# Patient Record
Sex: MALE | Race: WHITE | NOT HISPANIC OR LATINO | Employment: FULL TIME | URBAN - METROPOLITAN AREA
[De-identification: names, ages, dates, MRNs, and addresses within clinical notes are randomized per-mention and may not be internally consistent; named-entity substitution may affect disease eponyms.]

---

## 2017-01-06 ENCOUNTER — ALLSCRIPTS OFFICE VISIT (OUTPATIENT)
Dept: OTHER | Facility: OTHER | Age: 57
End: 2017-01-06

## 2017-01-06 DIAGNOSIS — J45.909 UNCOMPLICATED ASTHMA: ICD-10-CM

## 2017-02-13 ENCOUNTER — TRANSCRIBE ORDERS (OUTPATIENT)
Dept: ADMINISTRATIVE | Facility: HOSPITAL | Age: 57
End: 2017-02-13

## 2017-02-13 ENCOUNTER — ALLSCRIPTS OFFICE VISIT (OUTPATIENT)
Dept: OTHER | Facility: OTHER | Age: 57
End: 2017-02-13

## 2017-02-13 DIAGNOSIS — E04.1 NONTOXIC UNINODULAR GOITER: Primary | ICD-10-CM

## 2017-02-13 DIAGNOSIS — E29.1 TESTICULAR HYPOFUNCTION: ICD-10-CM

## 2017-02-17 ENCOUNTER — HOSPITAL ENCOUNTER (OUTPATIENT)
Dept: ULTRASOUND IMAGING | Facility: HOSPITAL | Age: 57
Discharge: HOME | End: 2017-02-17
Attending: INTERNAL MEDICINE
Payer: COMMERCIAL

## 2017-02-17 DIAGNOSIS — E04.1 NONTOXIC UNINODULAR GOITER: ICD-10-CM

## 2017-02-17 PROCEDURE — 76536 US EXAM OF HEAD AND NECK: CPT

## 2017-02-27 ENCOUNTER — GENERIC CONVERSION - ENCOUNTER (OUTPATIENT)
Dept: OTHER | Facility: OTHER | Age: 57
End: 2017-02-27

## 2017-04-24 ENCOUNTER — ALLSCRIPTS OFFICE VISIT (OUTPATIENT)
Dept: OTHER | Facility: OTHER | Age: 57
End: 2017-04-24

## 2017-06-13 ENCOUNTER — GENERIC CONVERSION - ENCOUNTER (OUTPATIENT)
Dept: OTHER | Facility: OTHER | Age: 57
End: 2017-06-13

## 2017-07-14 ENCOUNTER — GENERIC CONVERSION - ENCOUNTER (OUTPATIENT)
Dept: OTHER | Facility: OTHER | Age: 57
End: 2017-07-14

## 2017-07-22 ENCOUNTER — GENERIC CONVERSION - ENCOUNTER (OUTPATIENT)
Dept: OTHER | Facility: OTHER | Age: 57
End: 2017-07-22

## 2017-08-17 ENCOUNTER — ALLSCRIPTS OFFICE VISIT (OUTPATIENT)
Dept: OTHER | Facility: OTHER | Age: 57
End: 2017-08-17

## 2017-09-13 ENCOUNTER — LAB CONVERSION - ENCOUNTER (OUTPATIENT)
Dept: OTHER | Facility: OTHER | Age: 57
End: 2017-09-13

## 2017-09-13 LAB
HCT VFR BLD AUTO: 47.6 % (ref 38.5–50)
HGB BLD-MCNC: 16 G/DL (ref 13.2–17.1)
PROSTATE SPECIFIC ANTIGEN TOTAL (HISTORICAL): 0.6 NG/ML
T4 FREE SERPL-MCNC: 1.1 NG/DL (ref 0.8–1.8)
TESTOSTERONE FREE (HISTORICAL): 165.9 PG/ML (ref 35–155)
TESTOSTERONE TOTAL (HISTORICAL): 768 NG/DL (ref 250–1100)
TSH SERPL DL<=0.05 MIU/L-ACNC: 2.57 MIU/L (ref 0.4–4.5)

## 2017-09-14 ENCOUNTER — GENERIC CONVERSION - ENCOUNTER (OUTPATIENT)
Dept: OTHER | Facility: OTHER | Age: 57
End: 2017-09-14

## 2017-10-24 DIAGNOSIS — E29.1 TESTICULAR HYPOFUNCTION: ICD-10-CM

## 2017-10-24 DIAGNOSIS — I10 ESSENTIAL (PRIMARY) HYPERTENSION: ICD-10-CM

## 2017-11-16 ENCOUNTER — LAB CONVERSION - ENCOUNTER (OUTPATIENT)
Dept: OTHER | Facility: OTHER | Age: 57
End: 2017-11-16

## 2017-11-16 LAB
A/G RATIO (HISTORICAL): 1.6 (CALC) (ref 1–2.5)
ALBUMIN SERPL BCP-MCNC: 4.2 G/DL (ref 3.6–5.1)
ALP SERPL-CCNC: 50 U/L (ref 40–115)
ALT SERPL W P-5'-P-CCNC: 14 U/L (ref 9–46)
AST SERPL W P-5'-P-CCNC: 17 U/L (ref 10–35)
BILIRUB SERPL-MCNC: 0.5 MG/DL (ref 0.2–1.2)
BUN SERPL-MCNC: 15 MG/DL (ref 7–25)
BUN/CREA RATIO (HISTORICAL): NORMAL (CALC) (ref 6–22)
CALCIUM SERPL-MCNC: 9.8 MG/DL (ref 8.6–10.3)
CHLORIDE SERPL-SCNC: 106 MMOL/L (ref 98–110)
CHOLEST SERPL-MCNC: 165 MG/DL
CHOLEST/HDLC SERPL: 5 (CALC)
CO2 SERPL-SCNC: 25 MMOL/L (ref 20–31)
CREAT SERPL-MCNC: 0.95 MG/DL (ref 0.7–1.33)
EGFR AFRICAN AMERICAN (HISTORICAL): 104 ML/MIN/1.73M2
EGFR-AMERICAN CALC (HISTORICAL): 90 ML/MIN/1.73M2
GAMMA GLOBULIN (HISTORICAL): 2.6 G/DL (CALC) (ref 1.9–3.7)
GLUCOSE (HISTORICAL): 94 MG/DL (ref 65–99)
HDLC SERPL-MCNC: 33 MG/DL
LDL CHOLESTEROL (HISTORICAL): 98 MG/DL (CALC)
NON-HDL-CHOL (CHOL-HDL) (HISTORICAL): 132 MG/DL (CALC)
POTASSIUM SERPL-SCNC: 4.3 MMOL/L (ref 3.5–5.3)
SODIUM SERPL-SCNC: 141 MMOL/L (ref 135–146)
TOTAL PROTEIN (HISTORICAL): 6.8 G/DL (ref 6.1–8.1)
TRIGL SERPL-MCNC: 227 MG/DL

## 2017-11-28 ENCOUNTER — ALLSCRIPTS OFFICE VISIT (OUTPATIENT)
Dept: OTHER | Facility: OTHER | Age: 57
End: 2017-11-28

## 2018-01-11 NOTE — RESULT NOTES
Message   stable thyroid nodule     Verified Results  US THYROID [id number] 12:12PM Dipak Peterson     Test Name Result Flag Reference   US THYROID (Report)     THYROID ULTRASOUND     INDICATION: Follow-up thyroid nodule  COMPARISON: 5/7/2015     TECHNIQUE:  Ultrasound of the thyroid was performed with a high frequency linear transducer in transverse and sagittal planes including volumetric imaging sweeps as well as traditional still imaging technique  FINDINGS:   Normal homogeneous smooth background echotexture  Right gland: 4 7 x 1 3 x 1 8 cm  Isoechoic nodule in the mid lobe anteriorly measures 9 x 6 mm unchanged  Left gland: 4 3 x 1 4 x 1 4 cm  No dominant nodules  Isthmus: The isthmus is 0 2 cm in AP dimension  IMPRESSION:      Stable exam with subcentimeter right thyroid nodule  Workstation performed: [id number]     Signed by:    Tasneem Blue MD   2/20/17

## 2018-01-11 NOTE — RESULT NOTES
Message   Normal, continue current dose of testosterone replacement  Verified Results  (Q) TESTOSTERONE, FREE AND TOTAL, LC/MS/MS [id number] 06:35AM Quiana Nipper     Test Name Result Flag Reference   TESTOSTERONE, TOTAL,$LC/MS/ ng/dL  250-1100   For more information on this test, go to  [email or web address]/faq/  TotalTestosteroneLCMSMS   FREE TESTOSTERONE 92 4 pg/mL  35 0-155 0       Signatures   Electronically signed by : Licha Marks, ; May 24 2016  6:07PM EST                       (Author)

## 2018-01-12 VITALS
HEIGHT: 71 IN | BODY MASS INDEX: 28.84 KG/M2 | RESPIRATION RATE: 16 BRPM | DIASTOLIC BLOOD PRESSURE: 84 MMHG | HEART RATE: 74 BPM | WEIGHT: 206.02 LBS | SYSTOLIC BLOOD PRESSURE: 138 MMHG

## 2018-01-12 NOTE — PROGRESS NOTES
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)   2  Thyroid nodule (241 0) (E04 1)    Plan  Testicular hypogonadism    · BD Eclipse Syringe 25G X 1" 3 ML Miscellaneous; use twice a week   Rx By: Ekta Prajapati; Dispense: 90 Days ; #:24 Miscellaneous; Refill: 3; For: Testicular hypogonadism; CAL = N; Verified Transmission to [id number]; Last Updated By: System, Comfy; 8/17/2017 7:34:51 AM   · Testosterone Enanthate 200 MG/ML Intramuscular Solution; Inject 80 mg IM  weekly   Rx By: Ekta Prajapati; Dispense: 90 Days ; #:3 X 5 ML Vial; Refill: 1; For: Testicular hypogonadism; CAL = N; Print Rx   · (1) HGB AND HCT, BLOOD; Status:Active; Requested for:58Bjr2223;    Perform:[facility]; Due:17Aug2018; Ordered; For:Testicular hypogonadism; Ordered By:Kuldip Morales;   · (1) TESTOSTERONE, FREE (DIRECT) AND TOTAL; Status:Active; Requested  for:32Tkw7674;    Perform:[facility]; Due:15Ncw7192; Ordered; For:Testicular hypogonadism; Ordered By:Kuldip Morales; Testicular hypogonadism, Urinary frequency    · (1) PSA, DIAGNOSTIC (FOLLOW-UP); Status:Active; Requested for:56Hnq4911;    Perform:[facility]; Due:17Aug2018; Ordered; For:Testicular hypogonadism, Urinary frequency; Ordered By:Kuldip Morales; Thyroid nodule    · (1) T4, FREE; Status:Active; Requested for:17Aug2017;    Perform:[facility]; Due:96Riu2544; Ordered; For:Thyroid nodule; Ordered By:Kuldip Morales;   · (1) TSH; Status:Active; Requested for:84Sqy5401;    Perform:[facility]; Due:47Wgp7212; Ordered; For:Thyroid nodule; Ordered By:Kuldip Morales;   · Follow-up PRN Evaluation and Treatment  Follow-up  Status: Complete  Done:  33DWB0909   Ordered; For: Thyroid nodule; Ordered By: Ekta Prajapati Performed:  Due: 33NYC8580    Discussion/Summary  Discussion Summary:   1  Hypogonadism-he has let his testosterone dosing to twice a week instead of once a week  He finds that he is having better energy throughout the week doing this compared to once a week   He is due for laboratory testing which I have ordered  Being on testosterone, he is at higher risk or developing erythrocytosis deck in the 2 DVT and pulmonary embolism, prosthetic hypertrophy, hyperlipidemia and worsening of sleep apnea  2  Thyroid nodule-the ultrasound showed stable small nodules  Check TSH and free T4  Counseling Documentation With Imm: The patient was counseled regarding diagnostic results, instructions for management, impressions  Medication SE Review and Pt Understands Tx: The treatment plan was reviewed with the patient/guardian  The patient/guardian understands and agrees with the treatment plan      Chief Complaint  Chief Complaint Free Text Note Form: Follow up  History of Present Illness  Thyroid Nodule (Brief): The patient is being seen for a routine clinic follow-up of a thyroid nodule  The patient is currently asymptomatic  No associated symptoms are reported  The patient is not currently being treated for this problem  Pertinent medical history:  no thyroid malignancy  No risk factors have been identified  Hypogonadism, Primary: The patient is being seen for follow-up of primary hypogonadism  The etiology is idiopathic  Current treatment includes intramuscular testosterone  By report, there is good compliance with treatment, good tolerance of treatment and good symptom control  Symptoms:  no fatigue, no lack of motivation, no trouble concentrating, no feeling less masculine, no low libido and no erectile dysfunction  Pertinent medical history:  no alcoholism and no cirrhosis  Family history:  no delayed puberty  Review of Systems  Endo Adult ROS Male Established v2 - [facility]:   Constitutional/General: no recent weight gain, no recent weight loss, no poor energy/fatigue, no increased energy level, no insomnia/sleep problems, no fever and no feeling weak  Heart: high blood pressure, but no chest pain/tightness, no rapid/racing heart rate and no palpitations     Genitourinary - Urinary no frequent urination, no excess urination and no urinating during the night  Eyes: no blurred vision, no double vision, no bulging eyes, no gritty/scratchy eyes and no excessive tearing  Mouth / Throat: no hoarseness and no difficulty swallowing  Neck: no lumps, no swollen glands, no neck pain, no neck stiffness and no enlarged thyroid  Respiratory: no wheezing, no asthma and no persistent cough  Musculoskeletal: no muscle aches/pain, no joint aches/pain and no muscle weakness  Skin & Hair: no dry skin, no acne, the hair texture was not oily, no hair loss and no excessive hair growth  Gastrointestinal: no constipation, no diarrhea, no waking at night to drink and no stomach ache  Neurological: no blackouts, no weakness and no tremors  Genital: no testicular pain, no testicular lumps/bumps/mass and performs monthly testicular exam    Endocrine: no feeling hot frequently, no feeling cold frequently, no shifts between feeling hot and cold, no cold hands or feet, no excessive sweating, no thyroid problems, no blood sugar problems, no excessive thirst, no excessive hunger, no change in shoe size, no nausea or vomiting and no shaky hands  Active Problems    1  Abnormal blood chemistry (790 6) (R79 9)   2  Acute maxillary sinusitis (461 0) (J01 00)   3  Anxiety disorder (300 00) (F41 9)   4  Benign essential hypertension (401 1) (I10)   5  Benign non-nodular prostatic hyperplasia without lower urinary tract symptoms (600 90)   (N40 0)   6  Bronchitis, asthmatic (493 90) (J45 909)   7  Colon cancer screening (V76 51) (Z12 11)   8  Contusion of scrotum or testis (922 4) (S30 22XA)   9  Depression with anxiety (300 4) (F41 8)   10  Dyspepsia (536 8) (K30)   11  ED (erectile dysfunction) of organic origin (607 84) (N52 9)   12  GERD (gastroesophageal reflux disease) (530 81) (K21 9)   13  Heart burn (787 1) (R12)   14  Heartburn (787 1) (R12)   15  History of allergy (V15 09) (Z88 9)   16  Homocysteinemia (270 4) (E72 11)   17  Hypercalcemia (275 42) (E83 52)   18  Hyperlipidemia (272 4) (E78 5)   19  Need for vaccination for DTP (V06 1) (Z23)   20  Nephrolithiasis (592 0) (N20 0)   21  Other benign neoplasm of skin (216 9) (D23 9)   22  Overweight (278 02) (E66 3)   23  Palpitations (785 1) (R00 2)   24  Seasonal allergies (477 9) (J30 2)   25  Sinus bradycardia (427 89) (R00 1)   26  Skin neoplasm (239 2) (D49 2)   27  Testicular hypogonadism (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)   28  Thyroid nodule (241 0) (E04 1)   29  Urinary frequency (788 41) (R35 0)    Past Medical History    1  History of Low testosterone (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)   2  Need for vaccination for DTP (V06 1) (Z23)    Surgical History    1  History of Renal Lithotripsy    Family History  Father    1  FH: CABG (coronary artery bypass surgery) (V17 3) (Z84 89)  Sister    2  Family history of atrial fibrillation (V17 49) (Z82 49)  Paternal Uncle    3  Family history of myocardial infarction (V17 3) (Z82 49)  Family History    4  Family history of Coronary Artery Disease (V17 49)   5  Family history of Diabetes Mellitus (V18 0)   6  Family history of Hypertension (V17 49)   7  Family history of Uterine Cancer (V16 49)    Social History    · Being A Social Drinker   · Daily caffeine consumption   · Denied: History of Drug Use   · Former smoker (V15 82) (Z89 266)    Current Meds   1  BD Eclipse Syringe 25G X 1" 3 ML Miscellaneous; USE AS DIRECTED; Therapy: 11LIS7673 to (67 [phone])  Requested for: 94RFF7007; Last   Rx:93Ufw2666 Ordered   2  Co Q-10 100 MG Oral Capsule; take 1 capsule daily; Therapy: (Recorded:73Xri3304) to Recorded   3  Fenofibrate 160 MG Oral Tablet; TAKE 1 TABLET DAILY; Therapy: 14ZCW0068 to (SSDTNYAL:86XXH2527)  Requested for: 68ODF5299; Last   Rx:48Hpl6112 Ordered   4  Folic Acid 1 MG Oral Tablet; TAKE 1 TABLET DAILY AS DIRECTED  Requested for:   99RHJ2584; Last Rx:09Oct2012 Ordered   5  Lisinopril 10 MG Oral Tablet;  Take 1 tablet daily; Therapy: 43ZOJ2914 to (Last Rx:76Ycf9594)  Requested for: 47Bqd2959 Ordered   6  LORazepam 0 5 MG Oral Tablet; TAKE 1 TABLET DAILY AS NEEDED; Therapy: 18KTP0491 to (Evaluate:80Iry5808); Last Rx:25Jyq7093 Ordered   7  Nasonex 50 MCG/ACT Nasal Suspension; Therapy: (Recorded:26Dqo7126) to Recorded   8  Omega 3 1200 MG Oral Capsule; TAKE 2 CAPSULE Twice daily; Therapy: (Recorded:10Ifa2597) to Recorded   9  Omeprazole 40 MG Oral Capsule Delayed Release; TAKE 1 CAPSULE Daily; Therapy: (Recorded:05Ksx1630) to Recorded   10  One-Daily Multi Vitamins TABS; TAKE 1 TABLET DAILY; Therapy: (Recorded:50Flk8800) to Recorded   11  Testosterone Enanthate 200 MG/ML Intramuscular Solution; Inject 80 mg IM weekly; Therapy: 22XHI4274 to (Evaluate:87Bur6020); Last Rx:19One4320 Ordered   12  Viagra 50 MG Oral Tablet; take 1 tablet daily 1 hour before needed; Therapy: 86JMX8193 to (Evaluate:27Oct2016)  Requested for: 02DTT9463; Last    Rx:79Ezc2956 Ordered   13  Vitamin B Complex CAPS; TAKE 1 CAPSULE DAILY; Therapy: (Recorded:32Xmv2587) to Recorded   14  Vitamin B12 100 MCG Oral Tablet; TAKE 1 TABLET DAILY AS DIRECTED; Therapy: (Recorded:68Gzg8218) to Recorded   15  Vitamin D 1000 UNIT CAPS; TAKE 1 CAPSULE Daily; Therapy: (Recorded:95Uls3455) to Recorded  Medication List Reviewed: The medication list was reviewed and updated today  Allergies    1  No Known Drug Allergies    2  No Known Environmental Allergies   3  No Known Food Allergies    Vitals  Vital Signs    Recorded: 17Aug2017 07:16AM   Heart Rate 76   Systolic 395   Diastolic 84   Height 5 ft 10 5 in   Weight 206 lb 7 04 oz   BMI Calculated 29 2   BSA Calculated 2 13     Physical Exam    Constitutional   General appearance: No acute distress, well appearing and well nourished  Eyes   Conjunctiva and lids: No swelling, erythema, or discharge  Pupils: Equal, round and reactive to light  The sclera are anicteric   Extraocular movements are intact  Ears, Nose, Mouth, and Throat   External inspection of ears, nose and lips: Normal     Oropharynx: Normal with no erythema, edema, exudate or lesions  Exam of Head: The head is atraumatic and normocephalic  Neck: The neck is supple  The thyroid is normal in size with no palpable nodules  Pulmonary   Auscultation of lungs: Clear to auscultation bilaterally with normal chest expansion  Cardiovascular   Auscultation of heart: Normal rate and rhythm with no murmurs, gallops or rubs  Abdomen   Abdomen: Abdomen is soft, non-tender with normal bowel sounds  Lymphatic   Palpation of lymph nodes: No supraclavicular or suboccipital lymphadenopathy  Musculoskeletal   Inspection/palpation of joints, bones, and muscles: Muscle bulk and tone is normal     Skin   Skin and subcutaneous tissue: Normal skin temperature and color  Neurologic   Reflexes: 2+ and symmetric  Motor Strength: Strength is 5/5 bilaterally  Psychiatric   Orientation to person, place and time: Normal     Mood and affect: Affect and attention span are normal        Results/Data  US THYROID [id number] 12:12PM Vini Hansonian     Test Name Result Flag Reference   US THYROID (Report)     THYROID ULTRASOUND     INDICATION: Follow-up thyroid nodule  COMPARISON: 5/7/2015     TECHNIQUE:  Ultrasound of the thyroid was performed with a high frequency linear transducer in transverse and sagittal planes including volumetric imaging sweeps as well as traditional still imaging technique  FINDINGS:   Normal homogeneous smooth background echotexture  Right gland: 4 7 x 1 3 x 1 8 cm  Isoechoic nodule in the mid lobe anteriorly measures 9 x 6 mm unchanged  Left gland: 4 3 x 1 4 x 1 4 cm  No dominant nodules  Isthmus: The isthmus is 0 2 cm in AP dimension  IMPRESSION:      Stable exam with subcentimeter right thyroid nodule               Workstation performed: [id number]     Signed by: Marie Booth MD   2/20/17     Future Appointments    Date/Time Provider Specialty Site   10/23/2017 08:00 AM Katelynn Brown DO [facility]     Signatures   Electronically signed by : MERRY Granado ; Aug 17 2017  7:44AM EST                       (Author)

## 2018-01-13 VITALS
BODY MASS INDEX: 28.98 KG/M2 | DIASTOLIC BLOOD PRESSURE: 80 MMHG | HEART RATE: 84 BPM | WEIGHT: 207.02 LBS | SYSTOLIC BLOOD PRESSURE: 140 MMHG | HEIGHT: 71 IN

## 2018-01-13 VITALS
SYSTOLIC BLOOD PRESSURE: 110 MMHG | HEART RATE: 76 BPM | DIASTOLIC BLOOD PRESSURE: 84 MMHG | BODY MASS INDEX: 28.9 KG/M2 | HEIGHT: 71 IN | WEIGHT: 206.44 LBS

## 2018-01-14 VITALS
DIASTOLIC BLOOD PRESSURE: 88 MMHG | HEART RATE: 81 BPM | RESPIRATION RATE: 16 BRPM | TEMPERATURE: 98.6 F | BODY MASS INDEX: 27.73 KG/M2 | WEIGHT: 198.04 LBS | OXYGEN SATURATION: 98 % | HEIGHT: 71 IN | SYSTOLIC BLOOD PRESSURE: 142 MMHG

## 2018-01-15 NOTE — RESULT NOTES
Discussion/Summary   Most of the testing is normal except for free testosterone level  Decrease testosterone to 70 mg per week  Repeat testosterone level in 6 weeks  This should be done in the middle of the cycle  Verified Results  (1) T4, FREE [id number] 07:08AM Andrew, Bankim     Test Name Result Flag Reference   T4, FREE 1 1 ng/dL  0 8-1 8     (Q) HEMOGLOBIN + HEMATOCRIT [id number] 07:08AM Andrew, Bankim     Test Name Result Flag Reference   HEMOGLOBIN 16 0 g/dL  13 2-17 1   HEMATOCRIT 47 6 %  38 5-50 0     (Q) TSH, 3RD GENERATION [id number] 07:08AM Andrew, Bankim     Test Name Result Flag Reference   TSH 2 57 mIU/L  0 40-4 50     (1) PSA (SCREEN) (Dx V76 44 Screen for Prostate Cancer) [id number] 07:08AM Nettie Bernheim     Test Name Result Flag Reference   PSA, TOTAL 0 6 ng/mL  < OR = 4 0   The total PSA value from this assay system is   standardized against the WHO standard  The test   result will be approximately 20% lower when compared   to the equimolar-standardized total PSA (Markell   Clarice)  Comparison of serial PSA results should be   interpreted with this fact in mind  This test was performed using the Siemens   chemiluminescent method  Values obtained from   different assay methods cannot be used  interchangeably  PSA levels, regardless of  value, should not be interpreted as absolute  evidence of the presence or absence of disease  (Q) TESTOSTERONE, FREE AND TOTAL, LC/MS/MS 22Kea4557 07:08AM Kuldip Morales   REPORT COMMENT:  FASTING:NO     Test Name Result Flag Reference   TESTOSTERONE, TOTAL,$LC/MS/ ng/dL  250-1100   For more information on this test, go to  [email or web address]/faq/  TotalTestosteroneLCMSMS        This test was developed and its analytical performance  characteristics have been determined by [address] [place] [address]  It has  not been cleared or approved by the [place]  Food and Drug  Administration   This assay has been validated pursuant  to the CLIA regulations and is used for clinical  purposes  FREE TESTOSTERONE 165 9 pg/mL H 35 0-155 0   This test was developed and its analytical performance  characteristics have been determined by [address] [place] [address]  It has  not been cleared or approved by the [place]  Food and Drug  Administration  This assay has been validated pursuant  to the CLIA regulations and is used for clinical  purposes

## 2018-01-16 NOTE — PROGRESS NOTES
Assessment    1  Former smoker (V15 82) (O36 865)   2  Benign essential hypertension (401 1) (I10)   3  Hyperlipidemia (272 4) (E78 5)   4  Testicular hypogonadism (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)   5  Encounter for prostate cancer screening (V76 44) (Z12 5)    Assessment and plan 1  Annual wellness examination completed for the patient overall the patient is clinically stable doing well we encouraged the patient follow healthy imbalance diet, exercise routinely, weight loss is recommended I will be ordering is routine laboratories  He is up-to-date on his flu shot, colonoscopy  I will be ordering the patient's PSA  2   Hypertension - controlled, I have counseled patient following healthy imbalance diet, I would like the patient reduce sodium, exercise routinely, I would like the patient continued the med current medical regiment and we will continue to monitor the progress  3   Hypogonadism the patient reports me that he would like me to start prescribing his testosterone he did have a conversation with Endocrinology who feels that it would be appropriate therefore I have given the patient a prescription for his testosterone and a prescription for the syringes  The patient did confirm the dose of testosterone  He does understand the risks benefits and side effects of testosterone  The patient has a narcotics contract on file and today we did check the PDMP  Return to office 6  months  call if any problems     Plan  Encounter for prostate cancer screening    · (1) TESTOSTERONE, FREE (DIRECT) AND TOTAL; Status:Active; Requested  for:28May2018;   Encounter for prostate cancer screening, Testicular hypogonadism    · (1) PSA (SCREEN) (Dx V76 44 Screen for Prostate Cancer); Status:Active; Requested  for:28May2018;   Hyperlipidemia, Testicular hypogonadism    · (1) LIPID PANEL, FASTING; Status:Active; Requested for:28May2018;    Testicular hypogonadism    · From  Testosterone Enanthate 200 MG/ML Intramuscular Solution Inject 70 mg  IM weekly To Testosterone Enanthate 200 MG/ML Intramuscular Solution Inject 80 mg IM  weekly   · BD Eclipse Syringe 25G X 1" 3 ML Miscellaneous; use twice a week   · (1) CBC/ PLT (NO DIFF); Status:Active; Requested for:20Mni6052;    · (1) COMPREHENSIVE METABOLIC PANEL; Status:Active; Requested for:44Ehd3374;     Chief Complaint  Patient is here for a yearly wellness exam       History of Present Illness  HM, Adult Male: The patient is being seen for a health maintenance evaluation  Social History: Household members include spouse  He is   Work status: working full time and occupation: Manager  The patient is a former cigarette smoker, quit smoking 1987, has never used smokeless tobacco and 12pack yrs  He reports occasional alcohol use and drinking 4 drinks per week  The patient has no concerns about alcohol abuse  He has never used illicit drugs  General Health: The patient's health since the last visit is described as good  He has regular dental visits  The patient brushes 3 time(s) a day, flosses 1 time(s) a day and reports his last dental visit was April 2017  He denies vision problems  Vision care includes an eye examination more than a year ago  He denies hearing loss  Immunizations status: up to date  Lifestyle:  He consumes a diverse and healthy diet  He has weight concerns  He exercises regularly  He exercises 3 times per week for 30 minutes per session  He does not use tobacco  He consumes alcohol  He reports occasional alcohol use  He denies drug use  Reproductive health:  the patient is sexually active  birth control is not being practiced  He complains of erectile dysfunction  Screening: Prostate cancer screening includes prostate-specific antigen testing last year  Colorectal cancer screening includes a colonoscopy within the past ten years       Safety elements used: seat belt, safe driving habits, smoke detector, carbon monoxide detector and CPR training for the patient, but no CPR training for household members  Risk findings: anxiety symptoms and mild anxiety, but no passive smoke exposure and no depression symptoms  Active Problems    1  Abnormal blood chemistry (790 6) (R79 9)   2  Acute maxillary sinusitis (461 0) (J01 00)   3  Anxiety disorder (300 00) (F41 9)   4  Benign essential hypertension (401 1) (I10)   5  Benign non-nodular prostatic hyperplasia without lower urinary tract symptoms (600 90)   (N40 0)   6  Bronchitis, asthmatic (493 90) (J45 909)   7  Colon cancer screening (V76 51) (Z12 11)   8  Contusion of scrotum or testis (922 4) (S30 22XA)   9  Depression with anxiety (300 4) (F41 8)   10  Dyspepsia (536 8) (K30)   11  ED (erectile dysfunction) of organic origin (607 84) (N52 9)   12  GERD (gastroesophageal reflux disease) (530 81) (K21 9)   13  Heart burn (787 1) (R12)   14  Heartburn (787 1) (R12)   15  History of allergy (V15 09) (Z88 9)   16  Homocysteinemia (270 4) (E72 11)   17  Hypercalcemia (275 42) (E83 52)   18  Hyperlipidemia (272 4) (E78 5)   19  Need for vaccination for DTP (V06 1) (Z23)   20  Nephrolithiasis (592 0) (N20 0)   21  Other benign neoplasm of skin (216 9) (D23 9)   22  Overweight (278 02) (E66 3)   23  Palpitations (785 1) (R00 2)   24  Seasonal allergies (477 9) (J30 2)   25  Sinus bradycardia (427 89) (R00 1)   26  Skin neoplasm (239 2) (D49 2)   27  Testicular hypogonadism (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)   28  Thyroid nodule (241 0) (E04 1)   29   Urinary frequency (788 41) (R35 0)    Past Medical History    · History of Low testosterone (259 9) (E34 9)   · Need for vaccination for DTP (V06 1) (Z23)    Surgical History    · History of Renal Lithotripsy    Family History  Father    · FH: CABG (coronary artery bypass surgery) (V17 3) (Z84 89)  Sister    · Family history of atrial fibrillation (V17 49) (Z82 49)  Paternal Uncle    · Family history of myocardial infarction (V17 3) (Z82 49)  Family History    · Family history of Coronary Artery Disease (V17 49)   · Family history of Diabetes Mellitus (V18 0)   · Family history of Hypertension (V17 49)   · Family history of Uterine Cancer (V16 49)    Social History    · Being A Social Drinker   · Daily caffeine consumption   · Denied: History of Drug Use   · Former smoker (V15 82) (S01 278)    Current Meds   1  BD Eclipse Syringe 25G X 1" 3 ML Miscellaneous; use twice a week; Therapy: 39JEU8818 to (Evaluate:17Ank1239)  Requested for: 17Aug2017; Last   Rx:17Aug2017 Ordered   2  Co Q-10 100 MG Oral Capsule; take 1 capsule daily; Therapy: (Recorded:49Gfc9411) to Recorded   3  Fenofibrate 160 MG Oral Tablet; TAKE 1 TABLET DAILY; Therapy: 30OPN1223 to (ZXSRSI:97FUL8373)  Requested for: 75HQO3552; Last   Rx:10Jwo0402 Ordered   4  Folic Acid 1 MG Oral Tablet; TAKE 1 TABLET DAILY AS DIRECTED  Requested for:   50LEK8447; Last Rx:09Oct2012 Ordered   5  Lisinopril 10 MG Oral Tablet; Take 1 tablet daily; Therapy: 96VTY8795 to (Last Rx:01Gsu8201)  Requested for: 89Tpc6457 Ordered   6  LORazepam 0 5 MG Oral Tablet; TAKE 1 TABLET DAILY AS NEEDED; Therapy: 11RUJ9356 to (Evaluate:10Nov2017)  Requested for: 74BXE5289; Last   Rx:11Oct2017; Status: ACTIVE - Renewal Denied Ordered   7  Nasonex 50 MCG/ACT Nasal Suspension; Therapy: (Recorded:96Ggt9037) to Recorded   8  Omega 3 1200 MG Oral Capsule; TAKE 2 CAPSULE Twice daily; Therapy: (Recorded:60Ucd1888) to Recorded   9  Omeprazole 40 MG Oral Capsule Delayed Release; TAKE 1 CAPSULE Daily; Therapy: (Recorded:31Giw5343) to Recorded   10  One-Daily Multi Vitamins TABS; TAKE 1 TABLET DAILY; Therapy: (Recorded:81Jmy2069) to Recorded   11  Testosterone Enanthate 200 MG/ML Intramuscular Solution; Inject 70 mg IM weekly; Therapy: 91SRV4734 to (Evaluate:14Mar2018) Recorded   12  Viagra 50 MG Oral Tablet; take 1 tablet daily 1 hour before needed; Therapy: 45KND2906 to (Evaluate:27Oct2016)  Requested for: 51ZEV8201; Last    Rx:58Gve4433 Ordered   13   Vitamin B Complex CAPS; TAKE 1 CAPSULE DAILY; Therapy: (Recorded:73Fhx6715) to Recorded   14  Vitamin B12 100 MCG Oral Tablet; TAKE 1 TABLET DAILY AS DIRECTED; Therapy: (Recorded:48Cxy0746) to Recorded   15  Vitamin D 1000 UNIT CAPS; TAKE 1 CAPSULE Daily; Therapy: (Recorded:97Vea5732) to Recorded    Allergies    1  No Known Drug Allergies    2  No Known Environmental Allergies   3  No Known Food Allergies    Vitals   Recorded: 90FDX6383 09:31AM   Heart Rate 76   Respiration 16   Systolic 199, RUE, Sitting   Diastolic 89, RUE, Sitting   Height 5 ft 10 5 in   Weight 207 lb 0 4 oz   BMI Calculated 29 29   BSA Calculated 2 13   O2 Saturation 99     Physical Exam    Constitutional   General appearance: No acute distress, well appearing and well nourished  Head and Face   Head and face: Normal     Eyes   Conjunctiva and lids: No erythema, swelling or discharge  Pupils and irises: Equal, round, reactive to light  Ears, Nose, Mouth, and Throat   External inspection of ears and nose: Normal     Otoscopic examination: Tympanic membranes translucent with normal light reflex  Canals patent without erythema  Hearing: Normal     Nasal mucosa, septum, and turbinates: Normal without edema or erythema  Lips, teeth, and gums: Normal, good dentition  Oropharynx: Normal with no erythema, edema, exudate or lesions  Neck   Neck: Supple, symmetric, trachea midline, no masses  Pulmonary   Respiratory effort: No increased work of breathing or signs of respiratory distress  Auscultation of lungs: Clear to auscultation  Cardiovascular   Auscultation of heart: Normal rate and rhythm, normal S1 and S2, no murmurs  Pedal pulses: 2+ bilaterally  Examination of extremities for edema and/or varicosities: Normal     Abdomen   Abdomen: Non-tender, no masses  Liver and spleen: No hepatomegaly or splenomegaly  Lymphatic   Palpation of lymph nodes in neck: No lymphadenopathy      Psychiatric   Mood and affect: Normal  Results/Data  PHQ-9 Adult Depression Screening 28Nov2017 09:35AM User, Haley     Test Name Result Flag Reference   PHQ-9 Adult Depression Score 0     Over the last two weeks, how often have you been bothered by any of the following problems? Little interest or pleasure in doing things: Not at all - 0  Feeling down, depressed, or hopeless: Not at all - 0  Trouble falling or staying asleep, or sleeping too much: Not at all - 0  Feeling tired or having little energy: Not at all - 0  Poor appetite or over eating: Not at all - 0  Feeling bad about yourself - or that you are a failure or have let yourself or your family down: Not at all - 0  Trouble concentrating on things, such as reading the newspaper or watching television: Not at all - 0  Moving or speaking so slowly that other people could have noticed  Or the opposite -  being so fidgety or restless that you have been moving around a lot more than usual: Not at all - 0  Thoughts that you would be better off dead, or of hurting yourself in some way: Not at all - 0   PHQ-9 Adult Depression Screening Negative     PHQ-9 Difficulty Level Not difficult at all     PHQ-9 Severity No Depression       (1) LIPID PANEL, FASTING 79DPM6737 06:47AM Prema Flow     Test Name Result Flag Reference   CHOLESTEROL, TOTAL 165 mg/dL  <200   HDL CHOLESTEROL 33 mg/dL L >24   TRIGLICERIDES 147 mg/dL H <150   LDL-CHOLESTEROL 98 mg/dL (calc)     Reference range: <100     Desirable range <100 mg/dL for patients with CHD or  diabetes and <70 mg/dL for diabetic patients with  known heart disease  LDL-C is now calculated using the Antonio-Jiménez   calculation, which is a validated novel method providing   better accuracy than the Friedewald equation in the   estimation of LDL-C  Amanda Rodriguezdevin DoughertyDorys  2742;688(84): [phone]   (http://[employer]/faq/YGG898)   CHOL/HDLC RATIO 5 0 (calc) H <5 0   NON HDL CHOLESTEROL 132 mg/dL (calc) H <130   For patients with diabetes plus 1 major ASCVD risk   factor, treating to a non-HDL-C goal of <100 mg/dL   (LDL-C of <70 mg/dL) is considered a therapeutic   option  (1) COMPREHENSIVE METABOLIC PANEL 36ZVP6183 48:48EC [place] [address]   REPORT COMMENT:  FASTING:YES     Test Name Result Flag Reference   GLUCOSE 94 mg/dL  65-99   Fasting reference interval   UREA NITROGEN (BUN) 15 mg/dL  7-25   CREATININE 0 95 mg/dL  0 70-1 33   For patients >52years of age, the reference limit  for Creatinine is approximately 13% higher for people  identified as -American  eGFR NON-AFR  AMERICAN 90 mL/min/1 73m2  > OR = 60   eGFR AFRICAN AMERICAN 104 mL/min/1 73m2  > OR = 60   BUN/CREATININE RATIO   0-30   NOT APPLICABLE (calc)   SODIUM 141 mmol/L  135-146   POTASSIUM 4 3 mmol/L  3 5-5 3   CHLORIDE 106 mmol/L     CARBON DIOXIDE 25 mmol/L  20-31   CALCIUM 9 8 mg/dL  8 6-10 3   PROTEIN, TOTAL 6 8 g/dL  6 1-8 1   ALBUMIN 4 2 g/dL  3 6-5 1   GLOBULIN 2 6 g/dL (calc)  1 9-3 7   ALBUMIN/GLOBULIN RATIO 1 6 (calc)  1 0-2 5   BILIRUBIN, TOTAL 0 5 mg/dL  0 2-1 2   ALKALINE PHOSPHATASE 50 U/L     AST 17 U/L  10-35   ALT 14 U/L  9-46     (1) T4, FREE 43Zxc9111 07:08AM Andrew, Bankim     Test Name Result Flag Reference   T4, FREE 1 1 ng/dL  0 8-1 8     (Q) HEMOGLOBIN + HEMATOCRIT 87Zqa2591 07:08AM Andrew, Bankim     Test Name Result Flag Reference   HEMOGLOBIN 16 0 g/dL  13 2-17 1   HEMATOCRIT 47 6 %  38 5-50 0     (Q) TSH, 3RD GENERATION 00Vtc3981 07:08AM Andrew, Bankim     Test Name Result Flag Reference   TSH 2 57 mIU/L  0 40-4 50     (1) PSA (SCREEN) (Dx V76 44 Screen for Prostate Cancer) 93Cyb9551 07:08AM Andrew, Bankim     Test Name Result Flag Reference   PSA, TOTAL 0 6 ng/mL  < OR = 4 0   The total PSA value from this assay system is   standardized against the WHO standard  The test   result will be approximately 20% lower when compared   to the equimolar-standardized total PSA (Markell   Clarice)   Comparison of serial PSA results should be   interpreted with this fact in mind  This test was performed using the Siemens   chemiluminescent method  Values obtained from   different assay methods cannot be used  interchangeably  PSA levels, regardless of  value, should not be interpreted as absolute  evidence of the presence or absence of disease         Future Appointments    Date/Time Provider Specialty Site   06/12/2018 08:00 AM Nadine Mullen DO [facility]     Signatures   Electronically signed by : Sumeet Vidal DO; Nov 28 2017  4:12PM EST                       (Author)

## 2018-01-16 NOTE — RESULT NOTES
Verified Results  (Q) TESTOSTERONE, FREE AND TOTAL, LC/MS/MS 20Jan2016 06:52AM Kuldip Morales     Test Name Result Flag Reference   TESTOSTERONE, TOTAL,$LC/MS/ ng/dL  250-1100   For more information on this test, go to  http://[employer]/faq/  TotalTestosteroneLCMSMS   FREE TESTOSTERONE 40 4 pg/mL  35 0-155 0

## 2018-01-22 VITALS
RESPIRATION RATE: 16 BRPM | BODY MASS INDEX: 28.98 KG/M2 | OXYGEN SATURATION: 99 % | HEIGHT: 71 IN | HEART RATE: 76 BPM | DIASTOLIC BLOOD PRESSURE: 89 MMHG | WEIGHT: 207.03 LBS | SYSTOLIC BLOOD PRESSURE: 140 MMHG

## 2018-02-05 DIAGNOSIS — E78.1 HYPERTRIGLYCERIDEMIA: Primary | ICD-10-CM

## 2018-02-05 RX ORDER — FENOFIBRATE 145 MG/1
145 TABLET, COATED ORAL DAILY
Qty: 90 TABLET | Refills: 3 | Status: SHIPPED | OUTPATIENT
Start: 2018-02-05 | End: 2018-02-05 | Stop reason: SDUPTHER

## 2018-02-05 RX ORDER — FENOFIBRATE 145 MG/1
145 TABLET, COATED ORAL DAILY
Qty: 90 TABLET | Refills: 0 | Status: SHIPPED | OUTPATIENT
Start: 2018-02-05 | End: 2018-04-21 | Stop reason: SDUPTHER

## 2018-02-23 DIAGNOSIS — F41.9 ANXIETY: Primary | ICD-10-CM

## 2018-02-23 RX ORDER — DIMENHYDRINATE 50 MG
1 TABLET ORAL DAILY
COMMUNITY

## 2018-02-23 RX ORDER — LORAZEPAM 0.5 MG/1
1 TABLET ORAL DAILY PRN
COMMUNITY
Start: 2012-11-09 | End: 2018-02-23 | Stop reason: SDUPTHER

## 2018-02-23 RX ORDER — FOLIC ACID 1 MG/1
1 TABLET ORAL DAILY
COMMUNITY
End: 2019-04-12

## 2018-02-23 RX ORDER — BIOTIN 1 MG
1 TABLET ORAL DAILY
COMMUNITY

## 2018-02-23 RX ORDER — LORAZEPAM 0.5 MG/1
0.5 TABLET ORAL DAILY PRN
Qty: 30 TABLET | Refills: 0 | OUTPATIENT
Start: 2018-02-23 | End: 2018-05-01 | Stop reason: SDUPTHER

## 2018-02-23 RX ORDER — SILDENAFIL 50 MG/1
1 TABLET, FILM COATED ORAL AS NEEDED
COMMUNITY
Start: 2012-03-05 | End: 2019-08-19 | Stop reason: SDUPTHER

## 2018-03-28 DIAGNOSIS — I10 ESSENTIAL HYPERTENSION: Primary | ICD-10-CM

## 2018-03-28 RX ORDER — LISINOPRIL 10 MG/1
TABLET ORAL
Qty: 90 TABLET | Refills: 2 | Status: SHIPPED | OUTPATIENT
Start: 2018-03-28 | End: 2018-06-12 | Stop reason: SDUPTHER

## 2018-04-21 DIAGNOSIS — E78.1 HYPERTRIGLYCERIDEMIA: ICD-10-CM

## 2018-04-21 RX ORDER — FENOFIBRATE 145 MG/1
TABLET, COATED ORAL
Qty: 90 TABLET | Refills: 0 | Status: SHIPPED | OUTPATIENT
Start: 2018-04-21 | End: 2018-07-25 | Stop reason: SDUPTHER

## 2018-05-01 DIAGNOSIS — F41.9 ANXIETY: ICD-10-CM

## 2018-05-01 RX ORDER — LORAZEPAM 0.5 MG/1
0.5 TABLET ORAL DAILY PRN
Qty: 30 TABLET | Refills: 0 | OUTPATIENT
Start: 2018-05-01 | End: 2018-06-12 | Stop reason: SDUPTHER

## 2018-05-21 LAB
ALBUMIN SERPL-MCNC: 4.6 G/DL (ref 3.6–5.1)
ALBUMIN/GLOB SERPL: 1.6 (CALC) (ref 1–2.5)
ALP SERPL-CCNC: 58 U/L (ref 40–115)
ALT SERPL-CCNC: 18 U/L (ref 9–46)
AST SERPL-CCNC: 22 U/L (ref 10–35)
BILIRUB SERPL-MCNC: 0.6 MG/DL (ref 0.2–1.2)
BUN SERPL-MCNC: 9 MG/DL (ref 7–25)
BUN/CREAT SERPL: NORMAL (CALC) (ref 6–22)
CALCIUM SERPL-MCNC: 10.1 MG/DL (ref 8.6–10.3)
CHLORIDE SERPL-SCNC: 103 MMOL/L (ref 98–110)
CHOLEST SERPL-MCNC: 195 MG/DL
CHOLEST/HDLC SERPL: 5.3 (CALC)
CO2 SERPL-SCNC: 27 MMOL/L (ref 20–31)
CREAT SERPL-MCNC: 0.98 MG/DL (ref 0.7–1.33)
ERYTHROCYTE [DISTWIDTH] IN BLOOD BY AUTOMATED COUNT: 12.1 % (ref 11–15)
GLOBULIN SER CALC-MCNC: 2.8 G/DL (CALC) (ref 1.9–3.7)
GLUCOSE SERPL-MCNC: 87 MG/DL (ref 65–99)
HCT VFR BLD AUTO: 46.7 % (ref 38.5–50)
HDLC SERPL-MCNC: 37 MG/DL
HGB BLD-MCNC: 16.1 G/DL (ref 13.2–17.1)
LDLC SERPL CALC-MCNC: 121 MG/DL (CALC)
MCH RBC QN AUTO: 32.6 PG (ref 27–33)
MCHC RBC AUTO-ENTMCNC: 34.5 G/DL (ref 32–36)
MCV RBC AUTO: 94.5 FL (ref 80–100)
NONHDLC SERPL-MCNC: 158 MG/DL (CALC)
PLATELET # BLD AUTO: 269 THOUSAND/UL (ref 140–400)
PMV BLD REES-ECKER: 10.9 FL (ref 7.5–12.5)
POTASSIUM SERPL-SCNC: 4.7 MMOL/L (ref 3.5–5.3)
PROT SERPL-MCNC: 7.4 G/DL (ref 6.1–8.1)
PSA SERPL-MCNC: 0.5 NG/ML
RBC # BLD AUTO: 4.94 MILLION/UL (ref 4.2–5.8)
SL AMB EGFR AFRICAN AMERICAN: 100 ML/MIN/1.73M2
SL AMB EGFR NON AFRICAN AMERICAN: 86 ML/MIN/1.73M2
SODIUM SERPL-SCNC: 139 MMOL/L (ref 135–146)
TESTOST FREE SERPL-MCNC: 99 PG/ML (ref 35–155)
TESTOST SERPL-MCNC: 739 NG/DL (ref 250–1100)
TRIGL SERPL-MCNC: 262 MG/DL
WBC # BLD AUTO: 5.4 THOUSAND/UL (ref 3.8–10.8)

## 2018-06-04 ENCOUNTER — OFFICE VISIT (OUTPATIENT)
Dept: INTERNAL MEDICINE CLINIC | Facility: CLINIC | Age: 58
End: 2018-06-04
Payer: COMMERCIAL

## 2018-06-04 VITALS
SYSTOLIC BLOOD PRESSURE: 120 MMHG | HEART RATE: 88 BPM | OXYGEN SATURATION: 95 % | WEIGHT: 208.6 LBS | DIASTOLIC BLOOD PRESSURE: 88 MMHG | TEMPERATURE: 98.6 F | HEIGHT: 71 IN | BODY MASS INDEX: 29.2 KG/M2

## 2018-06-04 DIAGNOSIS — J20.9 ACUTE BRONCHITIS, UNSPECIFIED ORGANISM: Primary | ICD-10-CM

## 2018-06-04 PROCEDURE — 99213 OFFICE O/P EST LOW 20 MIN: CPT | Performed by: NURSE PRACTITIONER

## 2018-06-04 PROCEDURE — 3008F BODY MASS INDEX DOCD: CPT | Performed by: NURSE PRACTITIONER

## 2018-06-04 RX ORDER — AZITHROMYCIN 250 MG/1
TABLET, FILM COATED ORAL
Qty: 6 TABLET | Refills: 0 | Status: SHIPPED | OUTPATIENT
Start: 2018-06-04 | End: 2018-06-12 | Stop reason: ALTCHOICE

## 2018-06-04 RX ORDER — ALBUTEROL SULFATE 90 UG/1
2 AEROSOL, METERED RESPIRATORY (INHALATION) EVERY 6 HOURS PRN
Qty: 1 INHALER | Refills: 0 | Status: SHIPPED | OUTPATIENT
Start: 2018-06-04 | End: 2019-04-12

## 2018-06-04 RX ORDER — GUAIFENESIN AND CODEINE PHOSPHATE 100; 10 MG/5ML; MG/5ML
5 SOLUTION ORAL
Qty: 50 ML | Refills: 0 | Status: SHIPPED | OUTPATIENT
Start: 2018-06-04 | End: 2018-06-12 | Stop reason: ALTCHOICE

## 2018-06-04 NOTE — PROGRESS NOTES
Assessment/Plan:     Diagnoses and all orders for this visit:    Acute bronchitis, unspecified organism  -     azithromycin (ZITHROMAX) 250 mg tablet; Take 2 tablets daily on day 1 then 1 tablet daily on days 2-5   -     albuterol (PROAIR HFA) 90 mcg/act inhaler; Inhale 2 puffs every 6 (six) hours as needed for wheezing or shortness of breath  -     guaifenesin-codeine (GUAIFENESIN AC) 100-10 MG/5ML liquid; Take 5 mL by mouth daily at bedtime as needed for cough          Subjective:      Patient ID: Laura Gonzalez is a 56 y o  male  Here for a cough/cold symptoms  Started 8 days ago, thought it was a cold  Now getting worse +wheezing, chest tightness  Taking mucinex and flonase    Productive cough with yellowish sputum  Denies any fever               The following portions of the patient's history were reviewed and updated as appropriate: allergies, current medications, past family history, past medical history, past social history, past surgical history and problem list     Review of Systems   Constitutional: Negative  HENT: Positive for postnasal drip and sore throat  Respiratory: Positive for cough, chest tightness and wheezing  Negative for shortness of breath  Cardiovascular: Negative  Gastrointestinal: Negative  Musculoskeletal: Negative  Neurological: Negative  Objective:      /88   Pulse 88   Temp 98 6 °F (37 °C)   Ht 5' 11" (1 803 m)   Wt 94 6 kg (208 lb 9 6 oz)   SpO2 95%   BMI 29 09 kg/m²          Physical Exam   Constitutional: He is oriented to person, place, and time  He appears well-developed and well-nourished  HENT:   Right Ear: External ear normal    Left Ear: External ear normal    Mouth/Throat: Posterior oropharyngeal erythema present  No oropharyngeal exudate or posterior oropharyngeal edema  Eyes: Pupils are equal, round, and reactive to light  Cardiovascular: Normal rate, regular rhythm and normal heart sounds      Pulmonary/Chest: He has wheezes in the right upper field and the left upper field  Abdominal: Soft  Bowel sounds are normal    Musculoskeletal: He exhibits no edema  Lymphadenopathy:     He has no cervical adenopathy  Neurological: He is alert and oriented to person, place, and time  Psychiatric: He has a normal mood and affect   His behavior is normal

## 2018-06-05 RX ORDER — TESTOSTERONE ENANTHATE 200 MG/ML
80 INJECTION, SOLUTION INTRAMUSCULAR WEEKLY
Refills: 1 | COMMUNITY
Start: 2018-05-25 | End: 2018-06-12 | Stop reason: SDUPTHER

## 2018-06-12 ENCOUNTER — OFFICE VISIT (OUTPATIENT)
Dept: INTERNAL MEDICINE CLINIC | Facility: CLINIC | Age: 58
End: 2018-06-12
Payer: COMMERCIAL

## 2018-06-12 VITALS
DIASTOLIC BLOOD PRESSURE: 80 MMHG | RESPIRATION RATE: 16 BRPM | WEIGHT: 209.6 LBS | OXYGEN SATURATION: 98 % | HEIGHT: 71 IN | HEART RATE: 72 BPM | BODY MASS INDEX: 29.34 KG/M2 | SYSTOLIC BLOOD PRESSURE: 138 MMHG

## 2018-06-12 DIAGNOSIS — E29.1 TESTICULAR HYPOGONADISM: ICD-10-CM

## 2018-06-12 DIAGNOSIS — I10 ESSENTIAL HYPERTENSION: ICD-10-CM

## 2018-06-12 DIAGNOSIS — E29.1 HYPOGONADISM IN MALE: Primary | ICD-10-CM

## 2018-06-12 DIAGNOSIS — E04.1 THYROID NODULE: ICD-10-CM

## 2018-06-12 DIAGNOSIS — I10 BENIGN ESSENTIAL HYPERTENSION: ICD-10-CM

## 2018-06-12 DIAGNOSIS — Z11.59 ENCOUNTER FOR HEPATITIS C SCREENING TEST FOR LOW RISK PATIENT: Primary | ICD-10-CM

## 2018-06-12 DIAGNOSIS — F41.9 ANXIETY: ICD-10-CM

## 2018-06-12 DIAGNOSIS — E66.3 OVERWEIGHT: ICD-10-CM

## 2018-06-12 DIAGNOSIS — F41.8 DEPRESSION WITH ANXIETY: ICD-10-CM

## 2018-06-12 DIAGNOSIS — K21.9 GASTROESOPHAGEAL REFLUX DISEASE WITHOUT ESOPHAGITIS: ICD-10-CM

## 2018-06-12 PROCEDURE — 3079F DIAST BP 80-89 MM HG: CPT | Performed by: INTERNAL MEDICINE

## 2018-06-12 PROCEDURE — 1036F TOBACCO NON-USER: CPT | Performed by: INTERNAL MEDICINE

## 2018-06-12 PROCEDURE — 3075F SYST BP GE 130 - 139MM HG: CPT | Performed by: INTERNAL MEDICINE

## 2018-06-12 PROCEDURE — 99214 OFFICE O/P EST MOD 30 MIN: CPT | Performed by: INTERNAL MEDICINE

## 2018-06-12 RX ORDER — LISINOPRIL 10 MG/1
TABLET ORAL
Qty: 135 TABLET | Refills: 3 | Status: SHIPPED | OUTPATIENT
Start: 2018-06-12 | End: 2019-07-07 | Stop reason: SDUPTHER

## 2018-06-12 NOTE — ASSESSMENT & PLAN NOTE
Suboptimal control blood pressure 138/90 when I recheck it will increase lisinopril to 15 mg once daily

## 2018-06-12 NOTE — ASSESSMENT & PLAN NOTE
Currently stable thyroid nodule recheck ultrasound of the thyroid in 1 year I did review the ultrasound with the patient

## 2018-06-12 NOTE — ASSESSMENT & PLAN NOTE
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

## 2018-06-12 NOTE — PROGRESS NOTES
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 will continue with PPI currently is stable and up-to-date with upper endoscopy I have explained to the patient the risks benefits and side effects of proton pump inhibitor  Overweight     Recommend a healthy balance diet, reduce carbohydrates and sweets increase exercise  Thyroid nodule     Currently stable thyroid nodule recheck ultrasound of the thyroid in 1 year I did review the ultrasound with the patient  Relevant Orders    US thyroid    TSH, 3rd generation    Testicular hypogonadism     Currently stable on testosterone 80 mg IM weekly  Other Visit Diagnoses     Encounter for hepatitis C screening test for low risk patient    -  Primary    Relevant Orders    Hepatitis C antibody    Essential hypertension        Relevant Medications    lisinopril (ZESTRIL) 10 mg tablet          Return to office 6  months  call if any problems  Subjective:      Patient ID: Venus Silvestre is a 56 y o  male  HPI 49-year old male coming in for a follow up visit regarding GERD/Webb's, thyroid nodule, testicular hypogonadism, benign essential hypertension, overweight, anxiety and depression; The patient reports me compliant taking medications without untoward side effects the    The patient is here to review his medical condition, update me on the medical condition and the patient reports me no hospitalizations and no ER visits  Patient does report me overall he is feeling fine less exercise since last visit he is here to review his laboratories  He does report me travel and that he is not exercising the weight he had been in the past he plans to make changes  He reports me no anxiety, no depression  The following portions of the patient's history were reviewed and updated as appropriate: allergies, current medications, past family history, past medical history, past surgical history and problem list     Review of Systems   Constitutional: Negative for activity change, appetite change and unexpected weight change  HENT: Negative for dental problem and postnasal drip  Eyes: Negative for visual disturbance  Respiratory: Negative for cough and shortness of breath  Cardiovascular: Negative for chest pain  Gastrointestinal: Negative for abdominal pain, diarrhea, nausea and vomiting  Neurological: Negative for dizziness, light-headedness and headaches  Hematological: Negative for adenopathy  Objective:                    No Follow-up on file  No Known Allergies    Past Medical History:   Diagnosis Date    Chronic kidney disease     kidney stones    Hypertension      Past Surgical History:   Procedure Laterality Date    EXTRACORPOREAL SHOCK WAVE LITHOTRIPSY Bilateral     Renal    NH EXC SKIN BENIG >4 CM FACE,FACIAL Right 4/28/2016    Procedure: EXCISION  LESION UPPER EYELID, COMPLEX CLOSURE;  Surgeon: Joaquin Tripp MD;  Location: [facility] OR;  Service: Plastics     Current Outpatient Prescriptions on File Prior to Visit   Medication Sig Dispense Refill    albuterol (PROAIR HFA) 90 mcg/act inhaler Inhale 2 puffs every 6 (six) hours as needed for wheezing or shortness of breath 1 Inhaler 0    b complex vitamins capsule Take 1 capsule by mouth daily        Cholecalciferol (VITAMIN D3) 1000 units CAPS Take 1 capsule by mouth daily      coenzyme Q-10 100 MG capsule Take 1 capsule by mouth daily  cyanocobalamin (VITAMIN B-12) 100 mcg tablet Take by mouth daily   fenofibrate (TRICOR) 145 mg tablet TAKE 1 TABLET DAILY 90 tablet 0    fexofenadine (ALLEGRA) 180 MG tablet Take 180 mg by mouth daily   folic acid (FOLVITE) 1 mg tablet Take 1 tablet by mouth daily      LORazepam (ATIVAN) 0 5 mg tablet Take 1 tablet (0 5 mg total) by mouth daily as needed for anxiety 30 tablet 0    mometasone (NASONEX) 50 mcg/act nasal spray 2 sprays into each nostril daily as needed Indications: Hayfever   multivitamin (THERAGRAN) TABS Take 1 tablet by mouth daily   omeprazole (PriLOSEC) 40 MG capsule Take 40 mg by mouth daily   sildenafil (VIAGRA) 50 MG tablet Take 1 tablet by mouth      SYRINGE-NEEDLE, DISP, 3 ML (BD ECLIPSE SYRINGE) 25G X 1" 3 ML MISC by Does not apply route      Testosterone Enanthate 200 MG/ML SOLN Inject 80 mg into the shoulder, thigh, or buttocks once a week  1    [DISCONTINUED] guaifenesin-codeine (GUAIFENESIN AC) 100-10 MG/5ML liquid Take 5 mL by mouth daily at bedtime as needed for cough 50 mL 0    [DISCONTINUED] lisinopril (ZESTRIL) 10 mg tablet TAKE 1 TABLET BY MOUTH     DAILY 90 tablet 2    [DISCONTINUED] Testosterone 12 5 MG/ACT (1%) GEL Inject 80 mg into the shoulder, thigh, or buttocks once a week   [DISCONTINUED] azithromycin (ZITHROMAX) 250 mg tablet Take 2 tablets daily on day 1 then 1 tablet daily on days 2-5  6 tablet 0     No current facility-administered medications on file prior to visit        Family History   Problem Relation Age of Onset    Other Father      CABG (coronary artery bypass surgery)    Atrial fibrillation Sister     Other Paternal Uncle      Myocardial infarction    Coronary artery disease Family     Diabetes Family     Hypertension Family     Uterine cancer Family      Social History     Social History    Marital status: /Civil Union     Spouse name: N/A    Number of children: N/A    Years of education: N/A Occupational History    Not on file       Social History Main Topics    Smoking status: Former Smoker     Quit date: 4/28/1981    Smokeless tobacco: Never Used    Alcohol use 1 2 oz/week     2 Cans of beer per week      Comment: week, social drinker    Drug use: No    Sexual activity: Not on file     Other Topics Concern    Not on file     Social History Narrative    Daily caffeine consumption     Vitals:    06/12/18 0754   BP: 138/80   BP Location: Right arm   Patient Position: Sitting   Cuff Size: Standard   Pulse: 72   Resp: 16   SpO2: 98%   Weight: 95 1 kg (209 lb 9 6 oz)   Height: 5' 11" (1 803 m)     Results for orders placed or performed in visit on 05/17/18   Lipid panel   Result Value Ref Range    Total Cholesterol 195 <200 mg/dL    SL AMB HDL CHOLESTEROL 37 (L) >40 mg/dL    Triglycerides 262 (H) <150 mg/dL    SL AMB LDL-CHOLESTEROL 121 (H) mg/dL (calc)    SL AMB CHOL/HDLC RATIO 5 3 (H) <5 0 (calc)    SL AMB NON HDL CHOLESTEROL 158 (H) <130 mg/dL (calc)   Comprehensive metabolic panel   Result Value Ref Range    SL AMB GLUCOSE 87 65 - 99 mg/dL    BUN 9 7 - 25 mg/dL    Creatinine, Serum 0 98 0 70 - 1 33 mg/dL    eGFR Non  86 > OR = 60 mL/min/1 73m2    SL AMB EGFR  100 > OR = 60 mL/min/1 73m2    SL AMB BUN/CREATININE RATIO NOT APPLICABLE 6 - 22 (calc)    SL AMB SODIUM 139 135 - 146 mmol/L    SL AMB POTASSIUM 4 7 3 5 - 5 3 mmol/L    SL AMB CHLORIDE 103 98 - 110 mmol/L    SL AMB CARBON DIOXIDE 27 20 - 31 mmol/L    SL AMB CALCIUM 10 1 8 6 - 10 3 mg/dL    SL AMB PROTEIN, TOTAL 7 4 6 1 - 8 1 g/dL    Serum Albumin 4 6 3 6 - 5 1 g/dL    SL AMB GLOBULIN 2 8 1 9 - 3 7 g/dL (calc)    SL AMB ALBUMIN/GLOBULIN RATIO 1 6 1 0 - 2 5 (calc)    SL AMB BILIRUBIN, TOTAL 0 6 0 2 - 1 2 mg/dL    SL AMB ALKALINE PHOSPHATASE 58 40 - 115 U/L    SL AMB AST 22 10 - 35 U/L    SL AMB ALT 18 9 - 46 U/L   CBC   Result Value Ref Range    SL AMB LAB WHITE BLOOD CELL COUNT 5 4 3 8 - 10 8 Thousand/uL SL AMB LAB RED BLOOD CELLS 4 94 4 20 - 5 80 Million/uL    Hemoglobin 16 1 13 2 - 17 1 g/dL    Hematocrit 46 7 38 5 - 50 0 %    MCV 94 5 80 0 - 100 0 fL    MCH 32 6 27 0 - 33 0 pg    MCHC 34 5 32 0 - 36 0 g/dL    RDW 12 1 11 0 - 15 0 %    Platelet Count 554 794 - 400 Thousand/uL    SL AMB MPV 10 9 7 5 - 12 5 fL   PSA, Total Screen   Result Value Ref Range    Prostate Specific Antigen Total 0 5 < OR = 4 0 ng/mL   Testosterone, free, total   Result Value Ref Range    Testosterone, Total, LC/ 250 - 1,100 ng/dL    Testosterone, Free 99 0 35 0 - 155 0 pg/mL     Weight (last 2 days)     Date/Time   Weight    06/12/18 0754  95 1 (209 6)            Body mass index is 29 23 kg/m²  BP      Temp      Pulse     Resp      SpO2        Vitals:    06/12/18 0754   Weight: 95 1 kg (209 lb 9 6 oz)     Vitals:    06/12/18 0754   Weight: 95 1 kg (209 lb 9 6 oz)       /80 (BP Location: Right arm, Patient Position: Sitting, Cuff Size: Standard)   Pulse 72   Resp 16   Ht 5' 11" (1 803 m)   Wt 95 1 kg (209 lb 9 6 oz)   SpO2 98%   BMI 29 23 kg/m²          Physical Exam   Constitutional: He appears well-developed and well-nourished  No distress  HENT:   Head: Normocephalic and atraumatic  Right Ear: External ear normal    Left Ear: External ear normal    Mouth/Throat: Oropharynx is clear and moist    Eyes: Conjunctivae are normal  Pupils are equal, round, and reactive to light  Right eye exhibits no discharge  Left eye exhibits no discharge  No scleral icterus  Neck: Neck supple  Cardiovascular: Normal rate, regular rhythm and normal heart sounds  Exam reveals no gallop and no friction rub  No murmur heard  Pulmonary/Chest: No respiratory distress  He has no wheezes  He has no rales  Abdominal: Soft  Bowel sounds are normal  He exhibits no distension and no mass  There is no tenderness  There is no rebound and no guarding  Musculoskeletal: He exhibits no edema or deformity     Lymphadenopathy:     He has no cervical adenopathy  Neurological: He is alert  Skin: He is not diaphoretic  Psychiatric: He has a normal mood and affect

## 2018-06-13 RX ORDER — TESTOSTERONE ENANTHATE 200 MG/ML
80 INJECTION, SOLUTION INTRAMUSCULAR WEEKLY
Qty: 3 VIAL | Refills: 0 | Status: SHIPPED | OUTPATIENT
Start: 2018-06-13 | End: 2019-04-12 | Stop reason: SDUPTHER

## 2018-06-13 RX ORDER — LORAZEPAM 0.5 MG/1
0.5 TABLET ORAL DAILY PRN
Qty: 30 TABLET | Refills: 0 | Status: SHIPPED | OUTPATIENT
Start: 2018-06-13 | End: 2018-08-14 | Stop reason: SDUPTHER

## 2018-06-13 NOTE — TELEPHONE ENCOUNTER
No, I sent for a 3 month supply per last prescription  I corrected and took out the refills  Please authorize

## 2018-07-25 DIAGNOSIS — E78.1 HYPERTRIGLYCERIDEMIA: ICD-10-CM

## 2018-07-25 RX ORDER — FENOFIBRATE 145 MG/1
TABLET, COATED ORAL
Qty: 90 TABLET | Refills: 0 | Status: SHIPPED | OUTPATIENT
Start: 2018-07-25 | End: 2018-09-24 | Stop reason: SDUPTHER

## 2018-08-14 DIAGNOSIS — F41.9 ANXIETY: ICD-10-CM

## 2018-08-14 RX ORDER — LORAZEPAM 0.5 MG/1
0.5 TABLET ORAL DAILY PRN
Qty: 30 TABLET | Refills: 0 | Status: SHIPPED | OUTPATIENT
Start: 2018-08-14 | End: 2018-10-11 | Stop reason: SDUPTHER

## 2018-09-06 DIAGNOSIS — E29.1 HYPOGONADISM IN MALE: Primary | ICD-10-CM

## 2018-09-06 RX ORDER — TESTOSTERONE CYPIONATE 200 MG/ML
INJECTION INTRAMUSCULAR
Qty: 4 VIAL | Refills: 0 | Status: SHIPPED | OUTPATIENT
Start: 2018-09-06 | End: 2018-11-23 | Stop reason: SDUPTHER

## 2018-09-24 DIAGNOSIS — E78.1 HYPERTRIGLYCERIDEMIA: ICD-10-CM

## 2018-09-25 RX ORDER — FENOFIBRATE 145 MG/1
TABLET, COATED ORAL
Qty: 90 TABLET | Refills: 0 | Status: SHIPPED | OUTPATIENT
Start: 2018-09-25 | End: 2019-01-21 | Stop reason: SDUPTHER

## 2018-10-11 DIAGNOSIS — F41.9 ANXIETY: ICD-10-CM

## 2018-10-11 RX ORDER — LORAZEPAM 0.5 MG/1
0.5 TABLET ORAL DAILY PRN
Qty: 30 TABLET | Refills: 0 | Status: SHIPPED | OUTPATIENT
Start: 2018-10-11 | End: 2018-12-03 | Stop reason: SDUPTHER

## 2018-10-11 NOTE — TELEPHONE ENCOUNTER
PDMP completed Last appt 6/12/18 09/07/2018  1  09/06/2018  TESTOSTERONE  MG/ML  4 0  28  [id number]  0  [place]  [place]    08/14/2018  1  08/14/2018  LORAZEPAM 0 5 MG TABLET  30 0  30  [id number]  0  [place] Ins  [place]    06/13/2018  1  06/13/2018  LORAZEPAM 0 5 MG TABLET  30 0  30

## 2018-11-23 DIAGNOSIS — E29.1 HYPOGONADISM IN MALE: ICD-10-CM

## 2018-11-26 RX ORDER — TESTOSTERONE CYPIONATE 200 MG/ML
INJECTION INTRAMUSCULAR
Qty: 4 VIAL | Refills: 1 | Status: SHIPPED | OUTPATIENT
Start: 2018-11-26 | End: 2019-03-19 | Stop reason: SDUPTHER

## 2018-11-30 ENCOUNTER — TELEPHONE (OUTPATIENT)
Dept: UROLOGY | Facility: AMBULATORY SURGERY CENTER | Age: 58
End: 2018-11-30

## 2018-11-30 DIAGNOSIS — N20.0 KIDNEY STONES: Primary | ICD-10-CM

## 2018-11-30 NOTE — TELEPHONE ENCOUNTER
Patient called to schedule 2 yr follow up with Dr Gerda Anguiano wanted to know if he wanted any test done prior

## 2018-11-30 NOTE — TELEPHONE ENCOUNTER
Call and schedule patient for 2 yr follow up  Looks like he was a kidney stone patient so I will put in an order for a KUB to be done prior to visit

## 2018-12-03 DIAGNOSIS — F41.9 ANXIETY: ICD-10-CM

## 2018-12-03 RX ORDER — LORAZEPAM 0.5 MG/1
0.5 TABLET ORAL DAILY PRN
Qty: 30 TABLET | Refills: 0 | Status: SHIPPED | OUTPATIENT
Start: 2018-12-03 | End: 2019-01-23 | Stop reason: SDUPTHER

## 2018-12-13 LAB
ALBUMIN SERPL-MCNC: 4.3 G/DL (ref 3.6–5.1)
ALBUMIN/GLOB SERPL: 1.7 (CALC) (ref 1–2.5)
ALP SERPL-CCNC: 48 U/L (ref 40–115)
ALT SERPL-CCNC: 19 U/L (ref 9–46)
AST SERPL-CCNC: 22 U/L (ref 10–35)
BILIRUB SERPL-MCNC: 0.5 MG/DL (ref 0.2–1.2)
BUN SERPL-MCNC: 15 MG/DL (ref 7–25)
BUN/CREAT SERPL: NORMAL (CALC) (ref 6–22)
CALCIUM SERPL-MCNC: 9.6 MG/DL (ref 8.6–10.3)
CHLORIDE SERPL-SCNC: 104 MMOL/L (ref 98–110)
CHOLEST SERPL-MCNC: 182 MG/DL
CHOLEST/HDLC SERPL: 5.7 (CALC)
CO2 SERPL-SCNC: 29 MMOL/L (ref 20–32)
CREAT SERPL-MCNC: 1.03 MG/DL (ref 0.7–1.33)
GLOBULIN SER CALC-MCNC: 2.5 G/DL (CALC) (ref 1.9–3.7)
GLUCOSE SERPL-MCNC: 89 MG/DL (ref 65–99)
HCV AB S/CO SERPL IA: 0.01
HCV AB SERPL QL IA: NORMAL
HDLC SERPL-MCNC: 32 MG/DL
LDLC SERPL CALC-MCNC: 115 MG/DL (CALC)
NONHDLC SERPL-MCNC: 150 MG/DL (CALC)
POTASSIUM SERPL-SCNC: 4.4 MMOL/L (ref 3.5–5.3)
PROT SERPL-MCNC: 6.8 G/DL (ref 6.1–8.1)
SL AMB EGFR AFRICAN AMERICAN: 94 ML/MIN/1.73M2
SL AMB EGFR NON AFRICAN AMERICAN: 81 ML/MIN/1.73M2
SODIUM SERPL-SCNC: 140 MMOL/L (ref 135–146)
TRIGL SERPL-MCNC: 231 MG/DL

## 2019-01-21 DIAGNOSIS — E78.1 HYPERTRIGLYCERIDEMIA: ICD-10-CM

## 2019-01-21 RX ORDER — FENOFIBRATE 145 MG/1
TABLET, COATED ORAL
Qty: 90 TABLET | Refills: 0 | Status: SHIPPED | OUTPATIENT
Start: 2019-01-21 | End: 2019-04-21 | Stop reason: SDUPTHER

## 2019-01-23 DIAGNOSIS — F41.9 ANXIETY: ICD-10-CM

## 2019-01-23 RX ORDER — LORAZEPAM 0.5 MG/1
0.5 TABLET ORAL DAILY PRN
Qty: 30 TABLET | Refills: 0 | Status: SHIPPED | OUTPATIENT
Start: 2019-01-23 | End: 2019-03-08 | Stop reason: SDUPTHER

## 2019-02-04 ENCOUNTER — TELEPHONE (OUTPATIENT)
Dept: UROLOGY | Facility: MEDICAL CENTER | Age: 59
End: 2019-02-04

## 2019-02-04 NOTE — TELEPHONE ENCOUNTER
Please triage for appointment  Patient was in [place] [address] emergency department yesterday for ureterolithiasis

## 2019-02-04 NOTE — TELEPHONE ENCOUNTER
Offer 02/12 at 10 am with Dr Marlys Patel at [facility]   I scheduled appointment please call

## 2019-02-04 NOTE — TELEPHONE ENCOUNTER
Per ER report, patient has mild R hydro secondary to 5mm ureteral stone  Should be seen within a week

## 2019-02-04 NOTE — TELEPHONE ENCOUNTER
I am unable to find an opening for patient  Please help with an appointment  Patient is willing to go to [facility] or [place]

## 2019-02-08 NOTE — TELEPHONE ENCOUNTER
Patient has not been seen since 1/2016  Patient requested methylene blue  Hilaria Cox PA-C was consulted and stated that medication does not have any indication for use for kidney stones  Patient was notified that he will have to wait until his appointment for medication

## 2019-02-08 NOTE — TELEPHONE ENCOUNTER
Pt calling- is experiencing discomfort and pressure thinking due to kidney stone, would like to know if Dr Angie Garza will send over Rx to [phone] in the past to help before apt 2/12  Please advise    [phone]

## 2019-02-08 NOTE — PROGRESS NOTES
2/12/2019    [facility] Body  1960  [id number]    Discussion and Plan    Patient brings with him a potential stone fragment today  KUB will be obtained to reassess overall stone burden with consideration of elective ESWL with a sizable calculi are present in the upper tracts  We also briefly reviewed the potential need for ureteroscopy if the distal stone is still present  Scripts for Flomax and oxycodone provided  Given the recurrent nature of his stone disease, I reiterated the need for complete metabolic workup  Referral to Nephrology arranged today  All questions answered at this time  1  Calculus of kidney  - PSA Total, Diagnostic; Future  - Ambulatory referral to Nephrology; Future  - XR abdomen 1 view kub; Future  - HYDROcodone-acetaminophen (NORCO) 5-325 mg per tablet; Take 1 tablet by mouth every 6 (six) hours as needed for pain for up to 10 daysMax Daily Amount: 4 tablets  Dispense: 10 tablet; Refill: 0  - tamsulosin (FLOMAX) 0 4 mg; Take 1 capsule (0 4 mg total) by mouth daily with dinner  Dispense: 14 capsule; Refill: 3    Assessment      Patient Active Problem List   Diagnosis    Benign essential hypertension    Depression with anxiety    GERD (gastroesophageal reflux disease)    Overweight    Thyroid nodule    Testicular hypogonadism    Calculus of kidney       History of Present Illness    Shahana Paredes is a 66 y o  male seen today in regards to a history of nephrolithiasis and BPH  Not seen for sometime but last imaging had demonstrated bilateral small calculi  He more recently presented to [facility] with acute right flank pain  He is found have a 5 mm right UVJ stone along with several intrarenal calculi diameters for which were not provided in the report  He denies any hematuria urinary tract infection  Urinary flow consists of fair stream with complete bladder emptying sensation, currently sporadic urgency and frequency  Pain is well managed      Urinary Symptom Assessment        Past Medical History  Past Medical History:   Diagnosis Date    Chronic kidney disease     kidney stones    Hypertension        Past Social History  Past Surgical History:   Procedure Laterality Date    EXTRACORPOREAL SHOCK WAVE LITHOTRIPSY Bilateral     Renal    OR EXC SKIN BENIG >4 CM FACE,FACIAL Right 2016    Procedure: EXCISION  LESION UPPER EYELID, COMPLEX CLOSURE;  Surgeon: Clint Rosales MD;  Location:  MAIN OR;  Service: Plastics       Past Family History  Family History   Problem Relation Age of Onset    Other Father         CABG (coronary artery bypass surgery)    Atrial fibrillation Sister     Other Paternal Uncle         Myocardial infarction    Coronary artery disease Family     Diabetes Family     Hypertension Family     Uterine cancer Family        Past Social history  Social History     Socioeconomic History    Marital status: /Civil Union     Spouse name: Not on file    Number of children: Not on file    Years of education: Not on file    Highest education level: Not on file   Occupational History    Not on file   Social Needs    Financial resource strain: Not on file    Food insecurity:     Worry: Not on file     Inability: Not on file    Transportation needs:     Medical: Not on file     Non-medical: Not on file   Tobacco Use    Smoking status: Former Smoker     Last attempt to quit: 1981     Years since quittin 8    Smokeless tobacco: Never Used   Substance and Sexual Activity    Alcohol use:  Yes     Alcohol/week: 1 2 oz     Types: 2 Cans of beer per week     Comment: week, social drinker    Drug use: No    Sexual activity: Not on file   Lifestyle    Physical activity:     Days per week: Not on file     Minutes per session: Not on file    Stress: Not on file   Relationships    Social connections:     Talks on phone: Not on file     Gets together: Not on file     Attends Mosque service: Not on file     Active member of club or organization: Not on file     Attends meetings of clubs or organizations: Not on file     Relationship status: Not on file    Intimate partner violence:     Fear of current or ex partner: Not on file     Emotionally abused: Not on file     Physically abused: Not on file     Forced sexual activity: Not on file   Other Topics Concern    Not on file   Social History Narrative    Daily caffeine consumption       Current Medications  Current Outpatient Medications   Medication Sig Dispense Refill    b complex vitamins capsule Take 1 capsule by mouth daily   Cholecalciferol (VITAMIN D3) 1000 units CAPS Take 1 capsule by mouth daily      coenzyme Q-10 100 MG capsule Take 1 capsule by mouth daily      cyanocobalamin (VITAMIN B-12) 100 mcg tablet Take by mouth daily   fenofibrate (TRICOR) 145 mg tablet TAKE 1 TABLET DAILY 90 tablet 0    fexofenadine (ALLEGRA) 180 MG tablet Take 180 mg by mouth daily   FLUCELVAX QUADRIVALENT TO BE ADMINISTERED BY PHARMACIST FOR IMMUNIZATION  0    ibuprofen (MOTRIN) 600 mg tablet Take 600 mg by mouth every 6 (six) hours as needed      ibuprofen (MOTRIN) 600 mg tablet TAKE 1 TABLET BY MOUTH EVERY 6 HOURS AS NEED FOR MILD PAIN (1-3 ON PAIN SCALE)  TAKE WITH MEALS   0    lisinopril (ZESTRIL) 10 mg tablet 1 5 tablets once a day (Patient taking differently: 15 mg 1 5 tablets once a day) 135 tablet 3    LORazepam (ATIVAN) 0 5 mg tablet Take 1 tablet (0 5 mg total) by mouth daily as needed for anxiety 30 tablet 0    mometasone (NASONEX) 50 mcg/act nasal spray 2 sprays into each nostril daily as needed Indications: Hayfever   multivitamin (THERAGRAN) TABS Take 1 tablet by mouth daily   omeprazole (PriLOSEC) 40 MG capsule Take 40 mg by mouth daily        oxyCODONE-acetaminophen (PERCOCET) 5-325 mg per tablet One (1) Tab Every 6 Hours PRN moderate to severe pain for 1st day,  One (1) Tab Every 12 Hours PRN moderate to severe pain for 2nd day,  One (1) Tab Once PRN moderate to severe pain for 3rd day   oxyCODONE-acetaminophen (PERCOCET) 5-325 mg per tablet 1 TABLET EVERY 6 HRS 1ST DAY, 1 TABLET EVERY 12 HRS 2ND DAY, 1 TABLET ONCE 3RD DAY AS NEEDED ONLY  0    sildenafil (VIAGRA) 50 MG tablet Take 1 tablet by mouth      SYRINGE-NEEDLE, DISP, 3 ML (BD ECLIPSE SYRINGE) 25G X 1" 3 ML MISC by Does not apply route once a week 50 each 3    testosterone cypionate (DEPO-TESTOSTERONE) 200 mg/mL SOLN Inject 0 4ml into shoulder, thigh or buttocks once weekly  4 vial 1    Testosterone Enanthate 200 MG/ML SOLN Inject 0 4 mL (80 mg total) into the shoulder, thigh, or buttocks once a week 3 vial 0    albuterol (PROAIR HFA) 90 mcg/act inhaler Inhale 2 puffs every 6 (six) hours as needed for wheezing or shortness of breath (Patient not taking: Reported on 2/12/2019) 1 Inhaler 0    folic acid (FOLVITE) 1 mg tablet Take 1 tablet by mouth daily      HYDROcodone-acetaminophen (NORCO) 5-325 mg per tablet Take 1 tablet by mouth every 6 (six) hours as needed for pain for up to 10 daysMax Daily Amount: 4 tablets 10 tablet 0    lisinopril (ZESTRIL) 10 mg tablet Take 15 mg by mouth      ondansetron (ZOFRAN-ODT) 4 mg disintegrating tablet Take 4 mg by mouth every 8 (eight) hours as needed      ondansetron (ZOFRAN-ODT) 4 mg disintegrating tablet TAKE 1 TABLET BY MOUTH EVERY 8 HOURS AS NEEDED FOR NAUSEA/VOMITING  0    tamsulosin (FLOMAX) 0 4 mg Take 1 capsule (0 4 mg total) by mouth daily with dinner 14 capsule 3     No current facility-administered medications for this visit  Allergies  No Known Allergies    Past Medical History, Social History, Family History, medications and allergies were reviewed  Review of Systems  Review of Systems   Constitutional: Negative  HENT: Negative  Eyes: Negative  Respiratory: Negative  Cardiovascular: Negative  Gastrointestinal: Negative  Endocrine: Negative  Genitourinary: Positive for frequency and urgency   Negative for decreased urine volume, difficulty urinating and hematuria  Musculoskeletal: Negative  Skin: Negative  Neurological: Negative  Hematological: Negative  Psychiatric/Behavioral: Negative  Vitals  There were no vitals filed for this visit  Physical Exam    Physical Exam   Constitutional: He is oriented to person, place, and time  He appears well-developed and well-nourished  HENT:   Head: Normocephalic and atraumatic  Eyes: Pupils are equal, round, and reactive to light  Neck: Normal range of motion  Cardiovascular: Normal rate, regular rhythm and normal heart sounds  Pulmonary/Chest: Effort normal and breath sounds normal  No accessory muscle usage  No respiratory distress  Abdominal: Soft  Normal appearance and bowel sounds are normal  There is no tenderness  Musculoskeletal: Normal range of motion  Neurological: He is alert and oriented to person, place, and time  Skin: Skin is warm, dry and intact  Psychiatric: He has a normal mood and affect  His speech is normal  Cognition and memory are normal    Nursing note and vitals reviewed  Results    Below listed labs, pathology results, and radiology images were personally reviewed:    Lab Results   Component Value Date/Time    PSA 0 5 05/17/2018 07:25 AM     Lab Results   Component Value Date    GLUCOSE 97 01/08/2016    CALCIUM 9 6 12/12/2018     11/16/2017    K 4 4 12/12/2018    CO2 29 12/12/2018     12/12/2018    BUN 15 12/12/2018    CREATININE 0 95 11/16/2017     Lab Results   Component Value Date    WBC 5 4 05/17/2018    HGB 16 1 05/17/2018    HCT 46 7 05/17/2018    MCV 94 5 05/17/2018     05/17/2018       No results found for this or any previous visit (from the past 1 hour(s))  ]  Impression: Mild right hydronephrosis secondary to 5 mm calculus in the distal  right ureter  No bowel obstruction or free air              [id number]   Result Narrative   CT abdomen and pelvis without contrast 2/03/2019  Reason for exam: Right flank pain and hematuria  Axial tomographic sections were obtained from the domes of the diaphragm to the  pubic symphysis utilizing a renal calculus protocol  The study demonstrates minor atelectatic changes and groundglass opacity at the  lung bases  No pleural effusion is seen  CT of the abdomen: Evaluation of the abdominal viscera is limited by lack of  intravenous contrast  The liver and spleen are free of evidence of mass lesions  The spleen is not enlarged  No cholelithiasis or biliary dilatation is evident  The patient's pancreas appears normal  No peripancreatic collections are  evident  No adrenal enlargement can be detected  Mild right hydronephrosis is  noted secondary to a 5 mm calculus in the distal right ureter  Multiple  bilateral intrarenal calculi are noted  No left renal hydronephrosis is evident  Cortical scarring is noted in the lateral aspect of the left kidney   There is  no evidence of periaortic or pelvic lymphadenopathy  No abdominal aortic  aneurysm is evident  There is no evidence of free fluid within the abdomen or  pelvis  No free intraperitoneal air is evident  No bowel obstruction is evident  The patient's appendix is visualized and appears normal     No destructive bony lesions are evident  CT of the pelvis: No pelvic adenopathy, mass or free fluid can be detected  Other Result Information   Interface, Rad Results In - 02/03/2019  4:10 AM EST  CT abdomen and pelvis without contrast 2/03/2019  Reason for exam: Right flank pain and hematuria  Axial tomographic sections were obtained from the domes of the diaphragm to the  pubic symphysis utilizing a renal calculus protocol  The study demonstrates minor atelectatic changes and groundglass opacity at the  lung bases  No pleural effusion is seen      CT of the abdomen: Evaluation of the abdominal viscera is limited by lack of  intravenous contrast  The liver and spleen are free of evidence of mass lesions  The spleen is not enlarged  No cholelithiasis or biliary dilatation is evident  The patient's pancreas appears normal  No peripancreatic collections are  evident  No adrenal enlargement can be detected  Mild right hydronephrosis is  noted secondary to a 5 mm calculus in the distal right ureter  Multiple  bilateral intrarenal calculi are noted  No left renal hydronephrosis is evident  Cortical scarring is noted in the lateral aspect of the left kidney   There is  no evidence of periaortic or pelvic lymphadenopathy  No abdominal aortic  aneurysm is evident  There is no evidence of free fluid within the abdomen or  pelvis  No free intraperitoneal air is evident  No bowel obstruction is evident  The patient's appendix is visualized and appears normal     No destructive bony lesions are evident  CT of the pelvis: No pelvic adenopathy, mass or free fluid can be detected  IMPRESSION:  Impression: Mild right hydronephrosis secondary to 5 mm calculus in the distal  right ureter  No bowel obstruction or free air              [id number]   Status Results Details

## 2019-02-12 ENCOUNTER — APPOINTMENT (OUTPATIENT)
Dept: LAB | Facility: AMBULARY SURGERY CENTER | Age: 59
End: 2019-02-12
Attending: UROLOGY
Payer: COMMERCIAL

## 2019-02-12 ENCOUNTER — HOSPITAL ENCOUNTER (OUTPATIENT)
Dept: RADIOLOGY | Facility: HOSPITAL | Age: 59
Discharge: HOME | End: 2019-02-12
Attending: UROLOGY
Payer: COMMERCIAL

## 2019-02-12 ENCOUNTER — OFFICE VISIT (OUTPATIENT)
Dept: UROLOGY | Facility: CLINIC | Age: 59
End: 2019-02-12
Payer: COMMERCIAL

## 2019-02-12 ENCOUNTER — TELEPHONE (OUTPATIENT)
Dept: UROLOGY | Facility: MEDICAL CENTER | Age: 59
End: 2019-02-12

## 2019-02-12 DIAGNOSIS — N20.0 CALCULUS OF KIDNEY: ICD-10-CM

## 2019-02-12 DIAGNOSIS — N20.0 CALCULUS OF KIDNEY: Primary | ICD-10-CM

## 2019-02-12 LAB — PSA SERPL-MCNC: 0.7 NG/ML (ref 0–4)

## 2019-02-12 PROCEDURE — 74018 RADEX ABDOMEN 1 VIEW: CPT

## 2019-02-12 PROCEDURE — 82360 CALCULUS ASSAY QUANT: CPT | Performed by: UROLOGY

## 2019-02-12 PROCEDURE — 99244 OFF/OP CNSLTJ NEW/EST MOD 40: CPT | Performed by: UROLOGY

## 2019-02-12 PROCEDURE — 84153 ASSAY OF PSA TOTAL: CPT

## 2019-02-12 RX ORDER — LISINOPRIL 10 MG/1
15 TABLET ORAL
COMMUNITY
End: 2019-04-12

## 2019-02-12 RX ORDER — ONDANSETRON 4 MG/1
TABLET, ORALLY DISINTEGRATING ORAL
Refills: 0 | COMMUNITY
Start: 2019-02-03 | End: 2019-04-12

## 2019-02-12 RX ORDER — OXYCODONE HYDROCHLORIDE AND ACETAMINOPHEN 5; 325 MG/1; MG/1
TABLET ORAL
Refills: 0 | COMMUNITY
Start: 2019-02-03 | End: 2019-04-12

## 2019-02-12 RX ORDER — ONDANSETRON 4 MG/1
4 TABLET, ORALLY DISINTEGRATING ORAL EVERY 8 HOURS PRN
COMMUNITY
Start: 2019-02-03 | End: 2019-04-12

## 2019-02-12 RX ORDER — IBUPROFEN 600 MG/1
TABLET ORAL
Refills: 0 | COMMUNITY
Start: 2019-02-03 | End: 2019-04-12

## 2019-02-12 RX ORDER — TAMSULOSIN HYDROCHLORIDE 0.4 MG/1
0.4 CAPSULE ORAL
Qty: 14 CAPSULE | Refills: 3 | Status: SHIPPED | OUTPATIENT
Start: 2019-02-12 | End: 2019-04-12

## 2019-02-12 RX ORDER — OXYCODONE HYDROCHLORIDE AND ACETAMINOPHEN 5; 325 MG/1; MG/1
TABLET ORAL
COMMUNITY
Start: 2019-02-03 | End: 2019-02-13

## 2019-02-12 RX ORDER — IBUPROFEN 600 MG/1
600 TABLET ORAL EVERY 6 HOURS PRN
COMMUNITY
Start: 2019-02-03 | End: 2022-04-06 | Stop reason: ALTCHOICE

## 2019-02-12 RX ORDER — HYDROCODONE BITARTRATE AND ACETAMINOPHEN 5; 325 MG/1; MG/1
1 TABLET ORAL EVERY 6 HOURS PRN
Qty: 10 TABLET | Refills: 0 | Status: SHIPPED | OUTPATIENT
Start: 2019-02-12 | End: 2019-02-13 | Stop reason: SDUPTHER

## 2019-02-12 NOTE — TELEPHONE ENCOUNTER
Pt was in the office today prescription of Hydrocodone was sent to the pharmacy and they need clarification on the OLIVE#  [facility]  [phone]

## 2019-02-12 NOTE — TELEPHONE ENCOUNTER
I spoke to a pharmacy representative that stated she could not push the script through due to it being a narcotic  Please advise  There is no one else in the office able to send the script electronically at this time

## 2019-02-13 RX ORDER — HYDROCODONE BITARTRATE AND ACETAMINOPHEN 5; 325 MG/1; MG/1
1 TABLET ORAL EVERY 6 HOURS PRN
Qty: 10 TABLET | Refills: 0 | Status: SHIPPED | OUTPATIENT
Start: 2019-02-13 | End: 2019-02-23

## 2019-02-13 NOTE — TELEPHONE ENCOUNTER
Pt would like a call back before end of day today regarding prescription for medication [phone]

## 2019-02-15 LAB
CA PHOS MFR STONE: 10 %
CALCIUM OXALATE DIHYDRATE MFR STONE IR: 40 %
COLOR STONE: NORMAL
COM MFR STONE: 50 %
COMMENT-STONE3: NORMAL
COMPOSITION: NORMAL
LABORATORY COMMENT REPORT: NORMAL
NIDUS STONE QL: NORMAL
PHOTO: NORMAL
SIZE STONE: NORMAL MM
STONE ANALYSIS-IMP: NORMAL
STONE ANALYSIS-IMP: NORMAL
WT STONE: 14 MG

## 2019-02-18 ENCOUNTER — OFFICE VISIT (OUTPATIENT)
Dept: UROLOGY | Facility: CLINIC | Age: 59
End: 2019-02-18
Payer: COMMERCIAL

## 2019-02-18 VITALS
HEART RATE: 88 BPM | HEIGHT: 71 IN | DIASTOLIC BLOOD PRESSURE: 82 MMHG | BODY MASS INDEX: 30.38 KG/M2 | WEIGHT: 217 LBS | SYSTOLIC BLOOD PRESSURE: 130 MMHG

## 2019-02-18 DIAGNOSIS — N20.0 CALCULUS OF KIDNEY: Primary | ICD-10-CM

## 2019-02-18 LAB
SL AMB  POCT GLUCOSE, UA: ABNORMAL
SL AMB LEUKOCYTE ESTERASE,UA: 1
SL AMB POCT BILIRUBIN,UA: ABNORMAL
SL AMB POCT BLOOD,UA: 1
SL AMB POCT CLARITY,UA: CLEAR
SL AMB POCT COLOR,UA: YELLOW
SL AMB POCT KETONES,UA: ABNORMAL
SL AMB POCT NITRITE,UA: ABNORMAL
SL AMB POCT PH,UA: 8
SL AMB POCT SPECIFIC GRAVITY,UA: 1
SL AMB POCT URINE PROTEIN: ABNORMAL
SL AMB POCT UROBILINOGEN: ABNORMAL

## 2019-02-18 PROCEDURE — 81002 URINALYSIS NONAUTO W/O SCOPE: CPT | Performed by: PHYSICIAN ASSISTANT

## 2019-02-18 PROCEDURE — 99214 OFFICE O/P EST MOD 30 MIN: CPT | Performed by: PHYSICIAN ASSISTANT

## 2019-02-18 NOTE — PROGRESS NOTES
1  Calculus of kidney  POCT urine dip    Case request operating room: [address] [facility] ([facility])    Case request operating room: [address] [facility] ([facility])         Assessment and plan:       1  Nephrolithiasis - managed by Dr Galina Llanos  - I was happy to hear that the patient passed his right ureteral calculus  - we discussed his remaining stone burden, specifically with his right-sided kidney stones  We reviewed options including observation, ESWL, and ureteroscopy  Patient states that he has responded well to ESWL in the past and is interested in pursuing this again  Stones are very visible on KUB  We reviewed the risks of the procedure including but not limited to cardiopulmonary complications, bleeding, infection, hematoma, inability to break up stones, need for additional procedures  This will be reviewed preoperatively by the surgeon  Patient verbalized understanding  Patient will be scheduled for right ESWL in the near future  - patient will follow up with Nephrology as previously instructed for metabolic evaluation given his recurrent stone formation  - all questions answered  Keisha Ribera PA-C      Chief Complaint      Follow-up kidney stones    History of Present Illness     Marcie Rader is a 66 y o  male patient of Dr Galina Llanos with a history of nephrolithiasis and BPH presenting for follow-up  Patient has had recurrent nephrolithiasis  He has been referred to Nephrology for metabolic evaluation  Patient recently had a CT of the abdomen and pelvis without contrast (02/03/2019) at [facility] which revealed a 5 mm distal right ureteral calculus with mild right hydronephrosis  Multiple bilateral intrarenal calculi were also noted  Patient had a recent KUB (02/12/2019) which revealed multiple bilateral renal calculi  Four stones in the right kidney with the largest measuring approximately 7 mm    Two stones in the left kidney with the largest measuring approximately 4 mm  A 2 mm calcification was noted over the UVJ which is felt to likely correlates to previous distal ureteral calculus from previous CT  Patient states that he was able to passed his ureteral calculus approximately 3 days after he went for his KUB  He denies any residual suprapubic or flank pain  Denies any dysuria, gross hematuria, nausea, vomiting, fevers, or chills  Patient had brought in a stone fragment for analysis  This revealed his stones are calcium oxalate in origin  Laboratory     Lab Results   Component Value Date    CREATININE 0 95 11/16/2017       Lab Results   Component Value Date    PSA 0 7 02/12/2019    PSA 0 5 05/17/2018       Review of Systems     Review of Systems   Constitutional: Negative for activity change, appetite change, chills, diaphoresis, fatigue, fever and unexpected weight change  Respiratory: Negative for chest tightness and shortness of breath  Cardiovascular: Negative for chest pain, palpitations and leg swelling  Gastrointestinal: Negative for abdominal distention, abdominal pain, constipation, diarrhea, nausea and vomiting  Genitourinary: Negative for decreased urine volume, difficulty urinating, dysuria, enuresis, flank pain, frequency, genital sores, hematuria and urgency  Musculoskeletal: Negative for back pain, gait problem and myalgias  Skin: Negative for color change, pallor, rash and wound  Psychiatric/Behavioral: Negative for behavioral problems  The patient is not nervous/anxious  Allergies     No Known Allergies    Physical Exam     Physical Exam   Constitutional: He is oriented to person, place, and time  He appears well-developed and well-nourished  No distress  HENT:   Head: Normocephalic and atraumatic  Eyes: Conjunctivae are normal    Cardiovascular: Normal rate, regular rhythm and normal heart sounds  Pulmonary/Chest: Effort normal    Musculoskeletal: Normal range of motion   He exhibits no edema or deformity  Neurological: He is alert and oriented to person, place, and time  Skin: Skin is warm and dry  No rash noted  He is not diaphoretic  No erythema  Psychiatric: He has a normal mood and affect  His behavior is normal          Vital Signs     Vitals:    02/18/19 0846   BP: 130/82   BP Location: Left arm   Patient Position: Sitting   Cuff Size: Adult   Pulse: 88   Weight: 98 4 kg (217 lb)   Height: 5' 11" (1 803 m)         Current Medications       Current Outpatient Medications:     b complex vitamins capsule, Take 1 capsule by mouth daily  , Disp: , Rfl:     Cholecalciferol (VITAMIN D3) 1000 units CAPS, Take 1 capsule by mouth daily, Disp: , Rfl:     coenzyme Q-10 100 MG capsule, Take 1 capsule by mouth daily, Disp: , Rfl:     cyanocobalamin (VITAMIN B-12) 100 mcg tablet, Take by mouth daily  , Disp: , Rfl:     fenofibrate (TRICOR) 145 mg tablet, TAKE 1 TABLET DAILY, Disp: 90 tablet, Rfl: 0    fexofenadine (ALLEGRA) 180 MG tablet, Take 180 mg by mouth daily  , Disp: , Rfl:     FLUCELVAX QUADRIVALENT, TO BE ADMINISTERED BY PHARMACIST FOR IMMUNIZATION, Disp: , Rfl: 0    HYDROcodone-acetaminophen (NORCO) 5-325 mg per tablet, Take 1 tablet by mouth every 6 (six) hours as needed for pain for up to 10 daysMax Daily Amount: 4 tablets, Disp: 10 tablet, Rfl: 0    ibuprofen (MOTRIN) 600 mg tablet, Take 600 mg by mouth every 6 (six) hours as needed, Disp: , Rfl:     ibuprofen (MOTRIN) 600 mg tablet, TAKE 1 TABLET BY MOUTH EVERY 6 HOURS AS NEED FOR MILD PAIN (1-3 ON PAIN SCALE)   TAKE WITH MEALS , Disp: , Rfl: 0    lisinopril (ZESTRIL) 10 mg tablet, 1 5 tablets once a day (Patient taking differently: 15 mg 1 5 tablets once a day), Disp: 135 tablet, Rfl: 3    lisinopril (ZESTRIL) 10 mg tablet, Take 15 mg by mouth, Disp: , Rfl:     LORazepam (ATIVAN) 0 5 mg tablet, Take 1 tablet (0 5 mg total) by mouth daily as needed for anxiety, Disp: 30 tablet, Rfl: 0    mometasone (NASONEX) 50 mcg/act nasal spray, 2 sprays into each nostril daily as needed Indications: Hayfever  , Disp: , Rfl:     multivitamin (THERAGRAN) TABS, Take 1 tablet by mouth daily  , Disp: , Rfl:     omeprazole (PriLOSEC) 40 MG capsule, Take 40 mg by mouth daily  , Disp: , Rfl:     sildenafil (VIAGRA) 50 MG tablet, Take 1 tablet by mouth, Disp: , Rfl:     SYRINGE-NEEDLE, DISP, 3 ML (BD ECLIPSE SYRINGE) 25G X 1" 3 ML MISC, by Does not apply route once a week, Disp: 50 each, Rfl: 3    testosterone cypionate (DEPO-TESTOSTERONE) 200 mg/mL SOLN, Inject 0 4ml into shoulder, thigh or buttocks once weekly  , Disp: 4 vial, Rfl: 1    Testosterone Enanthate 200 MG/ML SOLN, Inject 0 4 mL (80 mg total) into the shoulder, thigh, or buttocks once a week, Disp: 3 vial, Rfl: 0    albuterol (PROAIR HFA) 90 mcg/act inhaler, Inhale 2 puffs every 6 (six) hours as needed for wheezing or shortness of breath (Patient not taking: Reported on 2/18/2019), Disp: 1 Inhaler, Rfl: 0    folic acid (FOLVITE) 1 mg tablet, Take 1 tablet by mouth daily, Disp: , Rfl:     ondansetron (ZOFRAN-ODT) 4 mg disintegrating tablet, Take 4 mg by mouth every 8 (eight) hours as needed, Disp: , Rfl:     ondansetron (ZOFRAN-ODT) 4 mg disintegrating tablet, TAKE 1 TABLET BY MOUTH EVERY 8 HOURS AS NEEDED FOR NAUSEA/VOMITING, Disp: , Rfl: 0    oxyCODONE-acetaminophen (PERCOCET) 5-325 mg per tablet, 1 TABLET EVERY 6 HRS 1ST DAY, 1 TABLET EVERY 12 HRS 2ND DAY, 1 TABLET ONCE 3RD DAY AS NEEDED ONLY, Disp: , Rfl: 0    tamsulosin (FLOMAX) 0 4 mg, Take 1 capsule (0 4 mg total) by mouth daily with dinner (Patient not taking: Reported on 2/18/2019), Disp: 14 capsule, Rfl: 3      Active Problems     Patient Active Problem List   Diagnosis    Benign essential hypertension    Depression with anxiety    GERD (gastroesophageal reflux disease)    Overweight    Thyroid nodule    Testicular hypogonadism    Calculus of kidney         Past Medical History     Past Medical History: Diagnosis Date    Chronic kidney disease     kidney stones    Hypertension          Surgical History     Past Surgical History:   Procedure Laterality Date    EXTRACORPOREAL SHOCK WAVE LITHOTRIPSY Bilateral     Renal    WA EXC SKIN BENIG >4 CM FACE,FACIAL Right 4/28/2016    Procedure: EXCISION  LESION UPPER EYELID, COMPLEX CLOSURE;  Surgeon: Jenae Stephens MD;  Location: [facility];  Service: Plastics         Family History     Family History   Problem Relation Age of Onset    Other Father         CABG (coronary artery bypass surgery)    Atrial fibrillation Sister     Other Paternal Uncle         Myocardial infarction    Coronary artery disease Family     Diabetes Family     Hypertension Family     Uterine cancer Family          Social History     Social History       Radiology   ABDOMEN     INDICATION:   N20 0: Calculus of kidney  History of bilateral renal stones     COMPARISON:  January 8, 2016  CT abdomen and pelvis February 3, 2019 report from [id number]:  AP supine        FINDINGS:     Multiple bilateral renal calculi are present  4 stones on the right with the largest measuring 7 mm  2 stones on the left with the largest measuring 4 mm    The report from [facility] describes a 5 mm distal right ureteral calculus        A 2 mm calcification projects in the region of the ureterovesical junction which may correspond to the stone on the recent CT      Nonobstructive bowel gas pattern      No acute osseous abnormality is seen      IMPRESSION:     Bilateral renal calculi with a possible distal right ureter calculus

## 2019-02-21 ENCOUNTER — TELEPHONE (OUTPATIENT)
Dept: UROLOGY | Facility: AMBULATORY SURGERY CENTER | Age: 59
End: 2019-02-21

## 2019-02-21 NOTE — TELEPHONE ENCOUNTER
02/21/19 Left a message for patient to call me to schedule his procedure for 04/18/19 with Dr Aparna Arambula Wilson

## 2019-03-08 DIAGNOSIS — F41.9 ANXIETY: ICD-10-CM

## 2019-03-09 RX ORDER — LORAZEPAM 0.5 MG/1
0.5 TABLET ORAL DAILY PRN
Qty: 30 TABLET | Refills: 0 | Status: SHIPPED | OUTPATIENT
Start: 2019-03-09 | End: 2019-04-16 | Stop reason: SDUPTHER

## 2019-03-19 ENCOUNTER — OFFICE VISIT (OUTPATIENT)
Dept: INTERNAL MEDICINE CLINIC | Facility: CLINIC | Age: 59
End: 2019-03-19
Payer: COMMERCIAL

## 2019-03-19 VITALS
HEART RATE: 77 BPM | BODY MASS INDEX: 30.52 KG/M2 | RESPIRATION RATE: 16 BRPM | OXYGEN SATURATION: 97 % | SYSTOLIC BLOOD PRESSURE: 132 MMHG | DIASTOLIC BLOOD PRESSURE: 80 MMHG | HEIGHT: 71 IN | WEIGHT: 218 LBS

## 2019-03-19 DIAGNOSIS — K21.9 GASTROESOPHAGEAL REFLUX DISEASE WITHOUT ESOPHAGITIS: ICD-10-CM

## 2019-03-19 DIAGNOSIS — N20.0 KIDNEY STONE: ICD-10-CM

## 2019-03-19 DIAGNOSIS — I10 BENIGN ESSENTIAL HYPERTENSION: Primary | ICD-10-CM

## 2019-03-19 DIAGNOSIS — F41.8 DEPRESSION WITH ANXIETY: ICD-10-CM

## 2019-03-19 DIAGNOSIS — R73.9 ELEVATED BLOOD SUGAR: ICD-10-CM

## 2019-03-19 DIAGNOSIS — E78.1 HYPERTRIGLYCERIDEMIA: ICD-10-CM

## 2019-03-19 DIAGNOSIS — E29.1 HYPOGONADISM IN MALE: ICD-10-CM

## 2019-03-19 DIAGNOSIS — E66.09 CLASS 1 OBESITY DUE TO EXCESS CALORIES WITHOUT SERIOUS COMORBIDITY WITH BODY MASS INDEX (BMI) OF 30.0 TO 30.9 IN ADULT: ICD-10-CM

## 2019-03-19 DIAGNOSIS — E29.1 TESTICULAR HYPOGONADISM: ICD-10-CM

## 2019-03-19 PROBLEM — E66.811 CLASS 1 OBESITY DUE TO EXCESS CALORIES WITHOUT SERIOUS COMORBIDITY WITH BODY MASS INDEX (BMI) OF 30.0 TO 30.9 IN ADULT: Status: ACTIVE | Noted: 2019-03-19

## 2019-03-19 PROCEDURE — 3075F SYST BP GE 130 - 139MM HG: CPT | Performed by: INTERNAL MEDICINE

## 2019-03-19 PROCEDURE — 3079F DIAST BP 80-89 MM HG: CPT | Performed by: INTERNAL MEDICINE

## 2019-03-19 PROCEDURE — 99214 OFFICE O/P EST MOD 30 MIN: CPT | Performed by: INTERNAL MEDICINE

## 2019-03-19 NOTE — PROGRESS NOTES
Assessment/Plan:    GERD (gastroesophageal reflux disease)  Stable continue with current medical regiment weight loss encouraged    Testicular hypogonadism  Check testosterone level    Benign essential hypertension  Hypertension - controlled, I have counseled patient following healthy balance diet, I would like the patient reduce sodium, exercise routinely, I would like the patient continued the med current medical regiment and we will continue to monitor  Elevated blood sugar  Pre diabetes -I have counseled the patient to follow a healthy balanced diet, I have counseled patient reduce carbohydrates and sweets in the diet, I would like the patient exercise routinely  I will be checking hemoglobin A1c and comprehensive metabolic panel  Have counseled patient about the prevention of diabetes, and the risk of progression to type 2 diabetes  Class 1 obesity due to excess calories without serious comorbidity with body mass index (BMI) of 30 0 to 30 9 in adult  Obesity -I have counseled patient following healthy and balanced diet, I would like the patient to lose weight, I would like the patient exercise routinely; we will continue monitor the patient's progress  Depression with anxiety  Stable doing well will continue monitor         Problem List Items Addressed This Visit        Digestive    GERD (gastroesophageal reflux disease)     Stable continue with current medical regiment weight loss encouraged            Endocrine    Testicular hypogonadism     Check testosterone level            Cardiovascular and Mediastinum    Benign essential hypertension - Primary     Hypertension - controlled, I have counseled patient following healthy balance diet, I would like the patient reduce sodium, exercise routinely, I would like the patient continued the [facility] current medical regiment and we will continue to monitor           Relevant Orders    Comprehensive metabolic panel       Genitourinary    Kidney stone    Relevant Orders    PTH, intact    Calcium, 24 Hour Urine (w/ Creatinine)       Other    Depression with anxiety     Stable doing well will continue monitor         Elevated blood sugar     Pre diabetes -I have counseled the patient to follow a healthy balanced diet, I have counseled patient reduce carbohydrates and sweets in the diet, I would like the patient exercise routinely  I will be checking hemoglobin A1c and comprehensive metabolic panel  Have counseled patient about the prevention of diabetes, and the risk of progression to type 2 diabetes  Relevant Orders    Hemoglobin A1C    Class 1 obesity due to excess calories without serious comorbidity with body mass index (BMI) of 30 0 to 30 9 in adult     Obesity -I have counseled patient following healthy and balanced diet, I would like the patient to lose weight, I would like the patient exercise routinely; we will continue monitor the patient's progress  Other Visit Diagnoses     Hypogonadism in male        Relevant Orders    Testosterone, free, total    Hypertriglyceridemia        Relevant Orders    High sensitivity CRP    Lipid Panel with Direct LDL reflex          Return to office 6  months  call if any problems  Subjective:      Patient ID: Sanjuanita Ny is a 66 y o  male  HPI 65-year old male coming in for a follow up visit regarding benign essential hypertension, GERD, testicular hypogonadism, kidney stone, anxiety, prediabetes and obesity; The patient reports me compliant taking medications without untoward side effects the  The patient is here to review his medical condition, update me on the medical condition and the patient reports me no hospitalizations and no ER visits  following the meditarranin diet on the vegan diet was gaining weight and low energy with the vegan diet  Fam history of cad father 61 and ; 2-3 days per week exercize; walking more with wife  Best results when going to the gym and work place just put in a gym      The following portions of the patient's history were reviewed and updated as appropriate: allergies, current medications, past family history, past medical history, past social history, past surgical history and problem list     Review of Systems   Constitutional: Negative for activity change, appetite change and unexpected weight change  HENT: Negative for congestion and postnasal drip  Eyes: Negative for visual disturbance  Respiratory: Negative for cough and shortness of breath  Cardiovascular: Negative for chest pain  Gastrointestinal: Negative for abdominal pain, diarrhea, nausea and vomiting  Neurological: Negative for dizziness, light-headedness and headaches  Hematological: Negative for adenopathy  Objective:    No follow-ups on file  No results found  No Known Allergies    Past Medical History:   Diagnosis Date    Chronic kidney disease     kidney stones    Hypertension      Past Surgical History:   Procedure Laterality Date    EXTRACORPOREAL SHOCK WAVE LITHOTRIPSY Bilateral     Renal    VA EXC SKIN BENIG >4 CM FACE,FACIAL Right 4/28/2016    Procedure: EXCISION  LESION UPPER EYELID, COMPLEX CLOSURE;  Surgeon: Robbin Wynn MD;  Location: [facility];  Service: Plastics     Current Outpatient Medications on File Prior to Visit   Medication Sig Dispense Refill    albuterol (PROAIR HFA) 90 mcg/act inhaler Inhale 2 puffs every 6 (six) hours as needed for wheezing or shortness of breath 1 Inhaler 0    b complex vitamins capsule Take 1 capsule by mouth daily   Cholecalciferol (VITAMIN D3) 1000 units CAPS Take 1 capsule by mouth daily      coenzyme Q-10 100 MG capsule Take 1 capsule by mouth daily      cyanocobalamin (VITAMIN B-12) 100 mcg tablet Take by mouth daily   fenofibrate (TRICOR) 145 mg tablet TAKE 1 TABLET DAILY 90 tablet 0    fexofenadine (ALLEGRA) 180 MG tablet Take 180 mg by mouth daily        FLUCELVAX QUADRIVALENT TO BE ADMINISTERED BY PHARMACIST FOR IMMUNIZATION  0    folic acid (FOLVITE) 1 mg tablet Take 1 tablet by mouth daily      ibuprofen (MOTRIN) 600 mg tablet Take 600 mg by mouth every 6 (six) hours as needed      ibuprofen (MOTRIN) 600 mg tablet TAKE 1 TABLET BY MOUTH EVERY 6 HOURS AS NEED FOR MILD PAIN (1-3 ON PAIN SCALE)  TAKE WITH MEALS   0    lisinopril (ZESTRIL) 10 mg tablet 1 5 tablets once a day (Patient taking differently: 15 mg 1 5 tablets once a day) 135 tablet 3    lisinopril (ZESTRIL) 10 mg tablet Take 15 mg by mouth      LORazepam (ATIVAN) 0 5 mg tablet Take 1 tablet (0 5 mg total) by mouth daily as needed for anxiety 30 tablet 0    mometasone (NASONEX) 50 mcg/act nasal spray 2 sprays into each nostril daily as needed Indications: Hayfever   multivitamin (THERAGRAN) TABS Take 1 tablet by mouth daily   omeprazole (PriLOSEC) 40 MG capsule Take 40 mg by mouth daily   ondansetron (ZOFRAN-ODT) 4 mg disintegrating tablet Take 4 mg by mouth every 8 (eight) hours as needed      ondansetron (ZOFRAN-ODT) 4 mg disintegrating tablet TAKE 1 TABLET BY MOUTH EVERY 8 HOURS AS NEEDED FOR NAUSEA/VOMITING  0    oxyCODONE-acetaminophen (PERCOCET) 5-325 mg per tablet 1 TABLET EVERY 6 HRS 1ST DAY, 1 TABLET EVERY 12 HRS 2ND DAY, 1 TABLET ONCE 3RD DAY AS NEEDED ONLY  0    sildenafil (VIAGRA) 50 MG tablet Take 1 tablet by mouth      SYRINGE-NEEDLE, DISP, 3 ML (BD ECLIPSE SYRINGE) 25G X 1" 3 ML MISC by Does not apply route once a week 50 each 3    tamsulosin (FLOMAX) 0 4 mg Take 1 capsule (0 4 mg total) by mouth daily with dinner 14 capsule 3    testosterone cypionate (DEPO-TESTOSTERONE) 200 mg/mL SOLN Inject 0 4ml into shoulder, thigh or buttocks once weekly  4 vial 1    Testosterone Enanthate 200 MG/ML SOLN Inject 0 4 mL (80 mg total) into the shoulder, thigh, or buttocks once a week 3 vial 0     No current facility-administered medications on file prior to visit        Family History   Problem Relation Age of Onset    Other Father CABG (coronary artery bypass surgery)    Atrial fibrillation Sister     Other Paternal Uncle         Myocardial infarction    Coronary artery disease Family     Diabetes Family     Hypertension Family     Uterine cancer Family      Social History     Socioeconomic History    Marital status: /Civil Union     Spouse name: Not on file    Number of children: Not on file    Years of education: Not on file    Highest education level: Not on file   Occupational History    Not on file   Social Needs    Financial resource strain: Not on file    Food insecurity:     Worry: Not on file     Inability: Not on file    Transportation needs:     Medical: Not on file     Non-medical: Not on file   Tobacco Use    Smoking status: Former Smoker     Last attempt to quit: 1981     Years since quittin 9    Smokeless tobacco: Never Used   Substance and Sexual Activity    Alcohol use:  Yes     Alcohol/week: 1 2 oz     Types: 2 Cans of beer per week     Comment: week, social drinker    Drug use: No    Sexual activity: Not on file   Lifestyle    Physical activity:     Days per week: Not on file     Minutes per session: Not on file    Stress: Not on file   Relationships    Social connections:     Talks on phone: Not on file     Gets together: Not on file     Attends Cheondoism service: Not on file     Active member of club or organization: Not on file     Attends meetings of clubs or organizations: Not on file     Relationship status: Not on file    Intimate partner violence:     Fear of current or ex partner: Not on file     Emotionally abused: Not on file     Physically abused: Not on file     Forced sexual activity: Not on file   Other Topics Concern    Not on file   Social History Narrative    Daily caffeine consumption     Vitals:    19 0819   BP: 132/80   Pulse: 77   Resp: 16   SpO2: 97%   Weight: 98 9 kg (218 lb)   Height: 5' 11" (1 803 m)     Results for orders placed or performed in visit on 02/18/19   POCT urine dip   Result Value Ref Range    LEUKOCYTE ESTERASE,UA 1     NITRITE,UA -     SL AMB POCT UROBILINOGEN -     POCT URINE PROTEIN TRACE      PH,UA 8 0     BLOOD,UA 1     SPECIFIC GRAVITY,UA 1 005     KETONES,UA -     BILIRUBIN,UA -     GLUCOSE, UA -      COLOR,UA YELLOW     CLARITY,UA CLEAR      Weight (last 2 days)     Date/Time   Weight    03/19/19 0819   98 9 (218)            Body mass index is 30 4 kg/m²  BP      Temp      Pulse     Resp      SpO2        Vitals:    03/19/19 0819   Weight: 98 9 kg (218 lb)     Vitals:    03/19/19 0819   Weight: 98 9 kg (218 lb)       /80   Pulse 77   Resp 16   Ht 5' 11" (1 803 m)   Wt 98 9 kg (218 lb)   SpO2 97%   BMI 30 40 kg/m²          Physical Exam   Constitutional: He appears well-developed and well-nourished  No distress  HENT:   Head: Normocephalic and atraumatic  Right Ear: External ear normal    Left Ear: External ear normal    Mouth/Throat: Oropharynx is clear and moist    Eyes: Pupils are equal, round, and reactive to light  Conjunctivae are normal  Right eye exhibits no discharge  Left eye exhibits no discharge  No scleral icterus  Neck: Neck supple  Cardiovascular: Normal rate, regular rhythm and normal heart sounds  Exam reveals no gallop and no friction rub  No murmur heard  Pulmonary/Chest: No respiratory distress  He has no wheezes  He has no rales  Abdominal: Soft  Bowel sounds are normal  He exhibits no distension and no mass  There is no tenderness  There is no rebound and no guarding  Musculoskeletal: He exhibits no edema or deformity  Lymphadenopathy:     He has no cervical adenopathy  Neurological: He is alert  Skin: He is not diaphoretic  Psychiatric: He has a normal mood and affect  His mood appears not anxious  He does not exhibit a depressed mood

## 2019-03-21 RX ORDER — TESTOSTERONE CYPIONATE 200 MG/ML
INJECTION INTRAMUSCULAR
Qty: 4 ML | Refills: 3 | Status: SHIPPED | OUTPATIENT
Start: 2019-03-21 | End: 2019-10-07 | Stop reason: SDUPTHER

## 2019-04-05 ENCOUNTER — ANESTHESIA EVENT (OUTPATIENT)
Dept: PERIOP | Facility: AMBULARY SURGERY CENTER | Age: 59
End: 2019-04-05
Payer: COMMERCIAL

## 2019-04-12 ENCOUNTER — OFFICE VISIT (OUTPATIENT)
Dept: UROLOGY | Facility: AMBULATORY SURGERY CENTER | Age: 59
End: 2019-04-12
Payer: COMMERCIAL

## 2019-04-12 VITALS
BODY MASS INDEX: 31.05 KG/M2 | DIASTOLIC BLOOD PRESSURE: 90 MMHG | HEIGHT: 71 IN | HEART RATE: 84 BPM | WEIGHT: 221.8 LBS | SYSTOLIC BLOOD PRESSURE: 126 MMHG

## 2019-04-12 DIAGNOSIS — N20.0 NEPHROLITHIASIS: Primary | ICD-10-CM

## 2019-04-12 PROCEDURE — 99214 OFFICE O/P EST MOD 30 MIN: CPT | Performed by: NURSE PRACTITIONER

## 2019-04-16 DIAGNOSIS — F41.9 ANXIETY: ICD-10-CM

## 2019-04-17 RX ORDER — LORAZEPAM 0.5 MG/1
0.5 TABLET ORAL DAILY PRN
Qty: 30 TABLET | Refills: 0 | Status: SHIPPED | OUTPATIENT
Start: 2019-04-17 | End: 2019-10-07 | Stop reason: SDUPTHER

## 2019-04-18 ENCOUNTER — HOSPITAL ENCOUNTER (OUTPATIENT)
Facility: AMBULARY SURGERY CENTER | Age: 59
Setting detail: OUTPATIENT SURGERY
Discharge: HOME | End: 2019-04-18
Attending: UROLOGY | Admitting: UROLOGY
Payer: COMMERCIAL

## 2019-04-18 ENCOUNTER — ANESTHESIA (OUTPATIENT)
Dept: PERIOP | Facility: AMBULARY SURGERY CENTER | Age: 59
End: 2019-04-18
Payer: COMMERCIAL

## 2019-04-18 VITALS
TEMPERATURE: 97.1 F | DIASTOLIC BLOOD PRESSURE: 84 MMHG | HEART RATE: 80 BPM | WEIGHT: 220 LBS | BODY MASS INDEX: 30.8 KG/M2 | HEIGHT: 71 IN | SYSTOLIC BLOOD PRESSURE: 153 MMHG | OXYGEN SATURATION: 97 % | RESPIRATION RATE: 18 BRPM

## 2019-04-18 DIAGNOSIS — N20.0 KIDNEY STONE: Primary | ICD-10-CM

## 2019-04-18 PROCEDURE — 50590 FRAGMENTING OF KIDNEY STONE: CPT | Performed by: UROLOGY

## 2019-04-18 RX ORDER — SODIUM CHLORIDE 9 MG/ML
INJECTION, SOLUTION INTRAVENOUS CONTINUOUS PRN
Status: DISCONTINUED | OUTPATIENT
Start: 2019-04-18 | End: 2019-04-18 | Stop reason: SURG

## 2019-04-18 RX ORDER — CEFAZOLIN SODIUM 2 G/50ML
2000 SOLUTION INTRAVENOUS ONCE
Status: COMPLETED | OUTPATIENT
Start: 2019-04-18 | End: 2019-04-18

## 2019-04-18 RX ORDER — LIDOCAINE HYDROCHLORIDE 10 MG/ML
0.5 INJECTION, SOLUTION EPIDURAL; INFILTRATION; INTRACAUDAL; PERINEURAL ONCE AS NEEDED
Status: DISCONTINUED | OUTPATIENT
Start: 2019-04-18 | End: 2019-04-18 | Stop reason: HOSPADM

## 2019-04-18 RX ORDER — FENTANYL CITRATE 50 UG/ML
INJECTION, SOLUTION INTRAMUSCULAR; INTRAVENOUS AS NEEDED
Status: DISCONTINUED | OUTPATIENT
Start: 2019-04-18 | End: 2019-04-18 | Stop reason: SURG

## 2019-04-18 RX ORDER — HYDROMORPHONE HCL/PF 1 MG/ML
0.2 SYRINGE (ML) INJECTION
Status: DISCONTINUED | OUTPATIENT
Start: 2019-04-18 | End: 2019-04-18 | Stop reason: HOSPADM

## 2019-04-18 RX ORDER — LIDOCAINE HYDROCHLORIDE 10 MG/ML
INJECTION, SOLUTION INFILTRATION; PERINEURAL AS NEEDED
Status: DISCONTINUED | OUTPATIENT
Start: 2019-04-18 | End: 2019-04-18 | Stop reason: SURG

## 2019-04-18 RX ORDER — ONDANSETRON 2 MG/ML
INJECTION INTRAMUSCULAR; INTRAVENOUS AS NEEDED
Status: DISCONTINUED | OUTPATIENT
Start: 2019-04-18 | End: 2019-04-18 | Stop reason: SURG

## 2019-04-18 RX ORDER — ONDANSETRON 2 MG/ML
4 INJECTION INTRAMUSCULAR; INTRAVENOUS EVERY 4 HOURS PRN
Status: DISCONTINUED | OUTPATIENT
Start: 2019-04-18 | End: 2019-04-18 | Stop reason: HOSPADM

## 2019-04-18 RX ORDER — FENTANYL CITRATE/PF 50 MCG/ML
25 SYRINGE (ML) INJECTION
Status: COMPLETED | OUTPATIENT
Start: 2019-04-18 | End: 2019-04-18

## 2019-04-18 RX ORDER — SODIUM CHLORIDE, SODIUM LACTATE, POTASSIUM CHLORIDE, CALCIUM CHLORIDE 600; 310; 30; 20 MG/100ML; MG/100ML; MG/100ML; MG/100ML
125 INJECTION, SOLUTION INTRAVENOUS CONTINUOUS
Status: DISCONTINUED | OUTPATIENT
Start: 2019-04-18 | End: 2019-04-18 | Stop reason: HOSPADM

## 2019-04-18 RX ORDER — PROPOFOL 10 MG/ML
INJECTION, EMULSION INTRAVENOUS AS NEEDED
Status: DISCONTINUED | OUTPATIENT
Start: 2019-04-18 | End: 2019-04-18 | Stop reason: SURG

## 2019-04-18 RX ORDER — HYDROCODONE BITARTRATE AND ACETAMINOPHEN 5; 325 MG/1; MG/1
1 TABLET ORAL EVERY 4 HOURS PRN
Status: DISCONTINUED | OUTPATIENT
Start: 2019-04-18 | End: 2019-04-18 | Stop reason: HOSPADM

## 2019-04-18 RX ORDER — CIPROFLOXACIN 500 MG/1
500 TABLET, FILM COATED ORAL 2 TIMES DAILY
Qty: 6 TABLET | Refills: 0 | Status: SHIPPED | OUTPATIENT
Start: 2019-04-18 | End: 2019-04-21

## 2019-04-18 RX ORDER — KETOROLAC TROMETHAMINE 30 MG/ML
INJECTION, SOLUTION INTRAMUSCULAR; INTRAVENOUS AS NEEDED
Status: DISCONTINUED | OUTPATIENT
Start: 2019-04-18 | End: 2019-04-18 | Stop reason: SURG

## 2019-04-18 RX ORDER — DEXAMETHASONE SODIUM PHOSPHATE 10 MG/ML
INJECTION, SOLUTION INTRAMUSCULAR; INTRAVENOUS AS NEEDED
Status: DISCONTINUED | OUTPATIENT
Start: 2019-04-18 | End: 2019-04-18 | Stop reason: SURG

## 2019-04-18 RX ORDER — HYDROCODONE BITARTRATE AND ACETAMINOPHEN 5; 325 MG/1; MG/1
1 TABLET ORAL EVERY 6 HOURS PRN
Qty: 10 TABLET | Refills: 0 | Status: SHIPPED | OUTPATIENT
Start: 2019-04-18 | End: 2019-04-28

## 2019-04-18 RX ORDER — ACETAMINOPHEN 325 MG/1
650 TABLET ORAL EVERY 4 HOURS PRN
Status: DISCONTINUED | OUTPATIENT
Start: 2019-04-18 | End: 2019-04-18 | Stop reason: HOSPADM

## 2019-04-18 RX ADMIN — HYDROCODONE BITARTRATE AND ACETAMINOPHEN 1 TABLET: 5; 325 TABLET ORAL at 09:57

## 2019-04-18 RX ADMIN — FENTANYL CITRATE 25 MCG: 50 INJECTION, SOLUTION INTRAMUSCULAR; INTRAVENOUS at 09:35

## 2019-04-18 RX ADMIN — ONDANSETRON 4 MG: 2 INJECTION INTRAMUSCULAR; INTRAVENOUS at 08:30

## 2019-04-18 RX ADMIN — CEFAZOLIN SODIUM 2000 MG: 2 SOLUTION INTRAVENOUS at 08:22

## 2019-04-18 RX ADMIN — SODIUM CHLORIDE: 0.9 INJECTION, SOLUTION INTRAVENOUS at 07:54

## 2019-04-18 RX ADMIN — FENTANYL CITRATE 25 MCG: 50 INJECTION, SOLUTION INTRAMUSCULAR; INTRAVENOUS at 09:24

## 2019-04-18 RX ADMIN — LIDOCAINE HYDROCHLORIDE ANHYDROUS 50 MG: 10 INJECTION, SOLUTION INFILTRATION at 08:30

## 2019-04-18 RX ADMIN — PROPOFOL 200 MG: 10 INJECTION, EMULSION INTRAVENOUS at 08:30

## 2019-04-18 RX ADMIN — FENTANYL CITRATE 25 MCG: 50 INJECTION, SOLUTION INTRAMUSCULAR; INTRAVENOUS at 08:30

## 2019-04-18 RX ADMIN — FENTANYL CITRATE 25 MCG: 50 INJECTION, SOLUTION INTRAMUSCULAR; INTRAVENOUS at 09:29

## 2019-04-18 RX ADMIN — DEXAMETHASONE SODIUM PHOSPHATE 4 MG: 10 INJECTION, SOLUTION INTRAMUSCULAR; INTRAVENOUS at 08:30

## 2019-04-18 RX ADMIN — FENTANYL CITRATE 25 MCG: 50 INJECTION, SOLUTION INTRAMUSCULAR; INTRAVENOUS at 08:53

## 2019-04-18 RX ADMIN — FENTANYL CITRATE 25 MCG: 50 INJECTION, SOLUTION INTRAMUSCULAR; INTRAVENOUS at 09:19

## 2019-04-18 RX ADMIN — KETOROLAC TROMETHAMINE 30 MG: 30 INJECTION, SOLUTION INTRAMUSCULAR at 09:02

## 2019-04-18 RX ADMIN — FENTANYL CITRATE 25 MCG: 50 INJECTION, SOLUTION INTRAMUSCULAR; INTRAVENOUS at 08:41

## 2019-04-19 ENCOUNTER — TELEPHONE (OUTPATIENT)
Dept: UROLOGY | Facility: AMBULATORY SURGERY CENTER | Age: 59
End: 2019-04-19

## 2019-04-19 DIAGNOSIS — N20.0 KIDNEY STONES: Primary | ICD-10-CM

## 2019-04-19 RX ORDER — NITROFURANTOIN 25; 75 MG/1; MG/1
100 CAPSULE ORAL 2 TIMES DAILY
Qty: 6 CAPSULE | Refills: 0 | Status: SHIPPED | OUTPATIENT
Start: 2019-04-19 | End: 2019-04-22

## 2019-04-21 DIAGNOSIS — E78.1 HYPERTRIGLYCERIDEMIA: ICD-10-CM

## 2019-04-21 RX ORDER — FENOFIBRATE 145 MG/1
TABLET, COATED ORAL
Qty: 90 TABLET | Refills: 0 | Status: SHIPPED | OUTPATIENT
Start: 2019-04-21 | End: 2019-07-07 | Stop reason: SDUPTHER

## 2019-05-06 ENCOUNTER — TELEPHONE (OUTPATIENT)
Dept: NEPHROLOGY | Facility: CLINIC | Age: 59
End: 2019-05-06

## 2019-05-10 ENCOUNTER — APPOINTMENT (OUTPATIENT)
Dept: RADIOLOGY | Age: 59
End: 2019-05-10
Payer: COMMERCIAL

## 2019-05-10 DIAGNOSIS — N20.0 KIDNEY STONES: ICD-10-CM

## 2019-05-10 PROCEDURE — 74018 RADEX ABDOMEN 1 VIEW: CPT

## 2019-05-23 ENCOUNTER — OFFICE VISIT (OUTPATIENT)
Dept: UROLOGY | Facility: AMBULATORY SURGERY CENTER | Age: 59
End: 2019-05-23
Payer: COMMERCIAL

## 2019-05-23 VITALS
HEIGHT: 71 IN | BODY MASS INDEX: 30.27 KG/M2 | DIASTOLIC BLOOD PRESSURE: 88 MMHG | WEIGHT: 216.2 LBS | HEART RATE: 80 BPM | SYSTOLIC BLOOD PRESSURE: 128 MMHG

## 2019-05-23 DIAGNOSIS — N20.0 CALCULUS OF KIDNEY: Primary | ICD-10-CM

## 2019-05-23 DIAGNOSIS — E29.1 TESTICULAR HYPOGONADISM: ICD-10-CM

## 2019-05-23 PROCEDURE — 99024 POSTOP FOLLOW-UP VISIT: CPT | Performed by: NURSE PRACTITIONER

## 2019-06-06 ENCOUNTER — CONSULT (OUTPATIENT)
Dept: NEPHROLOGY | Facility: CLINIC | Age: 59
End: 2019-06-06
Payer: COMMERCIAL

## 2019-06-06 VITALS — WEIGHT: 220 LBS | BODY MASS INDEX: 30.8 KG/M2 | HEIGHT: 71 IN

## 2019-06-06 DIAGNOSIS — N20.0 CALCULUS OF KIDNEY: ICD-10-CM

## 2019-06-06 PROCEDURE — 99244 OFF/OP CNSLTJ NEW/EST MOD 40: CPT | Performed by: INTERNAL MEDICINE

## 2019-07-07 DIAGNOSIS — I10 ESSENTIAL HYPERTENSION: ICD-10-CM

## 2019-07-07 DIAGNOSIS — E78.1 HYPERTRIGLYCERIDEMIA: ICD-10-CM

## 2019-07-07 RX ORDER — LISINOPRIL 10 MG/1
TABLET ORAL
Qty: 135 TABLET | Refills: 3 | Status: SHIPPED | OUTPATIENT
Start: 2019-07-07 | End: 2020-06-22

## 2019-07-07 RX ORDER — FENOFIBRATE 145 MG/1
TABLET, COATED ORAL
Qty: 90 TABLET | Refills: 0 | Status: SHIPPED | OUTPATIENT
Start: 2019-07-07 | End: 2019-10-08 | Stop reason: SDUPTHER

## 2019-08-19 DIAGNOSIS — E29.1 TESTICULAR HYPOGONADISM: Primary | ICD-10-CM

## 2019-08-19 RX ORDER — SILDENAFIL 50 MG/1
50 TABLET, FILM COATED ORAL AS NEEDED
Qty: 30 TABLET | Refills: 1 | Status: SHIPPED | OUTPATIENT
Start: 2019-08-19 | End: 2020-08-17 | Stop reason: SDUPTHER

## 2019-09-25 ENCOUNTER — TELEPHONE (OUTPATIENT)
Dept: UROLOGY | Facility: MEDICAL CENTER | Age: 59
End: 2019-09-25

## 2019-09-25 DIAGNOSIS — N20.0 CALCULUS OF KIDNEY: Primary | ICD-10-CM

## 2019-09-25 DIAGNOSIS — N20.0 NEPHROLITHIASIS: Primary | ICD-10-CM

## 2019-09-25 RX ORDER — TAMSULOSIN HYDROCHLORIDE 0.4 MG/1
0.4 CAPSULE ORAL
Qty: 30 CAPSULE | Refills: 0 | Status: SHIPPED | OUTPATIENT
Start: 2019-09-25 | End: 2020-03-23 | Stop reason: SDUPTHER

## 2019-09-25 NOTE — TELEPHONE ENCOUNTER
Called Tyrell Reis back and notified him that script was sent  Also put order in for him to get Ct scan when he gets back

## 2019-09-25 NOTE — TELEPHONE ENCOUNTER
Prescription of Flomax has been sent to the patient's preferred pharmacy in [place]  I would recommend following up in the next 1-2 weeks with CT scan prior  If his symptoms become unmanageable, patient should go to the emergency department

## 2019-09-25 NOTE — TELEPHONE ENCOUNTER
Patient of Dr Hugo Ly seen at [facility]  Currently on a business trip in [place] and he states he is passing a stone  Asking for a script of tamsulosin to be sent to the below pharmacy  [id number]  [address] [address]  [phone]    Requesting a call back to confirm this request has been processed [phone]  Message can be left

## 2019-10-07 ENCOUNTER — TRANSCRIBE ORDERS (OUTPATIENT)
Dept: ADMINISTRATIVE | Facility: HOSPITAL | Age: 59
End: 2019-10-07

## 2019-10-07 DIAGNOSIS — E29.1 HYPOGONADISM IN MALE: ICD-10-CM

## 2019-10-07 DIAGNOSIS — F41.9 ANXIETY: ICD-10-CM

## 2019-10-07 DIAGNOSIS — K21.9 GASTROESOPHAGEAL REFLUX DISEASE, ESOPHAGITIS PRESENCE NOT SPECIFIED: Primary | ICD-10-CM

## 2019-10-07 RX ORDER — TESTOSTERONE CYPIONATE 200 MG/ML
INJECTION INTRAMUSCULAR
Qty: 4 ML | Refills: 3 | Status: SHIPPED | OUTPATIENT
Start: 2019-10-07 | End: 2019-12-05 | Stop reason: SDUPTHER

## 2019-10-07 RX ORDER — LORAZEPAM 0.5 MG/1
0.5 TABLET ORAL DAILY PRN
Qty: 30 TABLET | Refills: 0 | Status: SHIPPED | OUTPATIENT
Start: 2019-10-07 | End: 2019-12-26 | Stop reason: SDUPTHER

## 2019-10-08 DIAGNOSIS — E78.1 HYPERTRIGLYCERIDEMIA: ICD-10-CM

## 2019-10-08 RX ORDER — FENOFIBRATE 145 MG/1
TABLET, COATED ORAL
Qty: 90 TABLET | Refills: 0 | Status: SHIPPED | OUTPATIENT
Start: 2019-10-08 | End: 2020-01-05

## 2019-10-11 ENCOUNTER — HOSPITAL ENCOUNTER (OUTPATIENT)
Dept: RADIOLOGY | Age: 59
Discharge: HOME | End: 2019-10-11
Payer: COMMERCIAL

## 2019-10-11 ENCOUNTER — APPOINTMENT (OUTPATIENT)
Dept: LAB | Age: 59
End: 2019-10-11
Payer: COMMERCIAL

## 2019-10-11 ENCOUNTER — TRANSCRIBE ORDERS (OUTPATIENT)
Dept: ADMINISTRATIVE | Age: 59
End: 2019-10-11

## 2019-10-11 DIAGNOSIS — R10.11 ABDOMINAL PAIN, RIGHT UPPER QUADRANT: Primary | ICD-10-CM

## 2019-10-11 DIAGNOSIS — R73.9 ELEVATED BLOOD SUGAR: ICD-10-CM

## 2019-10-11 DIAGNOSIS — R10.11 ABDOMINAL PAIN, RIGHT UPPER QUADRANT: ICD-10-CM

## 2019-10-11 DIAGNOSIS — E78.1 HYPERTRIGLYCERIDEMIA: ICD-10-CM

## 2019-10-11 DIAGNOSIS — K21.9 GASTROESOPHAGEAL REFLUX DISEASE, ESOPHAGITIS PRESENCE NOT SPECIFIED: ICD-10-CM

## 2019-10-11 DIAGNOSIS — N20.0 KIDNEY STONE: ICD-10-CM

## 2019-10-11 DIAGNOSIS — I10 BENIGN ESSENTIAL HYPERTENSION: ICD-10-CM

## 2019-10-11 DIAGNOSIS — N20.0 NEPHROLITHIASIS: ICD-10-CM

## 2019-10-11 DIAGNOSIS — E29.1 HYPOGONADISM IN MALE: ICD-10-CM

## 2019-10-11 LAB
ALBUMIN SERPL BCP-MCNC: 3.8 G/DL (ref 3.5–5)
ALP SERPL-CCNC: 44 U/L (ref 46–116)
ALT SERPL W P-5'-P-CCNC: 31 U/L (ref 12–78)
AMYLASE SERPL-CCNC: 101 IU/L (ref 25–115)
ANION GAP SERPL CALCULATED.3IONS-SCNC: 4 MMOL/L (ref 4–13)
AST SERPL W P-5'-P-CCNC: 23 U/L (ref 5–45)
BASOPHILS # BLD AUTO: 0.07 THOUSANDS/ΜL (ref 0–0.1)
BASOPHILS NFR BLD AUTO: 1 % (ref 0–1)
BILIRUB SERPL-MCNC: 0.48 MG/DL (ref 0.2–1)
BUN SERPL-MCNC: 15 MG/DL (ref 5–25)
CALCIUM SERPL-MCNC: 9.7 MG/DL (ref 8.3–10.1)
CHLORIDE SERPL-SCNC: 106 MMOL/L (ref 100–108)
CHOLEST SERPL-MCNC: 199 MG/DL (ref 50–200)
CO2 SERPL-SCNC: 27 MMOL/L (ref 21–32)
CREAT SERPL-MCNC: 1.14 MG/DL (ref 0.6–1.3)
CRP SERPL HS-MCNC: 9.38 MG/L
EOSINOPHIL # BLD AUTO: 0.23 THOUSAND/ΜL (ref 0–0.61)
EOSINOPHIL NFR BLD AUTO: 4 % (ref 0–6)
ERYTHROCYTE [DISTWIDTH] IN BLOOD BY AUTOMATED COUNT: 13.5 % (ref 11.6–15.1)
EST. AVERAGE GLUCOSE BLD GHB EST-MCNC: 114 MG/DL
GFR SERPL CREATININE-BSD FRML MDRD: 71 ML/MIN/1.73SQ M
GLUCOSE P FAST SERPL-MCNC: 86 MG/DL (ref 65–99)
HBA1C MFR BLD: 5.6 % (ref 4.2–6.3)
HCT VFR BLD AUTO: 51.1 % (ref 36.5–49.3)
HDLC SERPL-MCNC: 31 MG/DL (ref 40–60)
HGB BLD-MCNC: 16.3 G/DL (ref 12–17)
IMM GRANULOCYTES # BLD AUTO: 0.06 THOUSAND/UL (ref 0–0.2)
IMM GRANULOCYTES NFR BLD AUTO: 1 % (ref 0–2)
LDLC SERPL CALC-MCNC: 129 MG/DL (ref 0–100)
LIPASE SERPL-CCNC: 387 U/L (ref 73–393)
LYMPHOCYTES # BLD AUTO: 1.92 THOUSANDS/ΜL (ref 0.6–4.47)
LYMPHOCYTES NFR BLD AUTO: 32 % (ref 14–44)
MCH RBC QN AUTO: 28.7 PG (ref 26.8–34.3)
MCHC RBC AUTO-ENTMCNC: 31.9 G/DL (ref 31.4–37.4)
MCV RBC AUTO: 90 FL (ref 82–98)
MONOCYTES # BLD AUTO: 0.64 THOUSAND/ΜL (ref 0.17–1.22)
MONOCYTES NFR BLD AUTO: 11 % (ref 4–12)
NEUTROPHILS # BLD AUTO: 3.07 THOUSANDS/ΜL (ref 1.85–7.62)
NEUTS SEG NFR BLD AUTO: 51 % (ref 43–75)
NRBC BLD AUTO-RTO: 0 /100 WBCS
PLATELET # BLD AUTO: 320 THOUSANDS/UL (ref 149–390)
PMV BLD AUTO: 10.4 FL (ref 8.9–12.7)
POTASSIUM SERPL-SCNC: 4.1 MMOL/L (ref 3.5–5.3)
PROT SERPL-MCNC: 7.5 G/DL (ref 6.4–8.2)
PTH-INTACT SERPL-MCNC: 11.4 PG/ML (ref 18.4–80.1)
RBC # BLD AUTO: 5.67 MILLION/UL (ref 3.88–5.62)
SODIUM SERPL-SCNC: 137 MMOL/L (ref 136–145)
TRIGL SERPL-MCNC: 195 MG/DL
WBC # BLD AUTO: 5.99 THOUSAND/UL (ref 4.31–10.16)

## 2019-10-11 PROCEDURE — 74176 CT ABD & PELVIS W/O CONTRAST: CPT

## 2019-10-11 PROCEDURE — 80053 COMPREHEN METABOLIC PANEL: CPT

## 2019-10-11 PROCEDURE — 84402 ASSAY OF FREE TESTOSTERONE: CPT

## 2019-10-11 PROCEDURE — 86141 C-REACTIVE PROTEIN HS: CPT

## 2019-10-11 PROCEDURE — 83036 HEMOGLOBIN GLYCOSYLATED A1C: CPT

## 2019-10-11 PROCEDURE — 85025 COMPLETE CBC W/AUTO DIFF WBC: CPT

## 2019-10-11 PROCEDURE — 83690 ASSAY OF LIPASE: CPT

## 2019-10-11 PROCEDURE — 83970 ASSAY OF PARATHORMONE: CPT

## 2019-10-11 PROCEDURE — 36415 COLL VENOUS BLD VENIPUNCTURE: CPT

## 2019-10-11 PROCEDURE — 76705 ECHO EXAM OF ABDOMEN: CPT

## 2019-10-11 PROCEDURE — 84403 ASSAY OF TOTAL TESTOSTERONE: CPT

## 2019-10-11 PROCEDURE — 80061 LIPID PANEL: CPT

## 2019-10-11 PROCEDURE — 82150 ASSAY OF AMYLASE: CPT

## 2019-10-12 LAB
TESTOST FREE SERPL-MCNC: 20.3 PG/ML (ref 7.2–24)
TESTOST SERPL-MCNC: 652 NG/DL (ref 264–916)

## 2019-10-16 ENCOUNTER — TELEPHONE (OUTPATIENT)
Dept: UROLOGY | Facility: CLINIC | Age: 59
End: 2019-10-16

## 2019-10-16 NOTE — TELEPHONE ENCOUNTER
Called and left detailed message for patient  Explained to patient that he has  no obstructing stones or hydronephrosis at this time  Patient is to call the office with any questions or concerns  Office number left in message  ----- Message from Lashonda Segundo PA-C sent at 10/16/2019  8:10 AM EDT -----  Patient has no obstructing stones or hydronephrosis at this time

## 2019-10-25 ENCOUNTER — OFFICE VISIT (OUTPATIENT)
Dept: INTERNAL MEDICINE CLINIC | Facility: CLINIC | Age: 59
End: 2019-10-25
Payer: COMMERCIAL

## 2019-10-25 VITALS
SYSTOLIC BLOOD PRESSURE: 130 MMHG | HEIGHT: 71 IN | DIASTOLIC BLOOD PRESSURE: 86 MMHG | HEART RATE: 88 BPM | BODY MASS INDEX: 30.74 KG/M2 | TEMPERATURE: 97.8 F | WEIGHT: 219.6 LBS

## 2019-10-25 DIAGNOSIS — F41.8 DEPRESSION WITH ANXIETY: ICD-10-CM

## 2019-10-25 DIAGNOSIS — E66.09 CLASS 1 OBESITY DUE TO EXCESS CALORIES WITHOUT SERIOUS COMORBIDITY WITH BODY MASS INDEX (BMI) OF 30.0 TO 30.9 IN ADULT: Primary | ICD-10-CM

## 2019-10-25 DIAGNOSIS — Z11.59 ENCOUNTER FOR HEPATITIS C SCREENING TEST FOR LOW RISK PATIENT: ICD-10-CM

## 2019-10-25 DIAGNOSIS — R79.89 LOW SERUM PARATHYROID HORMONE (PTH): ICD-10-CM

## 2019-10-25 DIAGNOSIS — L98.9 SKIN LESION: ICD-10-CM

## 2019-10-25 DIAGNOSIS — R73.9 ELEVATED BLOOD SUGAR: ICD-10-CM

## 2019-10-25 DIAGNOSIS — R71.8 ELEVATED HEMATOCRIT: ICD-10-CM

## 2019-10-25 DIAGNOSIS — Z23 NEED FOR INFLUENZA VACCINATION: ICD-10-CM

## 2019-10-25 PROBLEM — K76.0 FATTY LIVER: Status: ACTIVE | Noted: 2019-10-25

## 2019-10-25 PROCEDURE — 3008F BODY MASS INDEX DOCD: CPT | Performed by: INTERNAL MEDICINE

## 2019-10-25 PROCEDURE — 99214 OFFICE O/P EST MOD 30 MIN: CPT | Performed by: INTERNAL MEDICINE

## 2019-10-25 PROCEDURE — 90682 RIV4 VACC RECOMBINANT DNA IM: CPT

## 2019-10-25 PROCEDURE — 90471 IMMUNIZATION ADMIN: CPT

## 2019-10-25 NOTE — PROGRESS NOTES
BMI Counseling: Body mass index is 30 63 kg/m²  The BMI is above normal  Nutrition recommendations include decreasing portion sizes  Exercise recommendations include moderate physical activity 150 minutes/week  No pharmacotherapy was ordered  Assessment/Plan:    Skin lesion  Will have the patient see Dermatology doctor Leyla Vargas    Class 1 obesity due to excess calories without serious comorbidity with body mass index (BMI) of 30 0 to 30 9 in adult  Obesity -I have counseled patient following healthy and balanced diet, I would like the patient to lose weight, I would like the patient exercise routinely; we will continue monitor the patient's progress  Depression with anxiety  Clinically stable and doing well continue the current medical regiment will continue monitor  Elevated blood sugar  I have counselled the pt to follow a healthy and balanced diet ,and recommend routine exercise  Elevated hematocrit  Likely secondary to testosterone I have asked the patient to go for a blood donation and recheck a CBC 1 month    Encounter for hepatitis C screening test for low risk patient  Counseled, will check hepatitis C antibody    Low serum parathyroid hormone (PTH)  Will check a vitamin-D level, I have asked patient to discuss this further with Nephrology to see whether he may need endocrinology consultation    Need for influenza vaccination  Counseled, patient received the flu shot today         Problem List Items Addressed This Visit        Musculoskeletal and Integument    Skin lesion     Will have the patient see Dermatology doctor Leyla Vargas         Relevant Orders    Ambulatory referral to Dermatology       Other    Depression with anxiety     Clinically stable and doing well continue the current medical regiment will continue monitor  Overweight    Elevated blood sugar     I have counselled the pt to follow a healthy and balanced diet ,and recommend routine exercise           Relevant Orders Comprehensive metabolic panel    Hemoglobin A1C    Lipid Panel with Direct LDL reflex    Class 1 obesity due to excess calories without serious comorbidity with body mass index (BMI) of 30 0 to 30 9 in adult - Primary     Obesity -I have counseled patient following healthy and balanced diet, I would like the patient to lose weight, I would like the patient exercise routinely; we will continue monitor the patient's progress  Elevated hematocrit     Likely secondary to testosterone I have asked the patient to go for a blood donation and recheck a CBC 1 month         Relevant Orders    CBC (Includes Diff/Plt) (Refl)    Vitamin D 25 hydroxy    Low serum parathyroid hormone (PTH)     Will check a vitamin-D level, I have asked patient to discuss this further with Nephrology to see whether he may need endocrinology consultation         Encounter for hepatitis C screening test for low risk patient     Counseled, will check hepatitis C antibody         Relevant Orders    PSA, Total Screen    Need for influenza vaccination     Counseled, patient received the flu shot today         Relevant Orders    influenza vaccine, [id number], quadrivalent, recombinant, PF, 0 5 mL, for patients 18 yr+ (FLUBLOK) (Completed)            Subjective:      Patient ID: Nancy Kay is a 66 y o  male  HPI 65-year old male coming in for a follow up visit regarding obesity, anxiety and depression, elevated blood sugar, elevated hematocrit/polycythemia, low parathyroid hormone, skin lesions; sep  Got passed kidney stone pass , flowmax, when got back following week ? Eating different while on travel he was getting pain below the rib cage called gi; etoh  Weekly etoh 6-7 per week   The pt has noticed mode nodule of the right nostril for 6 months sometime flakey  Occasionally with scrubbing he gets some bleeding and the pt has a mole left shin which is elarging    The following portions of the patient's history were reviewed and updated as appropriate: allergies, current medications, past family history, past medical history, past social history, past surgical history and problem list     Review of Systems   Constitutional: Negative for activity change, appetite change and unexpected weight change  HENT: Negative for congestion and postnasal drip  Eyes: Negative for visual disturbance  Respiratory: Negative for cough and shortness of breath  Cardiovascular: Negative for chest pain  Gastrointestinal: Negative for abdominal pain, diarrhea, nausea and vomiting  Neurological: Negative for dizziness, light-headedness and headaches  Hematological: Negative for adenopathy  skin lesion nose    Objective:    No follow-ups on file  Procedure: Us Abdomen Limited    Result Date: 10/15/2019  Narrative: RIGHT UPPER QUADRANT ULTRASOUND INDICATION:    K21 9: Gastro-esophageal reflux disease without esophagitis  COMPARISON:  CT abdomen/pelvis from the same day TECHNIQUE:   Real-time ultrasound of the right upper quadrant was performed with a curvilinear transducer with both volumetric sweeps and still imaging techniques  FINDINGS: PANCREAS:  Visualized portions of the pancreas are within normal limits  AORTA AND IVC:  Visualized portions are normal for patient age  LIVER: Size:  Within normal range  The liver measures cm in the midclavicular line  Contour:  Surface contour is smooth  Parenchyma: There is mild diffuse increased echogenicity with smooth echotexture, without significant beam attenuation or loss of periportal echogenicity  Most consistent with mild hepatic steatosis  No evidence of suspicious mass  Limited imaging of the main portal vein shows it to be patent and hepatopetal   BILIARY: The gallbladder is normal in caliber  No wall thickening or pericholecystic fluid  No stones or sludge identified  No sonographic Beck's sign  No intrahepatic biliary dilatation  CBD measures 3 mm  No choledocholithiasis   KIDNEY: Right kidney measures 12 3 x 6 3 cm  0 5 cm nonobstructing calculus within the interpole  A 1 3 cm lower pole cyst is noted  No hydronephrosis  ASCITES:   None  Impression: 1  Mild hepatic steatosis  2   Right renal 0 5 cm nonobstructing calculus  Of note, CT from same day demonstrated multiple bilateral nonobstructing renal calculi  Workstation performed: [id number]     Procedure: Ct Renal Stone Study Abdomen Pelvis Wo Contrast    Result Date: 10/15/2019  Narrative: CT ABDOMEN AND PELVIS WITHOUT IV CONTRAST - LOW DOSE RENAL STONE INDICATION:   N20 0: Calculus of kidney  COMPARISON:  None  TECHNIQUE:  Low dose thin section CT examination of the abdomen and pelvis was performed without intravenous or oral contrast according to a protocol specifically designed to evaluate for urinary tract calculus  Axial, sagittal, and coronal 2D reformatted images were created from the source data and submitted for interpretation  Evaluation for pathology in the abdomen and pelvis that is unrelated to urinary tract calculi is limited  Radiation dose length product (DLP) for this visit:  558 mGy-cm   This examination, like all CT scans performed in the [place], was performed utilizing techniques to minimize radiation dose exposure, including the use of iterative reconstruction and automated exposure control  FINDINGS: RIGHT KIDNEY AND URETER: Scattered nonobstructive 1 mm to 4 mm calculi  No hydronephrosis or hydroureter  No ureteral calculi  No perinephric collection  LEFT KIDNEY AND URETER: Scattered nonobstructive 1 mm to 5 mm calculi  No hydronephrosis or hydroureter  No ureteral calculi  No perinephric collection  Focal left upper pole renal scar  URINARY BLADDER: Unremarkable  No significant abnormality in the visualized lung bases  Limited low radiation dose noncontrast CT evaluation demonstrates no clinically significant abnormality of the spleen, pancreas, or adrenal glands   Mild fatty infiltration of the liver  The liver and spleen are partially imaged  No calcified gallstones or gallbladder wall thickening noted  No ascites or bulky lymphadenopathy on this limited noncontrast study  Bowel loops appear unremarkable  Limited evaluation demonstrates no evidence to suggest acute appendicitis  Small uncomplicated fat-containing left inguinal hernia  No acute fracture or destructive osseous lesion is identified  Impression: Scattered nonobstructive 1 to 5 mm bilateral renal calculi without hydronephrosis, hydroureter or ureteral /bladder calculi  Workstation performed: [id number]          Allergies   Allergen Reactions    Ciprofloxacin Hives    Macrobid [Nitrofurantoin] Nausea Only and Palpitations       Past Medical History:   Diagnosis Date    Chronic kidney disease     kidney stones    GERD (gastroesophageal reflux disease)     Hypertension     Kidney stone      Past Surgical History:   Procedure Laterality Date    COLONOSCOPY      ESOPHAGOGASTRODUODENOSCOPY      EXTRACORPOREAL SHOCK WAVE LITHOTRIPSY Bilateral     Renal Multiple times    ND EXC SKIN BENIG >4 CM FACE,FACIAL Right 4/28/2016    Procedure: EXCISION  LESION UPPER EYELID, COMPLEX CLOSURE;  Surgeon: Katie Palma MD;  Location:  MAIN OR;  Service: Plastics    ND FRAGMENT KIDNEY STONE/ ESWL Right 4/18/2019    Procedure: Tim Rhodes EXTRACORPORAL SHOCKWAVE (ESWL); Surgeon: Maylin Jasso MD;  Location[facility];  Service: Urology     Current Outpatient Medications on File Prior to Visit   Medication Sig Dispense Refill    b complex vitamins capsule Take 1 capsule by mouth daily   Cholecalciferol (VITAMIN D3) 1000 units CAPS Take 1 capsule by mouth daily      coenzyme Q-10 100 MG capsule Take 1 capsule by mouth daily      cyanocobalamin (VITAMIN B-12) 100 mcg tablet Take by mouth daily        fenofibrate (TRICOR) 145 mg tablet TAKE 1 TABLET DAILY 90 tablet 0    fexofenadine (ALLEGRA) 180 MG tablet Take 180 mg by mouth daily in the early morning       ibuprofen (MOTRIN) 600 mg tablet Take 600 mg by mouth every 6 (six) hours as needed      lisinopril (ZESTRIL) 10 mg tablet TAKE 1 AND 1/2 TABLETS ONCEDAILY 135 tablet 3    LORazepam (ATIVAN) 0 5 mg tablet Take 1 tablet (0 5 mg total) by mouth daily as needed for anxiety 30 tablet 0    multivitamin (THERAGRAN) TABS Take 1 tablet by mouth daily   omeprazole (PriLOSEC) 40 MG capsule Take 40 mg by mouth daily in the early morning       sildenafil (VIAGRA) 50 MG tablet Take 1 tablet (50 mg total) by mouth as needed for erectile dysfunction 30 tablet 1    SYRINGE-NEEDLE, DISP, 3 ML (BD ECLIPSE SYRINGE) 25G X 1" 3 ML MISC by Does not apply route once a week 50 each 3    testosterone cypionate (DEPO-TESTOSTERONE) 200 mg/mL SOLN INJECT 0 4ML INTO SHOULDER, THIGH, OR BUTTOCKS ONCE WEEKLY 4 mL 3    FLUCELVAX QUADRIVALENT TO BE ADMINISTERED BY PHARMACIST FOR IMMUNIZATION  0    tamsulosin (FLOMAX) 0 4 mg Take 1 capsule (0 4 mg total) by mouth daily with dinner (Patient not taking: Reported on 10/25/2019) 30 capsule 0     No current facility-administered medications on file prior to visit        Family History   Problem Relation Age of Onset    Other Father         CABG (coronary artery bypass surgery)    Atrial fibrillation Sister     Other Paternal Uncle         Myocardial infarction    Coronary artery disease Family     Diabetes Family     Hypertension Family     Uterine cancer Family      Social History     Socioeconomic History    Marital status: /Civil Union     Spouse name: Not on file    Number of children: Not on file    Years of education: Not on file    Highest education level: Not on file   Occupational History    Not on file   Social Needs    Financial resource strain: Not on file    Food insecurity:     Worry: Not on file     Inability: Not on file    Transportation needs:     Medical: Not on file     Non-medical: Not on file   Tobacco Use    Smoking status: Former Smoker     Last attempt to quit: 1981     Years since quittin 5    Smokeless tobacco: Never Used   Substance and Sexual Activity    Alcohol use:  Yes     Alcohol/week: 2 0 standard drinks     Types: 2 Cans of beer per week     Frequency: 2-4 times a month     Drinks per session: 1 or 2     Binge frequency: Never     Comment: weekend    Drug use: No    Sexual activity: Not on file   Lifestyle    Physical activity:     Days per week: Not on file     Minutes per session: Not on file    Stress: Not on file   Relationships    Social connections:     Talks on phone: Not on file     Gets together: Not on file     Attends [employer] service: Not on file     Active member of club or organization: Not on file     Attends meetings of clubs or organizations: Not on file     Relationship status: Not on file    Intimate partner violence:     Fear of current or ex partner: Not on file     Emotionally abused: Not on file     Physically abused: Not on file     Forced sexual activity: Not on file   Other Topics Concern    Not on file   Social History Narrative    Daily caffeine consumption     Vitals:    10/25/19 0821   BP: 130/86   BP Location: Right arm   Patient Position: Sitting   Cuff Size: Large   Pulse: 88   Temp: 97 8 °F (36 6 °C)   Weight: 99 6 kg (219 lb 9 6 oz)   Height: 5' 11" (1 803 m)     Results for orders placed or performed in visit on 10/11/19   PTH, intact   Result Value Ref Range    PTH 11 4 (L) 18 4 - 80 1 pg/mL   High sensitivity CRP   Result Value Ref Range    CRP, High Sensitivity 9 38 mg/L   Comprehensive metabolic panel   Result Value Ref Range    Sodium 137 136 - 145 mmol/L    Potassium 4 1 3 5 - 5 3 mmol/L    Chloride 106 100 - 108 mmol/L    CO2 27 21 - 32 mmol/L    ANION GAP 4 4 - 13 mmol/L    BUN 15 5 - 25 mg/dL    Creatinine 1 14 0 60 - 1 30 mg/dL    Glucose, Fasting 86 65 - 99 mg/dL    Calcium 9 7 8 3 - 10 1 mg/dL    AST 23 5 - 45 U/L    ALT 31 12 - 78 U/L Alkaline Phosphatase 44 (L) 46 - 116 U/L    Total Protein 7 5 6 4 - 8 2 g/dL    Albumin 3 8 3 5 - 5 0 g/dL    Total Bilirubin 0 48 0 20 - 1 00 mg/dL    eGFR 71 ml/min/1 73sq m   Lipid Panel with Direct LDL reflex   Result Value Ref Range    Cholesterol 199 50 - 200 mg/dL    Triglycerides 195 (H) <=150 mg/dL    HDL, Direct 31 (L) 40 - 60 mg/dL    LDL Calculated 129 (H) 0 - 100 mg/dL   Hemoglobin A1C   Result Value Ref Range    Hemoglobin A1C 5 6 4 2 - 6 3 %     mg/dl   Testosterone, free, total   Result Value Ref Range    Testosterone, Free 20 3 7 2 - 24 0 pg/mL    TESTOSTERONE TOTAL 652 264 - 916 ng/dL   Amylase   Result Value Ref Range    Amylase 101 25 - 115 IU/L   Lipase   Result Value Ref Range    Lipase 387 73 - 393 u/L   CBC and differential   Result Value Ref Range    WBC 5 99 4 31 - 10 16 Thousand/uL    RBC 5 67 (H) 3 88 - 5 62 Million/uL    Hemoglobin 16 3 12 0 - 17 0 g/dL    Hematocrit 51 1 (H) 36 5 - 49 3 %    MCV 90 82 - 98 fL    MCH 28 7 26 8 - 34 3 pg    MCHC 31 9 31 4 - 37 4 g/dL    RDW 13 5 11 6 - 15 1 %    MPV 10 4 8 9 - 12 7 fL    Platelets 327 521 - 441 Thousands/uL    nRBC 0 /100 WBCs    Neutrophils Relative 51 43 - 75 %    Immat GRANS % 1 0 - 2 %    Lymphocytes Relative 32 14 - 44 %    Monocytes Relative 11 4 - 12 %    Eosinophils Relative 4 0 - 6 %    Basophils Relative 1 0 - 1 %    Neutrophils Absolute 3 07 1 85 - 7 62 Thousands/µL    Immature Grans Absolute 0 06 0 00 - 0 20 Thousand/uL    Lymphocytes Absolute 1 92 0 60 - 4 47 Thousands/µL    Monocytes Absolute 0 64 0 17 - 1 22 Thousand/µL    Eosinophils Absolute 0 23 0 00 - 0 61 Thousand/µL    Basophils Absolute 0 07 0 00 - 0 10 Thousands/µL     Weight (last 2 days)     Date/Time   Weight    10/25/19 0821   99 6 (219 6)            Body mass index is 30 63 kg/m²    BP      Temp      Pulse     Resp      SpO2        Vitals:    10/25/19 0821   Weight: 99 6 kg (219 lb 9 6 oz)     Vitals:    10/25/19 0821   Weight: 99 6 kg (219 lb 9 6 oz) /86 (BP Location: Right arm, Patient Position: Sitting, Cuff Size: Large)   Pulse 88   Temp 97 8 °F (36 6 °C)   Ht 5' 11" (1 803 m)   Wt 99 6 kg (219 lb 9 6 oz)   BMI 30 63 kg/m²          Physical Exam   Constitutional: He appears well-developed and well-nourished  No distress  HENT:   Head: Normocephalic and atraumatic  Right Ear: External ear normal    Left Ear: External ear normal    Mouth/Throat: Oropharynx is clear and moist    Eyes: Pupils are equal, round, and reactive to light  Conjunctivae are normal  Right eye exhibits no discharge  Left eye exhibits no discharge  No scleral icterus  Neck: Neck supple  Cardiovascular: Normal rate, regular rhythm and normal heart sounds  Exam reveals no gallop and no friction rub  No murmur heard  Pulmonary/Chest: No respiratory distress  He has no wheezes  He has no rales  Abdominal: Soft  Bowel sounds are normal  He exhibits no distension and no mass  There is no tenderness  There is no rebound and no guarding  Musculoskeletal: He exhibits no edema or deformity  Lymphadenopathy:     He has no cervical adenopathy  Neurological: He is alert  Skin: He is not diaphoretic  Psychiatric: He has a normal mood and affect       brown skin lesion slightly raised nostril

## 2019-10-27 PROBLEM — L98.9 SKIN LESION: Status: ACTIVE | Noted: 2019-10-27

## 2019-10-27 PROBLEM — Z23 NEED FOR INFLUENZA VACCINATION: Status: ACTIVE | Noted: 2019-10-27

## 2019-10-27 PROBLEM — Z11.59 ENCOUNTER FOR HEPATITIS C SCREENING TEST FOR LOW RISK PATIENT: Status: ACTIVE | Noted: 2019-10-27

## 2019-10-27 PROBLEM — R79.89 LOW SERUM PARATHYROID HORMONE (PTH): Status: ACTIVE | Noted: 2019-10-27

## 2019-10-27 NOTE — ASSESSMENT & PLAN NOTE
Will check a vitamin-D level, I have asked patient to discuss this further with Nephrology to see whether he may need endocrinology consultation

## 2019-10-27 NOTE — ASSESSMENT & PLAN NOTE
Likely secondary to testosterone I have asked the patient to go for a blood donation and recheck a CBC 1 month

## 2019-12-05 DIAGNOSIS — E29.1 HYPOGONADISM IN MALE: ICD-10-CM

## 2019-12-05 RX ORDER — TESTOSTERONE CYPIONATE 200 MG/ML
INJECTION INTRAMUSCULAR
Qty: 4 ML | Refills: 3 | Status: SHIPPED | OUTPATIENT
Start: 2019-12-05 | End: 2020-03-24 | Stop reason: SDUPTHER

## 2019-12-05 NOTE — TELEPHONE ENCOUNTER
[facility] called in to explain since the [facility] [address] closed they could not transfer pt's remaining script because it is a controlled substance      NEXT OV: 4/28  LAST OV: 10/25    PDMP CHECKED  LAST FILL: 10/9  QUANT: 4 0    Prescriptions   Filled  ID  Written  Drug  QTY  Days  Prescriber  Rx #  Pharmacy *  Refills  Daily Dose  Pymt Type      10/09/2019  1  10/07/2019  TESTOSTERONE  MG/ML  4 0  28   MAT  [id number]  P[facility]NS ([id number])  0   [place]

## 2019-12-26 DIAGNOSIS — F41.9 ANXIETY: ICD-10-CM

## 2019-12-26 RX ORDER — LORAZEPAM 0.5 MG/1
0.5 TABLET ORAL DAILY PRN
Qty: 30 TABLET | Refills: 0 | Status: SHIPPED | OUTPATIENT
Start: 2019-12-26 | End: 2020-02-18 | Stop reason: SDUPTHER

## 2019-12-26 NOTE — TELEPHONE ENCOUNTER
Prescriptions    Filled  ID  Written  Drug  QTY  Days  Prescriber  Rx #  Pharmacy *  Refills  Daily Dose  Pymt Type      12/05/2019  1  12/05/2019  TESTOSTERONE  MG/ML  2 0  30  [facility]  [id number]  P[facility]NS ([id number])  0   [place]    10/09/2019  1  10/07/2019  TESTOSTERONE  MG/ML  4 0  28  [facility]  [id number]  [facility]NS ([id number])  0   [place]    10/07/2019  1  10/07/2019  LORAZEPAM 0 5 MG TABLET  30 0  30  [facility]  [id number]  [facility] ([id number])  0   [place]

## 2020-01-05 DIAGNOSIS — E78.1 HYPERTRIGLYCERIDEMIA: ICD-10-CM

## 2020-01-05 RX ORDER — FENOFIBRATE 145 MG/1
TABLET, COATED ORAL
Qty: 90 TABLET | Refills: 0 | Status: SHIPPED | OUTPATIENT
Start: 2020-01-05 | End: 2020-04-05

## 2020-02-18 DIAGNOSIS — F41.9 ANXIETY: ICD-10-CM

## 2020-02-19 DIAGNOSIS — F41.9 ANXIETY: ICD-10-CM

## 2020-02-19 RX ORDER — LORAZEPAM 0.5 MG/1
TABLET ORAL
Qty: 30 TABLET | Refills: 0 | OUTPATIENT
Start: 2020-02-19

## 2020-02-19 RX ORDER — LORAZEPAM 0.5 MG/1
0.5 TABLET ORAL DAILY PRN
Qty: 30 TABLET | Refills: 0 | Status: SHIPPED | OUTPATIENT
Start: 2020-02-19 | End: 2020-03-24 | Stop reason: SDUPTHER

## 2020-03-23 ENCOUNTER — TELEPHONE (OUTPATIENT)
Dept: UROLOGY | Facility: MEDICAL CENTER | Age: 60
End: 2020-03-23

## 2020-03-23 DIAGNOSIS — N20.0 CALCULUS OF KIDNEY: ICD-10-CM

## 2020-03-23 RX ORDER — TAMSULOSIN HYDROCHLORIDE 0.4 MG/1
0.4 CAPSULE ORAL
Qty: 30 CAPSULE | Refills: 0 | Status: SHIPPED | OUTPATIENT
Start: 2020-03-23 | End: 2020-08-06 | Stop reason: HOSPADM

## 2020-03-23 NOTE — TELEPHONE ENCOUNTER
Spoke to patient and gave him the recommendations per Barbara Modi, regarding stone treatment and slight discomfort  ER precautions were reviewed with him as well and he gave verbal understanding to follow instructions on hydration and NSAIDS and to take his FLOMAX  He was advised on what measures hospital ER rooms are taking during this COVID-19 outbreak and he understands now that if he has to go to the ER, he will be safe from the virus

## 2020-03-23 NOTE — TELEPHONE ENCOUNTER
Patient can take otc NSAIDs for discomfort, and prescription refill for Flomax sent to his pharmacy  He should increase his water intake  Please review ER precautions

## 2020-03-23 NOTE — TELEPHONE ENCOUNTER
Patient of [facility] ([facility] of [facility])  History Nephrolithiasis s/p right ESWL (4/18/19) and hypogonadism  Last seen in office May 2019  Patient was ordered for one year follow up with KUB prior to visit  Patient is currently scheduled for May 2020  Patient called today to advised that he passed stone fragments about 2 weeks ago  He would like to know if there is anything he should be taking so that he can avoid going to ER  Will route to provider to advise and will follow up with patient

## 2020-03-24 DIAGNOSIS — F41.9 ANXIETY: ICD-10-CM

## 2020-03-24 DIAGNOSIS — E29.1 HYPOGONADISM IN MALE: ICD-10-CM

## 2020-03-25 RX ORDER — TESTOSTERONE CYPIONATE 200 MG/ML
INJECTION INTRAMUSCULAR
Qty: 4 ML | Refills: 3 | Status: SHIPPED | OUTPATIENT
Start: 2020-03-25 | End: 2020-06-17

## 2020-03-25 RX ORDER — LORAZEPAM 0.5 MG/1
0.5 TABLET ORAL DAILY PRN
Qty: 90 TABLET | Refills: 0 | Status: SHIPPED | OUTPATIENT
Start: 2020-03-25 | End: 2020-07-13 | Stop reason: SDUPTHER

## 2020-04-04 DIAGNOSIS — E78.1 HYPERTRIGLYCERIDEMIA: ICD-10-CM

## 2020-04-05 RX ORDER — FENOFIBRATE 145 MG/1
TABLET, COATED ORAL
Qty: 90 TABLET | Refills: 0 | Status: SHIPPED | OUTPATIENT
Start: 2020-04-05 | End: 2020-06-22

## 2020-04-21 ENCOUNTER — TELEPHONE (OUTPATIENT)
Dept: DERMATOLOGY | Facility: CLINIC | Age: 60
End: 2020-04-21

## 2020-05-06 ENCOUNTER — AMB VIDEO VISIT (OUTPATIENT)
Dept: OTHER | Facility: HOSPITAL | Age: 60
End: 2020-05-06

## 2020-05-06 PROCEDURE — EVISIT: Performed by: FAMILY MEDICINE

## 2020-05-27 ENCOUNTER — TELEPHONE (OUTPATIENT)
Dept: UROLOGY | Facility: AMBULATORY SURGERY CENTER | Age: 60
End: 2020-05-27

## 2020-06-14 DIAGNOSIS — E29.1 HYPOGONADISM IN MALE: ICD-10-CM

## 2020-06-17 RX ORDER — TESTOSTERONE CYPIONATE 200 MG/ML
INJECTION INTRAMUSCULAR
Qty: 2 ML | Refills: 0 | Status: SHIPPED | OUTPATIENT
Start: 2020-06-17 | End: 2020-12-05

## 2020-06-22 DIAGNOSIS — I10 ESSENTIAL HYPERTENSION: ICD-10-CM

## 2020-06-22 DIAGNOSIS — E78.1 HYPERTRIGLYCERIDEMIA: ICD-10-CM

## 2020-06-22 RX ORDER — LISINOPRIL 10 MG/1
TABLET ORAL
Qty: 135 TABLET | Refills: 3 | Status: ON HOLD | OUTPATIENT
Start: 2020-06-22 | End: 2020-08-06 | Stop reason: SDUPTHER

## 2020-06-22 RX ORDER — FENOFIBRATE 145 MG/1
TABLET, COATED ORAL
Qty: 90 TABLET | Refills: 0 | Status: SHIPPED | OUTPATIENT
Start: 2020-06-22 | End: 2020-09-23

## 2020-07-10 ENCOUNTER — TELEPHONE (OUTPATIENT)
Dept: UROLOGY | Facility: AMBULATORY SURGERY CENTER | Age: 60
End: 2020-07-10

## 2020-07-10 ENCOUNTER — APPOINTMENT (OUTPATIENT)
Dept: LAB | Age: 60
End: 2020-07-10
Payer: COMMERCIAL

## 2020-07-10 ENCOUNTER — APPOINTMENT (OUTPATIENT)
Dept: RADIOLOGY | Age: 60
End: 2020-07-10
Payer: COMMERCIAL

## 2020-07-10 DIAGNOSIS — N20.0 NEPHROLITHIASIS: ICD-10-CM

## 2020-07-10 DIAGNOSIS — R97.20 ELEVATED PSA: ICD-10-CM

## 2020-07-10 DIAGNOSIS — N20.0 NEPHROLITHIASIS: Primary | ICD-10-CM

## 2020-07-10 DIAGNOSIS — R71.8 ELEVATED HEMATOCRIT: ICD-10-CM

## 2020-07-10 DIAGNOSIS — Z11.59 ENCOUNTER FOR HEPATITIS C SCREENING TEST FOR LOW RISK PATIENT: ICD-10-CM

## 2020-07-10 DIAGNOSIS — R73.9 ELEVATED BLOOD SUGAR: ICD-10-CM

## 2020-07-10 LAB — PSA SERPL-MCNC: 1 NG/ML (ref 0–4)

## 2020-07-10 PROCEDURE — 74018 RADEX ABDOMEN 1 VIEW: CPT

## 2020-07-10 PROCEDURE — 84153 ASSAY OF PSA TOTAL: CPT

## 2020-07-13 DIAGNOSIS — F41.9 ANXIETY: ICD-10-CM

## 2020-07-13 RX ORDER — LORAZEPAM 0.5 MG/1
0.5 TABLET ORAL DAILY PRN
Qty: 90 TABLET | Refills: 0 | Status: SHIPPED | OUTPATIENT
Start: 2020-07-13 | End: 2020-11-10 | Stop reason: SDUPTHER

## 2020-07-21 ENCOUNTER — OFFICE VISIT (OUTPATIENT)
Dept: UROLOGY | Facility: AMBULATORY SURGERY CENTER | Age: 60
End: 2020-07-21
Payer: COMMERCIAL

## 2020-07-21 VITALS
HEIGHT: 71 IN | SYSTOLIC BLOOD PRESSURE: 128 MMHG | TEMPERATURE: 97.1 F | WEIGHT: 228 LBS | BODY MASS INDEX: 31.92 KG/M2 | DIASTOLIC BLOOD PRESSURE: 88 MMHG

## 2020-07-21 DIAGNOSIS — N20.0 KIDNEY STONE: Primary | ICD-10-CM

## 2020-07-21 PROCEDURE — 3079F DIAST BP 80-89 MM HG: CPT | Performed by: UROLOGY

## 2020-07-21 PROCEDURE — 99214 OFFICE O/P EST MOD 30 MIN: CPT | Performed by: UROLOGY

## 2020-07-21 PROCEDURE — 3008F BODY MASS INDEX DOCD: CPT | Performed by: UROLOGY

## 2020-07-21 PROCEDURE — 3074F SYST BP LT 130 MM HG: CPT | Performed by: UROLOGY

## 2020-07-21 PROCEDURE — 1036F TOBACCO NON-USER: CPT | Performed by: UROLOGY

## 2020-07-21 NOTE — ASSESSMENT & PLAN NOTE
Metabolic workup ordered  Will reorder consult to Nephrology  We reviewed his KUB  He has small bilateral stones which we will continue to observe  KUB for next year ordered

## 2020-07-28 ENCOUNTER — TELEPHONE (OUTPATIENT)
Dept: DERMATOLOGY | Facility: CLINIC | Age: 60
End: 2020-07-28

## 2020-08-04 ENCOUNTER — TELEPHONE (OUTPATIENT)
Dept: UROLOGY | Facility: MEDICAL CENTER | Age: 60
End: 2020-08-04

## 2020-08-04 DIAGNOSIS — R10.9 FLANK PAIN: Primary | ICD-10-CM

## 2020-08-04 NOTE — TELEPHONE ENCOUNTER
Recommend obtaining a CT stone study, as KUB performed on 7/10 identified nonobstructing bilateral calculi  Order in Ronni  Encourage patient to increase water intake and continue Flomax  Please review ER precautions

## 2020-08-04 NOTE — TELEPHONE ENCOUNTER
Calling Lorna Ford back stating that he is passing a stone on left as he has Left flank pain  -  he has passed many stones  He took ibuprofen, flomax and is hydrating with water  Pain last night was worse as he was nauseated and the pain kept coming in waves  He had KUB completed on 7/10/20  He thinks stone is moving  He doesn't want to go to ER and is wondering if there is anything else he can try  Also suggested a moist heating pad   Please advise

## 2020-08-04 NOTE — TELEPHONE ENCOUNTER
Called Sim back and notified that he should have a CT scan completed and it is in the system and went to review ER precautions and he  states that he has been having kidney stones for 25 years and knows the ER precautions  and that  Dr Clarence Pitts would  Prescribe him stronger medication rather that ibuprofen to help with his pain  Per Nurse Practioner he has a 6 mm stone  The stone could be obstructing  Where we may need to discuss surgery or it could have grown and he won't pass it   He understood once explained whole reasoning to Ct scan

## 2020-08-04 NOTE — TELEPHONE ENCOUNTER
Patient managed by Dr Sarai Beth seen at [place]  Patient called in stating he is passing a kidney stone with pain  Patient stated the pain is coming in waves  Patient is taking ibuprofen   Patient can be reached at [phone]

## 2020-08-05 ENCOUNTER — ANESTHESIA EVENT (OUTPATIENT)
Dept: PERIOP | Facility: HOSPITAL | Age: 60
End: 2020-08-05
Payer: COMMERCIAL

## 2020-08-05 ENCOUNTER — APPOINTMENT (EMERGENCY)
Dept: RADIOLOGY | Facility: HOSPITAL | Age: 60
End: 2020-08-05
Payer: COMMERCIAL

## 2020-08-05 ENCOUNTER — HOSPITAL ENCOUNTER (OUTPATIENT)
Facility: HOSPITAL | Age: 60
Setting detail: OBSERVATION
Discharge: HOME | End: 2020-08-06
Attending: EMERGENCY MEDICINE | Admitting: FAMILY MEDICINE
Payer: COMMERCIAL

## 2020-08-05 DIAGNOSIS — N13.30 HYDRONEPHROSIS OF LEFT KIDNEY: ICD-10-CM

## 2020-08-05 DIAGNOSIS — N20.1 LEFT URETERAL STONE: Primary | ICD-10-CM

## 2020-08-05 DIAGNOSIS — N17.9 ACUTE KIDNEY INJURY (HCC): ICD-10-CM

## 2020-08-05 DIAGNOSIS — I10 ESSENTIAL HYPERTENSION: ICD-10-CM

## 2020-08-05 DIAGNOSIS — R31.9 HEMATURIA: ICD-10-CM

## 2020-08-05 PROBLEM — Z87.442 HISTORY OF NEPHROLITHIASIS: Status: ACTIVE | Noted: 2020-08-05

## 2020-08-05 PROBLEM — E78.5 DYSLIPIDEMIA: Status: ACTIVE | Noted: 2020-08-05

## 2020-08-05 PROBLEM — D72.829 LEUKOCYTOSIS: Status: ACTIVE | Noted: 2020-08-05

## 2020-08-05 PROBLEM — N13.2 HYDRONEPHROSIS WITH OBSTRUCTING CALCULUS: Status: ACTIVE | Noted: 2020-08-05

## 2020-08-05 LAB
ALBUMIN SERPL BCP-MCNC: 3.8 G/DL (ref 3.5–5)
ALP SERPL-CCNC: 38 U/L (ref 46–116)
ALT SERPL W P-5'-P-CCNC: 32 U/L (ref 12–78)
ANION GAP SERPL CALCULATED.3IONS-SCNC: 9 MMOL/L (ref 4–13)
AST SERPL W P-5'-P-CCNC: 37 U/L (ref 5–45)
BACTERIA UR QL AUTO: ABNORMAL /HPF
BASOPHILS # BLD AUTO: 0.08 THOUSANDS/ΜL (ref 0–0.1)
BASOPHILS NFR BLD AUTO: 1 % (ref 0–1)
BILIRUB SERPL-MCNC: 0.64 MG/DL (ref 0.2–1)
BILIRUB UR QL STRIP: NEGATIVE
BUN SERPL-MCNC: 19 MG/DL (ref 5–25)
CALCIUM SERPL-MCNC: 9.6 MG/DL (ref 8.3–10.1)
CHLORIDE SERPL-SCNC: 106 MMOL/L (ref 100–108)
CLARITY UR: CLEAR
CO2 SERPL-SCNC: 23 MMOL/L (ref 21–32)
COLOR UR: YELLOW
COLOR, POC: NORMAL
CREAT SERPL-MCNC: 1.44 MG/DL (ref 0.6–1.3)
EOSINOPHIL # BLD AUTO: 0.13 THOUSAND/ΜL (ref 0–0.61)
EOSINOPHIL NFR BLD AUTO: 1 % (ref 0–6)
ERYTHROCYTE [DISTWIDTH] IN BLOOD BY AUTOMATED COUNT: 13.6 % (ref 11.6–15.1)
GFR SERPL CREATININE-BSD FRML MDRD: 54 ML/MIN/1.73SQ M
GLUCOSE SERPL-MCNC: 89 MG/DL (ref 65–140)
GLUCOSE UR STRIP-MCNC: NEGATIVE MG/DL
HCT VFR BLD AUTO: 47.1 % (ref 36.5–49.3)
HGB BLD-MCNC: 16 G/DL (ref 12–17)
HGB UR QL STRIP.AUTO: ABNORMAL
HYALINE CASTS #/AREA URNS LPF: ABNORMAL /LPF
IMM GRANULOCYTES # BLD AUTO: 0.07 THOUSAND/UL (ref 0–0.2)
IMM GRANULOCYTES NFR BLD AUTO: 1 % (ref 0–2)
KETONES UR STRIP-MCNC: NEGATIVE MG/DL
LEUKOCYTE ESTERASE UR QL STRIP: NEGATIVE
LIPASE SERPL-CCNC: 340 U/L (ref 73–393)
LYMPHOCYTES # BLD AUTO: 1.65 THOUSANDS/ΜL (ref 0.6–4.47)
LYMPHOCYTES NFR BLD AUTO: 12 % (ref 14–44)
MCH RBC QN AUTO: 29.3 PG (ref 26.8–34.3)
MCHC RBC AUTO-ENTMCNC: 34 G/DL (ref 31.4–37.4)
MCV RBC AUTO: 86 FL (ref 82–98)
MONOCYTES # BLD AUTO: 1.47 THOUSAND/ΜL (ref 0.17–1.22)
MONOCYTES NFR BLD AUTO: 11 % (ref 4–12)
NEUTROPHILS # BLD AUTO: 10.1 THOUSANDS/ΜL (ref 1.85–7.62)
NEUTS SEG NFR BLD AUTO: 74 % (ref 43–75)
NITRITE UR QL STRIP: NEGATIVE
NON-SQ EPI CELLS URNS QL MICRO: ABNORMAL /HPF
NRBC BLD AUTO-RTO: 0 /100 WBCS
PH UR STRIP.AUTO: 7 [PH] (ref 4.5–8)
PLATELET # BLD AUTO: 286 THOUSANDS/UL (ref 149–390)
PMV BLD AUTO: 10.2 FL (ref 8.9–12.7)
POTASSIUM SERPL-SCNC: 3.8 MMOL/L (ref 3.5–5.3)
PROT SERPL-MCNC: 7.6 G/DL (ref 6.4–8.2)
PROT UR STRIP-MCNC: NEGATIVE MG/DL
RBC # BLD AUTO: 5.46 MILLION/UL (ref 3.88–5.62)
RBC #/AREA URNS AUTO: ABNORMAL /HPF
SODIUM SERPL-SCNC: 138 MMOL/L (ref 136–145)
SP GR UR STRIP.AUTO: 1.02 (ref 1–1.03)
UROBILINOGEN UR QL STRIP.AUTO: 0.2 E.U./DL
WBC # BLD AUTO: 13.5 THOUSAND/UL (ref 4.31–10.16)
WBC #/AREA URNS AUTO: ABNORMAL /HPF

## 2020-08-05 PROCEDURE — 99285 EMERGENCY DEPT VISIT HI MDM: CPT | Performed by: EMERGENCY MEDICINE

## 2020-08-05 PROCEDURE — 96376 TX/PRO/DX INJ SAME DRUG ADON: CPT

## 2020-08-05 PROCEDURE — 81001 URINALYSIS AUTO W/SCOPE: CPT

## 2020-08-05 PROCEDURE — G1004 CDSM NDSC: HCPCS

## 2020-08-05 PROCEDURE — 96374 THER/PROPH/DIAG INJ IV PUSH: CPT

## 2020-08-05 PROCEDURE — 99220 PR INITIAL OBSERVATION CARE/DAY 70 MINUTES: CPT | Performed by: INTERNAL MEDICINE

## 2020-08-05 PROCEDURE — 99285 EMERGENCY DEPT VISIT HI MDM: CPT

## 2020-08-05 PROCEDURE — 96361 HYDRATE IV INFUSION ADD-ON: CPT

## 2020-08-05 PROCEDURE — 80053 COMPREHEN METABOLIC PANEL: CPT | Performed by: EMERGENCY MEDICINE

## 2020-08-05 PROCEDURE — 99215 OFFICE O/P EST HI 40 MIN: CPT | Performed by: PHYSICIAN ASSISTANT

## 2020-08-05 PROCEDURE — 74176 CT ABD & PELVIS W/O CONTRAST: CPT

## 2020-08-05 PROCEDURE — 85025 COMPLETE CBC W/AUTO DIFF WBC: CPT | Performed by: EMERGENCY MEDICINE

## 2020-08-05 PROCEDURE — 96375 TX/PRO/DX INJ NEW DRUG ADDON: CPT

## 2020-08-05 PROCEDURE — 83690 ASSAY OF LIPASE: CPT | Performed by: EMERGENCY MEDICINE

## 2020-08-05 PROCEDURE — 36415 COLL VENOUS BLD VENIPUNCTURE: CPT | Performed by: EMERGENCY MEDICINE

## 2020-08-05 RX ORDER — PANTOPRAZOLE SODIUM 40 MG/1
40 TABLET, DELAYED RELEASE ORAL
Status: DISCONTINUED | OUTPATIENT
Start: 2020-08-06 | End: 2020-08-06 | Stop reason: HOSPADM

## 2020-08-05 RX ORDER — HYDROMORPHONE HCL/PF 1 MG/ML
1 SYRINGE (ML) INJECTION ONCE
Status: COMPLETED | OUTPATIENT
Start: 2020-08-05 | End: 2020-08-05

## 2020-08-05 RX ORDER — LORAZEPAM 0.5 MG/1
0.5 TABLET ORAL DAILY PRN
Status: DISCONTINUED | OUTPATIENT
Start: 2020-08-05 | End: 2020-08-06 | Stop reason: HOSPADM

## 2020-08-05 RX ORDER — HYDROMORPHONE HCL/PF 1 MG/ML
1 SYRINGE (ML) INJECTION
Status: DISCONTINUED | OUTPATIENT
Start: 2020-08-05 | End: 2020-08-06 | Stop reason: HOSPADM

## 2020-08-05 RX ORDER — CHOLECALCIFEROL (VITAMIN D3) 10(400)/ML
1000 DROPS ORAL DAILY
Status: DISCONTINUED | OUTPATIENT
Start: 2020-08-06 | End: 2020-08-06 | Stop reason: HOSPADM

## 2020-08-05 RX ORDER — LORATADINE 10 MG/1
10 TABLET ORAL DAILY
Status: DISCONTINUED | OUTPATIENT
Start: 2020-08-06 | End: 2020-08-06 | Stop reason: HOSPADM

## 2020-08-05 RX ORDER — TAMSULOSIN HYDROCHLORIDE 0.4 MG/1
0.4 CAPSULE ORAL
Status: DISCONTINUED | OUTPATIENT
Start: 2020-08-06 | End: 2020-08-06 | Stop reason: HOSPADM

## 2020-08-05 RX ORDER — ONDANSETRON 2 MG/ML
4 INJECTION INTRAMUSCULAR; INTRAVENOUS EVERY 4 HOURS PRN
Status: DISCONTINUED | OUTPATIENT
Start: 2020-08-05 | End: 2020-08-06 | Stop reason: HOSPADM

## 2020-08-05 RX ORDER — OXYCODONE HYDROCHLORIDE 5 MG/1
2.5 TABLET ORAL EVERY 4 HOURS PRN
Status: DISCONTINUED | OUTPATIENT
Start: 2020-08-05 | End: 2020-08-06 | Stop reason: HOSPADM

## 2020-08-05 RX ORDER — CEFAZOLIN SODIUM 2 G/50ML
2000 SOLUTION INTRAVENOUS
Status: COMPLETED | OUTPATIENT
Start: 2020-08-06 | End: 2020-08-06

## 2020-08-05 RX ORDER — SODIUM CHLORIDE, SODIUM LACTATE, POTASSIUM CHLORIDE, CALCIUM CHLORIDE 600; 310; 30; 20 MG/100ML; MG/100ML; MG/100ML; MG/100ML
125 INJECTION, SOLUTION INTRAVENOUS CONTINUOUS
Status: DISCONTINUED | OUTPATIENT
Start: 2020-08-05 | End: 2020-08-05

## 2020-08-05 RX ORDER — HEPARIN SODIUM 5000 [USP'U]/ML
5000 INJECTION, SOLUTION INTRAVENOUS; SUBCUTANEOUS EVERY 8 HOURS SCHEDULED
Status: DISCONTINUED | OUTPATIENT
Start: 2020-08-05 | End: 2020-08-06 | Stop reason: HOSPADM

## 2020-08-05 RX ORDER — KETOROLAC TROMETHAMINE 30 MG/ML
15 INJECTION, SOLUTION INTRAMUSCULAR; INTRAVENOUS ONCE
Status: COMPLETED | OUTPATIENT
Start: 2020-08-05 | End: 2020-08-05

## 2020-08-05 RX ORDER — HYDRALAZINE HYDROCHLORIDE 20 MG/ML
5 INJECTION INTRAMUSCULAR; INTRAVENOUS EVERY 6 HOURS PRN
Status: DISCONTINUED | OUTPATIENT
Start: 2020-08-05 | End: 2020-08-06 | Stop reason: HOSPADM

## 2020-08-05 RX ORDER — OXYCODONE HYDROCHLORIDE 5 MG/1
5 TABLET ORAL EVERY 4 HOURS PRN
Status: DISCONTINUED | OUTPATIENT
Start: 2020-08-05 | End: 2020-08-06 | Stop reason: HOSPADM

## 2020-08-05 RX ORDER — FENOFIBRATE 145 MG/1
145 TABLET, COATED ORAL DAILY
Status: DISCONTINUED | OUTPATIENT
Start: 2020-08-06 | End: 2020-08-06 | Stop reason: HOSPADM

## 2020-08-05 RX ORDER — SODIUM CHLORIDE 9 MG/ML
125 INJECTION, SOLUTION INTRAVENOUS CONTINUOUS
Status: DISCONTINUED | OUTPATIENT
Start: 2020-08-05 | End: 2020-08-06 | Stop reason: HOSPADM

## 2020-08-05 RX ORDER — CHOLECALCIFEROL (VITAMIN D3) 125 MCG
100 CAPSULE ORAL DAILY
Status: DISCONTINUED | OUTPATIENT
Start: 2020-08-06 | End: 2020-08-06 | Stop reason: HOSPADM

## 2020-08-05 RX ORDER — ACETAMINOPHEN 325 MG/1
650 TABLET ORAL EVERY 6 HOURS PRN
Status: DISCONTINUED | OUTPATIENT
Start: 2020-08-05 | End: 2020-08-06 | Stop reason: HOSPADM

## 2020-08-05 RX ORDER — ONDANSETRON 2 MG/ML
4 INJECTION INTRAMUSCULAR; INTRAVENOUS ONCE
Status: COMPLETED | OUTPATIENT
Start: 2020-08-05 | End: 2020-08-05

## 2020-08-05 RX ORDER — CALCIUM CARBONATE 200(500)MG
1000 TABLET,CHEWABLE ORAL DAILY PRN
Status: DISCONTINUED | OUTPATIENT
Start: 2020-08-05 | End: 2020-08-06 | Stop reason: HOSPADM

## 2020-08-05 RX ADMIN — SODIUM CHLORIDE 125 ML/HR: 0.9 INJECTION, SOLUTION INTRAVENOUS at 22:47

## 2020-08-05 RX ADMIN — KETOROLAC TROMETHAMINE 15 MG: 30 INJECTION, SOLUTION INTRAMUSCULAR at 07:33

## 2020-08-05 RX ADMIN — HYDROMORPHONE HYDROCHLORIDE 1 MG: 1 INJECTION, SOLUTION INTRAMUSCULAR; INTRAVENOUS; SUBCUTANEOUS at 22:25

## 2020-08-05 RX ADMIN — ONDANSETRON 4 MG: 2 INJECTION INTRAMUSCULAR; INTRAVENOUS at 15:27

## 2020-08-05 RX ADMIN — HYDROMORPHONE HYDROCHLORIDE 1 MG: 1 INJECTION, SOLUTION INTRAMUSCULAR; INTRAVENOUS; SUBCUTANEOUS at 16:20

## 2020-08-05 RX ADMIN — HYDROMORPHONE HYDROCHLORIDE 1 MG: 1 INJECTION, SOLUTION INTRAMUSCULAR; INTRAVENOUS; SUBCUTANEOUS at 10:14

## 2020-08-05 RX ADMIN — SODIUM CHLORIDE 125 ML/HR: 0.9 INJECTION, SOLUTION INTRAVENOUS at 15:25

## 2020-08-05 RX ADMIN — CALCIUM CARBONATE (ANTACID) CHEW TAB 500 MG 1000 MG: 500 CHEW TAB at 22:44

## 2020-08-05 RX ADMIN — SODIUM CHLORIDE 1000 ML: 0.9 INJECTION, SOLUTION INTRAVENOUS at 07:33

## 2020-08-05 RX ADMIN — HYDROMORPHONE HYDROCHLORIDE 1 MG: 1 INJECTION, SOLUTION INTRAMUSCULAR; INTRAVENOUS; SUBCUTANEOUS at 08:00

## 2020-08-05 RX ADMIN — ONDANSETRON 4 MG: 2 INJECTION INTRAMUSCULAR; INTRAVENOUS at 07:33

## 2020-08-05 RX ADMIN — HEPARIN SODIUM 5000 UNITS: 5000 INJECTION INTRAVENOUS; SUBCUTANEOUS at 21:04

## 2020-08-05 RX ADMIN — OXYCODONE HYDROCHLORIDE 5 MG: 5 TABLET ORAL at 19:29

## 2020-08-05 RX ADMIN — OXYCODONE HYDROCHLORIDE 5 MG: 5 TABLET ORAL at 15:27

## 2020-08-05 RX ADMIN — HYDRALAZINE HYDROCHLORIDE 5 MG: 20 INJECTION INTRAMUSCULAR; INTRAVENOUS at 16:21

## 2020-08-05 NOTE — ASSESSMENT & PLAN NOTE
CT imaging in the ER revealing "Mild to moderate left hydroureteronephrosis secondary to a 7 mm obstructing calculus in the proximal left ureter  Chica Leos Numerous other nonobstructing intrarenal calculi are slightly larger and more numerous than on the prior study "  Seen by urology in the ER -> plan for OR intervention tomorrow (NPO after midnight)  Continue IV fluids - PRN pain/emesis control

## 2020-08-05 NOTE — ED PROVIDER NOTES
History  Chief Complaint   Patient presents with    Flank Pain     pt reports 6mm kidney stone on left side, hx of kidney stones with lithotripsy  pt reports increased left flank pain 10/10 and blood in urine  HPI  63 yo male with PMH HTN, GERD, kidney stones presents to the ED with concern for left flank pain  Pt reports he saw his urologist 2 weeks ago and had an XR done that showed stones in his bilateral kidneys, and a large 6mm stone on the left  Pt reports he has had lithrotripsy before, but never required other intervention  Pt states when stones pass he usually has pain only for a few hours, but this time he has been having pain intermittently since yesterday evening and it is more severe than pain he has had before  Pain radiates to LLQ abdomen  He also reports associated nausea and some dysuria and hematuria  No vomiting, fever, cough, congestion, chest pain, SOB>    Prior to Admission Medications   Prescriptions Last Dose Informant Patient Reported? Taking? Cholecalciferol (VITAMIN D3) 1000 units CAPS 8/5/2020 at Unknown time Self Yes Yes   Sig: Take 1 capsule by mouth daily   FLUCELVAX QUADRIVALENT  Self Yes No   Sig: TO BE ADMINISTERED BY PHARMACIST FOR IMMUNIZATION   LORazepam (ATIVAN) 0 5 mg tablet Past Month at Unknown time  No Yes   Sig: Take 1 tablet (0 5 mg total) by mouth daily as needed for anxiety   SYRINGE-NEEDLE, DISP, 3 ML (BD ECLIPSE SYRINGE) 25G X 1" 3 ML MISC  Self No No   Sig: by Does not apply route once a week   b complex vitamins capsule 8/5/2020 at Unknown time Self Yes Yes   Sig: Take 1 capsule by mouth daily  coenzyme Q-10 100 MG capsule 8/5/2020 at Unknown time Self Yes Yes   Sig: Take 1 capsule by mouth daily   cyanocobalamin (VITAMIN B-12) 100 mcg tablet 8/5/2020 at Unknown time Self Yes Yes   Sig: Take by mouth daily     fenofibrate (TRICOR) 145 mg tablet 8/5/2020 at Unknown time  No Yes   Sig: TAKE 1 TABLET DAILY   fexofenadine (ALLEGRA) 180 MG tablet 8/5/2020 at Unknown time Self Yes Yes   Sig: Take 180 mg by mouth daily in the early morning    ibuprofen (MOTRIN) 600 mg tablet 8/5/2020 at Unknown time Self Yes Yes   Sig: Take 600 mg by mouth every 6 (six) hours as needed   lisinopril (ZESTRIL) 10 mg tablet 8/5/2020 at Unknown time  No Yes   Sig: TAKE 1 AND 1/2 TABLETS ONCEDAILY   multivitamin (THERAGRAN) TABS 8/5/2020 at Unknown time Self Yes Yes   Sig: Take 1 tablet by mouth daily  omeprazole (PriLOSEC) 40 MG capsule 8/5/2020 at Unknown time Self Yes Yes   Sig: Take 40 mg by mouth daily in the early morning    sildenafil (VIAGRA) 50 MG tablet Past Month at Unknown time Self No Yes   Sig: Take 1 tablet (50 mg total) by mouth as needed for erectile dysfunction   tamsulosin (FLOMAX) 0 4 mg 8/5/2020 at Unknown time  No Yes   Sig: Take 1 capsule (0 4 mg total) by mouth daily with dinner   testosterone cypionate (DEPO-TESTOSTERONE) 200 mg/mL SOLN Past Month at Unknown time  No Yes   Sig: INJECT 0 4ML INTO SHOULDER, THIGH, OR BUTTOCKS ONCE WEEKLY      Facility-Administered Medications: None       Past Medical History:   Diagnosis Date    Chronic kidney disease     kidney stones    GERD (gastroesophageal reflux disease)     Hypertension     Kidney stone        Past Surgical History:   Procedure Laterality Date    COLONOSCOPY      ESOPHAGOGASTRODUODENOSCOPY      EXTRACORPOREAL SHOCK WAVE LITHOTRIPSY Bilateral     Renal Multiple times    VA EXC SKIN BENIG >4 CM FACE,FACIAL Right 4/28/2016    Procedure: EXCISION  LESION UPPER EYELID, COMPLEX CLOSURE;  Surgeon: Skip Roldan MD;  Location: [facility];  Service: Plastics    VA FRAGMENT KIDNEY STONE/ ESWL Right 4/18/2019    Procedure: Talia Cavazos EXTRACORPORAL SHOCKWAVE (ESWL);   Surgeon: Dorota Díaz MD;  Location: [facility];  Service: Urology       Family History   Problem Relation Age of Onset    Other Father         CABG (coronary artery bypass surgery)    Atrial fibrillation Sister     Other Paternal Uncle Myocardial infarction    Coronary artery disease Family     Diabetes Family     Hypertension Family     Uterine cancer Family      I have reviewed and agree with the history as documented  E-Cigarette/Vaping    E-Cigarette Use Never User      E-Cigarette/Vaping Substances     Social History     Tobacco Use    Smoking status: Former Smoker     Last attempt to quit: 1981     Years since quittin 2    Smokeless tobacco: Never Used   Substance Use Topics    Alcohol use: Yes     Alcohol/week: 2 0 standard drinks     Types: 2 Cans of beer per week     Frequency: 2-4 times a month     Drinks per session: 1 or 2     Binge frequency: Never     Comment: weekend    Drug use: No        Review of Systems   Constitutional: Negative for chills and fever  HENT: Negative for congestion, rhinorrhea and sore throat  Respiratory: Negative for cough and shortness of breath  Cardiovascular: Negative for chest pain and palpitations  Gastrointestinal: Positive for abdominal pain and nausea  Negative for vomiting  Genitourinary: Positive for dysuria, flank pain and hematuria  Musculoskeletal: Negative for arthralgias and myalgias  Skin: Negative for rash  Neurological: Negative for dizziness, weakness, light-headedness, numbness and headaches  All other systems reviewed and are negative        Physical Exam  ED Triage Vitals   Temperature Pulse Respirations Blood Pressure SpO2   20 0840 20 0713 20 0713 20 0713 20 0713   97 9 °F (36 6 °C) 92 (!) 26 (!) 179/116 99 %      Temp Source Heart Rate Source Patient Position - Orthostatic VS BP Location FiO2 (%)   20 0840 20 0713 20 0713 20 0713 --   Tympanic Monitor Sitting Left arm       Pain Score       20 0800       9             Orthostatic Vital Signs  Vitals:    20 0713 20 0830 20 1000 20 1030   BP: (!) 179/116 156/92 144/82 133/85   Pulse: 92 94 88 88   Patient Position - Orthostatic VS: Sitting Sitting Sitting Sitting       Physical Exam  Constitutional:       General: He is not in acute distress  Appearance: He is well-developed  He is not diaphoretic  Comments: Appears uncomfortable   HENT:      Head: Normocephalic and atraumatic  Right Ear: External ear normal       Left Ear: External ear normal    Eyes:      Conjunctiva/sclera: Conjunctivae normal       Pupils: Pupils are equal, round, and reactive to light  Neck:      Musculoskeletal: Normal range of motion and neck supple  Cardiovascular:      Rate and Rhythm: Normal rate and regular rhythm  Heart sounds: Normal heart sounds  No murmur  No friction rub  No gallop  Pulmonary:      Effort: Pulmonary effort is normal  No respiratory distress  Breath sounds: Normal breath sounds  No wheezing, rhonchi or rales  Abdominal:      General: Bowel sounds are normal  There is no distension  Palpations: Abdomen is soft  Tenderness: There is abdominal tenderness in the left lower quadrant  There is left CVA tenderness  Musculoskeletal: Normal range of motion  Lymphadenopathy:      Cervical: No cervical adenopathy  Skin:     General: Skin is warm and dry  Neurological:      Mental Status: He is alert and oriented to person, place, and time  Cranial Nerves: No cranial nerve deficit  Sensory: No sensory deficit           ED Medications  Medications   ketorolac (TORADOL) injection 15 mg (15 mg Intravenous Given 8/5/20 0733)   ondansetron (ZOFRAN) injection 4 mg (4 mg Intravenous Given 8/5/20 0733)   sodium chloride 0 9 % bolus 1,000 mL (0 mL Intravenous Stopped 8/5/20 0909)   HYDROmorphone (DILAUDID) injection 1 mg (1 mg Intravenous Given 8/5/20 0800)   HYDROmorphone (DILAUDID) injection 1 mg (1 mg Intravenous Given 8/5/20 1014)       Diagnostic Studies  Results Reviewed     Procedure Component Value Units Date/Time    Urine Microscopic [[id number]]  (Abnormal) Collected: 08/05/20 0741    Lab Status:  Final result Specimen:  Urine, Clean Catch Updated:  08/05/20 0814     RBC, UA Innumerable /hpf      WBC, UA None Seen /hpf      Epithelial Cells None Seen /hpf      Bacteria, UA None Seen /hpf      Hyaline Casts, UA None Seen /lpf     Comprehensive metabolic panel [[id number]]  (Abnormal) Collected:  08/05/20 0733    Lab Status:  Final result Specimen:  Blood from Arm, Left Updated:  08/05/20 0801     Sodium 138 mmol/L      Potassium 3 8 mmol/L      Chloride 106 mmol/L      CO2 23 mmol/L      ANION GAP 9 mmol/L      BUN 19 mg/dL      Creatinine 1 44 mg/dL      Glucose 89 mg/dL      Calcium 9 6 mg/dL      AST 37 U/L      ALT 32 U/L      Alkaline Phosphatase 38 U/L      Total Protein 7 6 g/dL      Albumin 3 8 g/dL      Total Bilirubin 0 64 mg/dL      eGFR 54 ml/min/1 73sq m     Narrative:       [place] guidelines for Chronic Kidney Disease (CKD):     Stage 1 with normal or high GFR (GFR > 90 mL/min/1 73 square meters)    Stage 2 Mild CKD (GFR = 60-89 mL/min/1 73 square meters)    Stage 3A Moderate CKD (GFR = 45-59 mL/min/1 73 square meters)    Stage 3B Moderate CKD (GFR = 30-44 mL/min/1 73 square meters)    Stage 4 Severe CKD (GFR = 15-29 mL/min/1 73 square meters)    Stage 5 End Stage CKD (GFR <15 mL/min/1 73 square meters)  Note: GFR calculation is accurate only with a steady state creatinine    Lipase [[id number]]  (Normal) Collected:  08/05/20 0733    Lab Status:  Final result Specimen:  Blood from Arm, Left Updated:  08/05/20 0801     Lipase 340 u/L     CBC and differential [[id number]]  (Abnormal) Collected:  08/05/20 0733    Lab Status:  Final result Specimen:  Blood from Arm, Left Updated:  08/05/20 0751     WBC 13 50 Thousand/uL      RBC 5 46 Million/uL      Hemoglobin 16 0 g/dL      Hematocrit 47 1 %      MCV 86 fL      MCH 29 3 pg      MCHC 34 0 g/dL      RDW 13 6 %      MPV 10 2 fL      Platelets 240 Thousands/uL      nRBC 0 /100 WBCs Neutrophils Relative 74 %      Immat GRANS % 1 %      Lymphocytes Relative 12 %      Monocytes Relative 11 %      Eosinophils Relative 1 %      Basophils Relative 1 %      Neutrophils Absolute 10 10 Thousands/µL      Immature Grans Absolute 0 07 Thousand/uL      Lymphocytes Absolute 1 65 Thousands/µL      Monocytes Absolute 1 47 Thousand/µL      Eosinophils Absolute 0 13 Thousand/µL      Basophils Absolute 0 08 Thousands/µL     POCT urinalysis dipstick [[id number]]  (Normal) Resulted:  08/05/20 0740    Lab Status:  Final result Updated:  08/05/20 0740     Color, UA see results    Urine Macroscopic, POC [[id number]]  (Abnormal) Collected:  08/05/20 0741    Lab Status:  Final result Specimen:  Urine Updated:  08/05/20 0739     Color, UA Yellow     Clarity, UA Clear     pH, UA 7 0     Leukocytes, UA Negative     Nitrite, UA Negative     Protein, UA Negative mg/dl      Glucose, UA Negative mg/dl      Ketones, UA Negative mg/dl      Urobilinogen, UA 0 2 E U /dl      Bilirubin, UA Negative     Blood, UA Moderate     Specific Gravity, UA 1 025    Narrative:       [facility] RESULT                 CT renal stone study abdomen pelvis wo contrast   Final Result by Crow Mendez MD (08/05 1006)      1  Mild to moderate left hydroureteronephrosis secondary to a 7 mm obstructing calculus in the proximal left ureter  2   Numerous other nonobstructing intrarenal calculi are slightly larger and more numerous than on the prior study  Workstation performed: [id number]               Procedures  Procedures      ED Course  ED Course as of Aug 05 1150   Wed Aug 05, 2020   0821 WBC(!): 13 50   0821 Blood, UA(!): Moderate   0821 RBC, UA(!): Innumerable   0822 Creatinine(!): 1 44   1005 Obstructing large left ureteral stone with hydronephrosis  Nephrology will evaluate, will likely require procedural intervention   Will admit to [facility]    CT renal stone study abdomen pelvis wo contrast   1015 Color, UA: Yellow   1016 Admitted to [facility]                                              MDM  Number of Diagnoses or Management Options  Hematuria:   Hydronephrosis of left kidney:   Left ureteral stone:     63 yo male with PMH HTN, GERD, kidney stones presenting with L flank pain concerning for obstructed stone  Will obtain CBC, CMP, Lipase, UA, CT renal stone study to evaluate  Pt will likely require urology consult  Will treat pain and nausea with toradol and zofran, if toradol ineffective will give dilaudid  Disposition  Final diagnoses:   Left ureteral stone   Hydronephrosis of left kidney   Hematuria     Time reflects when diagnosis was documented in both MDM as applicable and the Disposition within this note     Time User Action Codes Description Comment    8/5/2020 10:15 AM Valdovinos Dings Add [N20 1] Left ureteral stone     8/5/2020 10:15 AM Valdovinos Dings Add [N13 30] Hydronephrosis of left kidney     8/5/2020 10:15 AM Valdovinos Dings Add [R31 9] Hematuria       ED Disposition     ED Disposition Condition Date/Time Comment    Admit Stable Wed Aug 5, 2020 10:15 AM Case was discussed with Dr Herberth Silvestre and the patient's admission status was agreed to be Admission Status: observation status to the service of Dr Herberth Silvestre   Follow-up Information    None         Patient's Medications   Discharge Prescriptions    No medications on file     No discharge procedures on file  PDMP Review     None           ED Provider  Attending physically available and evaluated Mary Carreno I managed the patient along with the ED Attending      Electronically Signed by         Ellen Martinez MD  08/05/20 1081

## 2020-08-05 NOTE — ASSESSMENT & PLAN NOTE
History of bilateral kidney stones status post multiple lithotripsies  See plan for obstructing calculus above

## 2020-08-05 NOTE — PLAN OF CARE
Problem: PAIN - ADULT  Goal: Verbalizes/displays adequate comfort level or baseline comfort level  Description: Interventions:  - Encourage patient to monitor pain and request assistance  - Assess pain using appropriate pain scale  - Administer analgesics based on type and severity of pain and evaluate response  - Implement non-pharmacological measures as appropriate and evaluate response  - Consider cultural and social influences on pain and pain management  - Notify physician/advanced practitioner if interventions unsuccessful or patient reports new pain  Outcome: Progressing     Problem: INFECTION - ADULT  Goal: Absence or prevention of progression during hospitalization  Description: INTERVENTIONS:  - Assess and monitor for signs and symptoms of infection  - Monitor lab/diagnostic results  - Monitor all insertion sites, i e  indwelling lines  Problem: SAFETY ADULT  Goal: Patient will remain free of falls  Description: INTERVENTIONS:  - Assess patient frequently for physical needs  -  Identify cognitive and physical deficits and behaviors that affect risk of falls    -  [place] fall precautions as indicated by assessment   - Educate patient/family on patient safety including physical limitations  - Instruct patient to call for assistance with activity based on assessment  - Modify environment to reduce risk of injury  - Consider OT/PT consult to assist with strengthening/mobility  Outcome: Progressing  Goal: Maintain or return to baseline ADL function  Description: INTERVENTIONS:  -  Assess patient's ability to carry out ADLs; assess patient's baseline for ADL function and identify physical deficits which impact ability to perform ADLs (bathing, care of mouth/teeth, toileting, grooming, dressing, etc )  - Assess/evaluate cause of self-care deficits   - Assess range of motion  - Assess patient's mobility; develop plan if impaired  - Assess patient's need for assistive devices and provide as appropriate  - Encourage maximum independence but intervene and supervise when necessary  - Involve family in performance of ADLs  - Assess for home care needs following discharge   - Consider OT consult to assist with ADL evaluation and planning for discharge  - Provide patient education as appropriate  Outcome: Progressing  Goal: Maintain or return mobility status to optimal level  Description: INTERVENTIONS:  - Assess patient's baseline mobility status (ambulation, transfers, stairs, etc )    - Identify cognitive and physical deficits and behaviors that affect mobility  - Identify mobility aids required to assist with transfers and/or ambulation (gait belt, sit-to-stand, lift, walker, cane, etc )  - [place] fall precautions as indicated by assessment  - Record patient progress and toleration of activity level on Mobility SBAR; progress patient to next Phase/Stage  - Instruct patient to call for assistance with activity based on assessment  - Consider rehabilitation consult to assist with strengthening/weightbearing, etc   Outcome: Progressing     Problem: DISCHARGE PLANNING  Goal: Discharge to home or other facility with appropriate resources  Description: INTERVENTIONS:  - Identify barriers to discharge w/patient and caregiver  - Arrange for needed discharge resources and transportation as appropriate  - Identify discharge learning needs (meds, wound care, etc )  Problem: Knowledge Deficit  Goal: Patient/family/caregiver demonstrates understanding of disease process, treatment plan, medications, and discharge instructions  Description: Complete learning assessment and assess knowledge base    Interventions:  - Provide teaching at level of understanding  - Provide teaching via preferred learning methods  Outcome: Progressing     - Refer to Case Management Department for coordinating discharge planning if the patient needs post-hospital services based on physician/advanced practitioner order or complex needs related to functional status, cognitive ability, or social support system  Outcome: Progressing     - Monitor endotracheal if appropriate and nasal secretions for changes in amount and color  - [place] appropriate cooling/warming therapies per order  - Administer medications as ordered  - Instruct and encourage patient and family to use good hand hygiene technique  - Identify and instruct in appropriate isolation precautions for identified infection/condition  Outcome: Progressing  Goal: Absence of fever/infection during neutropenic period  Description: INTERVENTIONS:  - Monitor WBC    Outcome: Progressing

## 2020-08-05 NOTE — CONSULTS
[address] NOTE   Admission Date: 8/5/2020    Patient Identifiers: Mario Rankin (MRN: [id number])  Iraida Ferreira MD   Service Providing Consultation:  Urology, Chante Bhatia PA-C  Consults  Date of Service: 8/5/2020    Reason for Consultation:  Left ureteral calculi with hydronephrosis    History of Present Illness:     Mario Rankin is a 64 y o  male with long history of kidney stones known to Dr Venus Eisenberg presented to the emergency room with onset of left flank pain  He has passed multiple stones previously but knew that this pain was different and worse  He had no fever chills no nausea or vomiting  He is comfortable at rest   He is afebrile  Creatinine 1 44  WBC 13 50  CT scan emergency room showed a 7 mm stone in the proximal left ureter with mild to moderate hydronephrosis  Past Medical, Past Surgical History:     Past Medical History:   Diagnosis Date    Chronic kidney disease     kidney stones    GERD (gastroesophageal reflux disease)     Hypertension     Kidney stone    :    Past Surgical History:   Procedure Laterality Date    COLONOSCOPY      ESOPHAGOGASTRODUODENOSCOPY      EXTRACORPOREAL SHOCK WAVE LITHOTRIPSY Bilateral     Renal Multiple times    FL EXC SKIN BENIG >4 CM FACE,FACIAL Right 4/28/2016    Procedure: EXCISION  LESION UPPER EYELID, COMPLEX CLOSURE;  Surgeon: Ronald Sesay MD;  Location:  MAIN OR;  Service: Plastics    FL FRAGMENT KIDNEY STONE/ ESWL Right 4/18/2019    Procedure: Vahid June EXTRACORPORAL SHOCKWAVE (ESWL); Surgeon: Bernie Mercado MD;  Location: [facility];  Service: Urology   :    Medications, Allergies:   No current facility-administered medications for this encounter  Current Outpatient Medications:     b complex vitamins capsule, Take 1 capsule by mouth daily  , Disp: , Rfl:     Cholecalciferol (VITAMIN D3) 1000 units CAPS, Take 1 capsule by mouth daily, Disp: , Rfl:     coenzyme Q-10 100 MG capsule, Take 1 capsule by mouth daily, Disp: , Rfl:     cyanocobalamin (VITAMIN B-12) 100 mcg tablet, Take by mouth daily  , Disp: , Rfl:     fenofibrate (TRICOR) 145 mg tablet, TAKE 1 TABLET DAILY, Disp: 90 tablet, Rfl: 0    fexofenadine (ALLEGRA) 180 MG tablet, Take 180 mg by mouth daily in the early morning , Disp: , Rfl:     ibuprofen (MOTRIN) 600 mg tablet, Take 600 mg by mouth every 6 (six) hours as needed, Disp: , Rfl:     lisinopril (ZESTRIL) 10 mg tablet, TAKE 1 AND 1/2 TABLETS ONCEDAILY, Disp: 135 tablet, Rfl: 3    LORazepam (ATIVAN) 0 5 mg tablet, Take 1 tablet (0 5 mg total) by mouth daily as needed for anxiety, Disp: 90 tablet, Rfl: 0    multivitamin (THERAGRAN) TABS, Take 1 tablet by mouth daily  , Disp: , Rfl:     omeprazole (PriLOSEC) 40 MG capsule, Take 40 mg by mouth daily in the early morning , Disp: , Rfl:     sildenafil (VIAGRA) 50 MG tablet, Take 1 tablet (50 mg total) by mouth as needed for erectile dysfunction, Disp: 30 tablet, Rfl: 1    tamsulosin (FLOMAX) 0 4 mg, Take 1 capsule (0 4 mg total) by mouth daily with dinner, Disp: 30 capsule, Rfl: 0    testosterone cypionate (DEPO-TESTOSTERONE) 200 mg/mL SOLN, INJECT 0 4ML INTO SHOULDER, THIGH, OR BUTTOCKS ONCE WEEKLY, Disp: 2 mL, Rfl: 0    FLUCELVAX QUADRIVALENT, TO BE ADMINISTERED BY PHARMACIST FOR IMMUNIZATION, Disp: , Rfl: 0    SYRINGE-NEEDLE, DISP, 3 ML (BD ECLIPSE SYRINGE) 25G X 1" 3 ML MISC, by Does not apply route once a week, Disp: 50 each, Rfl: 3    Allergies: Allergies   Allergen Reactions    Ciprofloxacin Hives    Macrobid [Nitrofurantoin] Nausea Only and Palpitations   :    Social and Family History:   Social History:   Social History     Tobacco Use    Smoking status: Former Smoker     Last attempt to quit: 1981     Years since quittin 2    Smokeless tobacco: Never Used   Substance Use Topics    Alcohol use:  Yes     Alcohol/week: 2 0 standard drinks Types: 2 Cans of beer per week     Frequency: 2-4 times a month     Drinks per session: 1 or 2     Binge frequency: Never     Comment: weekend    Drug use: No        Social History     Tobacco Use   Smoking Status Former Smoker    Last attempt to quit: 1981    Years since quittin 2   Smokeless Tobacco Never Used       Family History:  Family History   Problem Relation Age of Onset    Other Father         CABG (coronary artery bypass surgery)    Atrial fibrillation Sister     Other Paternal Uncle         Myocardial infarction    Coronary artery disease Family     Diabetes Family     Hypertension Family     Uterine cancer Family    :     Review of Systems:     General: Fever, chills, or night sweats: negative  Cardiac: Negative for chest pain  Pulmonary: Negative for shortness of breath  Gastrointestinal: Abdominal pain negative  Nausea, vomiting, or diarrhea negative,  Genitourinary: See HPI above  Patient does not have hematuria  All other systems queried were negative  Physical Exam:   General: Patient is pleasant and in NAD  Awake and alert  /82 (BP Location: Right arm)   Pulse 88   Temp 97 9 °F (36 6 °C) (Tympanic)   Resp 20   SpO2 94%   HEENT:  No scleral icterus conjunctiva are clear  Constitutional:  Pleasant and cooperative 28-year-old man no apparent distress  Cardiac: Peripheral edema: negative  Pulmonary: Non-labored breathing  Abdomen: Soft, non-tender, non-distended  No surgical scars  No masses, tenderness, hernias noted  Genitourinary: Positive CVA tenderness, negative suprapubic tenderness    Extremities:  Moves all extremities with no deformity or weakness   Neurological:  No numbness or tingling without focal neurologic findings  Psychiatric:  Mood affect and behavior normal      Labs:     Lab Results   Component Value Date    HGB 16 0 2020    HCT 47 1 2020    WBC 13 50 (H) 2020     2020   ]    Lab Results   Component Value Date     11/16/2017    K 3 8 08/05/2020     08/05/2020    CO2 23 08/05/2020    BUN 19 08/05/2020    CREATININE 1 44 (H) 08/05/2020    CALCIUM 9 6 08/05/2020    GLUCOSE 97 01/08/2016   ]    Imaging:   I personally reviewed the images and report of the following studies, and reviewed them with the patient:  ABDOMEN AND PELVIS WITHOUT IV CONTRAST - LOW DOSE RENAL STONE   IMPRESSION:     1  Mild to moderate left hydroureteronephrosis secondary to a 7 mm obstructing calculus in the proximal left ureter  2   Numerous other nonobstructing intrarenal calculi are slightly larger and more numerous than on the prior study        ASSESSMENT:     1  7 mm proximal left ureter calculus with mild-to-moderate hydronephrosis  2  Acute kidney injury  3  Nephrolithiasis bilateral     PLAN:   -I reviewed the options of management with the patient and wife  -he will be admitted to the Internal Medicine group  -I made him NPO after midnight he will be added on for ureteroscopy tomorrow  -the understands that he may only get  a stent if there are signs infection urine and would have to come back as an outpatient for definitive treatment of his stone      Thank you for allowing me to participate in this patients care  Please do not hesitate to call with any additional questions    Jett Irving PA-C

## 2020-08-05 NOTE — ED ATTENDING ATTESTATION
8/5/2020  I, Hiral Herrera MD, saw and evaluated the patient  I have discussed the patient with the resident/non-physician practitioner and agree with the resident's/non-physician practitioner's findings, Plan of Care, and MDM as documented in the resident's/non-physician practitioner's note, except where noted  All available labs and Radiology studies were reviewed  I was present for key portions of any procedure(s) performed by the resident/non-physician practitioner and I was immediately available to provide assistance  At this point I agree with the current assessment done in the Emergency Department  I have conducted an independent evaluation of this patient a history and physical is as follows:  Pt has history of kidney stone Last night started with L flank pain more severe than in the past  Pt has known 6 mm stone no fevers urinating ok PE: alert nad heart reg lungs clear abd soft nontender tender l flank MDM: will do ct check urine     ED Course         Critical Care Time  Procedures

## 2020-08-05 NOTE — H&P
History & Physical - [facility] Internal Medicine  Patient: Dolly Macdonald 64 y o  male MRN: [id number]  Unit/Bed#: ED 26 Encounter: [id number]  Primary Care Provider: Marisela Hough DO  Date & Time of Admission: 8/5/2020  7:06 AM        Assessment & Plan:    * Left hydroureteronephrosis with obstructing calculus  Assessment & Plan  CT imaging in the ER revealing "Mild to moderate left hydroureteronephrosis secondary to a 7 mm obstructing calculus in the proximal left ureter  Ringold Kappa Ric Kappa Numerous other nonobstructing intrarenal calculi are slightly larger and more numerous than on the prior study "  Seen by urology in the ER -> plan for OR intervention tomorrow (NPO after midnight)  Continue IV fluids - PRN pain/emesis control    Acute kidney injury  Assessment & Plan  Presents with a creatinine of 1 44 in the setting of hydroureteronephrosis due to obstructing calculus  Continue IV fluids - see plan for urologic intervention above  Monitor renal function and urine output - limit/avoid nephrotoxins as possible - holding ACEI    History of nephrolithiasis  Assessment & Plan  History of bilateral kidney stones status post multiple lithotripsies  See plan for obstructing calculus above    Leukocytosis  Assessment & Plan  Likely reactive due to acute medical issue(s)  Monitor WBC count    Essential hypertension  Assessment & Plan  Optimize pain control  Holding ACEI due to acute kidney injury  PRN IV Hydralazine on board    GERD (gastroesophageal reflux disease)  Assessment & Plan  Continue PPI regimen    Dyslipidemia  Assessment & Plan  Continue Fenofibrate        DVT Prophylaxis:  Heparin SC    Code Status:  Full code    Discussion with:  Patient at bedside    Anticipated Length of Stay:  Patient will be admitted on an Observation basis with an anticipated length of stay of less than 2 midnights     Justification for Hospital Stay:  Left hydroureteronephrosis secondary to an obstructive calculus requiring intravenous fluids, pain/emesis control, and urologic intervention  Total Time for Visit, including Counseling / Coordination of Care: 72 minutes  Greater than 50% of this total time spent on direct patient counseling and coordination of care  Chief Complaint:  Flank pain      History of Present Illness:    Talib Montiel is a 64 y o  male who presents with complaints of progressively worsening left-sided flank pain/discomfort over the last few days  Notably, he has significant history of recurrent kidney stones over the last few decades and can self identify when another obstructing stone becomes symptomatic  He has tried various doses of NSAIDs and Tylenol over the last few days for pain relief, however, the discomfort worsened acutely overnight into this morning  He states his nausea is controlled at this time after receiving intravenous analgesics in the ER  CT imaging revealed evidence of a left hydroureteronephrosis secondary to an obstructive calculus  He is plan for urologic intervention tomorrow  Overall, he remains in pleasant and hopeful spirits  Review of Systems:    Review of Systems - A thorough 12 point review systems was conducted  Pertinent positives and negatives are mentioned in the history of present illness        Past Medical and Surgical History:     Past Medical History:   Diagnosis Date    Chronic kidney disease     kidney stones    GERD (gastroesophageal reflux disease)     Hypertension     Kidney stone        Past Surgical History:   Procedure Laterality Date    COLONOSCOPY      ESOPHAGOGASTRODUODENOSCOPY      EXTRACORPOREAL SHOCK WAVE LITHOTRIPSY Bilateral     Renal Multiple times    HI EXC SKIN BENIG >4 CM FACE,FACIAL Right 4/28/2016    Procedure: EXCISION  LESION UPPER EYELID, COMPLEX CLOSURE;  Surgeon: Nathen Lyon MD;  Location[facility];  Service: Plastics    HI FRAGMENT KIDNEY STONE/ ESWL Right 4/18/2019    Procedure: Nikolas Paige EXTRACORPORAL SHOCKWAVE (ESWL); Surgeon: Jm Otoole MD;  [facility];  Service: Urology         Medications & Allergies:    Prior to Admission medications    Medication Sig Start Date End Date Taking? Authorizing Provider   b complex vitamins capsule Take 1 capsule by mouth daily  Yes Historical Provider, MD   Cholecalciferol (VITAMIN D3) 1000 units CAPS Take 1 capsule by mouth daily   Yes Historical Provider, MD   coenzyme Q-10 100 MG capsule Take 1 capsule by mouth daily   Yes Historical Provider, MD   cyanocobalamin (VITAMIN B-12) 100 mcg tablet Take by mouth daily  Yes Historical Provider, MD   fenofibrate (TRICOR) 145 mg tablet TAKE 1 TABLET DAILY 6/22/20  Yes Indira Muñoz, DO   fexofenadine (ALLEGRA) 180 MG tablet Take 180 mg by mouth daily in the early morning    Yes Historical Provider, MD   ibuprofen (MOTRIN) 600 mg tablet Take 600 mg by mouth every 6 (six) hours as needed 2/3/19  Yes Historical Provider, MD   lisinopril (ZESTRIL) 10 mg tablet TAKE 1 AND 1/2 TABLETS ONCEDAILY 6/22/20  Yes Indira Muñoz DO   LORazepam (ATIVAN) 0 5 mg tablet Take 1 tablet (0 5 mg total) by mouth daily as needed for anxiety 7/13/20  Yes Indira Muñoz, DO   multivitamin [facility]) TABS Take 1 tablet by mouth daily     Yes Historical Provider, MD   omeprazole (PriLOSEC) 40 MG capsule Take 40 mg by mouth daily in the early morning    Yes Historical Provider, MD   sildenafil (VIAGRA) 50 MG tablet Take 1 tablet (50 mg total) by mouth as needed for erectile dysfunction 8/19/19  Yes Indira Muñoz DO   tamsulosin (FLOMAX) 0 4 mg Take 1 capsule (0 4 mg total) by mouth daily with dinner 3/23/20  Yes WOLF Ty   testosterone cypionate (DEPO-TESTOSTERONE) 200 mg/mL SOLN INJECT 0 4ML INTO SHOULDER, THIGH, OR BUTTOCKS ONCE WEEKLY 6/17/20  Yes Indira Muñoz, DO   FLUCELVAX QUADRIVALENT TO BE ADMINISTERED BY PHARMACIST FOR IMMUNIZATION 10/19/18   Historical Provider, MD   SYRINGE-NEEDLE, DISP, 3 ML (BD ECLIPSE SYRINGE) 25G X 1" 3 ML MISC by Does not apply route once a week 18   Taryn Diesel, DO         Allergies: Allergies   Allergen Reactions    Ciprofloxacin Hives    Macrobid [Nitrofurantoin] Nausea Only and Palpitations         Social History:    Substance Use History:   Social History     Substance and Sexual Activity   Alcohol Use Yes    Alcohol/week: 2 0 standard drinks    Types: 2 Cans of beer per week    Frequency: 2-4 times a month    Drinks per session: 1 or 2    Binge frequency: Never    Comment: weekend     Social History     Tobacco Use   Smoking Status Former Smoker    Last attempt to quit: 1981    Years since quittin 2   Smokeless Tobacco Never Used     Social History     Substance and Sexual Activity   Drug Use No         Family History:    Per medical chart, significant for atrial fibrillation and sister, and heart disease in father and paternal uncle        Physical Exam:     Vitals:   Blood Pressure: 133/85 (20 1030)  Pulse: 88 (20 1030)  Temperature: 97 9 °F (36 6 °C) (20 0840)  Temp Source: Tympanic (20 0840)  Respirations: 20 (20 1030)  SpO2: 94 % (20 1030)      GENERAL:  Well-developed/nourished - improved distress from pain  HEAD:  Normocephalic - atraumatic  EYES: PERRL - EOMI   MOUTH:  Mucosa moist  NECK:  Supple - full range of motion  CARDIAC:  Rate controlled - S1/S2 positive  PULMONARY:  Clear breath sounds bilaterally - nonlabored respirations  ABDOMEN:  Soft - improved left lower flank tenderness - nondistended - active bowel sounds  MUSCULOSKELETAL:  Motor strength/range of motion intact  NEUROLOGIC:  Alert/oriented at baseline  SKIN:  Chronic wrinkles/blemishes   PSYCHIATRIC:  Mood/affect stable      Additional Data:     Labs & Recent Cultures:    Results from last 7 days   Lab Units 20  0733   WBC Thousand/uL 13 50*   HEMOGLOBIN g/dL 16 0   HEMATOCRIT % 47 1   PLATELETS Thousands/uL 286   NEUTROS PCT % 74 LYMPHS PCT % 12*   MONOS PCT % 11   EOS PCT % 1     Results from last 7 days   Lab Units 08/05/20  0733   SODIUM mmol/L 138   POTASSIUM mmol/L 3 8   CHLORIDE mmol/L 106   CO2 mmol/L 23   BUN mg/dL 19   CREATININE mg/dL 1 44*   ANION GAP mmol/L 9   CALCIUM mg/dL 9 6   ALBUMIN g/dL 3 8   TOTAL BILIRUBIN mg/dL 0 64   ALK PHOS U/L 38*   ALT U/L 32   AST U/L 37   GLUCOSE RANDOM mg/dL 89                       Imaging:     Xr Abdomen 1 View Kub    Result Date: 7/16/2020  Narrative: ABDOMEN INDICATION:   N20 0: Calculus of kidney  COMPARISON:  5/10/2019, CT abdomen and pelvis 10/11/2019 VIEWS:  AP supine Images: 3 FINDINGS: There are a few right midpole calculi measuring up to about 4 mm  Several small left renal calculi are also seen, estimated up to 6 mm in the midpole  No radiopaque ureteral calculi identified  Nonobstructive bowel gas pattern  No acute osseous abnormality is seen  Impression: Bilateral renal calculi  Workstation performed: [id number]     Ct Renal Stone Study Abdomen Pelvis Wo Contrast    Result Date: 8/5/2020  Narrative: CT ABDOMEN AND PELVIS WITHOUT IV CONTRAST - LOW DOSE RENAL STONE INDICATION:   left CVA tenderness, hx stones concern for obstruction  History of 6 mm kidney stone on the left  History of prior lithotripsy  Increased left flank pain  Hematuria  COMPARISON:  10/11/2019 TECHNIQUE:  Low dose thin section CT examination of the abdomen and pelvis was performed without intravenous or oral contrast according to a protocol specifically designed to evaluate for urinary tract calculus  Axial, sagittal, and coronal 2D reformatted images were created from the source data and submitted for interpretation  Evaluation for pathology in the abdomen and pelvis that is unrelated to urinary tract calculi is limited  Radiation dose length product (DLP) for this visit:  804 29 mGy-cm     This examination, like all CT scans performed in the [facility], was performed utilizing techniques to minimize radiation dose exposure, including the use of iterative  reconstruction and automated exposure control  FINDINGS: RIGHT KIDNEY AND URETER: Numerous nonobstructing intrarenal calculi in the right kidney noted, slightly more numerous than on the prior study  A cluster of calcifications in the central aspect of the right kidney measures in aggregate 7 mm on image 55, series 2  No hydronephrosis  Numerous smaller 2 to 3 mm right-sided calculi noted  LEFT KIDNEY AND URETER: Mild to moderate left-sided hydronephrosis secondary to a 7 mm obstructing calculus in the proximal left ureter on image 76, series 2, new from the prior study  Distal to this left ureteral calculus the ureter is decompressed  Numerous other nonobstructing intrarenal calculi on the left are noted, also more numerous and larger than on the prior study  A cluster of nonobstructing intrarenal calculi in the mid left kidney measures approximately 0 9 cm on image 52, series 2  URINARY BLADDER: Unremarkable  Mild bibasilar strandy densities noted  Limited low radiation dose noncontrast CT evaluation demonstrates no clinically significant abnormality of liver, spleen, pancreas, or adrenal glands  No calcified gallstones or gallbladder wall thickening noted  No ascites or bulky lymphadenopathy on this limited noncontrast study  Bowel loops appear unremarkable  Limited evaluation demonstrates no evidence to suggest acute appendicitis  No acute fracture or destructive osseous lesion is identified  Mild levoscoliosis mid lumbar spine  Impression: 1  Mild to moderate left hydroureteronephrosis secondary to a 7 mm obstructing calculus in the proximal left ureter  2   Numerous other nonobstructing intrarenal calculi are slightly larger and more numerous than on the prior study  Workstation performed: [id number]               ** Please Note: This note is constructed using a voice recognition dictation system    An occasional wrong word/phrase or sound-a-like substitution may have been picked up by dictation device due to the inherent limitations of voice recognition software  Read the chart carefully and recognize, using reasonable context, where substitutions may have occurred  **

## 2020-08-05 NOTE — ASSESSMENT & PLAN NOTE
Presents with a creatinine of 1 44 in the setting of hydroureteronephrosis due to obstructing calculus  Continue IV fluids - see plan for urologic intervention above  Monitor renal function and urine output - limit/avoid nephrotoxins as possible - holding ACEI

## 2020-08-06 ENCOUNTER — APPOINTMENT (OUTPATIENT)
Dept: RADIOLOGY | Facility: HOSPITAL | Age: 60
End: 2020-08-06
Payer: COMMERCIAL

## 2020-08-06 ENCOUNTER — TELEPHONE (OUTPATIENT)
Dept: UROLOGY | Facility: AMBULATORY SURGERY CENTER | Age: 60
End: 2020-08-06

## 2020-08-06 ENCOUNTER — ANESTHESIA (OUTPATIENT)
Dept: PERIOP | Facility: HOSPITAL | Age: 60
End: 2020-08-06
Payer: COMMERCIAL

## 2020-08-06 VITALS
OXYGEN SATURATION: 96 % | DIASTOLIC BLOOD PRESSURE: 99 MMHG | RESPIRATION RATE: 18 BRPM | BODY MASS INDEX: 31.79 KG/M2 | HEART RATE: 97 BPM | WEIGHT: 227.07 LBS | SYSTOLIC BLOOD PRESSURE: 151 MMHG | HEIGHT: 71 IN | TEMPERATURE: 98.4 F

## 2020-08-06 PROBLEM — E66.811 OBESITY (BMI 30.0-34.9): Chronic | Status: ACTIVE | Noted: 2020-08-06

## 2020-08-06 PROBLEM — E66.9 OBESITY (BMI 30.0-34.9): Chronic | Status: ACTIVE | Noted: 2020-08-06

## 2020-08-06 LAB
ANION GAP SERPL CALCULATED.3IONS-SCNC: 6 MMOL/L (ref 4–13)
BASOPHILS # BLD AUTO: 0.07 THOUSANDS/ΜL (ref 0–0.1)
BASOPHILS NFR BLD AUTO: 1 % (ref 0–1)
BUN SERPL-MCNC: 15 MG/DL (ref 5–25)
CALCIUM SERPL-MCNC: 8.9 MG/DL (ref 8.3–10.1)
CHLORIDE SERPL-SCNC: 108 MMOL/L (ref 100–108)
CO2 SERPL-SCNC: 25 MMOL/L (ref 21–32)
CREAT SERPL-MCNC: 1.44 MG/DL (ref 0.6–1.3)
EOSINOPHIL # BLD AUTO: 0.09 THOUSAND/ΜL (ref 0–0.61)
EOSINOPHIL NFR BLD AUTO: 1 % (ref 0–6)
ERYTHROCYTE [DISTWIDTH] IN BLOOD BY AUTOMATED COUNT: 13.8 % (ref 11.6–15.1)
GFR SERPL CREATININE-BSD FRML MDRD: 54 ML/MIN/1.73SQ M
GLUCOSE SERPL-MCNC: 88 MG/DL (ref 65–140)
GLUCOSE SERPL-MCNC: 94 MG/DL (ref 65–140)
HCT VFR BLD AUTO: 45 % (ref 36.5–49.3)
HGB BLD-MCNC: 14.4 G/DL (ref 12–17)
IMM GRANULOCYTES # BLD AUTO: 0.04 THOUSAND/UL (ref 0–0.2)
IMM GRANULOCYTES NFR BLD AUTO: 0 % (ref 0–2)
LYMPHOCYTES # BLD AUTO: 1.24 THOUSANDS/ΜL (ref 0.6–4.47)
LYMPHOCYTES NFR BLD AUTO: 11 % (ref 14–44)
MCH RBC QN AUTO: 28.7 PG (ref 26.8–34.3)
MCHC RBC AUTO-ENTMCNC: 32 G/DL (ref 31.4–37.4)
MCV RBC AUTO: 90 FL (ref 82–98)
MONOCYTES # BLD AUTO: 1.44 THOUSAND/ΜL (ref 0.17–1.22)
MONOCYTES NFR BLD AUTO: 13 % (ref 4–12)
NEUTROPHILS # BLD AUTO: 7.99 THOUSANDS/ΜL (ref 1.85–7.62)
NEUTS SEG NFR BLD AUTO: 74 % (ref 43–75)
NRBC BLD AUTO-RTO: 0 /100 WBCS
PLATELET # BLD AUTO: 252 THOUSANDS/UL (ref 149–390)
PMV BLD AUTO: 10.5 FL (ref 8.9–12.7)
POTASSIUM SERPL-SCNC: 4.1 MMOL/L (ref 3.5–5.3)
RBC # BLD AUTO: 5.01 MILLION/UL (ref 3.88–5.62)
SARS-COV-2 RNA RESP QL NAA+PROBE: NEGATIVE
SODIUM SERPL-SCNC: 139 MMOL/L (ref 136–145)
WBC # BLD AUTO: 10.87 THOUSAND/UL (ref 4.31–10.16)

## 2020-08-06 PROCEDURE — C2617 STENT, NON-COR, TEM W/O DEL: HCPCS | Performed by: UROLOGY

## 2020-08-06 PROCEDURE — C1769 GUIDE WIRE: HCPCS | Performed by: UROLOGY

## 2020-08-06 PROCEDURE — 82948 REAGENT STRIP/BLOOD GLUCOSE: CPT

## 2020-08-06 PROCEDURE — 85025 COMPLETE CBC W/AUTO DIFF WBC: CPT | Performed by: INTERNAL MEDICINE

## 2020-08-06 PROCEDURE — 99217 PR OBSERVATION CARE DISCHARGE MANAGEMENT: CPT | Performed by: PHYSICIAN ASSISTANT

## 2020-08-06 PROCEDURE — 74018 RADEX ABDOMEN 1 VIEW: CPT

## 2020-08-06 PROCEDURE — 52356 CYSTO/URETERO W/LITHOTRIPSY: CPT | Performed by: UROLOGY

## 2020-08-06 PROCEDURE — 80048 BASIC METABOLIC PNL TOTAL CA: CPT | Performed by: INTERNAL MEDICINE

## 2020-08-06 PROCEDURE — U0003 INFECTIOUS AGENT DETECTION BY NUCLEIC ACID (DNA OR RNA); SEVERE ACUTE RESPIRATORY SYNDROME CORONAVIRUS 2 (SARS-COV-2) (CORONAVIRUS DISEASE [COVID-19]), AMPLIFIED PROBE TECHNIQUE, MAKING USE OF HIGH THROUGHPUT TECHNOLOGIES AS DESCRIBED BY CMS-2020-01-R: HCPCS | Performed by: PHYSICIAN ASSISTANT

## 2020-08-06 PROCEDURE — 99225 PR SBSQ OBSERVATION CARE/DAY 25 MINUTES: CPT | Performed by: PHYSICIAN ASSISTANT

## 2020-08-06 PROCEDURE — C1758 CATHETER, URETERAL: HCPCS | Performed by: UROLOGY

## 2020-08-06 DEVICE — INLAY OPTIMA URETERAL STENT W/O GUIDEWIRE
Type: IMPLANTABLE DEVICE | Site: URETER | Status: FUNCTIONAL
Brand: BARD® INLAY OPTIMA® URETERAL STENT

## 2020-08-06 RX ORDER — PHENAZOPYRIDINE HYDROCHLORIDE 200 MG/1
200 TABLET, FILM COATED ORAL 3 TIMES DAILY PRN
Qty: 30 TABLET | Refills: 0 | Status: SHIPPED | OUTPATIENT
Start: 2020-08-06 | End: 2021-05-07 | Stop reason: ALTCHOICE

## 2020-08-06 RX ORDER — MAGNESIUM HYDROXIDE 1200 MG/15ML
LIQUID ORAL AS NEEDED
Status: DISCONTINUED | OUTPATIENT
Start: 2020-08-06 | End: 2020-08-06 | Stop reason: HOSPADM

## 2020-08-06 RX ORDER — LIDOCAINE HYDROCHLORIDE 10 MG/ML
INJECTION, SOLUTION EPIDURAL; INFILTRATION; INTRACAUDAL; PERINEURAL AS NEEDED
Status: DISCONTINUED | OUTPATIENT
Start: 2020-08-06 | End: 2020-08-06

## 2020-08-06 RX ORDER — HYDROCODONE BITARTRATE AND ACETAMINOPHEN 5; 325 MG/1; MG/1
1-2 TABLET ORAL EVERY 4 HOURS PRN
Qty: 20 TABLET | Refills: 0 | Status: SHIPPED | OUTPATIENT
Start: 2020-08-06 | End: 2020-08-16

## 2020-08-06 RX ORDER — TAMSULOSIN HYDROCHLORIDE 0.4 MG/1
0.4 CAPSULE ORAL EVERY EVENING
Qty: 30 CAPSULE | Refills: 0 | Status: SHIPPED | OUTPATIENT
Start: 2020-08-06 | End: 2020-08-31

## 2020-08-06 RX ORDER — ONDANSETRON 2 MG/ML
INJECTION INTRAMUSCULAR; INTRAVENOUS AS NEEDED
Status: DISCONTINUED | OUTPATIENT
Start: 2020-08-06 | End: 2020-08-06

## 2020-08-06 RX ORDER — FENTANYL CITRATE/PF 50 MCG/ML
25 SYRINGE (ML) INJECTION
Status: DISCONTINUED | OUTPATIENT
Start: 2020-08-06 | End: 2020-08-06 | Stop reason: HOSPADM

## 2020-08-06 RX ORDER — LISINOPRIL 10 MG/1
15 TABLET ORAL DAILY
Qty: 135 TABLET | Refills: 0
Start: 2020-08-07 | End: 2020-09-22 | Stop reason: SDUPTHER

## 2020-08-06 RX ORDER — ONDANSETRON 2 MG/ML
4 INJECTION INTRAMUSCULAR; INTRAVENOUS ONCE AS NEEDED
Status: DISCONTINUED | OUTPATIENT
Start: 2020-08-06 | End: 2020-08-06 | Stop reason: HOSPADM

## 2020-08-06 RX ORDER — SODIUM CHLORIDE, SODIUM LACTATE, POTASSIUM CHLORIDE, CALCIUM CHLORIDE 600; 310; 30; 20 MG/100ML; MG/100ML; MG/100ML; MG/100ML
75 INJECTION, SOLUTION INTRAVENOUS CONTINUOUS
Status: DISCONTINUED | OUTPATIENT
Start: 2020-08-06 | End: 2020-08-06 | Stop reason: HOSPADM

## 2020-08-06 RX ORDER — DEXAMETHASONE SODIUM PHOSPHATE 10 MG/ML
INJECTION, SOLUTION INTRAMUSCULAR; INTRAVENOUS AS NEEDED
Status: DISCONTINUED | OUTPATIENT
Start: 2020-08-06 | End: 2020-08-06

## 2020-08-06 RX ORDER — PROPOFOL 10 MG/ML
INJECTION, EMULSION INTRAVENOUS AS NEEDED
Status: DISCONTINUED | OUTPATIENT
Start: 2020-08-06 | End: 2020-08-06

## 2020-08-06 RX ORDER — FENTANYL CITRATE 50 UG/ML
INJECTION, SOLUTION INTRAMUSCULAR; INTRAVENOUS AS NEEDED
Status: DISCONTINUED | OUTPATIENT
Start: 2020-08-06 | End: 2020-08-06

## 2020-08-06 RX ORDER — CEPHALEXIN 500 MG/1
500 CAPSULE ORAL EVERY 6 HOURS SCHEDULED
Qty: 12 CAPSULE | Refills: 0 | Status: SHIPPED | OUTPATIENT
Start: 2020-08-06 | End: 2020-08-09

## 2020-08-06 RX ADMIN — B-COMPLEX W/ C & FOLIC ACID TAB 1 TABLET: TAB at 08:05

## 2020-08-06 RX ADMIN — FENTANYL CITRATE 25 MCG: 50 INJECTION, SOLUTION INTRAMUSCULAR; INTRAVENOUS at 13:14

## 2020-08-06 RX ADMIN — ONDANSETRON 4 MG: 2 INJECTION INTRAMUSCULAR; INTRAVENOUS at 00:45

## 2020-08-06 RX ADMIN — ACETAMINOPHEN 650 MG: 325 TABLET, FILM COATED ORAL at 18:25

## 2020-08-06 RX ADMIN — TAMSULOSIN HYDROCHLORIDE 0.4 MG: 0.4 CAPSULE ORAL at 16:26

## 2020-08-06 RX ADMIN — ACETAMINOPHEN 650 MG: 325 TABLET, FILM COATED ORAL at 05:54

## 2020-08-06 RX ADMIN — FENTANYL CITRATE 25 MCG: 50 INJECTION, SOLUTION INTRAMUSCULAR; INTRAVENOUS at 13:33

## 2020-08-06 RX ADMIN — SODIUM CHLORIDE 125 ML/HR: 0.9 INJECTION, SOLUTION INTRAVENOUS at 14:45

## 2020-08-06 RX ADMIN — SODIUM CHLORIDE, SODIUM LACTATE, POTASSIUM CHLORIDE, AND CALCIUM CHLORIDE: .6; .31; .03; .02 INJECTION, SOLUTION INTRAVENOUS at 13:04

## 2020-08-06 RX ADMIN — LORATADINE 10 MG: 10 TABLET ORAL at 08:05

## 2020-08-06 RX ADMIN — LIDOCAINE HYDROCHLORIDE 50 MG: 10 INJECTION, SOLUTION EPIDURAL; INFILTRATION; INTRACAUDAL; PERINEURAL at 13:08

## 2020-08-06 RX ADMIN — OXYCODONE HYDROCHLORIDE 5 MG: 5 TABLET ORAL at 18:26

## 2020-08-06 RX ADMIN — FENTANYL CITRATE 25 MCG: 50 INJECTION, SOLUTION INTRAMUSCULAR; INTRAVENOUS at 13:17

## 2020-08-06 RX ADMIN — FENOFIBRATE 145 MG: 145 TABLET ORAL at 08:06

## 2020-08-06 RX ADMIN — Medication 25 MCG: at 14:37

## 2020-08-06 RX ADMIN — DEXAMETHASONE SODIUM PHOSPHATE 5 MG: 10 INJECTION, SOLUTION INTRAMUSCULAR; INTRAVENOUS at 13:11

## 2020-08-06 RX ADMIN — OXYCODONE HYDROCHLORIDE 5 MG: 5 TABLET ORAL at 10:08

## 2020-08-06 RX ADMIN — Medication 25 MCG: at 14:27

## 2020-08-06 RX ADMIN — Medication 25 MCG: at 14:32

## 2020-08-06 RX ADMIN — OXYCODONE HYDROCHLORIDE 5 MG: 5 TABLET ORAL at 05:43

## 2020-08-06 RX ADMIN — HEPARIN SODIUM 5000 UNITS: 5000 INJECTION INTRAVENOUS; SUBCUTANEOUS at 15:14

## 2020-08-06 RX ADMIN — ONDANSETRON 4 MG: 2 INJECTION INTRAMUSCULAR; INTRAVENOUS at 13:11

## 2020-08-06 RX ADMIN — Medication 25 MCG: at 14:15

## 2020-08-06 RX ADMIN — OXYCODONE HYDROCHLORIDE 5 MG: 5 TABLET ORAL at 01:14

## 2020-08-06 RX ADMIN — CEFAZOLIN SODIUM 2000 MG: 2 SOLUTION INTRAVENOUS at 13:04

## 2020-08-06 RX ADMIN — FENTANYL CITRATE 25 MCG: 50 INJECTION, SOLUTION INTRAMUSCULAR; INTRAVENOUS at 13:29

## 2020-08-06 RX ADMIN — PANTOPRAZOLE SODIUM 40 MG: 40 TABLET, DELAYED RELEASE ORAL at 05:43

## 2020-08-06 RX ADMIN — Medication 100 MG: at 08:05

## 2020-08-06 RX ADMIN — VITAM B12 100 MCG: 100 TAB at 08:06

## 2020-08-06 RX ADMIN — PROPOFOL 200 MG: 10 INJECTION, EMULSION INTRAVENOUS at 13:08

## 2020-08-06 NOTE — ASSESSMENT & PLAN NOTE
Presents with a creatinine of 1 44 in the setting of hydroureteronephrosis due to obstructing calculus  Continue IV fluids - see plan for urologic intervention above  Cr remains unchanged today but will likely improve following urologic intervention today    BMP ordered for an outpatient to f/u with PCP and urology   Encourage PO hydration

## 2020-08-06 NOTE — PLAN OF CARE
Problem: PAIN - ADULT  Goal: Verbalizes/displays adequate comfort level or baseline comfort level  Description: Interventions:  - Encourage patient to monitor pain and request assistance  - Assess pain using appropriate pain scale  - Administer analgesics based on type and severity of pain and evaluate response  - Implement non-pharmacological measures as appropriate and evaluate response  - Consider cultural and social influences on pain and pain management  - Notify physician/advanced practitioner if interventions unsuccessful or patient reports new pain  Outcome: Progressing     Problem: INFECTION - ADULT  Goal: Absence or prevention of progression during hospitalization  Description: INTERVENTIONS:  - Assess and monitor for signs and symptoms of infection  - Monitor lab/diagnostic results  - Monitor all insertion sites, i e  indwelling lines, tubes, and drains  - [place] appropriate cooling/warming therapies per order  - Administer medications as ordered  - Instruct and encourage patient and family to use good hand hygiene technique  - Identify and instruct in appropriate isolation precautions for identified infection/condition  Outcome: Progressing     Problem: SAFETY ADULT  Goal: Patient will remain free of falls  Description: INTERVENTIONS:  - Assess patient frequently for physical needs  -  Identify cognitive and physical deficits and behaviors that affect risk of falls    -  [place] fall precautions as indicated by assessment   - Educate patient/family on patient safety including physical limitations  - Instruct patient to call for assistance with activity based on assessment  - Modify environment to reduce risk of injury  - Consider OT/PT consult to assist with strengthening/mobility  Outcome: Progressing  Goal: Maintain or return to baseline ADL function  Description: INTERVENTIONS:  -  Assess patient's ability to carry out ADLs; assess patient's baseline for ADL function and identify physical deficits which impact ability to perform ADLs (bathing, care of mouth/teeth, toileting, grooming, dressing, etc )  - Assess/evaluate cause of self-care deficits   - Assess range of motion  - Assess patient's mobility; develop plan if impaired  - Assess patient's need for assistive devices and provide as appropriate  - Encourage maximum independence but intervene and supervise when necessary  - Involve family in performance of ADLs  - Assess for home care needs following discharge   - Consider OT consult to assist with ADL evaluation and planning for discharge  - Provide patient education as appropriate  Outcome: Progressing  Goal: Maintain or return mobility status to optimal level  Description: INTERVENTIONS:  - Assess patient's baseline mobility status (ambulation, transfers, stairs, etc )    - Identify cognitive and physical deficits and behaviors that affect mobility  - Identify mobility aids required to assist with transfers and/or ambulation (gait belt, sit-to-stand, lift, walker, cane, etc )  - [place] fall precautions as indicated by assessment  - Instruct patient to call for assistance with activity based on assessment  - Consider rehabilitation consult to assist with strengthening/weightbearing, etc   Outcome: Progressing     Problem: DISCHARGE PLANNING  Goal: Discharge to home or other facility with appropriate resources  Description: INTERVENTIONS:  - Refer to Case Management Department for coordinating discharge planning if the patient needs post-hospital services based on physician/advanced practitioner order or complex needs related to functional status, cognitive ability, or social support system  Outcome: Progressing     Problem: Knowledge Deficit  Goal: Patient/family/caregiver demonstrates understanding of disease process, treatment plan, medications, and discharge instructions  Description: Complete learning assessment and assess knowledge base    Interventions:  - Provide teaching at level of understanding  - Provide teaching via preferred learning methods  Outcome: Progressing

## 2020-08-06 NOTE — PROGRESS NOTES
UROLOGY PROGRESS NOTE   Patient Identifiers: Kesha Spear (MRN [id number])  Date of Service: 8/6/2020    Subjective:    Awake and alert  Had some pain overnight  Afebrile  WBC 10 87  Creatinine 1 44  Patient has  complaints of Persistent flank pain relieved by medication         Objective:     VITALS:    Vitals:    08/06/20 1008   BP:    Pulse:    Resp:    Temp:    SpO2: 95%           LABS:  Lab Results   Component Value Date    HGB 14 4 08/06/2020    HCT 45 0 08/06/2020    WBC 10 87 (H) 08/06/2020     08/06/2020   ]    Lab Results   Component Value Date     11/16/2017    K 4 1 08/06/2020     08/06/2020    CO2 25 08/06/2020    BUN 15 08/06/2020    CREATININE 1 44 (H) 08/06/2020    CALCIUM 8 9 08/06/2020    GLUCOSE 97 01/08/2016   ]        INPATIENT MEDS:    Current Facility-Administered Medications:     acetaminophen (TYLENOL) tablet 650 mg, 650 mg, Oral, Q6H PRN, Danita Zee MD, 650 mg at 08/06/20 0554    calcium carbonate (TUMS) chewable tablet 1,000 mg, 1,000 mg, Oral, Daily PRN, Ruthy Mae PA-C, 1,000 mg at 08/05/20 2244    ceFAZolin (ANCEF) IVPB (premix) 2,000 mg 50 mL, 2,000 mg, Intravenous, On Call To OR, Katiana Verma PA-C    cholecalciferol (VITAMIN D) oral liquid 1,000 Units, 1,000 Units, Oral, Daily, Danita Zee MD    co-enzyme Q-10 capsule 100 mg, 100 mg, Oral, Daily, Danita Zee MD, 100 mg at 08/06/20 0805    cyanocobalamin (VITAMIN B-12) tablet 100 mcg, 100 mcg, Oral, Daily, Danita Zee MD, 100 mcg at 08/06/20 0806    fenofibrate (TRICOR) tablet 145 mg, 145 mg, Oral, Daily, Danita Zee MD, 145 mg at 08/06/20 0806    heparin (porcine) subcutaneous injection 5,000 Units, 5,000 Units, Subcutaneous, Q8H [facility], Danita Zee MD, Stopped at 08/06/20 0505    hydrALAZINE (APRESOLINE) injection 5 mg, 5 mg, Intravenous, Q6H PRN, Danita Zee MD, 5 mg at 08/05/20 1621    HYDROmorphone (DILAUDID) injection 1 mg, 1 mg, Intravenous, Q3H PRN, Danita Zee MD, 1 mg at 08/05/20 2225    loratadine (CLARITIN) tablet 10 mg, 10 mg, Oral, Daily, Contreras Forbes MD, 10 mg at 08/06/20 0805    LORazepam (ATIVAN) tablet 0 5 mg, 0 5 mg, Oral, Daily PRN, Contreras Forbes MD    multivitamin stress formula tablet 1 tablet, 1 tablet, Oral, Daily, Contreras Forbes MD, 1 tablet at 08/06/20 0805    ondansetron (ZOFRAN) injection 4 mg, 4 mg, Intravenous, Q4H PRN, Contreras Forbes MD, 4 mg at 08/06/20 0045    oxyCODONE (ROXICODONE) IR tablet 2 5 mg, 2 5 mg, Oral, Q4H PRN, Contreras Forbes MD    oxyCODONE (ROXICODONE) IR tablet 5 mg, 5 mg, Oral, Q4H PRN, Contreras Forbes MD, 5 mg at 08/06/20 1008    pantoprazole (PROTONIX) EC tablet 40 mg, 40 mg, Oral, Early Morning, Contreras Forbes MD, 40 mg at 08/06/20 0543    sodium chloride 0 9 % infusion, 125 mL/hr, Intravenous, Continuous, Contreras Forbes MD, Last Rate: 125 mL/hr at 08/05/20 2247, 125 mL/hr at 08/05/20 2247    tamsulosin (FLOMAX) capsule 0 4 mg, 0 4 mg, Oral, Daily With Angel Perry MD      Physical Exam:   /96   Pulse 92   Temp 99 2 °F (37 3 °C)   Resp 16   Ht 5' 11" (1 803 m)   Wt 103 kg (227 lb 1 2 oz)   SpO2 95%   BMI 31 67 kg/m²   GEN: no acute distress    RESP: breathing comfortably with no accessory muscle use    ABD: soft, non-tender, non-distended left CVA tenderness  INCISION:    EXT: no significant peripheral edema       RADIOLOGY:   None     Assessment:   1  7 mm proximal left ureter calculus with mild-to-moderate hydronephrosis  2  Acute kidney injury  3   Bilateral nephrolithiasis     Plan:   -scheduled for cystoscopy possible ureteroscopy and laser lithotripsy  -he understands he mail only get a stent if there are any signs of infection in the urine  -  -

## 2020-08-06 NOTE — PLAN OF CARE
Problem: PAIN - ADULT  Goal: Verbalizes/displays adequate comfort level or baseline comfort level  Description: Interventions:  - Encourage patient to monitor pain and request assistance  - Assess pain using appropriate pain scale  - Administer analgesics based on type and severity of pain and evaluate response  - Implement non-pharmacological measures as appropriate and evaluate response  - Consider cultural and social influences on pain and pain management  - Notify physician/advanced practitioner if interventions unsuccessful or patient reports new pain  8/6/2020 1052 by Sonja Hoffman RN  Outcome: Progressing  8/6/2020 1019 by Sonja Hoffman RN  Outcome: Progressing     Problem: INFECTION - ADULT  Goal: Absence or prevention of progression during hospitalization  Description: INTERVENTIONS:  - Assess and monitor for signs and symptoms of infection  - Monitor lab/diagnostic results  - Monitor all insertion sites, i e  indwelling lines, tubes, and drains  - Monitor endotracheal if appropriate and nasal secretions for changes in amount and color  - [place] appropriate cooling/warming therapies per order  - Administer medications as ordered  - Instruct and encourage patient and family to use good hand hygiene technique  - Identify and instruct in appropriate isolation precautions for identified infection/condition  8/6/2020 1052 by Sonja Hoffman RN  Outcome: Progressing  8/6/2020 1019 by Sonja Hoffman RN  Outcome: Progressing     Problem: SAFETY ADULT  Goal: Patient will remain free of falls  Description: INTERVENTIONS:  - Assess patient frequently for physical needs  -  Identify cognitive and physical deficits and behaviors that affect risk of falls    -  [place] fall precautions as indicated by assessment   - Educate patient/family on patient safety including physical limitations  - Instruct patient to call for assistance with activity based on assessment  - Modify environment to reduce risk of injury  - Consider OT/PT consult to assist with strengthening/mobility  8/6/2020 1052 by Eber Aguilar RN  Outcome: Progressing  8/6/2020 1019 by Eber Aguilar RN  Outcome: Progressing  Goal: Maintain or return to baseline ADL function  Description: INTERVENTIONS:  -  Assess patient's ability to carry out ADLs; assess patient's baseline for ADL function and identify physical deficits which impact ability to perform ADLs (bathing, care of mouth/teeth, toileting, grooming, dressing, etc )  - Assess/evaluate cause of self-care deficits   - Assess range of motion  - Assess patient's mobility; develop plan if impaired  - Assess patient's need for assistive devices and provide as appropriate  - Encourage maximum independence but intervene and supervise when necessary  - Involve family in performance of ADLs  - Assess for home care needs following discharge   - Consider OT consult to assist with ADL evaluation and planning for discharge  - Provide patient education as appropriate  8/6/2020 1052 by Eber Aguilar RN  Outcome: Progressing  8/6/2020 1019 by Eber Aguilar RN  Outcome: Progressing  Goal: Maintain or return mobility status to optimal level  Description: INTERVENTIONS:  - Assess patient's baseline mobility status (ambulation, transfers, stairs, etc )    - Identify cognitive and physical deficits and behaviors that affect mobility  - Identify mobility aids required to assist with transfers and/or ambulation (gait belt, sit-to-stand, lift, walker, cane, etc )  - [place] fall precautions as indicated by assessment  - Instruct patient to call for assistance with activity based on assessment  - Consider rehabilitation consult to assist with strengthening/weightbearing, etc   8/6/2020 1052 by Eber Aguilar RN  Outcome: Progressing  8/6/2020 1019 by Eber Aguilar RN  Outcome: Progressing     Problem: DISCHARGE PLANNING  Goal: Discharge to home or other facility with appropriate resources  Description: INTERVENTIONS:  - Refer to Case Management Department for coordinating discharge planning if the patient needs post-hospital services based on physician/advanced practitioner order or complex needs related to functional status, cognitive ability, or social support system  8/6/2020 1052 by Juanjose Doyle RN  Outcome: Progressing  8/6/2020 1019 by Juanjose Doyle RN  Outcome: Progressing     Problem: Knowledge Deficit  Goal: Patient/family/caregiver demonstrates understanding of disease process, treatment plan, medications, and discharge instructions  Description: Complete learning assessment and assess knowledge base    Interventions:  - Provide teaching at level of understanding  - Provide teaching via preferred learning methods  8/6/2020 1052 by Juanjose Doyle RN  Outcome: Progressing  8/6/2020 1019 by Juanjose Doyle RN  Outcome: Progressing

## 2020-08-06 NOTE — DISCHARGE INSTRUCTIONS
Expect to see blood in the urine, and to experience urgency/frequency/burning with urination and dribbling  This is normal after urological procedures  Is also normal to experience some nausea after these procedures  Go back your regular diet carefully  It is normal to feel pain in the kidney when urinating and when the bladder is filling due to urine refluxing up to the kidney because of the open stent  The stent is necessary to keep the ureter open to allow clots and swelling to resolve and to allow the kidney to drain properly after instrumentation  Some stones may pass around the stent, but most stones pass after the stent is removed  The presence of the stent makes the ureter wider while it is in and after has been removed, allowing passage of larger fragments  The stones in your ureter and your kidney were all broken up, and you need no further procedures  Our nurses will remove the stent by pulling on the string next week, then you will see 1 of us in 6 months with a renal ultrasound before  Have the metabolic workup that Dr Gracie Valera ordered done in the meantime-this is the lab work that he ordered  Call for fever greater that 101 5, inability to urinate, prolonged nausea and vomiting, or severe pain not relieved by pain medications  [place] Keep stent string taped- if it becomes dislodged or gets pulled out early, that is OK- simply remove it completely by pulling on string until it is completely out  No driving/operating machinery for 24 hours, and while taking narcotics  Take over the counter remedy of choice to avoid constipation  You may take Pyridium for discomfort/burning associated with urination  Drink plenty of fluids

## 2020-08-06 NOTE — TELEPHONE ENCOUNTER
Patient s/p left URS with stent insertion today with Dr Ana Lopez  He is managed at the [place] office and has a stent with string  Called and left a message for patient, tentatively scheduled for stent with string removal in the [place] office with nursing 8/12 at 11 am       Can call the patient tomorrow to check on his post op and confirm he wishes to come into the office for stent removal   Can discuss removing the stent himself if he wishes

## 2020-08-06 NOTE — ASSESSMENT & PLAN NOTE
CT imaging in the ER revealing "Mild to moderate left hydroureteronephrosis secondary to a 7 mm obstructing calculus in the proximal left ureter  Tanja Nissen Tanja Nissen Numerous other nonobstructing intrarenal calculi are slightly larger and more numerous than on the prior study "  Seen by urology in the ER -> plan for OR intervention today  Continue IV fluids - PRN pain/emesis control

## 2020-08-06 NOTE — DISCHARGE SUMMARY
[facility]  Discharge- Braeden Saul 1960, 64 y o  male MRN: [id number]    Unit/Bed#: [facility] Encounter: [id number]    Primary Care Provider: Apurva Zaragoza,    Date and time admitted to hospital: 8/5/2020  7:06 AM      * Left hydroureteronephrosis with obstructing calculus  Assessment & Plan  CT imaging in the ER revealing "Mild to moderate left hydroureteronephrosis secondary to a 7 mm obstructing calculus in the proximal left ureter  Cathlean Boston Cathlean Boston Numerous other nonobstructing intrarenal calculi are slightly larger and more numerous than on the prior study "  Seen by urology in the ER -> plan for OR intervention today  Continue IV fluids - PRN pain/emesis control    Dyslipidemia  Assessment & Plan  Continue Fenofibrate    Leukocytosis  Assessment & Plan  Likely reactive due to acute medical issue(s)  Monitor WBC count - improving at this time     Acute kidney injury  Assessment & Plan  Presents with a creatinine of 1 44 in the setting of hydroureteronephrosis due to obstructing calculus  Continue IV fluids - see plan for urologic intervention above  Cr remains unchanged today but will likely improve following urologic intervention today    BMP ordered for an outpatient to f/u with PCP and urology   Encourage PO hydration     GERD (gastroesophageal reflux disease)  Assessment & Plan  Continue PPI regimen    Essential hypertension  Assessment & Plan  Optimize pain control  Holding ACEI due to acute kidney injury - can resume as an outpatient       Discharging Physician / Practitioner: Rodrigue Tinoco PA-C  PCP: Apurva Zaragoza, DO  Admission Date:   Admission Orders (From admission, onward)     Ordered        08/05/20 1014  Place in Observation  Once                   Discharge Date: 08/06/20    Resolved Problems  Date Reviewed: 8/6/2020    None          Consultations During Hospital Stay:  · Urology     Procedures Performed:   · Cystoscopy ureteroscopy with lithotripsy, insertion of left ureteral stent    Significant Findings / Test Results:   · CT abdomen pelvis:  Mild to moderate left hydroureteronephrosis secondary to a 7 mm obstructing calculus in the proximal left ureter  Numerous other nonobstructing intrarenal calculi are slightly larger and more numerous than on the prior study  · TUSHAR    Incidental Findings:   · None     Test Results Pending at Discharge (will require follow up): · None     Outpatient Tests Requested:  · BMP    Complications:  None    Reason for Admission:  Hydronephrosis, TUSHAR    Hospital Course:     Mandy Saldana is a 64 y o  male patient who originally presented to the hospital on 8/5/2020 due to left flank pain  He does have history of kidney stones in the past with prior intervention  He had pain and symptoms similar to kidney stone, thus he presented to the ER for evaluation  A CT of the abdomen pelvis revealed left-sided hydroureteronephrosis secondary to 7 mm obstructing calculus  He was started on IV fluids and seen in consult by Urology  He underwent ureteroscopy, cystoscopy with lithotripsy and left ureteral stent placement today with Urology  He tolerated the procedure well  Suspect TUSHAR will resolve following intervention today with Urology  Encourage patient to drink plenty of fluids  He will be discharged with Keflex and p r n  Pain control  Follow-up with urology as an outpatient  Recommend follow-up with PCP on discharge  The patient at this time he feels well  He is stable at this time for discharge with outpatient follow-up  Please see above list of diagnoses and related plan for additional information  Condition at Discharge: good     Discharge Day Visit / Exam:     * Please refer to separate progress note for these details *    Discussion with Family: pt    Discharge instructions/Information to patient and family:   See after visit summary for information provided to patient and family        Provisions for Follow-Up Care:  See after visit summary for information related to follow-up care and any pertinent home health orders  Disposition:     Home    For Discharges to [facility] SNF:   · Not Applicable to this Patient - Not Applicable to this Patient    Planned Readmission: no     Discharge Statement:  I spent 25 minutes discharging the patient  This time was spent on the day of discharge  I had direct contact with the patient on the day of discharge  Greater than 50% of the total time was spent examining patient, answering all patient questions, arranging and discussing plan of care with patient as well as directly providing post-discharge instructions  Additional time then spent on discharge activities  Discharge Medications:  See after visit summary for reconciled discharge medications provided to patient and family        ** Please Note: This note has been constructed using a voice recognition system **

## 2020-08-06 NOTE — UTILIZATION REVIEW
Initial Clinical Review    Admission: Date/Time/Statement:   Admission Orders (From admission, onward)     Ordered        08/05/20 1014  Place in Observation  Once                   Orders Placed This Encounter   Procedures    Place in Observation     Standing Status:   Standing     Number of Occurrences:   1     Order Specific Question:   Admitting Physician     Answer:   Brea Bernard [[id number]]     Order Specific Question:   Level of Care     Answer:   Med Surg [[id number]]     ED Arrival Information     Expected Arrival Acuity Means of Arrival Escorted By Service Admission Type    - 8/5/2020 06:15 Urgent Walk-In Family Member General Medicine Urgent    Arrival Complaint    Flank Pain        Chief Complaint   Patient presents with    Flank Pain     pt reports 6mm kidney stone on left side, hx of kidney stones with lithotripsy  pt reports increased left flank pain 10/10 and blood in urine  Assessment/Plan:   62y Male to ED presents with progressively worsening left-sided flank pain/discomfort for past couple of days and nausea  Tried multiple doses of NSAIDs and Tylenol for pain relief, despite this worsening pain overnight to morning  PMH significant for recurrent kidney stones and can self identify when another obstructing stone becomes symptomatic/ Nephrolithiasis, HTN, GERD and Dyslipidemia  CT imaging revealed evidence of a left hydroureteronephrosis secondary to an obstructive calculus  Given IV analgesics in ED  Admit Observation level of care for Left hydroureteronephrosis with obstructing calculus, TUSHAR, Leukocytosis and HTN  Plan for OR tomorrow 8/6 per Urology  NPO after midnight  IV fluids and pain/emesis control prn  Creat 1 44 on admit  Monitor renal function and urine outpt  Hold ACEI for TUSHAR  Monitor Wbc  PRN Iv Hydralazine  Continue other home meds  8/5 Urology cons; 7 mm proximal left ureter calculus with mild-to-moderate hydronephrosis  Acute kidney injury   Nephrolithiasis bilateral  NPO after midnight for OR tomorrow 8/6; Ureteroscopy tomorrow  8/6 Progress notes; Continue IV fluids - PRN pain/emesis control  Plan for OR intervention with Urology today  8/6 OR - S/P CYSTOSCOPY URETEROSCOPY WITH LITHOTRIPSY HOLMIUM LASER, AND INSERTION STENT URETERAL (Left)  Operative Findings:  Proximal left ureteral stone, broken up into tiny passable fragments  Additional calculi found in the kidney broken up into tiny passable fragments    ED Triage Vitals   Temperature Pulse Respirations Blood Pressure SpO2   08/05/20 0840 08/05/20 0713 08/05/20 0713 08/05/20 0713 08/05/20 0713   97 9 °F (36 6 °C) 92 (!) 26 (!) 179/116 99 %      Temp Source Heart Rate Source Patient Position - Orthostatic VS BP Location FiO2 (%)   08/05/20 0840 08/05/20 0713 08/05/20 0713 08/05/20 0713 --   Tympanic Monitor Sitting Left arm       Pain Score       08/05/20 0800       9          Wt Readings from Last 1 Encounters:   08/06/20 103 kg (227 lb 1 2 oz)     Additional Vital Signs:   08/06/20 06:51:59   99 2 °F (37 3 °C)   92   16   153/96   115   97 %   --   --    08/06/20 03:32:07   98 3 °F (36 8 °C)   96   18   158/96   117   95 %   --   --    08/05/20 22:48:41   98 °F (36 7 °C)   100   16   165/97   120   96 %   --   Lying    08/05/20 16:54:21   98 2 °F (36 8 °C)   88   18   144/88   107   98 %   --   --    08/05/20 16:06:02   98 2 °F (36 8 °C)   --   --   161/107Abnormal     125   --   --   --    08/05/20 16:03:40   98 2 °F (36 8 °C)   --   18   159/106Abnormal     124   --          Pertinent Labs/Diagnostic Test Results:   8/5 CT renal stone study abdomen/ pelvis - Mild to moderate left hydroureteronephrosis secondary to a 7 mm obstructing calculus in the proximal left ureter    Numerous other nonobstructing intrarenal calculi are slightly larger and more numerous than on the prior study       Results from last 7 days   Lab Units 08/06/20  5542   SARS-COV-2  Negative     Results from last 7 days   Lab Units 08/06/20  4507 08/05/20  0733   WBC Thousand/uL 10 87* 13 50*   HEMOGLOBIN g/dL 14 4 16 0   HEMATOCRIT % 45 0 47 1   PLATELETS Thousands/uL 252 286   NEUTROS ABS Thousands/µL 7 99* 10 10*         Results from last 7 days   Lab Units 08/06/20  0506 08/05/20  0733   SODIUM mmol/L 139 138   POTASSIUM mmol/L 4 1 3 8   CHLORIDE mmol/L 108 106   CO2 mmol/L 25 23   ANION GAP mmol/L 6 9   BUN mg/dL 15 19   CREATININE mg/dL 1 44* 1 44*   EGFR ml/min/1 73sq m 54 54   CALCIUM mg/dL 8 9 9 6     Results from last 7 days   Lab Units 08/05/20  0733   AST U/L 37   ALT U/L 32   ALK PHOS U/L 38*   TOTAL PROTEIN g/dL 7 6   ALBUMIN g/dL 3 8   TOTAL BILIRUBIN mg/dL 0 64         Results from last 7 days   Lab Units 08/06/20  0506 08/05/20  0733   GLUCOSE RANDOM mg/dL 94 89       Results from last 7 days   Lab Units 08/05/20  0733   LIPASE u/L 340             Results from last 7 days   Lab Units 08/05/20  0741 08/05/20  0740   CLARITY UA  Clear  --    COLOR UA  Yellow see results   SPEC GRAV UA  1 025  --    PH UA  7 0  --    GLUCOSE UA mg/dl Negative  --    KETONES UA mg/dl Negative  --    BLOOD UA  Moderate*  --    PROTEIN UA mg/dl Negative  --    NITRITE UA  Negative  --    BILIRUBIN UA  Negative  --    UROBILINOGEN UA E U /dl 0 2  --    LEUKOCYTES UA  Negative  --    WBC UA /hpf None Seen  --    RBC UA /hpf Innumerable*  --    BACTERIA UA /hpf None Seen  --    EPITHELIAL CELLS WET PREP /hpf None Seen  --      ED Treatment:   Medication Administration from 08/05/2020 0614 to 08/05/2020 1504       Date/Time Order Dose Route Action     08/05/2020 0733 ketorolac (TORADOL) injection 15 mg 15 mg Intravenous Given     08/05/2020 0733 ondansetron (ZOFRAN) injection 4 mg 4 mg Intravenous Given     08/05/2020 0733 sodium chloride 0 9 % bolus 1,000 mL 1,000 mL Intravenous New Bag     08/05/2020 0800 HYDROmorphone (DILAUDID) injection 1 mg 1 mg Intravenous Given     08/05/2020 1014 HYDROmorphone (DILAUDID) injection 1 mg 1 mg Intravenous Given        Past Medical History:   Diagnosis Date    Chronic kidney disease     kidney stones    GERD (gastroesophageal reflux disease)     Hypertension     Kidney stone      Present on Admission:   Essential hypertension   GERD (gastroesophageal reflux disease)      Admitting Diagnosis: Hematuria [R31 9]  Flank pain [R10 9]  Hydronephrosis of left kidney [N13 30]  Left ureteral stone [N20 1]  Age/Sex: 64 y o  male     Admission Orders:  Scheduled Medications:  cefazolin, 2,000 mg, Intravenous, On Call To OR  cholecalciferol, 1,000 Units, Oral, Daily  co-enzyme Q-10, 100 mg, Oral, Daily  cyanocobalamin, 100 mcg, Oral, Daily  fenofibrate, 145 mg, Oral, Daily  heparin (porcine), 5,000 Units, Subcutaneous, Q8H TRACEY  loratadine, 10 mg, Oral, Daily  multivitamin stress formula, 1 tablet, Oral, Daily  pantoprazole, 40 mg, Oral, Early Morning  tamsulosin, 0 4 mg, Oral, Daily With Dinner      Continuous IV Infusions:  sodium chloride, 125 mL/hr, Intravenous, Continuous      PRN Meds:  acetaminophen, 650 mg, Oral, Q6H PRN  calcium carbonate, 1,000 mg, Oral, Daily PRN  hydrALAZINE, 5 mg, Intravenous, Q6H PRN 8/5 x1  HYDROmorphone, 1 mg, Intravenous, Q3H PRN 8/5 x2  LORazepam, 0 5 mg, Oral, Daily PRN  ondansetron, 4 mg, Intravenous, Q4H PRN 8/5 x1, 8/6 x1  oxyCODONE, 2 5 mg, Oral, Q4H PRN  oxyCODONE, 5 mg, Oral, Q4H PRN 8/5 x2, 8/6 x2      IP CONSULT TO UROLOGY  NPO     Network Utilization Review Department  [email or web address]  ATTENTION: Please call with any questions or concerns to [phone] and carefully listen to the prompts so that you are directed to the right person  All voicemails are confidential   Lissa Mcgee all requests for admission clinical reviews, approved or denied determinations and any other requests to dedicated fax number below belonging to the campus where the patient is receiving treatment   List of dedicated fax numbers for the Facilities:  FACILITY NAME UR FAX NUMBER   ADMISSION DENIALS (Administrative/Medical Necessity) [address] (Maternity/NICU/Pediatrics) [place] [phone]   Magdiel Burch [phone]   Socrates Rocha [phone]   [facility] [address]  [phone]   [facility]  [phone]   [address] [place] [address], [facility]  [address] [place] [address] [phone]

## 2020-08-06 NOTE — OP NOTE
OPERATIVE REPORT  PATIENT NAME: Mandy Saldana    :  1960  MRN: [id number]  Pt Location: [facility] CYSTO ROOM 01    SURGERY DATE: 2020    Surgeon(s) and Role:     * Angelica Ferguson MD - Primary    Preop Diagnosis:  Left ureteral stone [N20 1]    Post-Op Diagnosis Codes:     * Left ureteral stone [N20 1]    Procedure(s) (LRB):  CYSTOSCOPY URETEROSCOPY WITH LITHOTRIPSY HOLMIUM LASER, AND INSERTION STENT URETERAL (Left)    Specimen(s):  * No specimens in log *    Estimated Blood Loss:   Minimal    Drains:  * No LDAs found *    Anesthesia Type:   General    Operative Indications:  Left ureteral stone [N20 1]      Operative Findings:  Proximal left ureteral stone, broken up into tiny passable fragments  Additional calculi found in the kidney broken up into tiny passable fragments  Complications:   None    Procedure and Technique:  79-year-old man with a history recurrent nephrolithiasis presents with left flank pain hydronephrosis and a 7 mm proximal left ureteral calculus  He has been offered cystoscopy, left ureteroscopy laser lithotripsy, possible left retrograde pyelography left ureteral stent placement  He has been through such procedures before  The risks of bleeding, infection, damage to the urinary tract and need for additional procedures was explained he gives informed consent  Patient was brought to the operating room and identified properly  LMA was induced the patient was prepped and draped in dorsal lithotomy position usual fashion  A time-out was performed, and cystoscopy was carried out with a 22 [place] cystoscopy sheath and 30 degree lens  The urethra was normal without stricture  The prostate was moderately obstructive with a high bladder neck and a moderate median lobe component  The bladder itself was smooth not trabeculated there were no stones tumors or other lesions  Fluoroscopy revealed no radiopaque stone  Using the 0 035 in guidewire    I cannulated the left orifice and placed a wire up to the kidney and I met some resistance in the proximal ureter due to the stone  I was able to get the wire up into the kidney however then established 2 wire access with the dual-lumen catheter, then I placed 1 wire as a safety wire and then the over the working wire and placed the 35 cm ureteral access sheath  Through the access sheath I placed a flexible ureteral scope and I saw the stone in the proximal ureter and using the 272 holmium laser fiber and broken up into tiny passable fragments  I then proceeded up into the kidney I have and I found some other stones which were broken up with the laser  Once I was satisfied with the size of the fragments, I withdrew the scope and performed pan ureteroscopy and saw no other stones  I then placed a 6 [place] by 26 cm stent over the wire and coils were established in the renal pelvis and the bladder  The bladder was drained with the cystoscopy sheath, and the stent string was taped to the dorsal penis     I was present for the entire procedure and A qualified resident physician was not available    Patient Disposition:  PACU  and extubated and stable    SIGNATURE: Yonatan Ro MD  DATE: August 6, 2020  TIME: 1:51 PM

## 2020-08-06 NOTE — ASSESSMENT & PLAN NOTE
Presents with a creatinine of 1 44 in the setting of hydroureteronephrosis due to obstructing calculus  Continue IV fluids - see plan for urologic intervention above  Cr remains unchanged today but will likely improve following urologic intervention   Monitor renal function and urine output - limit/avoid nephrotoxins as possible - holding ACEI

## 2020-08-06 NOTE — ANESTHESIA POSTPROCEDURE EVALUATION
Post-Op Assessment Note    CV Status:  Stable  Pain Score: 0    Pain management: adequate     Mental Status:  Alert   Hydration Status:  Stable   PONV Controlled:  None   Airway Patency:  Patent      Post Op Vitals Reviewed: Yes      Staff: CRNA         No complications documented      BP   150/100   Temp   99 4   Pulse  100   Resp   14   SpO2   98

## 2020-08-06 NOTE — TELEPHONE ENCOUNTER
----- Message from Shakeel Eckert MD sent at 8/6/2020  2:04 PM EDT -----  Please contact patient for an appointment to see the nurses next week for stent removal by pulling on the string, then see 1 of the providers in 6 months with a renal ultrasound

## 2020-08-06 NOTE — QUICK NOTE
Ureteral stone completely broken up along with renal calculi as well  He has a stent with a string externalized to the dorsal penis and all arrangements have been made for him to follow up with us in 1 week  Prescriptions for pain medication and antibiotic have been called into his pharmacy  From my standpoint he be discharged home if comfortable and afebrile

## 2020-08-06 NOTE — SOCIAL WORK
Patient reviewed during care coordination rounds  Pt to Or today with urology for cystoscopy and stent placement  No needs identified at this time,  department to remain available 
Pt is not a <30 day readmission or a bundle  Pt admitted due to hydroureteronephrosis, Urology following  Pt has a history of kidney stones, hypertension, GERD, TUSHAR, depression, and anxiety  CM met with pt at [facility] to introduce CM role and begin discharge planning  Pt lives with his wife, Radha Edmonds ([phone]) in a 2 story house that has 1 MARIIA  Luz Cody Pt reports being independent with ADLs and ambulation PTA, no use of DME  Pt reports no history of VNA, STR, inpatient MH treatment or D/A treatment  Pt drives and works  PCP is Dr Anna Spears ([phone]) and pt uses [facility] pharmacy in [place] for prescriptions  Pt's wife to assist with transportation at time of discharge  CM department to remain available for discharge concerns or needs  CM reviewed d/c planning process including the following: identifying help at home, patient preference for d/c planning needs, Discharge Lounge, [facility] Meds to Bed program, availability of treatment team to discuss questions or concerns patient and/or family may have regarding understanding medications and recognizing signs and symptoms once discharged  CM also encouraged patient to follow up with all recommended appointments after discharge  Patient advised of importance for patient and family to participate in managing patients medical well being 
no weight-bearing restrictions

## 2020-08-06 NOTE — ANESTHESIA PREPROCEDURE EVALUATION
Procedure:  CYSTOSCOPY URETEROSCOPY WITH LITHOTRIPSY HOLMIUM LASER, RETROGRADE PYELOGRAM AND INSERTION STENT URETERAL (Left Bladder)    Relevant Problems   ANESTHESIA (within normal limits)   (-) History of anesthesia complications      CARDIO   (+) Essential hypertension      GI/HEPATIC   (+) Fatty liver   (+) GERD (gastroesophageal reflux disease)      /RENAL   (+) Acute kidney injury   (+) Kidney stone   (+) Left hydroureteronephrosis with obstructing calculus      HEMATOLOGY (within normal limits)      MUSCULOSKELETAL (within normal limits)      NEURO/PSYCH (within normal limits)      PULMONARY (within normal limits)      Other   (+) Obesity (BMI 30 0-34  9)        Physical Exam    Airway    Mallampati score: III  TM Distance: >3 FB  Neck ROM: full     Dental   No notable dental hx     Cardiovascular  Rhythm: regular, Rate: normal, Cardiovascular exam normal    Pulmonary  Pulmonary exam normal Breath sounds clear to auscultation,     Other Findings        Anesthesia Plan  ASA Score- 2     Anesthesia Type- general with ASA Monitors  Additional Monitors:   Airway Plan: LMA  Plan Factors-Exercise tolerance (METS): >4 METS  Chart reviewed  EKG reviewed  Imaging results reviewed  Existing labs reviewed  Patient summary reviewed  Patient is not a current smoker  Induction- intravenous  Postoperative Plan- Plan for postoperative opioid use  Planned trial extubation    Informed Consent- Anesthetic plan and risks discussed with patient  I personally reviewed this patient with the CRNA  Discussed and agreed on the Anesthesia Plan with the CRNA           NPO and allergies verified  Plan:  GA, LMA    Risks and benefits discussed with patient  Questions answered  Patient consented

## 2020-08-06 NOTE — PROGRESS NOTES
[facility]  Progress Note - Marcie Rader 1960, 64 y o  male MRN: [id number]    Unit/Bed#: [facility] Encounter: [id number]    Primary Care Provider: Leonel Chilel DO   Date and time admitted to hospital: 8/5/2020  7:06 AM      * Left hydroureteronephrosis with obstructing calculus  Assessment & Plan  CT imaging in the ER revealing "Mild to moderate left hydroureteronephrosis secondary to a 7 mm obstructing calculus in the proximal left ureter  David Simple David Simple Numerous other nonobstructing intrarenal calculi are slightly larger and more numerous than on the prior study "  Seen by urology in the ER -> plan for OR intervention today  Continue IV fluids - PRN pain/emesis control    Dyslipidemia  Assessment & Plan  Continue Fenofibrate    Leukocytosis  Assessment & Plan  Likely reactive due to acute medical issue(s)  Monitor WBC count    History of nephrolithiasis  Assessment & Plan  History of bilateral kidney stones status post multiple lithotripsies  See plan for obstructing calculus above    Acute kidney injury  Assessment & Plan  Presents with a creatinine of 1 44 in the setting of hydroureteronephrosis due to obstructing calculus  Continue IV fluids - see plan for urologic intervention above  Cr remains unchanged today but will likely improve following urologic intervention   Monitor renal function and urine output - limit/avoid nephrotoxins as possible - holding ACEI    GERD (gastroesophageal reflux disease)  Assessment & Plan  Continue PPI regimen    Essential hypertension  Assessment & Plan  Optimize pain control  Holding ACEI due to acute kidney injury  PRN IV Hydralazine on board      VTE Pharmacologic Prophylaxis:   Pharmacologic: Heparin  Mechanical VTE Prophylaxis in Place: Yes    Patient Centered Rounds: I have performed bedside rounds with nursing staff today      Discussions with Specialists or Other Care Team Provider: RN    Education and Discussions with Family / Patient: pt and wife at bedside     Time Spent for Care: 20 minutes  More than 50% of total time spent on counseling and coordination of care as described above  Current Length of Stay: 0 day(s)    Current Patient Status: Observation   Certification Statement: continue OBS for now, awaiting urologic procedure today, TUSHAR mgmt with IV fluids and lab monitoring  today vs tomorrow pending clinical course and urology clearance following procedure today    Discharge Plan: pending above     Code Status: Level 1 - Full Code      Subjective: The patient has no acute complaints, pain is more manageable today  He denies n/v  Awaiting procedure     Objective:     Vitals:   Temp (24hrs), Av 4 °F (36 9 °C), Min:98 °F (36 7 °C), Max:99 2 °F (37 3 °C)    Temp:  [98 °F (36 7 °C)-99 2 °F (37 3 °C)] 99 2 °F (37 3 °C)  HR:  [] 92  Resp:  [16-19] 16  BP: (144-165)/() 153/96  SpO2:  [95 %-98 %] 95 %  Body mass index is 31 67 kg/m²  Input and Output Summary (last 24 hours): Intake/Output Summary (Last 24 hours) at 2020 1142  Last data filed at 2020 1100  Gross per 24 hour   Intake 916 67 ml   Output --   Net 916 67 ml       Physical Exam:     Physical Exam  Vitals signs reviewed  Constitutional:       General: He is not in acute distress  Appearance: Normal appearance  He is not toxic-appearing or diaphoretic  HENT:      Head: Normocephalic and atraumatic  Eyes:      General: No scleral icterus  Extraocular Movements: Extraocular movements intact  Pupils: Pupils are equal, round, and reactive to light  Neck:      Musculoskeletal: Normal range of motion and neck supple  Cardiovascular:      Rate and Rhythm: Normal rate and regular rhythm  Pulmonary:      Effort: Pulmonary effort is normal  No respiratory distress  Breath sounds: Normal breath sounds  No wheezing or rales  Abdominal:      General: There is no distension  Musculoskeletal: Normal range of motion           General: No swelling  Skin:     General: Skin is warm and dry  Neurological:      General: No focal deficit present  Mental Status: He is alert and oriented to person, place, and time  Mental status is at baseline  Cranial Nerves: No cranial nerve deficit  Psychiatric:         Mood and Affect: Mood normal          Behavior: Behavior normal          Thought Content: Thought content normal          Judgment: Judgment normal        Additional Data:     Labs:    Results from last 7 days   Lab Units 08/06/20  0506   WBC Thousand/uL 10 87*   HEMOGLOBIN g/dL 14 4   HEMATOCRIT % 45 0   PLATELETS Thousands/uL 252   NEUTROS PCT % 74   LYMPHS PCT % 11*   MONOS PCT % 13*   EOS PCT % 1     Results from last 7 days   Lab Units 08/06/20  0506 08/05/20  0733   SODIUM mmol/L 139 138   POTASSIUM mmol/L 4 1 3 8   CHLORIDE mmol/L 108 106   CO2 mmol/L 25 23   BUN mg/dL 15 19   CREATININE mg/dL 1 44* 1 44*   ANION GAP mmol/L 6 9   CALCIUM mg/dL 8 9 9 6   ALBUMIN g/dL  --  3 8   TOTAL BILIRUBIN mg/dL  --  0 64   ALK PHOS U/L  --  38*   ALT U/L  --  32   AST U/L  --  37   GLUCOSE RANDOM mg/dL 94 89                           * I Have Reviewed All Lab Data Listed Above  * Additional Pertinent Lab Tests Reviewed:  All Labs Within Last 24 Hours Reviewed    Imaging:    Imaging Reports Reviewed Today Include: none  Imaging Personally Reviewed by Myself Includes:  none    Recent Cultures (last 7 days):           Last 24 Hours Medication List:   acetaminophen, 650 mg, Oral, Q6H PRN, Danita Zee MD  calcium carbonate, 1,000 mg, Oral, Daily PRN, Ruthy Mae PA-C  cefazolin, 2,000 mg, Intravenous, On Call To OR, Katiana Verma PA-C  cholecalciferol, 1,000 Units, Oral, Daily, Danita Zee MD  co-enzyme Q-10, 100 mg, Oral, Daily, Danita Zee MD  cyanocobalamin, 100 mcg, Oral, Daily, Danita Zee MD  fenofibrate, 145 mg, Oral, Daily, Danita Zee MD  heparin (porcine), 5,000 Units, Subcutaneous, Q8H [address], Danita Zee MD  hydrALAZINE, 5 mg, Intravenous, Q6H PRN, Ashwin Toro MD  HYDROmorphone, 1 mg, Intravenous, Q3H PRN, Ashwin Toro MD  loratadine, 10 mg, Oral, Daily, Ashwin Toro MD  LORazepam, 0 5 mg, Oral, Daily PRN, Ashwin Toro MD  multivitamin stress formula, 1 tablet, Oral, Daily, Ashwin Toro MD  ondansetron, 4 mg, Intravenous, Q4H PRN, Ashwin Toro MD  oxyCODONE, 2 5 mg, Oral, Q4H PRN, Ashwin Toro MD  oxyCODONE, 5 mg, Oral, Q4H PRN, Ashwin Toro MD  pantoprazole, 40 mg, Oral, Early Morning, Ashwin Toro MD  sodium chloride, 125 mL/hr, Intravenous, Continuous, Ashwin Toro MD, Last Rate: 125 mL/hr (08/05/20 9116)  tamsulosin, 0 4 mg, Oral, Daily With Ratna Hyde MD         Today, Patient Was Seen By: Mirela Gillis PA-C    ** Please Note: Dictation voice to text software may have been used in the creation of this document   **

## 2020-08-06 NOTE — ASSESSMENT & PLAN NOTE
CT imaging in the ER revealing "Mild to moderate left hydroureteronephrosis secondary to a 7 mm obstructing calculus in the proximal left ureter  Deana Orlinuel Deana Cruel Numerous other nonobstructing intrarenal calculi are slightly larger and more numerous than on the prior study "  Seen by urology in the ER -> plan for OR intervention today  Continue IV fluids - PRN pain/emesis control

## 2020-08-07 ENCOUNTER — TRANSITIONAL CARE MANAGEMENT (OUTPATIENT)
Dept: INTERNAL MEDICINE CLINIC | Facility: CLINIC | Age: 60
End: 2020-08-07

## 2020-08-07 NOTE — UTILIZATION REVIEW
Notification of Observation Admission/Observation Authorization Request   This is a Notification of Observation Admission for 5 Katherin Cisse  Be advised that this patient was admitted to our facility under Observation Status  Contact Elis Feliciano at [phone] for additional admission information  [facility]  DEDICATED -[phone]  Patient Name:   Francisco Body   YOB: 1960       [address]   [address]:   [facility]  Tax ID: [id number]  NPI: [id number] Attending Provider/NPI: Minna Ochoa, [address]onnell [[id number]]   Place of Service Code: 25     Place of Service Name:  CPT Code for Observation:  On [facility] / CPT 25030   Start Date:      Discharge Date & Time: 8/6/2020  7:30 PM    Type of Admission: Observation Status Discharge Disposition (if discharged): Home/Self Care   Patient Diagnoses: Hematuria [R31 9]  Flank pain [R10 9]  Hydronephrosis of left kidney [N13 30]  Left ureteral stone [N20 1]     Orders: Admission Orders (From admission, onward)     Ordered        08/05/20 1014  Place in Observation  Once                    Assigned Utilization Review Contact: Elis Feliciano  Utilization   Network Utilization Review Department  Phone: [phone]; Fax [phone]  Email: [email or web address]   ATTENTION PAYERS: Please call the assigned Utilization  directly with any questions or concerns ALL voicemails in the department are confidential  Send all requests for admission clinical reviews, approved or denied determinations and any other requests to dedicated fax number belonging to the campus where the patient is receiving treatment

## 2020-08-07 NOTE — TELEPHONE ENCOUNTER
Post Op Note    Braeden Saul is a 64 y o  male s/p CYSTOSCOPY URETEROSCOPY WITH LITHOTRIPSY HOLMIUM LASER, AND INSERTION STENT URETERAL (Left Bladder)  performed 8/6/20 by Dr Jerardo Newton  Braeden Saul is a patient of Dr Ashley Wilson and is seen at the [facility] [place]  How would you rate your pain on a scale from 1 to 10, 10 being the worst pain ever? 0  Have you had a fever? No    Have your bowel movements been regular? No  Patient has been taking stool softener, instructed on addition of laxative, proper hydration  Patient reports this is common for him with narcotic pain medication  Reports no need to use prescription pain medication since being home, however had been receiving narcotics while in patient   Do you have any difficulty urinating? No    Discussed with patient options for stent removal  Patient prefers to have stent remove in the office  Patient confirmed stent removal appointment for 8/12/20  Reviewed with patient common stent symptoms, such as burning with urination, frequency, urgency, blood in the urine and flank pain  Instructed on proper hydration and continued use of OTC pain medication as needed  All questions answered

## 2020-08-07 NOTE — TELEPHONE ENCOUNTER
Left message for patient to return call to see how he is doing since surgery and discuss stent removal in office versus at home

## 2020-08-10 ENCOUNTER — OFFICE VISIT (OUTPATIENT)
Dept: INTERNAL MEDICINE CLINIC | Facility: CLINIC | Age: 60
End: 2020-08-10
Payer: COMMERCIAL

## 2020-08-10 VITALS
RESPIRATION RATE: 16 BRPM | WEIGHT: 220.8 LBS | TEMPERATURE: 98.2 F | DIASTOLIC BLOOD PRESSURE: 82 MMHG | SYSTOLIC BLOOD PRESSURE: 132 MMHG | BODY MASS INDEX: 30.91 KG/M2 | OXYGEN SATURATION: 97 % | HEART RATE: 83 BPM | HEIGHT: 71 IN

## 2020-08-10 DIAGNOSIS — N13.2 HYDRONEPHROSIS WITH OBSTRUCTING CALCULUS: Primary | ICD-10-CM

## 2020-08-10 PROCEDURE — 1111F DSCHRG MED/CURRENT MED MERGE: CPT | Performed by: INTERNAL MEDICINE

## 2020-08-10 PROCEDURE — 99214 OFFICE O/P EST MOD 30 MIN: CPT | Performed by: INTERNAL MEDICINE

## 2020-08-10 NOTE — PROGRESS NOTES
Assessment/Plan:     Left hydroureteronephrosis with obstructing calculus  Transition care management visit regarding recent hospitalization for acute kidney failure, left hydro ureter nephrosis with obstructing kidney stone; today we did review the hospitalization, discharge medication laboratories in test that were completed during the hospitalization  Overall he is feeling well he does have discomfort from the stent currently is using Tylenol 1000 mg every 8 hours he may also use the pain medication for severe pain p r n     Will have the stent removed on Wednesday this week via Urology he is drinking adequate amounts of water he should avoid anti-inflammatories  He will go for his laboratories in the next week which are already assigned to him he will be in a fasting state but he must drink 1-2 glasses RTO as scheduled call if any problems  t   Problem List Items Addressed This Visit        Genitourinary    Left hydroureteronephrosis with obstructing calculus - Primary     Transition care management visit regarding recent hospitalization for acute kidney failure, left hydro ureter nephrosis with obstructing kidney stone; today we did review the hospitalization, discharge medication laboratories in test that were completed during the hospitalization  Overall he is feeling well he does have discomfort from the stent currently is using Tylenol 1000 mg every 8 hours he may also use the pain medication for severe pain p r n     Will have the stent removed on Wednesday this week via Urology he is drinking adequate amounts of water he should avoid anti-inflammatories  He will go for his laboratories in the next week which are already assigned to him he will be in a fasting state but he must drink 1-2 glasses RTO as scheduled call if any problems              Return to office  as scheduled  call if any problems    Patient does have repeat BMP ordered to recheck on his kidney function he will be completing this in 1 week will also go for his routine blood work in 1 week  Subjective:     Patient ID: Edilson Carbajal is a 64 y o  male  HPI transition care management visit regarding recent hospitalization for left hydro ureter nephrosis secondary to kidney stone/acute kidney injury; today we did review the hospital discharge summary, medication list, his current symptoms laboratories in test that were completed in the hospital   Overall he is feeling much better at this point is able to void urine no blood in the urine reports to the some mild discomfort from the stent is plane to have the stent removed on Wednesday of this week with Urology  He is using Tylenol to help control his pain he is using the pain medication very infrequently only for severe pain  1 week prior pain in the back , over the week end felt again , but passed then Monday night 10/10 3-4 hours, called uro nsaid , hydration, wed 3 am , went to er; the pt had bcc removed - had the Mohs procedure   Review of Systems   Constitutional: Negative for activity change, appetite change and unexpected weight change  HENT: Negative for congestion  Respiratory: Negative for cough and shortness of breath  Cardiovascular: Negative for chest pain  Gastrointestinal: Negative for abdominal pain, diarrhea, nausea and vomiting  Genitourinary: Negative for difficulty urinating  Minimal discomfort from the stent   Neurological: Negative for dizziness, light-headedness and headaches  Objective:     Physical Exam  Constitutional:       General: He is not in acute distress  Appearance: He is well-developed  He is not diaphoretic  HENT:      Head: Normocephalic and atraumatic  Right Ear: External ear normal       Left Ear: External ear normal    Eyes:      General: No scleral icterus  Right eye: No discharge  Left eye: No discharge        Conjunctiva/sclera: Conjunctivae normal       Pupils: Pupils are equal, round, and reactive to light    Neck:      Musculoskeletal: Neck supple  Cardiovascular:      Rate and Rhythm: Normal rate and regular rhythm  Heart sounds: Normal heart sounds  No murmur  No friction rub  No gallop  Pulmonary:      Effort: No respiratory distress  Breath sounds: No wheezing or rales  Abdominal:      General: Bowel sounds are normal  There is no distension  Palpations: Abdomen is soft  There is no mass  Tenderness: There is no abdominal tenderness  There is no guarding or rebound  Musculoskeletal:         General: No deformity  Lymphadenopathy:      Cervical: No cervical adenopathy  Neurological:      Mental Status: He is alert  No follow-ups on file  Allergies   Allergen Reactions    Ciprofloxacin Hives    Macrobid [Nitrofurantoin] Nausea Only and Palpitations       Past Medical History:   Diagnosis Date    Chronic kidney disease     kidney stones    GERD (gastroesophageal reflux disease)     Hypertension     Kidney stone      Past Surgical History:   Procedure Laterality Date    COLONOSCOPY      ESOPHAGOGASTRODUODENOSCOPY      EXTRACORPOREAL SHOCK WAVE LITHOTRIPSY Bilateral     Renal Multiple times    ME CYSTO/URETERO W/LITHOTRIPSY &INDWELL STENT INSRT Left 8/6/2020    Procedure: CYSTOSCOPY URETEROSCOPY WITH LITHOTRIPSY HOLMIUM LASER, AND INSERTION STENT URETERAL;  Surgeon: Viona Peabody, MD;  [facility];  Service: Urology    ME EXC SKIN BENIG >4 CM FACE,FACIAL Right 4/28/2016    Procedure: EXCISION  LESION UPPER EYELID, COMPLEX CLOSURE;  Surgeon: Gerard Harrison MD;  Location:  MAIN OR;  Service: Plastics    ME FRAGMENT KIDNEY STONE/ ESWL Right 4/18/2019    Procedure: [place] Jasmine SHOCKWAVE (ESWL); Surgeon: Sejal Cohn MD;  Location: [facility];  Service: Urology     Current Outpatient Medications on File Prior to Visit   Medication Sig Dispense Refill    b complex vitamins capsule Take 1 capsule by mouth daily   Cholecalciferol (VITAMIN D3) 1000 units CAPS Take 1 capsule by mouth daily      coenzyme Q-10 100 MG capsule Take 1 capsule by mouth daily      cyanocobalamin (VITAMIN B-12) 100 mcg tablet Take by mouth daily   fenofibrate (TRICOR) 145 mg tablet TAKE 1 TABLET DAILY 90 tablet 0    fexofenadine (ALLEGRA) 180 MG tablet Take 180 mg by mouth daily in the early morning       FLUCELVAX QUADRIVALENT TO BE ADMINISTERED BY PHARMACIST FOR IMMUNIZATION  0    HYDROcodone-acetaminophen (NORCO) 5-325 mg per tablet Take 1-2 tablets by mouth every 4 (four) hours as needed for pain for up to 10 daysMax Daily Amount: 12 tablets 20 tablet 0    ibuprofen (MOTRIN) 600 mg tablet Take 600 mg by mouth every 6 (six) hours as needed      lisinopril (ZESTRIL) 10 mg tablet Take 1 5 tablets (15 mg total) by mouth daily 135 tablet 0    LORazepam (ATIVAN) 0 5 mg tablet Take 1 tablet (0 5 mg total) by mouth daily as needed for anxiety 90 tablet 0    multivitamin (THERAGRAN) TABS Take 1 tablet by mouth daily   omeprazole (PriLOSEC) 40 MG capsule Take 40 mg by mouth daily in the early morning       phenazopyridine (PYRIDIUM) 200 mg tablet Take 1 tablet (200 mg total) by mouth 3 (three) times a day as needed for bladder spasms 30 tablet 0    sildenafil (VIAGRA) 50 MG tablet Take 1 tablet (50 mg total) by mouth as needed for erectile dysfunction 30 tablet 1    SYRINGE-NEEDLE, DISP, 3 ML (BD ECLIPSE SYRINGE) 25G X 1" 3 ML MISC by Does not apply route once a week 50 each 3    tamsulosin (FLOMAX) 0 4 mg Take 1 capsule (0 4 mg total) by mouth every evening 30 capsule 0    testosterone cypionate (DEPO-TESTOSTERONE) 200 mg/mL SOLN INJECT 0 4ML INTO SHOULDER, THIGH, OR BUTTOCKS ONCE WEEKLY 2 mL 0    [] cephalexin (KEFLEX) 500 mg capsule Take 1 capsule (500 mg total) by mouth every 6 (six) hours for 12 doses 12 capsule 0     No current facility-administered medications on file prior to visit        Family History   Problem Relation Age of Onset    Other Father         CABG (coronary artery bypass surgery)    Atrial fibrillation Sister     Other Paternal Uncle         Myocardial infarction    Coronary artery disease Family     Diabetes Family     Hypertension Family     Uterine cancer Family      Social History     Socioeconomic History    Marital status: /Civil Union     Spouse name: Not on file    Number of children: Not on file    Years of education: Not on file    Highest education level: Not on file   Occupational History    Not on file   Social Needs    Financial resource strain: Not on file    Food insecurity     Worry: Not on file     Inability: Not on file    Transportation needs     Medical: Not on file     Non-medical: Not on file   Tobacco Use    Smoking status: Former Smoker     Last attempt to quit: 1981     Years since quittin 3    Smokeless tobacco: Never Used   Substance and Sexual Activity    Alcohol use:  Yes     Alcohol/week: 2 0 standard drinks     Types: 2 Cans of beer per week     Frequency: 2-4 times a month     Drinks per session: 1 or 2     Binge frequency: Never     Comment: weekend    Drug use: No    Sexual activity: Not on file   Lifestyle    Physical activity     Days per week: Not on file     Minutes per session: Not on file    Stress: Not on file   Relationships    Social connections     Talks on phone: Not on file     Gets together: Not on file     Attends [employer] service: Not on file     Active member of club or organization: Not on file     Attends meetings of clubs or organizations: Not on file     Relationship status: Not on file    Intimate partner violence     Fear of current or ex partner: Not on file     Emotionally abused: Not on file     Physically abused: Not on file     Forced sexual activity: Not on file   Other Topics Concern    Not on file   Social History Narrative    Daily caffeine consumption     Vitals:    08/10/20 0904   BP: 132/82   Pulse: 83   Resp: 16   Temp: 98 2 °F (36 8 °C)   TempSrc: Temporal   SpO2: 97%   Weight: 100 kg (220 lb 12 8 oz)   Height: 5' 11" (1 803 m)     Results for orders placed or performed during the hospital encounter of 08/05/20   Novel Coronavirus (Covid-19),PCR [facility]    Specimen: Throat; Nares   Result Value Ref Range    SARS-CoV-2 Negative Negative   CBC and differential   Result Value Ref Range    WBC 13 50 (H) 4 31 - 10 16 Thousand/uL    RBC 5 46 3 88 - 5 62 Million/uL    Hemoglobin 16 0 12 0 - 17 0 g/dL    Hematocrit 47 1 36 5 - 49 3 %    MCV 86 82 - 98 fL    MCH 29 3 26 8 - 34 3 pg    MCHC 34 0 31 4 - 37 4 g/dL    RDW 13 6 11 6 - 15 1 %    MPV 10 2 8 9 - 12 7 fL    Platelets 669 552 - 737 Thousands/uL    nRBC 0 /100 WBCs    Neutrophils Relative 74 43 - 75 %    Immat GRANS % 1 0 - 2 %    Lymphocytes Relative 12 (L) 14 - 44 %    Monocytes Relative 11 4 - 12 %    Eosinophils Relative 1 0 - 6 %    Basophils Relative 1 0 - 1 %    Neutrophils Absolute 10 10 (H) 1 85 - 7 62 Thousands/µL    Immature Grans Absolute 0 07 0 00 - 0 20 Thousand/uL    Lymphocytes Absolute 1 65 0 60 - 4 47 Thousands/µL    Monocytes Absolute 1 47 (H) 0 17 - 1 22 Thousand/µL    Eosinophils Absolute 0 13 0 00 - 0 61 Thousand/µL    Basophils Absolute 0 08 0 00 - 0 10 Thousands/µL   Comprehensive metabolic panel   Result Value Ref Range    Sodium 138 136 - 145 mmol/L    Potassium 3 8 3 5 - 5 3 mmol/L    Chloride 106 100 - 108 mmol/L    CO2 23 21 - 32 mmol/L    ANION GAP 9 4 - 13 mmol/L    BUN 19 5 - 25 mg/dL    Creatinine 1 44 (H) 0 60 - 1 30 mg/dL    Glucose 89 65 - 140 mg/dL    Calcium 9 6 8 3 - 10 1 mg/dL    AST 37 5 - 45 U/L    ALT 32 12 - 78 U/L    Alkaline Phosphatase 38 (L) 46 - 116 U/L    Total Protein 7 6 6 4 - 8 2 g/dL    Albumin 3 8 3 5 - 5 0 g/dL    Total Bilirubin 0 64 0 20 - 1 00 mg/dL    eGFR 54 ml/min/1 73sq m   Lipase   Result Value Ref Range    Lipase 340 73 - 393 u/L   Urine Microscopic   Result Value Ref Range    RBC, UA Innumerable (A) None Seen, 0-5 /hpf    WBC, UA None Seen None Seen, 0-5, 5-55, 5-65 /hpf    Epithelial Cells None Seen None Seen, Occasional /hpf    Bacteria, UA None Seen None Seen, Occasional /hpf    Hyaline Casts, UA None Seen None Seen /lpf   Basic metabolic panel   Result Value Ref Range    Sodium 139 136 - 145 mmol/L    Potassium 4 1 3 5 - 5 3 mmol/L    Chloride 108 100 - 108 mmol/L    CO2 25 21 - 32 mmol/L    ANION GAP 6 4 - 13 mmol/L    BUN 15 5 - 25 mg/dL    Creatinine 1 44 (H) 0 60 - 1 30 mg/dL    Glucose 94 65 - 140 mg/dL    Calcium 8 9 8 3 - 10 1 mg/dL    eGFR 54 ml/min/1 73sq m   CBC and differential   Result Value Ref Range    WBC 10 87 (H) 4 31 - 10 16 Thousand/uL    RBC 5 01 3 88 - 5 62 Million/uL    Hemoglobin 14 4 12 0 - 17 0 g/dL    Hematocrit 45 0 36 5 - 49 3 %    MCV 90 82 - 98 fL    MCH 28 7 26 8 - 34 3 pg    MCHC 32 0 31 4 - 37 4 g/dL    RDW 13 8 11 6 - 15 1 %    MPV 10 5 8 9 - 12 7 fL    Platelets 411 136 - 844 Thousands/uL    nRBC 0 /100 WBCs    Neutrophils Relative 74 43 - 75 %    Immat GRANS % 0 0 - 2 %    Lymphocytes Relative 11 (L) 14 - 44 %    Monocytes Relative 13 (H) 4 - 12 %    Eosinophils Relative 1 0 - 6 %    Basophils Relative 1 0 - 1 %    Neutrophils Absolute 7 99 (H) 1 85 - 7 62 Thousands/µL    Immature Grans Absolute 0 04 0 00 - 0 20 Thousand/uL    Lymphocytes Absolute 1 24 0 60 - 4 47 Thousands/µL    Monocytes Absolute 1 44 (H) 0 17 - 1 22 Thousand/µL    Eosinophils Absolute 0 09 0 00 - 0 61 Thousand/µL    Basophils Absolute 0 07 0 00 - 0 10 Thousands/µL   POCT urinalysis dipstick   Result Value Ref Range    Color, UA see results    Urine Macroscopic, POC   Result Value Ref Range    Color, UA Yellow     Clarity, UA Clear     pH, UA 7 0 4 5 - 8 0    Leukocytes, UA Negative Negative    Nitrite, UA Negative Negative    Protein, UA Negative Negative mg/dl    Glucose, UA Negative Negative mg/dl    Ketones, UA Negative Negative mg/dl    Urobilinogen, UA 0 2 0 2, 1 0 E U /dl E U /dl    Bilirubin, UA Negative Negative    Blood, UA Moderate (A) Negative    Specific Gravity, UA 1 025 1 003 - 1 030   Fingerstick Glucose (POCT)   Result Value Ref Range    POC Glucose 88 65 - 140 mg/dl     Weight (last 2 days)     Date/Time   Weight    08/10/20 0904   100 (220 8)            Body mass index is 30 8 kg/m²  BP      Temp      Pulse     Resp      SpO2        Vitals:    08/10/20 0904   Weight: 100 kg (220 lb 12 8 oz)     Vitals:    08/10/20 0904   Weight: 100 kg (220 lb 12 8 oz)       Vitals:    08/10/20 0904   BP: 132/82   Pulse: 83   Resp: 16   Temp: 98 2 °F (36 8 °C)   TempSrc: Temporal   SpO2: 97%   Weight: 100 kg (220 lb 12 8 oz)   Height: 5' 11" (1 803 m)       Transitional Care Management Review:  Rufino Nunez is a 64 y o  male here for TCM follow up  During the TCM phone call patient stated:    TCM Call (since 7/10/2020)     Date and time call was made  8/7/2020  8:32 AM    Hospital care reviewed  Records reviewed    Patient was hospitial[facility] at  [facility]    Date of Admission  08/05/20    Date of discharge  08/06/20    Disposition  Home    Were the patients medications reviewed and updated  No    Current Symptoms  -- (Comment)  Pain with urination      TCM Call (since 7/10/2020)     Post hospital issues  None    Should patient be enrolled in anticoag monitoring? No    Scheduled for follow up?   Yes    Did you obtain your prescribed medications  Yes    Do you need help managing your prescriptions or medications  No    I have advised the patient to call PCP with any new or worsening symptoms  Juan C Fields,     Are you recieving any outpatient services  No    Are you recieving home care services  No    Are you using any community resources  No    Interperter language line needed  No    Counseling  Patient          Meseret Carbone DO

## 2020-08-10 NOTE — ASSESSMENT & PLAN NOTE
Transition care management visit regarding recent hospitalization for acute kidney failure, left hydro ureter nephrosis with obstructing kidney stone; today we did review the hospitalization, discharge medication laboratories in test that were completed during the hospitalization  Overall he is feeling well he does have discomfort from the stent currently is using Tylenol 1000 mg every 8 hours he may also use the pain medication for severe pain p r n     Will have the stent removed on Wednesday this week via Urology he is drinking adequate amounts of water he should avoid anti-inflammatories    He will go for his laboratories in the next week which are already assigned to him he will be in a fasting state but he must drink 1-2 glasses RTO as scheduled call if any problems

## 2020-08-12 ENCOUNTER — PROCEDURE VISIT (OUTPATIENT)
Dept: UROLOGY | Facility: AMBULATORY SURGERY CENTER | Age: 60
End: 2020-08-12
Payer: COMMERCIAL

## 2020-08-12 VITALS
HEART RATE: 98 BPM | WEIGHT: 223.4 LBS | SYSTOLIC BLOOD PRESSURE: 160 MMHG | DIASTOLIC BLOOD PRESSURE: 90 MMHG | BODY MASS INDEX: 31.27 KG/M2 | TEMPERATURE: 98 F | HEIGHT: 71 IN

## 2020-08-12 DIAGNOSIS — N20.0 NEPHROLITHIASIS: Primary | ICD-10-CM

## 2020-08-12 PROCEDURE — 99211 OFF/OP EST MAY X REQ PHY/QHP: CPT

## 2020-08-12 PROCEDURE — 1111F DSCHRG MED/CURRENT MED MERGE: CPT

## 2020-08-12 PROCEDURE — 3077F SYST BP >= 140 MM HG: CPT

## 2020-08-12 PROCEDURE — 3008F BODY MASS INDEX DOCD: CPT

## 2020-08-12 PROCEDURE — 3080F DIAST BP >= 90 MM HG: CPT

## 2020-08-12 PROCEDURE — 1036F TOBACCO NON-USER: CPT

## 2020-08-12 NOTE — PROGRESS NOTES
8/12/2020  Elizabeth Stapleton is a 64 y o  male  [id number]        Diagnosis  Chief Complaint     Nephrolithiasis          Patient is s/p left ureteroscopy with stone extraction on 8/6/2020 with Dr Terrance Toledo  Patient will FU in 6 months with a renal US PTV  Reviewed what to expect after stent removal at length, written information provided  Reviewed ER precautions for severe pain/fever/chills/nausea/vomiting  Procedure Stent with String Removal    Vitals:    08/12/20 1100   BP: 160/90   BP Location: Left arm   Patient Position: Sitting   Cuff Size: Standard   Pulse: 98   Temp: 98 °F (36 7 °C)   TempSrc: Temporal   Weight: 101 kg (223 lb 6 4 oz)   Height: 5' 11" (1 803 m)       Stent with string removed intact without difficulty  Reviewed post stent removal symptoms including flank pain, dysuria, and hematuria  Instructed patient to increase oral fluid intake  Encouraged the use of NSAIDS and other prescribed pain medication as needed for discomfort  Patient instructed to call the office or report to the ER for uncontrolled pain, fever, chills, nausea or vomiting         NICHELLE Choe BSN

## 2020-08-12 NOTE — PATIENT INSTRUCTIONS
URETERAL STENTS     [id number]  [id number]  A ureteral stent is a soft plastic tube with holes in it  It's temporarily inserted into a ureter to help drain urine into the bladder  One end goes in the kidney  The other end goes in the bladder  A coil on each end holds the stent in place  The stent can't be seen from outside the body  It shouldn't interfere with your normal routine  When Is a Ureteral Stent Used?  To bypass a blockage in a kidney or ureter   During kidney stone removal    To let a ureter heal after surgery  After the Procedure:  After the procedure you may experience the following symptoms  All of these are normal and should resolve within 1 or 2 days after your stent is removed:   Urinary frequency (urinating more often than usual)   Urinary urgency (the sensation that you need to urinate right away)   Painful urination (this can be pain in your bladder or in your back when  you urinate)   Blood in your urine ( a stent can irritate the lining of your bladder causing it to bleed)   Back/Flank pain, especially with urination    Staying Comfortable with a Stent in Place:   Fluids: Stay very well hydrated  Try to drink at least 6-8 glasses of water per day   Ibuprofen 600mg every 6-8 hours  Ask your doctor if you need to reduce the dose (ie, for renal insufficiency)   Flomax (tamsulosin): Usually provided at hospital discharge  Taken daily, relaxes the muscle around the ureter tube, and makes stents more comfortable   Narcotics (Percocet, Vicodin, Oxycodone) are usually provided at hospital discharge - for severe pain when needed   Use a stool softener! Constipation worsens stent discomfort  We recommend using Colace, Miralax, and/or Senna every day  Stent Removal:  You will be scheduled for stent removal in the office  In most cases this is removed by the nurse, using the black string which is secured from your urethra    Sometimes stents are removed in the office using a cystoscope  Stent removal   Blood may continue in the urine for a few days   May develop Flank pain on the side the stent was placed, this may last a few days  Take ibuprofen 600 mg by mouth, three times a day (every 8 hours), as needed   Increase water intake   No restrictions after stent removed      Call Your Doctor   You have a fever over 101°F (38 5°C), chills, nausea, or vomiting   Your urine contains blood clots or you see a large amount of blood-tinged urine      Your pain is not relieved with medication   You constantly leak urine   Your stent is accidentally removed

## 2020-08-17 DIAGNOSIS — E29.1 TESTICULAR HYPOGONADISM: ICD-10-CM

## 2020-08-17 RX ORDER — SILDENAFIL 50 MG/1
50 TABLET, FILM COATED ORAL DAILY PRN
Qty: 30 TABLET | Refills: 1 | Status: SHIPPED | OUTPATIENT
Start: 2020-08-17 | End: 2020-12-07

## 2020-08-31 DIAGNOSIS — N20.1 LEFT URETERAL STONE: ICD-10-CM

## 2020-08-31 RX ORDER — TAMSULOSIN HYDROCHLORIDE 0.4 MG/1
CAPSULE ORAL
Qty: 90 CAPSULE | Refills: 1 | Status: SHIPPED | OUTPATIENT
Start: 2020-08-31 | End: 2021-05-07 | Stop reason: ALTCHOICE

## 2020-09-22 DIAGNOSIS — I10 ESSENTIAL HYPERTENSION: ICD-10-CM

## 2020-09-22 RX ORDER — LISINOPRIL 10 MG/1
15 TABLET ORAL DAILY
Qty: 135 TABLET | Refills: 1 | Status: SHIPPED | OUTPATIENT
Start: 2020-09-22 | End: 2021-02-14

## 2020-09-23 DIAGNOSIS — E78.1 HYPERTRIGLYCERIDEMIA: ICD-10-CM

## 2020-09-23 RX ORDER — FENOFIBRATE 145 MG/1
TABLET, COATED ORAL
Qty: 90 TABLET | Refills: 0 | Status: SHIPPED | OUTPATIENT
Start: 2020-09-23 | End: 2020-12-18

## 2020-10-14 ENCOUNTER — TRANSCRIBE ORDERS (OUTPATIENT)
Dept: ADMINISTRATIVE | Age: 60
End: 2020-10-14

## 2020-10-14 ENCOUNTER — LAB (OUTPATIENT)
Dept: LAB | Age: 60
End: 2020-10-14
Payer: COMMERCIAL

## 2020-10-14 DIAGNOSIS — K75.81 NONALCOHOLIC STEATOHEPATITIS: Primary | ICD-10-CM

## 2020-10-14 DIAGNOSIS — K75.81 NONALCOHOLIC STEATOHEPATITIS: ICD-10-CM

## 2020-10-14 DIAGNOSIS — N20.0 KIDNEY STONE: ICD-10-CM

## 2020-10-14 DIAGNOSIS — N17.9 ACUTE KIDNEY INJURY (HCC): ICD-10-CM

## 2020-10-14 LAB
25(OH)D3 SERPL-MCNC: 32 NG/ML (ref 30–100)
ALBUMIN SERPL BCP-MCNC: 4.2 G/DL (ref 3.5–5)
ALP SERPL-CCNC: 51 U/L (ref 46–116)
ALT SERPL W P-5'-P-CCNC: 26 U/L (ref 12–78)
ANION GAP SERPL CALCULATED.3IONS-SCNC: 5 MMOL/L (ref 4–13)
AST SERPL W P-5'-P-CCNC: 19 U/L (ref 5–45)
BASOPHILS # BLD AUTO: 0.07 THOUSANDS/ΜL (ref 0–0.1)
BASOPHILS NFR BLD AUTO: 2 % (ref 0–1)
BILIRUB SERPL-MCNC: 0.46 MG/DL (ref 0.2–1)
BUN SERPL-MCNC: 15 MG/DL (ref 5–25)
CALCIUM SERPL-MCNC: 9.5 MG/DL (ref 8.3–10.1)
CHLORIDE SERPL-SCNC: 105 MMOL/L (ref 100–108)
CHOLEST SERPL-MCNC: 169 MG/DL (ref 50–200)
CO2 SERPL-SCNC: 27 MMOL/L (ref 21–32)
CREAT SERPL-MCNC: 1.09 MG/DL (ref 0.6–1.3)
EOSINOPHIL # BLD AUTO: 0.17 THOUSAND/ΜL (ref 0–0.61)
EOSINOPHIL NFR BLD AUTO: 4 % (ref 0–6)
ERYTHROCYTE [DISTWIDTH] IN BLOOD BY AUTOMATED COUNT: 13.4 % (ref 11.6–15.1)
EST. AVERAGE GLUCOSE BLD GHB EST-MCNC: 103 MG/DL
GFR SERPL CREATININE-BSD FRML MDRD: 75 ML/MIN/1.73SQ M
GLUCOSE P FAST SERPL-MCNC: 88 MG/DL (ref 65–99)
HBA1C MFR BLD: 5.2 %
HCT VFR BLD AUTO: 49.4 % (ref 36.5–49.3)
HDLC SERPL-MCNC: 35 MG/DL
HGB BLD-MCNC: 15.6 G/DL (ref 12–17)
IMM GRANULOCYTES # BLD AUTO: 0 THOUSAND/UL (ref 0–0.2)
IMM GRANULOCYTES NFR BLD AUTO: 0 % (ref 0–2)
INR PPP: 1.07 (ref 0.84–1.19)
LDLC SERPL CALC-MCNC: 94 MG/DL (ref 0–100)
LYMPHOCYTES # BLD AUTO: 1.51 THOUSANDS/ΜL (ref 0.6–4.47)
LYMPHOCYTES NFR BLD AUTO: 32 % (ref 14–44)
MAGNESIUM SERPL-MCNC: 2.1 MG/DL (ref 1.6–2.6)
MCH RBC QN AUTO: 28.9 PG (ref 26.8–34.3)
MCHC RBC AUTO-ENTMCNC: 31.6 G/DL (ref 31.4–37.4)
MCV RBC AUTO: 92 FL (ref 82–98)
MONOCYTES # BLD AUTO: 0.68 THOUSAND/ΜL (ref 0.17–1.22)
MONOCYTES NFR BLD AUTO: 14 % (ref 4–12)
NEUTROPHILS # BLD AUTO: 2.37 THOUSANDS/ΜL (ref 1.85–7.62)
NEUTS SEG NFR BLD AUTO: 48 % (ref 43–75)
NRBC BLD AUTO-RTO: 0 /100 WBCS
PHOSPHATE SERPL-MCNC: 1.6 MG/DL (ref 2.7–4.5)
PLATELET # BLD AUTO: 288 THOUSANDS/UL (ref 149–390)
PMV BLD AUTO: 10.9 FL (ref 8.9–12.7)
POTASSIUM SERPL-SCNC: 4.1 MMOL/L (ref 3.5–5.3)
PROT SERPL-MCNC: 7.6 G/DL (ref 6.4–8.2)
PROTHROMBIN TIME: 13.9 SECONDS (ref 11.6–14.5)
PSA SERPL-MCNC: 1 NG/ML (ref 0–4)
PTH-INTACT SERPL-MCNC: 16.8 PG/ML (ref 18.4–80.1)
RBC # BLD AUTO: 5.4 MILLION/UL (ref 3.88–5.62)
SODIUM SERPL-SCNC: 137 MMOL/L (ref 136–145)
TRIGL SERPL-MCNC: 199 MG/DL
URATE SERPL-MCNC: 4.8 MG/DL (ref 4.2–8)
WBC # BLD AUTO: 4.8 THOUSAND/UL (ref 4.31–10.16)

## 2020-10-14 PROCEDURE — 36415 COLL VENOUS BLD VENIPUNCTURE: CPT

## 2020-10-14 PROCEDURE — 83036 HEMOGLOBIN GLYCOSYLATED A1C: CPT

## 2020-10-14 PROCEDURE — 84100 ASSAY OF PHOSPHORUS: CPT

## 2020-10-14 PROCEDURE — 85025 COMPLETE CBC W/AUTO DIFF WBC: CPT

## 2020-10-14 PROCEDURE — 80061 LIPID PANEL: CPT

## 2020-10-14 PROCEDURE — G0103 PSA SCREENING: HCPCS

## 2020-10-14 PROCEDURE — 82306 VITAMIN D 25 HYDROXY: CPT

## 2020-10-14 PROCEDURE — 83735 ASSAY OF MAGNESIUM: CPT

## 2020-10-14 PROCEDURE — 80053 COMPREHEN METABOLIC PANEL: CPT

## 2020-10-14 PROCEDURE — 83970 ASSAY OF PARATHORMONE: CPT

## 2020-10-14 PROCEDURE — 85610 PROTHROMBIN TIME: CPT

## 2020-10-14 PROCEDURE — 84550 ASSAY OF BLOOD/URIC ACID: CPT

## 2020-10-29 ENCOUNTER — IMMUNIZATIONS (OUTPATIENT)
Dept: INTERNAL MEDICINE CLINIC | Facility: CLINIC | Age: 60
End: 2020-10-29
Payer: COMMERCIAL

## 2020-10-29 DIAGNOSIS — Z23 NEED FOR INFLUENZA VACCINATION: Primary | ICD-10-CM

## 2020-10-29 PROCEDURE — 90682 RIV4 VACC RECOMBINANT DNA IM: CPT

## 2020-10-29 PROCEDURE — 90471 IMMUNIZATION ADMIN: CPT

## 2020-11-10 DIAGNOSIS — F41.9 ANXIETY: ICD-10-CM

## 2020-11-10 RX ORDER — LORAZEPAM 0.5 MG/1
0.5 TABLET ORAL DAILY PRN
Qty: 90 TABLET | Refills: 0 | Status: SHIPPED | OUTPATIENT
Start: 2020-11-10 | End: 2021-02-04 | Stop reason: SDUPTHER

## 2020-12-03 DIAGNOSIS — E29.1 HYPOGONADISM IN MALE: ICD-10-CM

## 2020-12-05 RX ORDER — TESTOSTERONE CYPIONATE 200 MG/ML
INJECTION INTRAMUSCULAR
Qty: 4 ML | Refills: 3 | Status: SHIPPED | OUTPATIENT
Start: 2020-12-05 | End: 2021-06-30 | Stop reason: SDUPTHER

## 2020-12-06 DIAGNOSIS — E29.1 TESTICULAR HYPOGONADISM: ICD-10-CM

## 2020-12-07 ENCOUNTER — HOSPITAL ENCOUNTER (OUTPATIENT)
Dept: RADIOLOGY | Age: 60
Discharge: HOME | End: 2020-12-07
Payer: COMMERCIAL

## 2020-12-07 DIAGNOSIS — N20.1 LEFT URETERAL STONE: ICD-10-CM

## 2020-12-07 PROCEDURE — 76770 US EXAM ABDO BACK WALL COMP: CPT

## 2020-12-07 RX ORDER — SILDENAFIL 50 MG/1
TABLET, FILM COATED ORAL
Qty: 30 TABLET | Refills: 1 | Status: SHIPPED | OUTPATIENT
Start: 2020-12-07 | End: 2022-04-15 | Stop reason: SDUPTHER

## 2020-12-08 ENCOUNTER — TELEPHONE (OUTPATIENT)
Dept: UROLOGY | Facility: CLINIC | Age: 60
End: 2020-12-08

## 2020-12-08 ENCOUNTER — TELEPHONE (OUTPATIENT)
Dept: CARDIOLOGY CLINIC | Facility: CLINIC | Age: 60
End: 2020-12-08

## 2020-12-08 DIAGNOSIS — N20.0 NEPHROLITHIASIS: Primary | ICD-10-CM

## 2020-12-10 ENCOUNTER — APPOINTMENT (OUTPATIENT)
Dept: LAB | Facility: HOSPITAL | Age: 60
End: 2020-12-10
Payer: COMMERCIAL

## 2020-12-10 PROCEDURE — 84300 ASSAY OF URINE SODIUM: CPT

## 2020-12-10 PROCEDURE — 82570 ASSAY OF URINE CREATININE: CPT

## 2020-12-10 PROCEDURE — 82340 ASSAY OF CALCIUM IN URINE: CPT

## 2020-12-10 PROCEDURE — 83935 ASSAY OF URINE OSMOLALITY: CPT

## 2020-12-10 PROCEDURE — 83735 ASSAY OF MAGNESIUM: CPT

## 2020-12-10 PROCEDURE — 83945 ASSAY OF OXALATE: CPT

## 2020-12-10 PROCEDURE — 84105 ASSAY OF URINE PHOSPHORUS: CPT

## 2020-12-10 PROCEDURE — 82507 ASSAY OF CITRATE: CPT

## 2020-12-10 PROCEDURE — 84133 ASSAY OF URINE POTASSIUM: CPT

## 2020-12-10 PROCEDURE — 82436 ASSAY OF URINE CHLORIDE: CPT

## 2020-12-10 PROCEDURE — 82131 AMINO ACIDS SINGLE QUANT: CPT

## 2020-12-10 PROCEDURE — 84560 ASSAY OF URINE/URIC ACID: CPT

## 2020-12-10 PROCEDURE — 84392 ASSAY OF URINE SULFATE: CPT

## 2020-12-10 PROCEDURE — 81003 URINALYSIS AUTO W/O SCOPE: CPT

## 2020-12-10 PROCEDURE — 82140 ASSAY OF AMMONIA: CPT

## 2020-12-18 DIAGNOSIS — E78.1 HYPERTRIGLYCERIDEMIA: ICD-10-CM

## 2020-12-18 RX ORDER — FENOFIBRATE 145 MG/1
TABLET, COATED ORAL
Qty: 90 TABLET | Refills: 0 | Status: SHIPPED | OUTPATIENT
Start: 2020-12-18 | End: 2021-03-18

## 2021-01-13 ENCOUNTER — OFFICE VISIT (OUTPATIENT)
Dept: NEPHROLOGY | Facility: CLINIC | Age: 61
End: 2021-01-13
Payer: COMMERCIAL

## 2021-01-13 VITALS
RESPIRATION RATE: 16 BRPM | SYSTOLIC BLOOD PRESSURE: 128 MMHG | DIASTOLIC BLOOD PRESSURE: 74 MMHG | HEIGHT: 71 IN | TEMPERATURE: 98 F | BODY MASS INDEX: 30.66 KG/M2 | HEART RATE: 70 BPM | WEIGHT: 219 LBS

## 2021-01-13 DIAGNOSIS — N20.0 NEPHROLITHIASIS: Primary | ICD-10-CM

## 2021-01-13 PROBLEM — N17.9 ACUTE KIDNEY INJURY (HCC): Status: RESOLVED | Noted: 2020-08-05 | Resolved: 2021-01-13

## 2021-01-13 PROCEDURE — 3078F DIAST BP <80 MM HG: CPT | Performed by: INTERNAL MEDICINE

## 2021-01-13 PROCEDURE — 1036F TOBACCO NON-USER: CPT | Performed by: INTERNAL MEDICINE

## 2021-01-13 PROCEDURE — 99214 OFFICE O/P EST MOD 30 MIN: CPT | Performed by: INTERNAL MEDICINE

## 2021-01-13 PROCEDURE — 3074F SYST BP LT 130 MM HG: CPT | Performed by: INTERNAL MEDICINE

## 2021-01-13 PROCEDURE — 3008F BODY MASS INDEX DOCD: CPT | Performed by: INTERNAL MEDICINE

## 2021-01-13 RX ORDER — POTASSIUM CITRATE 10 MEQ/1
10 TABLET, EXTENDED RELEASE ORAL
Qty: 270 TABLET | Refills: 3 | Status: SHIPPED | OUTPATIENT
Start: 2021-01-13 | End: 2022-06-09 | Stop reason: ALTCHOICE

## 2021-01-13 NOTE — PATIENT INSTRUCTIONS
You are here for follow-up unfortunately you have had a kidney stone event in August but that is been taking care of in you know that you do currently have kidney stones in both kidneys  Your 24 hour urine collection reveals that you her biggest risk for stone formation is low urinary citrate which is an inhibitor of calcium stone formation  That level is low  Your urine calcium and oxalate which of the other mild heels implicated with formation of the type stones that you have are normal   Continue with your drinking fluids and we are going to start medical therapy  Start potassium citrate 10 mEq 3 times a day  After you have done this for couple months when it is convenient do the 24 hour urine again to make sure the parameters are into the appropriate range    You will follow-up with imaging and for potential lithotripsy or surgical extraction of the stones but that discussions between you in the urologist as you are aware  Once I get the results of the 24 hour urine collection I will contact you will arrange follow-up

## 2021-01-13 NOTE — PROGRESS NOTES
NEPHROLOGY PROGRESS NOTE    Davian Mohr 64 y o  male MRN: [id number]  Unit/Bed#:  Encounter: [id number]  Reason for Consult:  Nephrolithiasis    The patient is here for follow-up I initially saw him in June of 2019 and he states that he really had done the collection at that time  He had a kidney stone event that caused obstruction and required urologic procedure and stent placement for removal in August   He is now back for treatment recommendations to prevent kidney stones  We reviewed his medications he did do a 24 hour urine collection prior to his visit which was appreciated  He had no complaints today  ASSESSMENT/PLAN:  1  Nephrolithiasis    The patient has history of calcium oxalate nephrolithiasis over decades and has had multiple procedures including 9 lithotripsies  His kidney stone disease has progressed since I initially saw him but he did do any specific treatment other than lifestyle modification with clinic of fluids to drink  His 24 hour urine collection reveals low urinary citrate with normal urinary oxalate and calcium  Urine osmolality was low so indeed was dilute  At this point I will call a small medical therapy with potassium citrate  Potassium citrate 10 mEq p o  t i d  Do 24 hour urine collection after you have been on therapy for couple months to make sure the calcium and oxalate remain in the normal range and citrate is improved  Continue with follow-up with Urology and they will manage imaging and discussed potential procedures to help remove large stones that are present  I personally reviewed the renal ultrasound report from August of 2020 which revealed normal-sized kidneys and the right kidney at 3 stones identified 7 come 8 com 6 mm  And the left kidney had 3 stones identified measure 7, 10, 4 mm       I did inform the patient that some of these are too large to pass and would need procedures for removal and again will discuss with his urologist     SUBJECTIVE:  Review of Systems   Constitution: Negative for chills, diaphoresis, fever and malaise/fatigue  HENT: Negative  Eyes: Negative  Cardiovascular: Negative  Negative for chest pain, leg swelling, orthopnea and palpitations  Respiratory: Negative  Negative for cough, shortness of breath, sputum production and wheezing  Gastrointestinal: Negative  Negative for abdominal pain, diarrhea, nausea and vomiting  Genitourinary: Negative for dysuria, flank pain, hematuria and incomplete emptying  Neurological: Negative for dizziness, focal weakness, headaches and weakness  Psychiatric/Behavioral: Negative for altered mental status, depression, hallucinations and hypervigilance  OBJECTIVE:  Current Weight: Weight - Scale: 99 3 kg (219 lb)  [email or web address] com: Temperature: 98 °F (36 7 °C)   Blood pressure 128/74, pulse 70, temperature 98 °F (36 7 °C), temperature source Temporal, resp  rate 16, height 5' 11" (1 803 m), weight 99 3 kg (219 lb)  , Body mass index is 30 54 kg/m²  [unfilled]    Physical Exam: /74 (BP Location: Left arm, Patient Position: Sitting, Cuff Size: Standard)   Pulse 70   Temp 98 °F (36 7 °C) (Temporal)   Resp 16   Ht 5' 11" (1 803 m)   Wt 99 3 kg (219 lb)   BMI 30 54 kg/m²   Physical Exam  Constitutional:       General: He is not in acute distress  Appearance: Normal appearance  He is not ill-appearing or diaphoretic  HENT:      Head: Normocephalic and atraumatic  Nose:      Comments: mask     Mouth/Throat:      Comments: mask  Eyes:      General: No scleral icterus  Extraocular Movements: Extraocular movements intact  Neck:      Musculoskeletal: Normal range of motion and neck supple  Cardiovascular:      Rate and Rhythm: Normal rate and regular rhythm  Heart sounds: No murmur  No friction rub  No gallop  Comments: No edema  Pulmonary:      Effort: Pulmonary effort is normal  No respiratory distress  Breath sounds: Normal breath sounds  No wheezing, rhonchi or rales  Abdominal:      General: Bowel sounds are normal  There is no distension  Palpations: Abdomen is soft  Tenderness: There is no abdominal tenderness  There is no rebound  Neurological:      General: No focal deficit present  Mental Status: He is alert and oriented to person, place, and time  Mental status is at baseline  Psychiatric:         Mood and Affect: Mood normal          Behavior: Behavior normal          Thought Content: Thought content normal          Judgment: Judgment normal          Medications:    Current Outpatient Medications:     b complex vitamins capsule, Take 1 capsule by mouth daily  , Disp: , Rfl:     Cholecalciferol (VITAMIN D3) 1000 units CAPS, Take 1 capsule by mouth daily, Disp: , Rfl:     coenzyme Q-10 100 MG capsule, Take 1 capsule by mouth daily, Disp: , Rfl:     cyanocobalamin (VITAMIN B-12) 100 mcg tablet, Take by mouth daily  , Disp: , Rfl:     fenofibrate (TRICOR) 145 mg tablet, TAKE 1 TABLET DAILY, Disp: 90 tablet, Rfl: 0    fexofenadine (ALLEGRA) 180 MG tablet, Take 180 mg by mouth daily in the early morning , Disp: , Rfl:     FLUCELVAX QUADRIVALENT, TO BE ADMINISTERED BY PHARMACIST FOR IMMUNIZATION, Disp: , Rfl: 0    ibuprofen (MOTRIN) 600 mg tablet, Take 600 mg by mouth every 6 (six) hours as needed, Disp: , Rfl:     lisinopril (ZESTRIL) 10 mg tablet, Take 1 5 tablets (15 mg total) by mouth daily, Disp: 135 tablet, Rfl: 1    LORazepam (ATIVAN) 0 5 mg tablet, Take 1 tablet (0 5 mg total) by mouth daily as needed for anxiety, Disp: 90 tablet, Rfl: 0    multivitamin (THERAGRAN) TABS, Take 1 tablet by mouth daily  , Disp: , Rfl:     omeprazole (PriLOSEC) 40 MG capsule, Take 40 mg by mouth daily in the early morning , Disp: , Rfl:     sildenafil (VIAGRA) 50 MG tablet, TAKE 1 TABLET DAILY AS     NEEDED FOR ERECTILE        DYSFUNCTION, Disp: 30 tablet, Rfl: 1    SYRINGE-NEEDLE, DISP, 3 ML (BD ECLIPSE SYRINGE) 25G X 1" 3 ML MISC, by Does not apply route once a week, Disp: 50 each, Rfl: 3    testosterone cypionate (DEPO-TESTOSTERONE) 200 mg/mL SOLN, INJECT 0 4 ML INTO         SHOULDER, THIGH OR BUTTOCKSONCE WEEKLY   DISCARD THE   REMAINDER , Disp: 4 mL, Rfl: 3    phenazopyridine (PYRIDIUM) 200 mg tablet, Take 1 tablet (200 mg total) by mouth 3 (three) times a day as needed for bladder spasms, Disp: 30 tablet, Rfl: 0    potassium citrate (Urocit-K 10) 10 mEq, Take 1 tablet (10 mEq total) by mouth 3 (three) times a day with meals, Disp: 270 tablet, Rfl: 3    tamsulosin (FLOMAX) 0 4 mg, TAKE 1 CAPSULE BY MOUTH EVERY EVENING, Disp: 90 capsule, Rfl: 1    Laboratory Results:  Lab Results   Component Value Date    WBC 4 80 10/14/2020    HGB 15 6 10/14/2020    HCT 49 4 (H) 10/14/2020    MCV 92 10/14/2020     10/14/2020     Lab Results   Component Value Date    SODIUM 137 10/14/2020    K 4 1 10/14/2020     10/14/2020    CO2 27 10/14/2020    BUN 15 10/14/2020    CREATININE 1 09 10/14/2020    GLUC 94 08/06/2020    CALCIUM 9 5 10/14/2020     Lab Results   Component Value Date    CALCIUM 9 5 10/14/2020    PHOS 1 6 (L) 10/14/2020     No results found for: LABPROT

## 2021-01-13 NOTE — LETTER
January 13, 2021     Nasreen Fiore  [address] [address] [address]    Patient: Kellie Sun   YOB: 1960   Date of Visit: 1/13/2021       Dear Dr Leah Franklin: Thank you for referring Newton Tran to me for evaluation  Below are my notes for this consultation  If you have questions, please do not hesitate to call me  I look forward to following your patient along with you  Sincerely,        Kevin Greer MD        CC: MD Kevin Weir MD  1/13/2021 10:07 AM  Sign when Signing Visit  NEPHROLOGY PROGRESS NOTE    Kellie Sun 64 y o  male MRN: [id number]  Unit/Bed#:  Encounter: [id number]  Reason for Consult:  Nephrolithiasis    The patient is here for follow-up I initially saw him in June of 2019 and he states that he really had done the collection at that time  He had a kidney stone event that caused obstruction and required urologic procedure and stent placement for removal in August   He is now back for treatment recommendations to prevent kidney stones  We reviewed his medications he did do a 24 hour urine collection prior to his visit which was appreciated  He had no complaints today  ASSESSMENT/PLAN:  1  Nephrolithiasis    The patient has history of calcium oxalate nephrolithiasis over decades and has had multiple procedures including 9 lithotripsies  His kidney stone disease has progressed since I initially saw him but he did do any specific treatment other than lifestyle modification with clinic of fluids to drink  His 24 hour urine collection reveals low urinary citrate with normal urinary oxalate and calcium  Urine osmolality was low so indeed was dilute  At this point I will call a small medical therapy with potassium citrate  Potassium citrate 10 mEq p o  t i d    Do 24 hour urine collection after you have been on therapy for couple months to make sure the calcium and oxalate remain in the normal range and citrate is improved  Continue with follow-up with Urology and they will manage imaging and discussed potential procedures to help remove large stones that are present  I personally reviewed the renal ultrasound report from August of 2020 which revealed normal-sized kidneys and the right kidney at 3 stones identified 7 come 8 com 6 mm  And the left kidney had 3 stones identified measure 7, 10, 4 mm  I did inform the patient that some of these are too large to pass and would need procedures for removal and again will discuss with his urologist     SUBJECTIVE:  Review of Systems   Constitution: Negative for chills, diaphoresis, fever and malaise/fatigue  HENT: Negative  Eyes: Negative  Cardiovascular: Negative  Negative for chest pain, leg swelling, orthopnea and palpitations  Respiratory: Negative  Negative for cough, shortness of breath, sputum production and wheezing  Gastrointestinal: Negative  Negative for abdominal pain, diarrhea, nausea and vomiting  Genitourinary: Negative for dysuria, flank pain, hematuria and incomplete emptying  Neurological: Negative for dizziness, focal weakness, headaches and weakness  Psychiatric/Behavioral: Negative for altered mental status, depression, hallucinations and hypervigilance  OBJECTIVE:  Current Weight: Weight - Scale: 99 3 kg (219 lb)  [email or web address] com: Temperature: 98 °F (36 7 °C)   Blood pressure 128/74, pulse 70, temperature 98 °F (36 7 °C), temperature source Temporal, resp  rate 16, height 5' 11" (1 803 m), weight 99 3 kg (219 lb)  , Body mass index is 30 54 kg/m²  [unfilled]    Physical Exam: /74 (BP Location: Left arm, Patient Position: Sitting, Cuff Size: Standard)   Pulse 70   Temp 98 °F (36 7 °C) (Temporal)   Resp 16   Ht 5' 11" (1 803 m)   Wt 99 3 kg (219 lb)   BMI 30 54 kg/m²   Physical Exam  Constitutional:       General: He is not in acute distress  Appearance: Normal appearance   He is not ill-appearing or diaphoretic  HENT:      Head: Normocephalic and atraumatic  Nose:      Comments: mask     Mouth/Throat:      Comments: mask  Eyes:      General: No scleral icterus  Extraocular Movements: Extraocular movements intact  Neck:      Musculoskeletal: Normal range of motion and neck supple  Cardiovascular:      Rate and Rhythm: Normal rate and regular rhythm  Heart sounds: No murmur  No friction rub  No gallop  Comments: No edema  Pulmonary:      Effort: Pulmonary effort is normal  No respiratory distress  Breath sounds: Normal breath sounds  No wheezing, rhonchi or rales  Abdominal:      General: Bowel sounds are normal  There is no distension  Palpations: Abdomen is soft  Tenderness: There is no abdominal tenderness  There is no rebound  Neurological:      General: No focal deficit present  Mental Status: He is alert and oriented to person, place, and time  Mental status is at baseline  Psychiatric:         Mood and Affect: Mood normal          Behavior: Behavior normal          Thought Content: Thought content normal          Judgment: Judgment normal          Medications:    Current Outpatient Medications:     b complex vitamins capsule, Take 1 capsule by mouth daily  , Disp: , Rfl:     Cholecalciferol (VITAMIN D3) 1000 units CAPS, Take 1 capsule by mouth daily, Disp: , Rfl:     coenzyme Q-10 100 MG capsule, Take 1 capsule by mouth daily, Disp: , Rfl:     cyanocobalamin (VITAMIN B-12) 100 mcg tablet, Take by mouth daily  , Disp: , Rfl:     fenofibrate (TRICOR) 145 mg tablet, TAKE 1 TABLET DAILY, Disp: 90 tablet, Rfl: 0    fexofenadine (ALLEGRA) 180 MG tablet, Take 180 mg by mouth daily in the early morning , Disp: , Rfl:     FLUCELVAX QUADRIVALENT, TO BE ADMINISTERED BY PHARMACIST FOR IMMUNIZATION, Disp: , Rfl: 0    ibuprofen (MOTRIN) 600 mg tablet, Take 600 mg by mouth every 6 (six) hours as needed, Disp: , Rfl:     lisinopril (ZESTRIL) 10 mg tablet, Take 1 5 tablets (15 mg total) by mouth daily, Disp: 135 tablet, Rfl: 1    LORazepam (ATIVAN) 0 5 mg tablet, Take 1 tablet (0 5 mg total) by mouth daily as needed for anxiety, Disp: 90 tablet, Rfl: 0    multivitamin (THERAGRAN) TABS, Take 1 tablet by mouth daily  , Disp: , Rfl:     omeprazole (PriLOSEC) 40 MG capsule, Take 40 mg by mouth daily in the early morning , Disp: , Rfl:     sildenafil (VIAGRA) 50 MG tablet, TAKE 1 TABLET DAILY AS     NEEDED FOR ERECTILE        DYSFUNCTION, Disp: 30 tablet, Rfl: 1    SYRINGE-NEEDLE, DISP, 3 ML (BD ECLIPSE SYRINGE) 25G X 1" 3 ML MISC, by Does not apply route once a week, Disp: 50 each, Rfl: 3    testosterone cypionate (DEPO-TESTOSTERONE) 200 mg/mL SOLN, INJECT 0 4 ML INTO         SHOULDER, THIGH OR BUTTOCKSONCE WEEKLY   DISCARD THE   REMAINDER , Disp: 4 mL, Rfl: 3    phenazopyridine (PYRIDIUM) 200 mg tablet, Take 1 tablet (200 mg total) by mouth 3 (three) times a day as needed for bladder spasms, Disp: 30 tablet, Rfl: 0    potassium citrate (Urocit-K 10) 10 mEq, Take 1 tablet (10 mEq total) by mouth 3 (three) times a day with meals, Disp: 270 tablet, Rfl: 3    tamsulosin (FLOMAX) 0 4 mg, TAKE 1 CAPSULE BY MOUTH EVERY EVENING, Disp: 90 capsule, Rfl: 1    Laboratory Results:  Lab Results   Component Value Date    WBC 4 80 10/14/2020    HGB 15 6 10/14/2020    HCT 49 4 (H) 10/14/2020    MCV 92 10/14/2020     10/14/2020     Lab Results   Component Value Date    SODIUM 137 10/14/2020    K 4 1 10/14/2020     10/14/2020    CO2 27 10/14/2020    BUN 15 10/14/2020    CREATININE 1 09 10/14/2020    GLUC 94 08/06/2020    CALCIUM 9 5 10/14/2020     Lab Results   Component Value Date    CALCIUM 9 5 10/14/2020    PHOS 1 6 (L) 10/14/2020     No results found for: LABPROT

## 2021-01-20 ENCOUNTER — TELEPHONE (OUTPATIENT)
Dept: NEPHROLOGY | Facility: CLINIC | Age: 61
End: 2021-01-20

## 2021-01-20 NOTE — TELEPHONE ENCOUNTER
Patient called in looking for an alternative medication for his potassium citrate  He stated it is very costly with his insurance  He would like to discuss other options with you   Please contact him at your earliest convenience

## 2021-01-21 ENCOUNTER — TELEPHONE (OUTPATIENT)
Dept: NEPHROLOGY | Facility: CLINIC | Age: 61
End: 2021-01-21

## 2021-01-21 NOTE — TELEPHONE ENCOUNTER
Called patient to relay message from Dr Cain Swanson, but was unsuccessful in reaching him  His mailbox was full so I was unable to leave him a voicemail

## 2021-02-04 DIAGNOSIS — F41.9 ANXIETY: ICD-10-CM

## 2021-02-04 RX ORDER — LORAZEPAM 0.5 MG/1
0.5 TABLET ORAL DAILY PRN
Qty: 90 TABLET | Refills: 0 | Status: SHIPPED | OUTPATIENT
Start: 2021-02-04 | End: 2021-05-03 | Stop reason: SDUPTHER

## 2021-02-13 DIAGNOSIS — I10 ESSENTIAL HYPERTENSION: ICD-10-CM

## 2021-02-14 RX ORDER — LISINOPRIL 10 MG/1
TABLET ORAL
Qty: 135 TABLET | Refills: 1 | Status: SHIPPED | OUTPATIENT
Start: 2021-02-14 | End: 2021-07-28 | Stop reason: SDUPTHER

## 2021-02-23 ENCOUNTER — TELEPHONE (OUTPATIENT)
Dept: UROLOGY | Facility: AMBULATORY SURGERY CENTER | Age: 61
End: 2021-02-23

## 2021-02-23 NOTE — TELEPHONE ENCOUNTER
Patient cancelled follow up appointment with Jomar Sinclair  I left a message with patient looking to reschedule that appointment  I also informed patient that his appointment in July with Dr Pancho Flores is cancelled since he is no longer with our office  Patient is to reschedule next available with KUB prior to visit

## 2021-03-01 NOTE — TELEPHONE ENCOUNTER
LMOM asking patient to call back and reschedule appointment  Let him know a KUB is required before appointment  Office info given  Mailed letter

## 2021-03-01 NOTE — TELEPHONE ENCOUNTER
Patient returned call to schedule appointment  Patient advised that he only wants to see a MD because they know how to handle his history with kidney stones   Scheduled with Dr Emory Bhatia on 7/26/2021 @[facility]

## 2021-03-10 DIAGNOSIS — Z23 ENCOUNTER FOR IMMUNIZATION: ICD-10-CM

## 2021-03-18 DIAGNOSIS — E78.1 HYPERTRIGLYCERIDEMIA: ICD-10-CM

## 2021-03-18 RX ORDER — FENOFIBRATE 145 MG/1
TABLET, COATED ORAL
Qty: 90 TABLET | Refills: 0 | Status: SHIPPED | OUTPATIENT
Start: 2021-03-18 | End: 2021-06-16

## 2021-04-27 ENCOUNTER — TELEPHONE (OUTPATIENT)
Dept: INTERNAL MEDICINE CLINIC | Facility: CLINIC | Age: 61
End: 2021-04-27

## 2021-04-27 DIAGNOSIS — E29.1 TESTICULAR HYPOGONADISM: Primary | ICD-10-CM

## 2021-04-27 DIAGNOSIS — Z12.5 SCREENING FOR PROSTATE CANCER: ICD-10-CM

## 2021-04-27 NOTE — TELEPHONE ENCOUNTER
Patient scheduled his yearly wellness and is asking for orders for blood work  He would also like to have a PSA and testosterone added in        Please advise

## 2021-05-03 DIAGNOSIS — F41.9 ANXIETY: ICD-10-CM

## 2021-05-03 RX ORDER — LORAZEPAM 0.5 MG/1
0.5 TABLET ORAL DAILY PRN
Qty: 90 TABLET | Refills: 0 | Status: SHIPPED | OUTPATIENT
Start: 2021-05-03 | End: 2021-07-28 | Stop reason: SDUPTHER

## 2021-06-16 DIAGNOSIS — E78.1 HYPERTRIGLYCERIDEMIA: ICD-10-CM

## 2021-06-16 RX ORDER — FENOFIBRATE 145 MG/1
TABLET, COATED ORAL
Qty: 90 TABLET | Refills: 0 | Status: SHIPPED | OUTPATIENT
Start: 2021-06-16 | End: 2021-09-07

## 2021-06-24 ENCOUNTER — TRANSCRIBE ORDERS (OUTPATIENT)
Dept: ADMINISTRATIVE | Age: 61
End: 2021-06-24

## 2021-06-24 ENCOUNTER — APPOINTMENT (OUTPATIENT)
Dept: LAB | Age: 61
End: 2021-06-24
Payer: COMMERCIAL

## 2021-06-24 DIAGNOSIS — E29.1 TESTICULAR HYPOGONADISM: ICD-10-CM

## 2021-06-24 DIAGNOSIS — N20.0 URIC ACID NEPHROLITHIASIS: Primary | ICD-10-CM

## 2021-06-24 DIAGNOSIS — Z12.5 SCREENING FOR PROSTATE CANCER: ICD-10-CM

## 2021-06-24 DIAGNOSIS — K75.81 NONALCOHOLIC STEATOHEPATITIS: ICD-10-CM

## 2021-06-24 LAB
ALBUMIN SERPL BCP-MCNC: 3.9 G/DL (ref 3.5–5)
ALP SERPL-CCNC: 48 U/L (ref 46–116)
ALT SERPL W P-5'-P-CCNC: 34 U/L (ref 12–78)
ANION GAP SERPL CALCULATED.3IONS-SCNC: 7 MMOL/L (ref 4–13)
AST SERPL W P-5'-P-CCNC: 21 U/L (ref 5–45)
BASOPHILS # BLD AUTO: 0.07 THOUSANDS/ΜL (ref 0–0.1)
BASOPHILS NFR BLD AUTO: 1 % (ref 0–1)
BILIRUB SERPL-MCNC: 0.67 MG/DL (ref 0.2–1)
BUN SERPL-MCNC: 17 MG/DL (ref 5–25)
CALCIUM SERPL-MCNC: 9.6 MG/DL (ref 8.3–10.1)
CHLORIDE SERPL-SCNC: 104 MMOL/L (ref 100–108)
CO2 SERPL-SCNC: 26 MMOL/L (ref 21–32)
CREAT SERPL-MCNC: 1.15 MG/DL (ref 0.6–1.3)
EOSINOPHIL # BLD AUTO: 0.17 THOUSAND/ΜL (ref 0–0.61)
EOSINOPHIL NFR BLD AUTO: 3 % (ref 0–6)
ERYTHROCYTE [DISTWIDTH] IN BLOOD BY AUTOMATED COUNT: 13.2 % (ref 11.6–15.1)
GFR SERPL CREATININE-BSD FRML MDRD: 70 ML/MIN/1.73SQ M
GLUCOSE P FAST SERPL-MCNC: 92 MG/DL (ref 65–99)
HCT VFR BLD AUTO: 52.3 % (ref 36.5–49.3)
HGB BLD-MCNC: 17.3 G/DL (ref 12–17)
IMM GRANULOCYTES # BLD AUTO: 0.04 THOUSAND/UL (ref 0–0.2)
IMM GRANULOCYTES NFR BLD AUTO: 1 % (ref 0–2)
INR PPP: 1.04 (ref 0.84–1.19)
LYMPHOCYTES # BLD AUTO: 1.91 THOUSANDS/ΜL (ref 0.6–4.47)
LYMPHOCYTES NFR BLD AUTO: 31 % (ref 14–44)
MCH RBC QN AUTO: 29.8 PG (ref 26.8–34.3)
MCHC RBC AUTO-ENTMCNC: 33.1 G/DL (ref 31.4–37.4)
MCV RBC AUTO: 90 FL (ref 82–98)
MONOCYTES # BLD AUTO: 0.71 THOUSAND/ΜL (ref 0.17–1.22)
MONOCYTES NFR BLD AUTO: 11 % (ref 4–12)
NEUTROPHILS # BLD AUTO: 3.37 THOUSANDS/ΜL (ref 1.85–7.62)
NEUTS SEG NFR BLD AUTO: 53 % (ref 43–75)
NRBC BLD AUTO-RTO: 0 /100 WBCS
PLATELET # BLD AUTO: 282 THOUSANDS/UL (ref 149–390)
PMV BLD AUTO: 10.8 FL (ref 8.9–12.7)
POTASSIUM SERPL-SCNC: 4.1 MMOL/L (ref 3.5–5.3)
PROT SERPL-MCNC: 7.7 G/DL (ref 6.4–8.2)
PROTHROMBIN TIME: 13.6 SECONDS (ref 11.6–14.5)
RBC # BLD AUTO: 5.8 MILLION/UL (ref 3.88–5.62)
SODIUM SERPL-SCNC: 137 MMOL/L (ref 136–145)
WBC # BLD AUTO: 6.27 THOUSAND/UL (ref 4.31–10.16)

## 2021-06-24 PROCEDURE — 84403 ASSAY OF TOTAL TESTOSTERONE: CPT

## 2021-06-24 PROCEDURE — 84402 ASSAY OF FREE TESTOSTERONE: CPT

## 2021-06-24 PROCEDURE — 36415 COLL VENOUS BLD VENIPUNCTURE: CPT

## 2021-06-24 PROCEDURE — 80053 COMPREHEN METABOLIC PANEL: CPT

## 2021-06-24 PROCEDURE — 84153 ASSAY OF PSA TOTAL: CPT

## 2021-06-24 PROCEDURE — 84154 ASSAY OF PSA FREE: CPT

## 2021-06-24 PROCEDURE — 85025 COMPLETE CBC W/AUTO DIFF WBC: CPT

## 2021-06-24 PROCEDURE — 85610 PROTHROMBIN TIME: CPT

## 2021-06-25 LAB
PSA FREE MFR SERPL: 28.9 %
PSA FREE SERPL-MCNC: 0.26 NG/ML
PSA SERPL-MCNC: 0.9 NG/ML (ref 0–4)
TESTOST FREE SERPL-MCNC: 19.7 PG/ML (ref 7.2–24)
TESTOST SERPL-MCNC: 453 NG/DL (ref 264–916)

## 2021-06-28 RX ORDER — FENOFIBRATE 134 MG/1
134 CAPSULE ORAL
COMMUNITY
End: 2021-11-22 | Stop reason: ALTCHOICE

## 2021-06-30 ENCOUNTER — OFFICE VISIT (OUTPATIENT)
Dept: INTERNAL MEDICINE CLINIC | Facility: CLINIC | Age: 61
End: 2021-06-30
Payer: COMMERCIAL

## 2021-06-30 VITALS
HEIGHT: 71 IN | HEART RATE: 84 BPM | DIASTOLIC BLOOD PRESSURE: 88 MMHG | BODY MASS INDEX: 31.25 KG/M2 | WEIGHT: 223.2 LBS | RESPIRATION RATE: 16 BRPM | SYSTOLIC BLOOD PRESSURE: 138 MMHG | OXYGEN SATURATION: 97 %

## 2021-06-30 DIAGNOSIS — Z00.00 WELLNESS EXAMINATION: ICD-10-CM

## 2021-06-30 DIAGNOSIS — E29.1 HYPOGONADISM IN MALE: ICD-10-CM

## 2021-06-30 DIAGNOSIS — E66.9 OBESITY (BMI 30.0-34.9): Chronic | ICD-10-CM

## 2021-06-30 DIAGNOSIS — E78.5 HYPERLIPIDEMIA, UNSPECIFIED HYPERLIPIDEMIA TYPE: ICD-10-CM

## 2021-06-30 DIAGNOSIS — Z11.4 SCREENING FOR HIV (HUMAN IMMUNODEFICIENCY VIRUS): Primary | ICD-10-CM

## 2021-06-30 PROCEDURE — 3008F BODY MASS INDEX DOCD: CPT | Performed by: INTERNAL MEDICINE

## 2021-06-30 PROCEDURE — 99396 PREV VISIT EST AGE 40-64: CPT | Performed by: INTERNAL MEDICINE

## 2021-06-30 PROCEDURE — 3725F SCREEN DEPRESSION PERFORMED: CPT | Performed by: INTERNAL MEDICINE

## 2021-06-30 PROCEDURE — 1036F TOBACCO NON-USER: CPT | Performed by: INTERNAL MEDICINE

## 2021-06-30 RX ORDER — TESTOSTERONE CYPIONATE 200 MG/ML
INJECTION INTRAMUSCULAR
Qty: 4 ML | Refills: 3 | Status: SHIPPED | OUTPATIENT
Start: 2021-06-30 | End: 2021-11-18

## 2021-07-01 NOTE — ASSESSMENT & PLAN NOTE
Assessment and plan 1  Health maintenance annual wellness examination overall the patient is clinically stable and doing well, we encouraged the patient to follow a healthy and balanced diet  We recommend that the patient exercise routinely approximately 30 minutes 5 times per week   We have reviewed the patient's vaccines and have made recommendations for updates if necessary    Annual flu shot, consider the COVID vaccine     We will be ordering screening laboratories which are age appropriate  Return to the office in   6 months    call if any problems

## 2021-07-01 NOTE — ASSESSMENT & PLAN NOTE
Elevation of the hemoglobin possibly related to testosterone currently he would like to continue with testosterone benefits outweigh the risks I have asked him to donate  Blood and recheck CBC in a month if he remains elevated we may need to stop the testosterone

## 2021-07-01 NOTE — PROGRESS NOTES
Assessment/Plan:    Wellness examination   Assessment and plan 1  Health maintenance annual wellness examination overall the patient is clinically stable and doing well, we encouraged the patient to follow a healthy and balanced diet  We recommend that the patient exercise routinely approximately 30 minutes 5 times per week   We have reviewed the patient's vaccines and have made recommendations for updates if necessary    Annual flu shot, consider the COVID vaccine     We will be ordering screening laboratories which are age appropriate  Return to the office in   6 months    call if any problems  Obesity (BMI 30 0-34 9)   Obesity -I have counseled patient following healthy and balanced diet, I would like the patient to lose weight, I would like the patient exercise routinely; we will continue monitor the patient's progress      Hyperlipidemia   Will check a lipid profile    Hypogonadism in male   Elevation of the hemoglobin possibly related to testosterone currently he would like to continue with testosterone benefits outweigh the risks I have asked him to donate  Blood and recheck CBC in a month if he remains elevated we may need to stop the testosterone         Problem List Items Addressed This Visit        Endocrine    Hypogonadism in male      Elevation of the hemoglobin possibly related to testosterone currently he would like to continue with testosterone benefits outweigh the risks I have asked him to donate  Blood and recheck CBC in a month if he remains elevated we may need to stop the testosterone         Relevant Medications    testosterone cypionate (DEPO-TESTOSTERONE) 200 mg/mL SOLN    Other Relevant Orders    CBC (Includes Diff/Plt) (Refl)       Other    Obesity (BMI 30 0-34 9) (Chronic)      Obesity -I have counseled patient following healthy and balanced diet, I would like the patient to lose weight, I would like the patient exercise routinely; we will continue monitor the patient's progress  Hyperlipidemia      Will check a lipid profile         Relevant Medications    fenofibrate micronized (LOFIBRA) 134 MG capsule    Other Relevant Orders    Lipid Panel with Direct LDL reflex    Comprehensive metabolic panel    Wellness examination      Assessment and plan 1  Health maintenance annual wellness examination overall the patient is clinically stable and doing well, we encouraged the patient to follow a healthy and balanced diet  We recommend that the patient exercise routinely approximately 30 minutes 5 times per week   We have reviewed the patient's vaccines and have made recommendations for updates if necessary    Annual flu shot, consider the COVID vaccine     We will be ordering screening laboratories which are age appropriate  Return to the office in   6 months    call if any problems  Other Visit Diagnoses     Screening for HIV (human immunodeficiency virus)    -  Primary    Relevant Orders    HIV 1/2 Antigen/Antibody (4th Generation) w Reflex [facility]            Subjective:      Patient ID: Froylan Garner is a 64 y o  male  HPI  Annual wellness examination completed for the patient - overall the patient is clinically stable and doing well, we encouraged the patient to follow a healthy and balanced diet, we would like the patient exercise routinely  I will be ordering routine laboratories  We will also order screening- he drives a car safely wears a seatbelt brushes and flosses the teeth sees a dentist routinely he is trying to exercise not able to go to gym because COVID; he would like to hold off on the COVID vaccine but he may consider in the future      The following portions of the patient's history were reviewed and updated as appropriate: allergies, current medications, past family history, past medical history, past social history, past surgical history and problem list     Review of Systems   Constitutional: Negative for activity change, appetite change and unexpected weight change  Eyes: Negative for visual disturbance  Respiratory: Negative for cough and shortness of breath  Cardiovascular: Negative for chest pain  Gastrointestinal: Negative for abdominal pain, diarrhea, nausea and vomiting  Neurological: Negative for dizziness, light-headedness and headaches  Objective:    No follow-ups on file  No results found  Allergies   Allergen Reactions    Ciprofloxacin Hives    Macrobid [Nitrofurantoin] Nausea Only and Palpitations       Past Medical History:   Diagnosis Date    Chronic kidney disease     kidney stones    GERD (gastroesophageal reflux disease)     Hypertension     Kidney stone      Past Surgical History:   Procedure Laterality Date    COLONOSCOPY      ESOPHAGOGASTRODUODENOSCOPY      EXTRACORPOREAL SHOCK WAVE LITHOTRIPSY Bilateral     Renal Multiple times    FL CYSTO/URETERO W/LITHOTRIPSY &INDWELL STENT INSRT Left 8/6/2020    Procedure: CYSTOSCOPY URETEROSCOPY WITH LITHOTRIPSY HOLMIUM LASER, AND INSERTION STENT URETERAL;  Surgeon: Kaylyn Marquez MD;  [facility];  Service: Urology    FL EXC SKIN BENIG >4 CM FACE,FACIAL Right 4/28/2016    Procedure: EXCISION  LESION UPPER EYELID, COMPLEX CLOSURE;  Surgeon: Sary Lopez MD;  [facility];  Service: Plastics    FL FRAGMENT KIDNEY STONE/ ESWL Right 4/18/2019    Procedure: Nathan Serrano SHOCKWAVE (ESWL); Surgeon: Eduarda Alfaro MD;  [facility];  Service: Urology     Current Outpatient Medications on File Prior to Visit   Medication Sig Dispense Refill    b complex vitamins capsule Take 1 capsule by mouth daily   Cholecalciferol (VITAMIN D3) 1000 units CAPS Take 1 capsule by mouth daily      coenzyme Q-10 100 MG capsule Take 1 capsule by mouth daily      cyanocobalamin (VITAMIN B-12) 100 mcg tablet Take by mouth daily        fenofibrate (TRICOR) 145 mg tablet TAKE 1 TABLET DAILY 90 tablet 0    fenofibrate micronized (LOFIBRA) 134 MG capsule Take 134 mg by mouth      fexofenadine (ALLEGRA) 180 MG tablet Take 180 mg by mouth daily in the early morning       FLUCELVAX QUADRIVALENT TO BE ADMINISTERED BY PHARMACIST FOR IMMUNIZATION  0    ibuprofen (MOTRIN) 600 mg tablet Take 600 mg by mouth every 6 (six) hours as needed      lisinopril (ZESTRIL) 10 mg tablet TAKE 1 AND 1/2 TABLETS     DAILY 135 tablet 1    LORazepam (ATIVAN) 0 5 mg tablet Take 1 tablet (0 5 mg total) by mouth daily as needed for anxiety 90 tablet 0    multivitamin (THERAGRAN) TABS Take 1 tablet by mouth daily   omeprazole (PriLOSEC) 40 MG capsule Take 40 mg by mouth daily in the early morning       potassium citrate (Urocit-K 10) 10 mEq Take 1 tablet (10 mEq total) by mouth 3 (three) times a day with meals 270 tablet 3    sildenafil (VIAGRA) 50 MG tablet TAKE 1 TABLET DAILY AS     NEEDED FOR ERECTILE        DYSFUNCTION 30 tablet 1    SYRINGE-NEEDLE, DISP, 3 ML (BD ECLIPSE SYRINGE) 25G X 1" 3 ML MISC by Does not apply route once a week 50 each 3    [DISCONTINUED] testosterone cypionate (DEPO-TESTOSTERONE) 200 mg/mL SOLN INJECT 0 4 ML INTO         SHOULDER, THIGH OR BUTTOCKSONCE WEEKLY  DISCARD THE   REMAINDER  4 mL 3     No current facility-administered medications on file prior to visit       Family History   Problem Relation Age of Onset    Other Father         CABG (coronary artery bypass surgery)    Atrial fibrillation Sister     Other Paternal Uncle         Myocardial infarction    Coronary artery disease Family     Diabetes Family     Hypertension Family     Uterine cancer Family      Social History     Socioeconomic History    Marital status: /Civil Union     Spouse name: Not on file    Number of children: Not on file    Years of education: Not on file    Highest education level: Not on file   Occupational History    Not on file   Tobacco Use    Smoking status: Former Smoker     Quit date: 4/28/1981     Years since quitting: 40 2    Smokeless tobacco: Never Used   Vaping Use    Vaping Use: Never used   Substance and Sexual Activity    Alcohol use: Yes     Alcohol/week: 2 0 standard drinks     Types: 2 Cans of beer per week     Comment: weekend    Drug use: No    Sexual activity: Not on file   Other Topics Concern    Not on file   Social History Narrative    Daily caffeine consumption     Social Determinants of Health     Financial Resource Strain:     Difficulty of Paying Living Expenses:    Food Insecurity:     Worried About Running Out of Food in the Last Year:     Ran Out of Food in the Last Year:    Transportation Needs:     Lack of Transportation (Medical):      Lack of Transportation (Non-Medical):    Physical Activity:     Days of Exercise per Week:     Minutes of Exercise per Session:    Stress:     Feeling of Stress :    Social Connections:     Frequency of Communication with Friends and Family:     Frequency of Social Gatherings with Friends and Family:     Attends Sikhism Services:     Active Member of Clubs or Organizations:     Attends Club or Organization Meetings:     Marital Status:    Intimate Partner Violence:     Fear of Current or Ex-Partner:     Emotionally Abused:     Physically Abused:     Sexually Abused:      Vitals:    06/30/21 1421   BP: 138/88   Pulse: 84   Resp: 16   SpO2: 97%   Weight: 101 kg (223 lb 3 2 oz)   Height: 5' 11" (1 803 m)     Results for orders placed or performed in visit on 06/24/21   PSA, total and free   Result Value Ref Range    Prostate Specific Antigen Total 0 9 0 0 - 4 0 ng/mL    PSA, Free 0 26 N/A ng/mL    PSA, Free Pct 28 9 %   Testosterone, free, total   Result Value Ref Range    Testosterone, Free 19 7 7 2 - 24 0 pg/mL    TESTOSTERONE TOTAL 453 264 - 916 ng/dL   CBC and differential   Result Value Ref Range    WBC 6 27 4 31 - 10 16 Thousand/uL    RBC 5 80 (H) 3 88 - 5 62 Million/uL    Hemoglobin 17 3 (H) 12 0 - 17 0 g/dL    Hematocrit 52 3 (H) 36 5 - 49 3 % MCV 90 82 - 98 fL    MCH 29 8 26 8 - 34 3 pg    MCHC 33 1 31 4 - 37 4 g/dL    RDW 13 2 11 6 - 15 1 %    MPV 10 8 8 9 - 12 7 fL    Platelets 949 306 - 426 Thousands/uL    nRBC 0 /100 WBCs    Neutrophils Relative 53 43 - 75 %    Immat GRANS % 1 0 - 2 %    Lymphocytes Relative 31 14 - 44 %    Monocytes Relative 11 4 - 12 %    Eosinophils Relative 3 0 - 6 %    Basophils Relative 1 0 - 1 %    Neutrophils Absolute 3 37 1 85 - 7 62 Thousands/µL    Immature Grans Absolute 0 04 0 00 - 0 20 Thousand/uL    Lymphocytes Absolute 1 91 0 60 - 4 47 Thousands/µL    Monocytes Absolute 0 71 0 17 - 1 22 Thousand/µL    Eosinophils Absolute 0 17 0 00 - 0 61 Thousand/µL    Basophils Absolute 0 07 0 00 - 0 10 Thousands/µL   Comprehensive metabolic panel   Result Value Ref Range    Sodium 137 136 - 145 mmol/L    Potassium 4 1 3 5 - 5 3 mmol/L    Chloride 104 100 - 108 mmol/L    CO2 26 21 - 32 mmol/L    ANION GAP 7 4 - 13 mmol/L    BUN 17 5 - 25 mg/dL    Creatinine 1 15 0 60 - 1 30 mg/dL    Glucose, Fasting 92 65 - 99 mg/dL    Calcium 9 6 8 3 - 10 1 mg/dL    AST 21 5 - 45 U/L    ALT 34 12 - 78 U/L    Alkaline Phosphatase 48 46 - 116 U/L    Total Protein 7 7 6 4 - 8 2 g/dL    Albumin 3 9 3 5 - 5 0 g/dL    Total Bilirubin 0 67 0 20 - 1 00 mg/dL    eGFR 70 ml/min/1 73sq m   Protime-INR   Result Value Ref Range    Protime 13 6 11 6 - 14 5 seconds    INR 1 04 0 84 - 1 19     Weight (last 2 days)     Date/Time   Weight    06/30/21 1421   101 (223 2)            Body mass index is 31 13 kg/m²  BP      Temp      Pulse     Resp      SpO2        Vitals:    06/30/21 1421   Weight: 101 kg (223 lb 3 2 oz)     Vitals:    06/30/21 1421   Weight: 101 kg (223 lb 3 2 oz)       /88   Pulse 84   Resp 16   Ht 5' 11" (1 803 m)   Wt 101 kg (223 lb 3 2 oz)   SpO2 97%   BMI 31 13 kg/m²          Physical Exam  Constitutional:       General: He is not in acute distress  Appearance: He is well-developed  He is not diaphoretic     HENT:      Head: Normocephalic and atraumatic  Right Ear: External ear normal       Left Ear: External ear normal    Eyes:      General: No scleral icterus  Right eye: No discharge  Left eye: No discharge  Conjunctiva/sclera: Conjunctivae normal       Pupils: Pupils are equal, round, and reactive to light  Cardiovascular:      Rate and Rhythm: Normal rate and regular rhythm  Heart sounds: Normal heart sounds  No murmur heard  No friction rub  No gallop  Pulmonary:      Effort: No respiratory distress  Breath sounds: No wheezing or rales  Abdominal:      General: Bowel sounds are normal  There is no distension  Palpations: Abdomen is soft  There is no mass  Tenderness: There is no abdominal tenderness  There is no guarding or rebound  Musculoskeletal:         General: No deformity  Cervical back: Neck supple  Lymphadenopathy:      Cervical: No cervical adenopathy  Neurological:      Mental Status: He is alert  BMI Counseling: Body mass index is 31 13 kg/m²  The BMI is above normal  Nutrition recommendations include decreasing portion sizes and moderation in carbohydrate intake  Exercise recommendations include exercising 3-5 times per week

## 2021-07-20 ENCOUNTER — APPOINTMENT (OUTPATIENT)
Dept: RADIOLOGY | Age: 61
End: 2021-07-20
Payer: COMMERCIAL

## 2021-07-20 DIAGNOSIS — N20.0 KIDNEY STONE: ICD-10-CM

## 2021-07-20 PROCEDURE — 74018 RADEX ABDOMEN 1 VIEW: CPT

## 2021-07-26 ENCOUNTER — OFFICE VISIT (OUTPATIENT)
Dept: UROLOGY | Facility: AMBULATORY SURGERY CENTER | Age: 61
End: 2021-07-26
Payer: COMMERCIAL

## 2021-07-26 VITALS
WEIGHT: 224 LBS | SYSTOLIC BLOOD PRESSURE: 140 MMHG | BODY MASS INDEX: 31.36 KG/M2 | HEART RATE: 85 BPM | HEIGHT: 71 IN | DIASTOLIC BLOOD PRESSURE: 82 MMHG

## 2021-07-26 DIAGNOSIS — N20.0 NEPHROLITHIASIS: Primary | ICD-10-CM

## 2021-07-26 PROCEDURE — 1036F TOBACCO NON-USER: CPT | Performed by: UROLOGY

## 2021-07-26 PROCEDURE — 3008F BODY MASS INDEX DOCD: CPT | Performed by: UROLOGY

## 2021-07-26 PROCEDURE — 99213 OFFICE O/P EST LOW 20 MIN: CPT | Performed by: UROLOGY

## 2021-07-26 PROCEDURE — 82360 CALCULUS ASSAY QUANT: CPT | Performed by: UROLOGY

## 2021-07-26 NOTE — PROGRESS NOTES
7/26/2021    Luisa Cazares  1960  [id number]        Assessment    Nephrolithiasis    Plan    We discussed his findings  We reviewed his KUB images  He does have small bilateral calculi  He is not concerned about these  In the past he has been able to spontaneously pass 5 mm calculi  He would like to continue observation I think this is reasonable  He will continue follow-up with Nephrology to ensure potassium citrate dosing is adequate and efficacious in raising his urinary citrate level  He wishes to follow up in 1 year with another KUB  History of Present Illness  Tyrell Reis is a 64 y o  male  With significant history of nephrolithiasis  He has been passing stones for the last 30 years  He has seen Dr Radha Hancock as well as Dr Ines Uriarte in the past   In August 2020 he underwent emergent ureteroscopy with Dr David Chand with laser and stone extraction  Stone analysis revealed calcium oxalate stones  He was then sent to Nephrology for evaluation and management  He has been taking potassium citrate  He says that he is due soon for another 24 hour urine analysis  Currently he is not having any pain or urinary symptoms  He reports that he recently had a prostate check and PSA of 0 7  AUA SYMPTOM SCORE      Most Recent Value   AUA SYMPTOM SCORE   How often have you had a sensation of not emptying your bladder completely after you finished urinating? 0   How often have you had to urinate again less than two hours after you finished urinating? 0   How often have you found you stopped and started again several times when you urinate?  0   How often have you found it difficult to postpone urination? 0   How often have you had a weak urinary stream?  0   How often have you had to push or strain to begin urination? 0   How many times did you most typically get up to urinate from the time you went to bed at night until the time you got up in the morning?   1   Quality of Life: If you were to spend the rest of your life with your urinary condition just the way it is now, how would you feel about that?  1   AUA SYMPTOM SCORE  1          Review of Systems  Review of Systems   Constitutional: Negative  HENT: Negative  Respiratory: Negative  Cardiovascular: Negative  Gastrointestinal: Negative  Genitourinary:        As per HPI   Musculoskeletal: Negative  Skin: Negative  Neurological: Negative  Hematological: Negative  Past Medical History  Past Medical History:   Diagnosis Date    Chronic kidney disease     kidney stones    GERD (gastroesophageal reflux disease)     Hypertension     Kidney stone        Past Social History  Past Surgical History:   Procedure Laterality Date    COLONOSCOPY      ESOPHAGOGASTRODUODENOSCOPY      EXTRACORPOREAL SHOCK WAVE LITHOTRIPSY Bilateral     Renal Multiple times    LA CYSTO/URETERO W/LITHOTRIPSY &INDWELL STENT INSRT Left 8/6/2020    Procedure: CYSTOSCOPY URETEROSCOPY WITH LITHOTRIPSY HOLMIUM LASER, AND INSERTION STENT URETERAL;  Surgeon: Dev Hernandez MD;  [facility];  Service: Urology    LA EXC SKIN BENIG >4 CM FACE,FACIAL Right 4/28/2016    Procedure: EXCISION  LESION UPPER EYELID, COMPLEX CLOSURE;  Surgeon: Jae Mcdaniel MD;  [facility];  Service: Plastics    LA FRAGMENT KIDNEY STONE/ ESWL Right 4/18/2019    Procedure: LITHROTRIPSY EXTRACORPORAL SHOCKWAVE (ESWL);   Surgeon: Kelechi Hair MD;  [facility];  Service: Urology       Past Family History  Family History   Problem Relation Age of Onset    Other Father         CABG (coronary artery bypass surgery)    Atrial fibrillation Sister     Other Paternal Uncle         Myocardial infarction    Coronary artery disease Family     Diabetes Family     Hypertension Family     Uterine cancer Family        Past Social history  Social History     Socioeconomic History    Marital status: /Civil Union     Spouse name: Not on file    Number of children: Not on file    Years of education: Not on file    Highest education level: Not on file   Occupational History    Not on file   Tobacco Use    Smoking status: Former Smoker     Quit date: 1981     Years since quittin 2    Smokeless tobacco: Never Used   Vaping Use    Vaping Use: Never used   Substance and Sexual Activity    Alcohol use: Yes     Alcohol/week: 2 0 standard drinks     Types: 2 Cans of beer per week     Comment: weekend    Drug use: No    Sexual activity: Not on file   Other Topics Concern    Not on file   Social History Narrative    Daily caffeine consumption     Social Determinants of Health     Financial Resource Strain:     Difficulty of Paying Living Expenses:    Food Insecurity:     Worried About Running Out of Food in the Last Year:     Ran Out of Food in the Last Year:    Transportation Needs:     Lack of Transportation (Medical):  Lack of Transportation (Non-Medical):    Physical Activity:     Days of Exercise per Week:     Minutes of Exercise per Session:    Stress:     Feeling of Stress :    Social Connections:     Frequency of Communication with Friends and Family:     Frequency of Social Gatherings with Friends and Family:     Attends Oriental orthodox Services:     Active Member of Clubs or Organizations:     Attends Club or Organization Meetings:     Marital Status:    Intimate Partner Violence:     Fear of Current or Ex-Partner:     Emotionally Abused:     Physically Abused:     Sexually Abused:      Social History     Tobacco Use   Smoking Status Former Smoker    Quit date: 1981    Years since quittin 2   Smokeless Tobacco Never Used       Current Medications  Current Outpatient Medications   Medication Sig Dispense Refill    b complex vitamins capsule Take 1 capsule by mouth daily        Cholecalciferol (VITAMIN D3) 1000 units CAPS Take 1 capsule by mouth daily      coenzyme Q-10 100 MG capsule Take 1 capsule by mouth daily      cyanocobalamin (VITAMIN B-12) 100 mcg tablet Take by mouth daily   fenofibrate (TRICOR) 145 mg tablet TAKE 1 TABLET DAILY 90 tablet 0    fexofenadine (ALLEGRA) 180 MG tablet Take 180 mg by mouth daily in the early morning       FLUCELVAX QUADRIVALENT TO BE ADMINISTERED BY PHARMACIST FOR IMMUNIZATION  0    ibuprofen (MOTRIN) 600 mg tablet Take 600 mg by mouth every 6 (six) hours as needed      lisinopril (ZESTRIL) 10 mg tablet TAKE 1 AND 1/2 TABLETS     DAILY 135 tablet 1    LORazepam (ATIVAN) 0 5 mg tablet Take 1 tablet (0 5 mg total) by mouth daily as needed for anxiety 90 tablet 0    multivitamin (THERAGRAN) TABS Take 1 tablet by mouth daily   omeprazole (PriLOSEC) 40 MG capsule Take 40 mg by mouth daily in the early morning       potassium citrate (Urocit-K 10) 10 mEq Take 1 tablet (10 mEq total) by mouth 3 (three) times a day with meals 270 tablet 3    sildenafil (VIAGRA) 50 MG tablet TAKE 1 TABLET DAILY AS     NEEDED FOR ERECTILE        DYSFUNCTION 30 tablet 1    SYRINGE-NEEDLE, DISP, 3 ML (BD ECLIPSE SYRINGE) 25G X 1" 3 ML MISC by Does not apply route once a week 50 each 3    testosterone cypionate (DEPO-TESTOSTERONE) 200 mg/mL SOLN INJECT 0 4 ML INTO         SHOULDER, THIGH OR BUTTOCKSONCE WEEKLY  DISCARD THE   REMAINDER  4 mL 3    fenofibrate micronized (LOFIBRA) 134 MG capsule Take 134 mg by mouth       No current facility-administered medications for this visit  Allergies  Allergies   Allergen Reactions    Ciprofloxacin Hives    Macrobid [Nitrofurantoin] Nausea Only and Palpitations       Past Medical History, Social History, Family History, medications and allergies were reviewed  Vitals  Vitals:    07/26/21 1357   BP: 140/82   BP Location: Left arm   Patient Position: Sitting   Cuff Size: Adult   Pulse: 85   Weight: 102 kg (224 lb)   Height: 5' 11" (1 803 m)       Physical Exam  Physical Exam  Vitals reviewed  Constitutional:       Appearance: He is well-developed  HENT:      Head: Normocephalic and atraumatic  Eyes:      Conjunctiva/sclera: Conjunctivae normal    Cardiovascular:      Rate and Rhythm: Normal rate  Pulmonary:      Effort: Pulmonary effort is normal    Abdominal:      Palpations: Abdomen is soft  Genitourinary:     Comments: No CVAT  Musculoskeletal:         General: Normal range of motion  Skin:     General: Skin is warm and dry  Neurological:      Mental Status: He is alert and oriented to person, place, and time     Psychiatric:         Mood and Affect: Mood normal            Results  Lab Results   Component Value Date    PSA 0 9 06/24/2021    PSA 1 0 10/14/2020    PSA 1 0 07/10/2020     Lab Results   Component Value Date    GLUCOSE 97 01/08/2016    CALCIUM 9 6 06/24/2021     11/16/2017    K 4 1 06/24/2021    CO2 26 06/24/2021     06/24/2021    BUN 17 06/24/2021    CREATININE 1 15 06/24/2021     Lab Results   Component Value Date    WBC 6 27 06/24/2021    HGB 17 3 (H) 06/24/2021    HCT 52 3 (H) 06/24/2021    MCV 90 06/24/2021     06/24/2021

## 2021-07-28 DIAGNOSIS — I10 ESSENTIAL HYPERTENSION: ICD-10-CM

## 2021-07-28 DIAGNOSIS — F41.9 ANXIETY: ICD-10-CM

## 2021-07-28 RX ORDER — LORAZEPAM 0.5 MG/1
0.5 TABLET ORAL DAILY PRN
Qty: 90 TABLET | Refills: 0 | Status: SHIPPED | OUTPATIENT
Start: 2021-07-28 | End: 2021-10-25 | Stop reason: SDUPTHER

## 2021-07-28 RX ORDER — LISINOPRIL 10 MG/1
15 TABLET ORAL DAILY
Qty: 135 TABLET | Refills: 1 | Status: SHIPPED | OUTPATIENT
Start: 2021-07-28 | End: 2022-01-06

## 2021-08-04 LAB
CALCIUM OXALATE DIHYDRATE MFR STONE IR: 40 %
COLOR STONE: NORMAL
COM MFR STONE: 55 %
COMMENT-STONE3: NORMAL
COMPOSITION: NORMAL
HYDROXYAPATITE 24H ENGDIFF UR: 5 %
LABORATORY COMMENT REPORT: NORMAL
PHOTO: NORMAL
SIZE STONE: NORMAL MM
SPEC SOURCE SUBJ: NORMAL
STONE ANALYSIS-IMP: NORMAL
WT STONE: 46 MG

## 2021-09-07 DIAGNOSIS — E78.1 HYPERTRIGLYCERIDEMIA: ICD-10-CM

## 2021-09-07 RX ORDER — FENOFIBRATE 145 MG/1
TABLET, COATED ORAL
Qty: 90 TABLET | Refills: 1 | Status: SHIPPED | OUTPATIENT
Start: 2021-09-07 | End: 2022-03-07

## 2021-10-25 DIAGNOSIS — F41.9 ANXIETY: ICD-10-CM

## 2021-10-26 RX ORDER — LORAZEPAM 0.5 MG/1
0.5 TABLET ORAL DAILY PRN
Qty: 90 TABLET | Refills: 0 | Status: SHIPPED | OUTPATIENT
Start: 2021-10-26 | End: 2022-01-11

## 2021-11-17 DIAGNOSIS — E29.1 HYPOGONADISM IN MALE: ICD-10-CM

## 2021-11-18 ENCOUNTER — APPOINTMENT (OUTPATIENT)
Dept: LAB | Age: 61
End: 2021-11-18
Payer: COMMERCIAL

## 2021-11-18 DIAGNOSIS — E78.5 HYPERLIPIDEMIA, UNSPECIFIED HYPERLIPIDEMIA TYPE: ICD-10-CM

## 2021-11-18 DIAGNOSIS — Z11.4 SCREENING FOR HIV (HUMAN IMMUNODEFICIENCY VIRUS): ICD-10-CM

## 2021-11-18 DIAGNOSIS — E29.1 HYPOGONADISM IN MALE: ICD-10-CM

## 2021-11-18 LAB
ALBUMIN SERPL BCP-MCNC: 3.6 G/DL (ref 3.5–5)
ALP SERPL-CCNC: 47 U/L (ref 46–116)
ALT SERPL W P-5'-P-CCNC: 27 U/L (ref 12–78)
ANION GAP SERPL CALCULATED.3IONS-SCNC: 7 MMOL/L (ref 4–13)
AST SERPL W P-5'-P-CCNC: 21 U/L (ref 5–45)
BASOPHILS # BLD AUTO: 0.07 THOUSANDS/ΜL (ref 0–0.1)
BASOPHILS NFR BLD AUTO: 1 % (ref 0–1)
BILIRUB SERPL-MCNC: 0.46 MG/DL (ref 0.2–1)
BUN SERPL-MCNC: 16 MG/DL (ref 5–25)
CALCIUM SERPL-MCNC: 10 MG/DL (ref 8.3–10.1)
CHLORIDE SERPL-SCNC: 105 MMOL/L (ref 100–108)
CHOLEST SERPL-MCNC: 182 MG/DL (ref 50–200)
CO2 SERPL-SCNC: 25 MMOL/L (ref 21–32)
CREAT SERPL-MCNC: 1.18 MG/DL (ref 0.6–1.3)
EOSINOPHIL # BLD AUTO: 0.25 THOUSAND/ΜL (ref 0–0.61)
EOSINOPHIL NFR BLD AUTO: 4 % (ref 0–6)
ERYTHROCYTE [DISTWIDTH] IN BLOOD BY AUTOMATED COUNT: 14.6 % (ref 11.6–15.1)
GFR SERPL CREATININE-BSD FRML MDRD: 67 ML/MIN/1.73SQ M
GLUCOSE P FAST SERPL-MCNC: 97 MG/DL (ref 65–99)
HCT VFR BLD AUTO: 43.3 % (ref 36.5–49.3)
HDLC SERPL-MCNC: 32 MG/DL
HGB BLD-MCNC: 13.4 G/DL (ref 12–17)
IMM GRANULOCYTES # BLD AUTO: 0.02 THOUSAND/UL (ref 0–0.2)
IMM GRANULOCYTES NFR BLD AUTO: 0 % (ref 0–2)
LDLC SERPL CALC-MCNC: 108 MG/DL (ref 0–100)
LYMPHOCYTES # BLD AUTO: 2.06 THOUSANDS/ΜL (ref 0.6–4.47)
LYMPHOCYTES NFR BLD AUTO: 32 % (ref 14–44)
MCH RBC QN AUTO: 26 PG (ref 26.8–34.3)
MCHC RBC AUTO-ENTMCNC: 30.9 G/DL (ref 31.4–37.4)
MCV RBC AUTO: 84 FL (ref 82–98)
MONOCYTES # BLD AUTO: 0.7 THOUSAND/ΜL (ref 0.17–1.22)
MONOCYTES NFR BLD AUTO: 11 % (ref 4–12)
NEUTROPHILS # BLD AUTO: 3.44 THOUSANDS/ΜL (ref 1.85–7.62)
NEUTS SEG NFR BLD AUTO: 52 % (ref 43–75)
NRBC BLD AUTO-RTO: 0 /100 WBCS
PLATELET # BLD AUTO: 370 THOUSANDS/UL (ref 149–390)
PMV BLD AUTO: 10.4 FL (ref 8.9–12.7)
POTASSIUM SERPL-SCNC: 4.3 MMOL/L (ref 3.5–5.3)
PROT SERPL-MCNC: 7.4 G/DL (ref 6.4–8.2)
RBC # BLD AUTO: 5.15 MILLION/UL (ref 3.88–5.62)
SODIUM SERPL-SCNC: 137 MMOL/L (ref 136–145)
TRIGL SERPL-MCNC: 210 MG/DL
WBC # BLD AUTO: 6.54 THOUSAND/UL (ref 4.31–10.16)

## 2021-11-18 PROCEDURE — 36415 COLL VENOUS BLD VENIPUNCTURE: CPT

## 2021-11-18 PROCEDURE — 85025 COMPLETE CBC W/AUTO DIFF WBC: CPT

## 2021-11-18 PROCEDURE — 80061 LIPID PANEL: CPT

## 2021-11-18 PROCEDURE — 80053 COMPREHEN METABOLIC PANEL: CPT

## 2021-11-18 PROCEDURE — 87389 HIV-1 AG W/HIV-1&-2 AB AG IA: CPT

## 2021-11-18 RX ORDER — TESTOSTERONE CYPIONATE 200 MG/ML
INJECTION INTRAMUSCULAR
Qty: 4 ML | Refills: 3 | Status: SHIPPED | OUTPATIENT
Start: 2021-11-18 | End: 2022-04-18

## 2021-11-19 LAB — HIV 1+2 AB+HIV1 P24 AG SERPL QL IA: NORMAL

## 2022-01-04 DIAGNOSIS — I10 ESSENTIAL HYPERTENSION: ICD-10-CM

## 2022-01-06 RX ORDER — LISINOPRIL 10 MG/1
TABLET ORAL
Qty: 135 TABLET | Refills: 1 | Status: SHIPPED | OUTPATIENT
Start: 2022-01-06 | End: 2022-07-11

## 2022-01-10 DIAGNOSIS — F41.9 ANXIETY: ICD-10-CM

## 2022-01-11 RX ORDER — LORAZEPAM 0.5 MG/1
TABLET ORAL
Qty: 90 TABLET | Refills: 0 | Status: SHIPPED | OUTPATIENT
Start: 2022-01-11 | End: 2022-04-18

## 2022-03-03 ENCOUNTER — TELEPHONE (OUTPATIENT)
Dept: UROLOGY | Facility: MEDICAL CENTER | Age: 62
End: 2022-03-03

## 2022-03-03 ENCOUNTER — APPOINTMENT (OUTPATIENT)
Dept: LAB | Age: 62
End: 2022-03-03
Payer: COMMERCIAL

## 2022-03-03 DIAGNOSIS — R39.9 UTI SYMPTOMS: Primary | ICD-10-CM

## 2022-03-03 DIAGNOSIS — N20.0 NEPHROLITHIASIS: ICD-10-CM

## 2022-03-03 LAB
BACTERIA UR QL AUTO: ABNORMAL /HPF
BILIRUB UR QL STRIP: NEGATIVE
CLARITY UR: CLEAR
COLOR UR: ABNORMAL
GLUCOSE UR STRIP-MCNC: NEGATIVE MG/DL
HGB UR QL STRIP.AUTO: ABNORMAL
KETONES UR STRIP-MCNC: NEGATIVE MG/DL
LEUKOCYTE ESTERASE UR QL STRIP: NEGATIVE
NITRITE UR QL STRIP: NEGATIVE
NON-SQ EPI CELLS URNS QL MICRO: ABNORMAL /HPF
PH UR STRIP.AUTO: 7 [PH]
PROT UR STRIP-MCNC: NEGATIVE MG/DL
RBC #/AREA URNS AUTO: ABNORMAL /HPF
SP GR UR STRIP.AUTO: 1 (ref 1–1.03)
UROBILINOGEN UR STRIP-ACNC: <2 MG/DL
WBC #/AREA URNS AUTO: ABNORMAL /HPF

## 2022-03-03 PROCEDURE — 87086 URINE CULTURE/COLONY COUNT: CPT

## 2022-03-03 PROCEDURE — 81001 URINALYSIS AUTO W/SCOPE: CPT

## 2022-03-03 NOTE — TELEPHONE ENCOUNTER
Spoke to patient to advise of urine testing  Patient stated he just got back from providing a urine sample  Advised patient of AP's recommendation of obtaining US  Central scheduling was provided  Advised we will call him with the results  Patient is understanding and agreeable

## 2022-03-03 NOTE — TELEPHONE ENCOUNTER
Returned call to patient   Patient seen in our [place] location   Last appt was 7/26/22    Patient states that he did pass a stone a week ago  Patient is concerned that now he has an infection  Patient started to get L side  flank pain last week   Patient states Friday of last week patient passed stone  + frequency and urgency  Lower pelvic irritation" sore" increases with urination  Urine is clear- yellow" straw"  Denies cloudiness, odor  Denies fever or chills  Urine test orders placed in epic  Patient will go and get testing this morning  Encouraged hydration 40-60oz of water daily  Avoiding Bladder irritants such as coffee,tea,soda,carbonated beverages  Limiting your alcohol intake  Avoiding constipation  Continue taking medications as prescribed  Reducing cigarette smoking  Advised patient monitor/contact our office with fever above 101 0, chills, decreased urination or unable to urinate, blood or clots in the urine  Health Call after hours protocol reviewed  Ed precautions reviewed   Patient verbilized understanding/ Agreement

## 2022-03-03 NOTE — TELEPHONE ENCOUNTER
Patient of Dr Brad Ruelas at [facility]    Patient called stating he stated he passed a kidney stone on Thursday 2 weeks ago  He stated he started having irritation on bladder  He is having frequency, urgency and some burning  He is not sure if it could be a bladder infection after passing the stone  He would like to know how to proceed

## 2022-03-04 ENCOUNTER — HOSPITAL ENCOUNTER (OUTPATIENT)
Dept: RADIOLOGY | Age: 62
Discharge: HOME | End: 2022-03-04
Payer: COMMERCIAL

## 2022-03-04 DIAGNOSIS — N20.0 NEPHROLITHIASIS: ICD-10-CM

## 2022-03-04 LAB — BACTERIA UR CULT: NORMAL

## 2022-03-04 PROCEDURE — 76770 US EXAM ABDO BACK WALL COMP: CPT

## 2022-03-06 DIAGNOSIS — E78.1 HYPERTRIGLYCERIDEMIA: ICD-10-CM

## 2022-03-07 RX ORDER — FENOFIBRATE 145 MG/1
TABLET, COATED ORAL
Qty: 90 TABLET | Refills: 1 | Status: SHIPPED | OUTPATIENT
Start: 2022-03-07

## 2022-03-09 NOTE — TELEPHONE ENCOUNTER
pts care is managed by the [facility] office  Pt was last seen 7/26/21    Pt stating he is still not feeling 100 % better  Pt reporting he is having a feeling of irritation in his bladder area  Pt states this discomfort is a # 3 on a scale of 1-10  Pt denies any other symptoms at this time  Pt claims he is well hydrated and has been waiting for renal ultrasound results since 3/4/22  Please advise

## 2022-03-09 NOTE — TELEPHONE ENCOUNTER
Pt is calling today explaining his pain from last week is still occurring and is not feeling well and is concerned that there is another kidney stone  Pt explained pain was coming and going but with moving and going through the day the pain comes and goes         Pt can be reached at [phone]

## 2022-03-09 NOTE — TELEPHONE ENCOUNTER
Results of renal ultrasound identified mild right-sided hydronephrosis  He has several bilateral nonobstructing renal calculi  Patient may have spontaneously passed a calculus indicating his right-sided hydronephrosis and symptoms  However, if he remains symptomatic, would recommend CT renal stone study for more definitive evaluation  Urine culture negative for infection

## 2022-03-09 NOTE — TELEPHONE ENCOUNTER
Called and spoke with patient  Advised that the results of the ultrasound showed mild right-sided hydronephrosis  Stated that he has several bilateral nonobstructing renal calculi and that he may have spontaneously passed a calculus indicating his right-sided hydronephrosis and symptoms  Advised that if he remains symptomatic, we would recommend CT renal stone study for more definitive evaluation  Patient states that he is still having symptoms and is drinking lots of water and rotating between Tylenol and Motrin  Advised he can try a heating pad to the back area as well and advised on watching his caffeine, tea, and coffee intake as well as moderate calcium intake since his last kidney stone was made up of calcium  Order placed for CT renal study and central scheduling number provided  Advised urine culture was negative for infection  Patient will call if anything changes and review ER precautions

## 2022-03-11 ENCOUNTER — APPOINTMENT (OUTPATIENT)
Dept: LAB | Age: 62
End: 2022-03-11
Payer: COMMERCIAL

## 2022-03-11 DIAGNOSIS — K75.81 NONALCOHOLIC STEATOHEPATITIS: ICD-10-CM

## 2022-03-11 PROCEDURE — 82977 ASSAY OF GGT: CPT

## 2022-03-11 PROCEDURE — 82947 ASSAY GLUCOSE BLOOD QUANT: CPT

## 2022-03-11 PROCEDURE — 82465 ASSAY BLD/SERUM CHOLESTEROL: CPT

## 2022-03-11 PROCEDURE — 82172 ASSAY OF APOLIPOPROTEIN: CPT

## 2022-03-11 PROCEDURE — 84460 ALANINE AMINO (ALT) (SGPT): CPT

## 2022-03-11 PROCEDURE — 82247 BILIRUBIN TOTAL: CPT

## 2022-03-11 PROCEDURE — 84450 TRANSFERASE (AST) (SGOT): CPT

## 2022-03-11 PROCEDURE — 84478 ASSAY OF TRIGLYCERIDES: CPT

## 2022-03-11 PROCEDURE — 36415 COLL VENOUS BLD VENIPUNCTURE: CPT

## 2022-03-11 PROCEDURE — 83010 ASSAY OF HAPTOGLOBIN QUANT: CPT

## 2022-03-11 PROCEDURE — 83883 ASSAY NEPHELOMETRY NOT SPEC: CPT

## 2022-03-15 LAB
A2 MACROGLOB SERPL-MCNC: 122 MG/DL (ref 110–276)
ALT SERPL W P-5'-P-CCNC: 21 IU/L (ref 0–55)
APO A-I SERPL-MCNC: 117 MG/DL (ref 101–178)
AST SERPL W P-5'-P-CCNC: 25 IU/L (ref 0–40)
BILIRUB SERPL-MCNC: 0.4 MG/DL (ref 0–1.2)
CHOLEST SERPL-MCNC: 211 MG/DL (ref 100–199)
FIBROSIS SCORING:: ABNORMAL
FIBROSIS STAGE SERPL QL: ABNORMAL
GGT SERPL-CCNC: 20 IU/L (ref 0–65)
GLUCOSE SERPL-MCNC: 89 MG/DL (ref 65–99)
HAPTOGLOB SERPL-MCNC: 114 MG/DL (ref 29–370)
LABORATORY COMMENT REPORT: ABNORMAL
LIVER FIBR SCORE SERPL CALC.FIBROSURE: 0.13 (ref 0–0.21)
NECROINFLAMMATORY ACT GRADE SERPL QL: ABNORMAL
NECROINFLAMMATORY ACT SCORE SERPL: 0.5
SERVICE CMNT-IMP: ABNORMAL
SL AMB INTERPRETATION: ABNORMAL
SL AMB NASH SCORING: ABNORMAL
SL AMB STEATOSIS GRADE: ABNORMAL
SL AMB STEATOSIS SCORE: 0.66 (ref 0–0.3)
STEATOSIS GRADING: ABNORMAL
TRIGL SERPL-MCNC: 221 MG/DL (ref 0–149)

## 2022-03-17 ENCOUNTER — HOSPITAL ENCOUNTER (OUTPATIENT)
Dept: RADIOLOGY | Age: 62
Discharge: HOME | End: 2022-03-17
Payer: COMMERCIAL

## 2022-03-17 DIAGNOSIS — N20.0 NEPHROLITHIASIS: ICD-10-CM

## 2022-03-17 PROCEDURE — 74176 CT ABD & PELVIS W/O CONTRAST: CPT

## 2022-03-17 PROCEDURE — G1004 CDSM NDSC: HCPCS

## 2022-03-24 RX ORDER — TAMSULOSIN HYDROCHLORIDE 0.4 MG/1
0.4 CAPSULE ORAL
Qty: 30 CAPSULE | Refills: 0 | Status: SHIPPED | OUTPATIENT
Start: 2022-03-24 | End: 2022-03-24 | Stop reason: DRUGHIGH

## 2022-03-24 RX ORDER — TAMSULOSIN HYDROCHLORIDE 0.4 MG/1
0.4 CAPSULE ORAL
Qty: 90 CAPSULE | Refills: 0 | Status: SHIPPED | OUTPATIENT
Start: 2022-03-24 | End: 2022-05-31 | Stop reason: SDUPTHER

## 2022-03-24 NOTE — TELEPHONE ENCOUNTER
Patient called stating he saw results of his ct scan and would like to have the clinical staff look over and let him know how to proceed

## 2022-03-24 NOTE — TELEPHONE ENCOUNTER
Patient called stating the pharmacy called him and stated they are not able to do 30 days supply of flomax  The insurance will only pay for 90 days supply  Please resend it to pharmacy for 90 days  Any questions call him

## 2022-03-24 NOTE — TELEPHONE ENCOUNTER
Called patient to review results of CT scan which identified a 7mm stone  Patient reports symptoms of discomfort starting 2/24 and passed a tiny calculus  He has since been experiencing a pulling sensation on right side intermittently  Based on size of stones and symptoms, we discussed proceeding with surgery  He is amenable with plan  Case request will be placed for cystoscopy, ureteroscopy, holmium laser lithotripsy, retrograde pyelogram, and ureteral stent placement  Informed consent provided  Surgery coordinator will contact patient to arrange  In the interim, he will begin Flomax and call office if he passes stone  ER precautions reviewed  All questions answered, patient verbalizes understanding

## 2022-03-28 ENCOUNTER — PREP FOR PROCEDURE (OUTPATIENT)
Dept: OTHER | Facility: HOSPITAL | Age: 62
End: 2022-03-28

## 2022-03-28 ENCOUNTER — PREP FOR PROCEDURE (OUTPATIENT)
Dept: UROLOGY | Facility: AMBULATORY SURGERY CENTER | Age: 62
End: 2022-03-28

## 2022-03-28 DIAGNOSIS — N20.1 LEFT URETERAL STONE: Primary | ICD-10-CM

## 2022-03-28 DIAGNOSIS — R39.89 SUSPECTED UTI: ICD-10-CM

## 2022-03-28 DIAGNOSIS — N20.0 NEPHROLITHIASIS: Primary | ICD-10-CM

## 2022-03-28 NOTE — TELEPHONE ENCOUNTER
Patient called stating he wants to know when he is to get a call from the surgical scheduler to schedule surgery

## 2022-03-29 NOTE — TELEPHONE ENCOUNTER
I called pt again and confirmed that Dr Kaleb Garcia did review his chart and confirm that he can be scheduled with him on 4/15/22 at the [facility]  Pt will have his pre op urine culture done on 4/1/22 at a [address]  Pt will stop by the [facility] office tomorrow to  a a copy of his surgical packet and he was instructed to call our office with any questions or concerns regarding this procedure

## 2022-03-29 NOTE — TELEPHONE ENCOUNTER
Patient called stating he will be dropping fmla papers at the office for doctor to fill out  He also is waiting to hear about surgery

## 2022-03-29 NOTE — TELEPHONE ENCOUNTER
I spoke with pt this afternoon to inform him that I am currently working on getting him scheduled for surgery and I am currently waiting to hear back from Dr Veronica Davies confirming if I can schedule pt with him at the [facility] on 4/15/22  Pt verbalized understanding and agreed that as long as Dr Veronica Davies agrees he is ok with that date  Pt then stated that he will be dropping off FMLA paperwork that needs to be completed  I did verbally go over our form fee for this paperwork and the turnaround time being 7-10 business days and pt verbalized understanding  Pt will wait to hear back from me in regards to scheduling once Dr Veronica Davies responds to my email

## 2022-03-30 ENCOUNTER — HOSPITAL ENCOUNTER (INPATIENT)
Facility: HOSPITAL | Age: 62
LOS: 1 days | Discharge: HOME | DRG: 661 | End: 2022-03-31
Attending: EMERGENCY MEDICINE | Admitting: INTERNAL MEDICINE
Payer: COMMERCIAL

## 2022-03-30 DIAGNOSIS — N20.0 NEPHROLITHIASIS: ICD-10-CM

## 2022-03-30 DIAGNOSIS — N20.0 RENAL CALCULUS, BILATERAL: ICD-10-CM

## 2022-03-30 DIAGNOSIS — N20.1 RIGHT URETERAL STONE: Primary | ICD-10-CM

## 2022-03-30 LAB
ALBUMIN SERPL BCP-MCNC: 4 G/DL (ref 3.5–5)
ALP SERPL-CCNC: 47 U/L (ref 46–116)
ALT SERPL W P-5'-P-CCNC: 30 U/L (ref 12–78)
ANION GAP SERPL CALCULATED.3IONS-SCNC: 6 MMOL/L (ref 4–13)
AST SERPL W P-5'-P-CCNC: 25 U/L (ref 5–45)
BACTERIA UR QL AUTO: ABNORMAL /HPF
BASOPHILS # BLD AUTO: 0.1 THOUSANDS/ΜL (ref 0–0.1)
BASOPHILS NFR BLD AUTO: 1 % (ref 0–1)
BILIRUB SERPL-MCNC: 0.23 MG/DL (ref 0.2–1)
BILIRUB UR QL STRIP: NEGATIVE
BUN SERPL-MCNC: 21 MG/DL (ref 5–25)
CALCIUM SERPL-MCNC: 10.2 MG/DL (ref 8.3–10.1)
CHLORIDE SERPL-SCNC: 108 MMOL/L (ref 100–108)
CLARITY UR: CLEAR
CO2 SERPL-SCNC: 24 MMOL/L (ref 21–32)
COLOR UR: ABNORMAL
CREAT SERPL-MCNC: 1.29 MG/DL (ref 0.6–1.3)
EOSINOPHIL # BLD AUTO: 0.28 THOUSAND/ΜL (ref 0–0.61)
EOSINOPHIL NFR BLD AUTO: 3 % (ref 0–6)
ERYTHROCYTE [DISTWIDTH] IN BLOOD BY AUTOMATED COUNT: 15.3 % (ref 11.6–15.1)
GFR SERPL CREATININE-BSD FRML MDRD: 60 ML/MIN/1.73SQ M
GLUCOSE SERPL-MCNC: 95 MG/DL (ref 65–140)
GLUCOSE UR STRIP-MCNC: NEGATIVE MG/DL
HCT VFR BLD AUTO: 39.2 % (ref 36.5–49.3)
HGB BLD-MCNC: 12.7 G/DL (ref 12–17)
HGB UR QL STRIP.AUTO: ABNORMAL
IMM GRANULOCYTES # BLD AUTO: 0.04 THOUSAND/UL (ref 0–0.2)
IMM GRANULOCYTES NFR BLD AUTO: 0 % (ref 0–2)
KETONES UR STRIP-MCNC: NEGATIVE MG/DL
LEUKOCYTE ESTERASE UR QL STRIP: NEGATIVE
LYMPHOCYTES # BLD AUTO: 2.54 THOUSANDS/ΜL (ref 0.6–4.47)
LYMPHOCYTES NFR BLD AUTO: 26 % (ref 14–44)
MCH RBC QN AUTO: 24.3 PG (ref 26.8–34.3)
MCHC RBC AUTO-ENTMCNC: 32.4 G/DL (ref 31.4–37.4)
MCV RBC AUTO: 75 FL (ref 82–98)
MONOCYTES # BLD AUTO: 1.06 THOUSAND/ΜL (ref 0.17–1.22)
MONOCYTES NFR BLD AUTO: 11 % (ref 4–12)
MUCOUS THREADS UR QL AUTO: ABNORMAL
NEUTROPHILS # BLD AUTO: 5.61 THOUSANDS/ΜL (ref 1.85–7.62)
NEUTS SEG NFR BLD AUTO: 59 % (ref 43–75)
NITRITE UR QL STRIP: NEGATIVE
NON-SQ EPI CELLS URNS QL MICRO: ABNORMAL /HPF
NRBC BLD AUTO-RTO: 0 /100 WBCS
PH UR STRIP.AUTO: 7 [PH]
PLATELET # BLD AUTO: 381 THOUSANDS/UL (ref 149–390)
PMV BLD AUTO: 10.1 FL (ref 8.9–12.7)
POTASSIUM SERPL-SCNC: 4.2 MMOL/L (ref 3.5–5.3)
PROT SERPL-MCNC: 7.9 G/DL (ref 6.4–8.2)
PROT UR STRIP-MCNC: ABNORMAL MG/DL
RBC # BLD AUTO: 5.22 MILLION/UL (ref 3.88–5.62)
RBC #/AREA URNS AUTO: ABNORMAL /HPF
SODIUM SERPL-SCNC: 138 MMOL/L (ref 136–145)
SP GR UR STRIP.AUTO: 1.02 (ref 1–1.03)
UROBILINOGEN UR STRIP-ACNC: <2 MG/DL
WBC # BLD AUTO: 9.63 THOUSAND/UL (ref 4.31–10.16)
WBC #/AREA URNS AUTO: ABNORMAL /HPF

## 2022-03-30 PROCEDURE — 96375 TX/PRO/DX INJ NEW DRUG ADDON: CPT

## 2022-03-30 PROCEDURE — 96361 HYDRATE IV INFUSION ADD-ON: CPT

## 2022-03-30 PROCEDURE — 99223 1ST HOSP IP/OBS HIGH 75: CPT | Performed by: INTERNAL MEDICINE

## 2022-03-30 PROCEDURE — 81001 URINALYSIS AUTO W/SCOPE: CPT | Performed by: EMERGENCY MEDICINE

## 2022-03-30 PROCEDURE — 80053 COMPREHEN METABOLIC PANEL: CPT | Performed by: EMERGENCY MEDICINE

## 2022-03-30 PROCEDURE — 36415 COLL VENOUS BLD VENIPUNCTURE: CPT | Performed by: EMERGENCY MEDICINE

## 2022-03-30 PROCEDURE — 99285 EMERGENCY DEPT VISIT HI MDM: CPT | Performed by: EMERGENCY MEDICINE

## 2022-03-30 PROCEDURE — 85025 COMPLETE CBC W/AUTO DIFF WBC: CPT | Performed by: EMERGENCY MEDICINE

## 2022-03-30 PROCEDURE — 96374 THER/PROPH/DIAG INJ IV PUSH: CPT

## 2022-03-30 PROCEDURE — 99285 EMERGENCY DEPT VISIT HI MDM: CPT

## 2022-03-30 RX ORDER — LISINOPRIL 10 MG/1
10 TABLET ORAL DAILY
Status: DISCONTINUED | OUTPATIENT
Start: 2022-03-31 | End: 2022-03-31 | Stop reason: HOSPADM

## 2022-03-30 RX ORDER — SODIUM CHLORIDE 9 MG/ML
125 INJECTION, SOLUTION INTRAVENOUS CONTINUOUS
Status: DISCONTINUED | OUTPATIENT
Start: 2022-03-30 | End: 2022-03-31 | Stop reason: HOSPADM

## 2022-03-30 RX ORDER — KETOROLAC TROMETHAMINE 30 MG/ML
15 INJECTION, SOLUTION INTRAMUSCULAR; INTRAVENOUS ONCE
Status: COMPLETED | OUTPATIENT
Start: 2022-03-30 | End: 2022-03-30

## 2022-03-30 RX ORDER — VITAMIN B COMPLEX
1 CAPSULE ORAL DAILY
Status: DISCONTINUED | OUTPATIENT
Start: 2022-03-31 | End: 2022-03-31

## 2022-03-30 RX ORDER — TAMSULOSIN HYDROCHLORIDE 0.4 MG/1
0.4 CAPSULE ORAL
Status: DISCONTINUED | OUTPATIENT
Start: 2022-03-31 | End: 2022-03-31 | Stop reason: HOSPADM

## 2022-03-30 RX ORDER — OXYCODONE HYDROCHLORIDE 10 MG/1
10 TABLET ORAL EVERY 4 HOURS PRN
Status: DISCONTINUED | OUTPATIENT
Start: 2022-03-30 | End: 2022-03-31 | Stop reason: HOSPADM

## 2022-03-30 RX ORDER — LORATADINE 10 MG/1
10 TABLET ORAL DAILY
Status: DISCONTINUED | OUTPATIENT
Start: 2022-03-31 | End: 2022-03-31 | Stop reason: HOSPADM

## 2022-03-30 RX ORDER — MAGNESIUM HYDROXIDE/ALUMINUM HYDROXICE/SIMETHICONE 120; 1200; 1200 MG/30ML; MG/30ML; MG/30ML
30 SUSPENSION ORAL EVERY 6 HOURS PRN
Status: DISCONTINUED | OUTPATIENT
Start: 2022-03-30 | End: 2022-03-31 | Stop reason: HOSPADM

## 2022-03-30 RX ORDER — FENTANYL CITRATE 50 UG/ML
100 INJECTION, SOLUTION INTRAMUSCULAR; INTRAVENOUS ONCE
Status: COMPLETED | OUTPATIENT
Start: 2022-03-30 | End: 2022-03-30

## 2022-03-30 RX ORDER — HYDROMORPHONE HCL/PF 1 MG/ML
0.5 SYRINGE (ML) INJECTION ONCE
Status: COMPLETED | OUTPATIENT
Start: 2022-03-30 | End: 2022-03-30

## 2022-03-30 RX ORDER — FENTANYL CITRATE 50 UG/ML
50 INJECTION, SOLUTION INTRAMUSCULAR; INTRAVENOUS EVERY 2 HOUR PRN
Status: DISCONTINUED | OUTPATIENT
Start: 2022-03-30 | End: 2022-03-31 | Stop reason: HOSPADM

## 2022-03-30 RX ORDER — OXYCODONE HCL 5 MG/5 ML
5 SOLUTION, ORAL ORAL EVERY 4 HOURS PRN
Status: DISCONTINUED | OUTPATIENT
Start: 2022-03-30 | End: 2022-03-31 | Stop reason: HOSPADM

## 2022-03-30 RX ORDER — LORAZEPAM 0.5 MG/1
0.5 TABLET ORAL DAILY PRN
Status: DISCONTINUED | OUTPATIENT
Start: 2022-03-30 | End: 2022-03-31 | Stop reason: HOSPADM

## 2022-03-30 RX ORDER — ACETAMINOPHEN 325 MG/1
650 TABLET ORAL EVERY 6 HOURS PRN
Status: DISCONTINUED | OUTPATIENT
Start: 2022-03-30 | End: 2022-03-31 | Stop reason: HOSPADM

## 2022-03-30 RX ORDER — DOCUSATE SODIUM 100 MG/1
100 CAPSULE, LIQUID FILLED ORAL 2 TIMES DAILY PRN
Status: DISCONTINUED | OUTPATIENT
Start: 2022-03-30 | End: 2022-03-31 | Stop reason: HOSPADM

## 2022-03-30 RX ORDER — FENOFIBRATE 145 MG/1
145 TABLET, COATED ORAL DAILY
Status: DISCONTINUED | OUTPATIENT
Start: 2022-03-31 | End: 2022-03-31 | Stop reason: HOSPADM

## 2022-03-30 RX ORDER — ONDANSETRON 2 MG/ML
4 INJECTION INTRAMUSCULAR; INTRAVENOUS EVERY 4 HOURS PRN
Status: DISCONTINUED | OUTPATIENT
Start: 2022-03-30 | End: 2022-03-31 | Stop reason: HOSPADM

## 2022-03-30 RX ORDER — POTASSIUM CITRATE 10 MEQ/1
10 TABLET, EXTENDED RELEASE ORAL
Status: DISCONTINUED | OUTPATIENT
Start: 2022-03-31 | End: 2022-03-31 | Stop reason: HOSPADM

## 2022-03-30 RX ORDER — PANTOPRAZOLE SODIUM 40 MG/1
40 TABLET, DELAYED RELEASE ORAL
Status: DISCONTINUED | OUTPATIENT
Start: 2022-03-31 | End: 2022-03-31 | Stop reason: HOSPADM

## 2022-03-30 RX ORDER — MELATONIN
1000 DAILY
Status: DISCONTINUED | OUTPATIENT
Start: 2022-03-31 | End: 2022-03-31 | Stop reason: HOSPADM

## 2022-03-30 RX ORDER — CHOLECALCIFEROL (VITAMIN D3) 125 MCG
100 CAPSULE ORAL DAILY
Status: DISCONTINUED | OUTPATIENT
Start: 2022-03-31 | End: 2022-03-31 | Stop reason: HOSPADM

## 2022-03-30 RX ADMIN — FENTANYL CITRATE 100 MCG: 50 INJECTION, SOLUTION INTRAMUSCULAR; INTRAVENOUS at 22:48

## 2022-03-30 RX ADMIN — SODIUM CHLORIDE 1000 ML: 0.9 INJECTION, SOLUTION INTRAVENOUS at 21:13

## 2022-03-30 RX ADMIN — HYDROMORPHONE HYDROCHLORIDE 0.5 MG: 1 INJECTION, SOLUTION INTRAMUSCULAR; INTRAVENOUS; SUBCUTANEOUS at 21:13

## 2022-03-30 RX ADMIN — KETOROLAC TROMETHAMINE 15 MG: 30 INJECTION, SOLUTION INTRAMUSCULAR at 21:12

## 2022-03-31 ENCOUNTER — ANESTHESIA EVENT (INPATIENT)
Dept: PERIOP | Facility: HOSPITAL | Age: 62
DRG: 661 | End: 2022-03-31
Payer: COMMERCIAL

## 2022-03-31 ENCOUNTER — ANESTHESIA (INPATIENT)
Dept: PERIOP | Facility: HOSPITAL | Age: 62
DRG: 661 | End: 2022-03-31
Payer: COMMERCIAL

## 2022-03-31 ENCOUNTER — APPOINTMENT (INPATIENT)
Dept: RADIOLOGY | Facility: HOSPITAL | Age: 62
DRG: 661 | End: 2022-03-31
Payer: COMMERCIAL

## 2022-03-31 ENCOUNTER — TELEPHONE (OUTPATIENT)
Dept: UROLOGY | Facility: MEDICAL CENTER | Age: 62
End: 2022-03-31

## 2022-03-31 VITALS
HEART RATE: 74 BPM | OXYGEN SATURATION: 98 % | SYSTOLIC BLOOD PRESSURE: 125 MMHG | TEMPERATURE: 98.2 F | DIASTOLIC BLOOD PRESSURE: 74 MMHG | RESPIRATION RATE: 17 BRPM

## 2022-03-31 DIAGNOSIS — N20.1 RIGHT URETERAL STONE: ICD-10-CM

## 2022-03-31 DIAGNOSIS — N20.0 NEPHROLITHIASIS: Primary | ICD-10-CM

## 2022-03-31 PROBLEM — E66.9 OBESITY (BMI 30.0-34.9): Chronic | Status: RESOLVED | Noted: 2020-08-06 | Resolved: 2022-03-31

## 2022-03-31 PROBLEM — N13.30 HYDRONEPHROSIS, RIGHT: Status: ACTIVE | Noted: 2022-03-31

## 2022-03-31 PROBLEM — L98.9 SKIN LESION: Status: RESOLVED | Noted: 2019-10-27 | Resolved: 2022-03-31

## 2022-03-31 PROBLEM — N18.9 CKD (CHRONIC KIDNEY DISEASE): Status: ACTIVE | Noted: 2022-03-31

## 2022-03-31 PROBLEM — E66.811 CLASS 1 OBESITY DUE TO EXCESS CALORIES WITHOUT SERIOUS COMORBIDITY WITH BODY MASS INDEX (BMI) OF 30.0 TO 30.9 IN ADULT: Status: RESOLVED | Noted: 2019-03-19 | Resolved: 2022-03-31

## 2022-03-31 PROBLEM — Z00.00 WELLNESS EXAMINATION: Status: RESOLVED | Noted: 2021-06-30 | Resolved: 2022-03-31

## 2022-03-31 PROBLEM — Z23 NEED FOR INFLUENZA VACCINATION: Status: RESOLVED | Noted: 2019-10-27 | Resolved: 2022-03-31

## 2022-03-31 PROBLEM — E66.09 CLASS 1 OBESITY DUE TO EXCESS CALORIES WITHOUT SERIOUS COMORBIDITY WITH BODY MASS INDEX (BMI) OF 30.0 TO 30.9 IN ADULT: Status: RESOLVED | Noted: 2019-03-19 | Resolved: 2022-03-31

## 2022-03-31 PROBLEM — E66.811 OBESITY (BMI 30.0-34.9): Chronic | Status: RESOLVED | Noted: 2020-08-06 | Resolved: 2022-03-31

## 2022-03-31 LAB
ALBUMIN SERPL BCP-MCNC: 3.2 G/DL (ref 3.5–5)
ALP SERPL-CCNC: 33 U/L (ref 46–116)
ALT SERPL W P-5'-P-CCNC: 22 U/L (ref 12–78)
ANION GAP SERPL CALCULATED.3IONS-SCNC: 6 MMOL/L (ref 4–13)
AST SERPL W P-5'-P-CCNC: 17 U/L (ref 5–45)
BASOPHILS # BLD AUTO: 0.07 THOUSANDS/ΜL (ref 0–0.1)
BASOPHILS NFR BLD AUTO: 1 % (ref 0–1)
BILIRUB SERPL-MCNC: 0.48 MG/DL (ref 0.2–1)
BUN SERPL-MCNC: 20 MG/DL (ref 5–25)
CALCIUM ALBUM COR SERPL-MCNC: 9.7 MG/DL (ref 8.3–10.1)
CALCIUM SERPL-MCNC: 9.1 MG/DL (ref 8.3–10.1)
CHLORIDE SERPL-SCNC: 109 MMOL/L (ref 100–108)
CO2 SERPL-SCNC: 23 MMOL/L (ref 21–32)
CREAT SERPL-MCNC: 1.22 MG/DL (ref 0.6–1.3)
EOSINOPHIL # BLD AUTO: 0.34 THOUSAND/ΜL (ref 0–0.61)
EOSINOPHIL NFR BLD AUTO: 4 % (ref 0–6)
ERYTHROCYTE [DISTWIDTH] IN BLOOD BY AUTOMATED COUNT: 15.3 % (ref 11.6–15.1)
FLUAV RNA RESP QL NAA+PROBE: NEGATIVE
FLUBV RNA RESP QL NAA+PROBE: NEGATIVE
GFR SERPL CREATININE-BSD FRML MDRD: 64 ML/MIN/1.73SQ M
GLUCOSE SERPL-MCNC: 94 MG/DL (ref 65–140)
HCT VFR BLD AUTO: 37.2 % (ref 36.5–49.3)
HGB BLD-MCNC: 11.1 G/DL (ref 12–17)
IMM GRANULOCYTES # BLD AUTO: 0.04 THOUSAND/UL (ref 0–0.2)
IMM GRANULOCYTES NFR BLD AUTO: 1 % (ref 0–2)
LYMPHOCYTES # BLD AUTO: 2.62 THOUSANDS/ΜL (ref 0.6–4.47)
LYMPHOCYTES NFR BLD AUTO: 34 % (ref 14–44)
MAGNESIUM SERPL-MCNC: 1.8 MG/DL (ref 1.6–2.6)
MCH RBC QN AUTO: 24.1 PG (ref 26.8–34.3)
MCHC RBC AUTO-ENTMCNC: 29.8 G/DL (ref 31.4–37.4)
MCV RBC AUTO: 81 FL (ref 82–98)
MONOCYTES # BLD AUTO: 0.83 THOUSAND/ΜL (ref 0.17–1.22)
MONOCYTES NFR BLD AUTO: 11 % (ref 4–12)
NEUTROPHILS # BLD AUTO: 3.79 THOUSANDS/ΜL (ref 1.85–7.62)
NEUTS SEG NFR BLD AUTO: 49 % (ref 43–75)
NRBC BLD AUTO-RTO: 0 /100 WBCS
PLATELET # BLD AUTO: 336 THOUSANDS/UL (ref 149–390)
PMV BLD AUTO: 10.2 FL (ref 8.9–12.7)
POTASSIUM SERPL-SCNC: 4.2 MMOL/L (ref 3.5–5.3)
PROT SERPL-MCNC: 6.5 G/DL (ref 6.4–8.2)
RBC # BLD AUTO: 4.6 MILLION/UL (ref 3.88–5.62)
RSV RNA RESP QL NAA+PROBE: NEGATIVE
SARS-COV-2 RNA RESP QL NAA+PROBE: NEGATIVE
SODIUM SERPL-SCNC: 138 MMOL/L (ref 136–145)
WBC # BLD AUTO: 7.69 THOUSAND/UL (ref 4.31–10.16)

## 2022-03-31 PROCEDURE — C1769 GUIDE WIRE: HCPCS | Performed by: UROLOGY

## 2022-03-31 PROCEDURE — 74420 UROGRAPHY RTRGR +-KUB: CPT

## 2022-03-31 PROCEDURE — 99223 1ST HOSP IP/OBS HIGH 75: CPT | Performed by: UROLOGY

## 2022-03-31 PROCEDURE — 82360 CALCULUS ASSAY QUANT: CPT | Performed by: UROLOGY

## 2022-03-31 PROCEDURE — 85025 COMPLETE CBC W/AUTO DIFF WBC: CPT | Performed by: INTERNAL MEDICINE

## 2022-03-31 PROCEDURE — 36415 COLL VENOUS BLD VENIPUNCTURE: CPT | Performed by: INTERNAL MEDICINE

## 2022-03-31 PROCEDURE — C1758 CATHETER, URETERAL: HCPCS | Performed by: UROLOGY

## 2022-03-31 PROCEDURE — NC001 PR NO CHARGE: Performed by: PHYSICIAN ASSISTANT

## 2022-03-31 PROCEDURE — 52356 CYSTO/URETERO W/LITHOTRIPSY: CPT | Performed by: UROLOGY

## 2022-03-31 PROCEDURE — 0241U HB NFCT DS VIR RESP RNA 4 TRGT: CPT

## 2022-03-31 PROCEDURE — 99239 HOSP IP/OBS DSCHRG MGMT >30: CPT | Performed by: PHYSICIAN ASSISTANT

## 2022-03-31 PROCEDURE — 83735 ASSAY OF MAGNESIUM: CPT | Performed by: INTERNAL MEDICINE

## 2022-03-31 PROCEDURE — 80053 COMPREHEN METABOLIC PANEL: CPT | Performed by: INTERNAL MEDICINE

## 2022-03-31 PROCEDURE — 0T768DZ DILATION OF RIGHT URETER WITH INTRALUMINAL DEVICE, VIA NATURAL OR ARTIFICIAL OPENING ENDOSCOPIC: ICD-10-PCS | Performed by: INTERNAL MEDICINE

## 2022-03-31 PROCEDURE — C2617 STENT, NON-COR, TEM W/O DEL: HCPCS | Performed by: UROLOGY

## 2022-03-31 RX ORDER — SODIUM CHLORIDE, SODIUM LACTATE, POTASSIUM CHLORIDE, CALCIUM CHLORIDE 600; 310; 30; 20 MG/100ML; MG/100ML; MG/100ML; MG/100ML
INJECTION, SOLUTION INTRAVENOUS CONTINUOUS PRN
Status: DISCONTINUED | OUTPATIENT
Start: 2022-03-31 | End: 2022-03-31

## 2022-03-31 RX ORDER — KETOROLAC TROMETHAMINE 30 MG/ML
15 INJECTION, SOLUTION INTRAMUSCULAR; INTRAVENOUS EVERY 6 HOURS SCHEDULED
Status: DISCONTINUED | OUTPATIENT
Start: 2022-03-31 | End: 2022-03-31 | Stop reason: HOSPADM

## 2022-03-31 RX ORDER — GLYCOPYRROLATE 0.2 MG/ML
INJECTION INTRAMUSCULAR; INTRAVENOUS AS NEEDED
Status: DISCONTINUED | OUTPATIENT
Start: 2022-03-31 | End: 2022-03-31

## 2022-03-31 RX ORDER — MAGNESIUM HYDROXIDE 1200 MG/15ML
LIQUID ORAL AS NEEDED
Status: DISCONTINUED | OUTPATIENT
Start: 2022-03-31 | End: 2022-03-31 | Stop reason: HOSPADM

## 2022-03-31 RX ORDER — ONDANSETRON 2 MG/ML
4 INJECTION INTRAMUSCULAR; INTRAVENOUS ONCE AS NEEDED
Status: DISCONTINUED | OUTPATIENT
Start: 2022-03-31 | End: 2022-03-31 | Stop reason: HOSPADM

## 2022-03-31 RX ORDER — PHENAZOPYRIDINE HYDROCHLORIDE 100 MG/1
100 TABLET, FILM COATED ORAL 3 TIMES DAILY PRN
Qty: 6 TABLET | Refills: 0 | Status: SHIPPED | OUTPATIENT
Start: 2022-03-31 | End: 2022-03-31 | Stop reason: SDUPTHER

## 2022-03-31 RX ORDER — CEPHALEXIN 500 MG/1
500 CAPSULE ORAL EVERY 12 HOURS SCHEDULED
Qty: 10 CAPSULE | Refills: 0 | Status: SHIPPED | OUTPATIENT
Start: 2022-03-31 | End: 2022-03-31 | Stop reason: SDUPTHER

## 2022-03-31 RX ORDER — ONDANSETRON 2 MG/ML
INJECTION INTRAMUSCULAR; INTRAVENOUS AS NEEDED
Status: DISCONTINUED | OUTPATIENT
Start: 2022-03-31 | End: 2022-03-31

## 2022-03-31 RX ORDER — TAMSULOSIN HYDROCHLORIDE 0.4 MG/1
0.4 CAPSULE ORAL
Status: DISCONTINUED | OUTPATIENT
Start: 2022-03-31 | End: 2022-03-31

## 2022-03-31 RX ORDER — OXYCODONE HYDROCHLORIDE 5 MG/1
5 TABLET ORAL EVERY 8 HOURS PRN
Qty: 5 TABLET | Refills: 0 | Status: SHIPPED | OUTPATIENT
Start: 2022-03-31 | End: 2022-04-02

## 2022-03-31 RX ORDER — FENTANYL CITRATE/PF 50 MCG/ML
25 SYRINGE (ML) INJECTION
Status: COMPLETED | OUTPATIENT
Start: 2022-03-31 | End: 2022-03-31

## 2022-03-31 RX ORDER — KETOROLAC TROMETHAMINE 30 MG/ML
INJECTION, SOLUTION INTRAMUSCULAR; INTRAVENOUS AS NEEDED
Status: DISCONTINUED | OUTPATIENT
Start: 2022-03-31 | End: 2022-03-31

## 2022-03-31 RX ORDER — LIDOCAINE HYDROCHLORIDE 10 MG/ML
INJECTION, SOLUTION EPIDURAL; INFILTRATION; INTRACAUDAL; PERINEURAL AS NEEDED
Status: DISCONTINUED | OUTPATIENT
Start: 2022-03-31 | End: 2022-03-31

## 2022-03-31 RX ORDER — PROPOFOL 10 MG/ML
INJECTION, EMULSION INTRAVENOUS AS NEEDED
Status: DISCONTINUED | OUTPATIENT
Start: 2022-03-31 | End: 2022-03-31

## 2022-03-31 RX ORDER — DEXAMETHASONE SODIUM PHOSPHATE 10 MG/ML
INJECTION, SOLUTION INTRAMUSCULAR; INTRAVENOUS AS NEEDED
Status: DISCONTINUED | OUTPATIENT
Start: 2022-03-31 | End: 2022-03-31

## 2022-03-31 RX ORDER — CEPHALEXIN 500 MG/1
500 CAPSULE ORAL EVERY 12 HOURS SCHEDULED
Qty: 10 CAPSULE | Refills: 0 | Status: SHIPPED | OUTPATIENT
Start: 2022-03-31 | End: 2022-04-05

## 2022-03-31 RX ORDER — OXYBUTYNIN CHLORIDE 5 MG/1
5 TABLET, EXTENDED RELEASE ORAL DAILY
Qty: 5 TABLET | Refills: 0 | Status: SHIPPED | OUTPATIENT
Start: 2022-03-31 | End: 2022-04-05 | Stop reason: SDUPTHER

## 2022-03-31 RX ORDER — HYDROMORPHONE HCL IN WATER/PF 6 MG/30 ML
0.2 PATIENT CONTROLLED ANALGESIA SYRINGE INTRAVENOUS
Status: DISCONTINUED | OUTPATIENT
Start: 2022-03-31 | End: 2022-03-31 | Stop reason: HOSPADM

## 2022-03-31 RX ORDER — MIDAZOLAM HYDROCHLORIDE 2 MG/2ML
INJECTION, SOLUTION INTRAMUSCULAR; INTRAVENOUS AS NEEDED
Status: DISCONTINUED | OUTPATIENT
Start: 2022-03-31 | End: 2022-03-31

## 2022-03-31 RX ORDER — FENTANYL CITRATE 50 UG/ML
INJECTION, SOLUTION INTRAMUSCULAR; INTRAVENOUS AS NEEDED
Status: DISCONTINUED | OUTPATIENT
Start: 2022-03-31 | End: 2022-03-31

## 2022-03-31 RX ORDER — PHENAZOPYRIDINE HYDROCHLORIDE 100 MG/1
100 TABLET, FILM COATED ORAL 3 TIMES DAILY PRN
Qty: 6 TABLET | Refills: 0 | Status: SHIPPED | OUTPATIENT
Start: 2022-03-31 | End: 2022-04-02

## 2022-03-31 RX ORDER — CEFAZOLIN SODIUM 1 G/3ML
INJECTION, POWDER, FOR SOLUTION INTRAMUSCULAR; INTRAVENOUS AS NEEDED
Status: DISCONTINUED | OUTPATIENT
Start: 2022-03-31 | End: 2022-03-31

## 2022-03-31 RX ORDER — OXYBUTYNIN CHLORIDE 5 MG/1
5 TABLET, EXTENDED RELEASE ORAL DAILY
Qty: 5 TABLET | Refills: 0 | Status: SHIPPED | OUTPATIENT
Start: 2022-03-31 | End: 2022-03-31 | Stop reason: SDUPTHER

## 2022-03-31 RX ORDER — CEFAZOLIN SODIUM 1 G/50ML
1000 SOLUTION INTRAVENOUS EVERY 8 HOURS
Status: DISCONTINUED | OUTPATIENT
Start: 2022-03-31 | End: 2022-03-31 | Stop reason: HOSPADM

## 2022-03-31 RX ADMIN — OXYCODONE HYDROCHLORIDE 10 MG: 10 TABLET ORAL at 01:03

## 2022-03-31 RX ADMIN — SODIUM CHLORIDE 125 ML/HR: 0.9 INJECTION, SOLUTION INTRAVENOUS at 01:03

## 2022-03-31 RX ADMIN — ONDANSETRON 4 MG: 2 INJECTION INTRAMUSCULAR; INTRAVENOUS at 06:56

## 2022-03-31 RX ADMIN — FENTANYL CITRATE 50 MCG: 50 INJECTION, SOLUTION INTRAMUSCULAR; INTRAVENOUS at 03:43

## 2022-03-31 RX ADMIN — FENTANYL CITRATE 25 MCG: 50 INJECTION INTRAMUSCULAR; INTRAVENOUS at 12:24

## 2022-03-31 RX ADMIN — KETOROLAC TROMETHAMINE 15 MG: 30 INJECTION, SOLUTION INTRAMUSCULAR at 16:31

## 2022-03-31 RX ADMIN — LISINOPRIL 10 MG: 10 TABLET ORAL at 08:46

## 2022-03-31 RX ADMIN — Medication 100 MCG: at 08:47

## 2022-03-31 RX ADMIN — B-COMPLEX W/ C & FOLIC ACID TAB 1 TABLET: TAB at 08:47

## 2022-03-31 RX ADMIN — SODIUM CHLORIDE, SODIUM LACTATE, POTASSIUM CHLORIDE, AND CALCIUM CHLORIDE: .6; .31; .03; .02 INJECTION, SOLUTION INTRAVENOUS at 12:15

## 2022-03-31 RX ADMIN — SODIUM CHLORIDE 125 ML/HR: 0.9 INJECTION, SOLUTION INTRAVENOUS at 14:23

## 2022-03-31 RX ADMIN — OXYCODONE HYDROCHLORIDE 10 MG: 10 TABLET ORAL at 07:09

## 2022-03-31 RX ADMIN — FENTANYL CITRATE 25 MCG: 50 INJECTION INTRAMUSCULAR; INTRAVENOUS at 12:37

## 2022-03-31 RX ADMIN — Medication 25 MCG: at 14:25

## 2022-03-31 RX ADMIN — Medication 1000 UNITS: at 08:46

## 2022-03-31 RX ADMIN — CEFAZOLIN 2000 MG: 1 INJECTION, POWDER, FOR SOLUTION INTRAMUSCULAR; INTRAVENOUS at 12:21

## 2022-03-31 RX ADMIN — Medication 25 MCG: at 14:04

## 2022-03-31 RX ADMIN — FENTANYL CITRATE 25 MCG: 50 INJECTION INTRAMUSCULAR; INTRAVENOUS at 12:50

## 2022-03-31 RX ADMIN — PROPOFOL 200 MG: 10 INJECTION, EMULSION INTRAVENOUS at 12:19

## 2022-03-31 RX ADMIN — OXYCODONE HYDROCHLORIDE 5 MG: 5 SOLUTION ORAL at 18:41

## 2022-03-31 RX ADMIN — FENTANYL CITRATE 25 MCG: 50 INJECTION INTRAMUSCULAR; INTRAVENOUS at 13:06

## 2022-03-31 RX ADMIN — MIDAZOLAM 2 MG: 1 INJECTION INTRAMUSCULAR; INTRAVENOUS at 12:15

## 2022-03-31 RX ADMIN — FENOFIBRATE 145 MG: 145 TABLET, FILM COATED ORAL at 08:47

## 2022-03-31 RX ADMIN — Medication 25 MCG: at 14:20

## 2022-03-31 RX ADMIN — PANTOPRAZOLE SODIUM 40 MG: 40 TABLET, DELAYED RELEASE ORAL at 06:28

## 2022-03-31 RX ADMIN — KETOROLAC TROMETHAMINE 15 MG: 30 INJECTION, SOLUTION INTRAMUSCULAR at 13:42

## 2022-03-31 RX ADMIN — GLYCOPYRROLATE 0.1 MG: 0.2 INJECTION, SOLUTION INTRAMUSCULAR; INTRAVENOUS at 12:15

## 2022-03-31 RX ADMIN — FENTANYL CITRATE 50 MCG: 50 INJECTION, SOLUTION INTRAMUSCULAR; INTRAVENOUS at 08:47

## 2022-03-31 RX ADMIN — Medication 25 MCG: at 14:11

## 2022-03-31 RX ADMIN — Medication 100 MG: at 08:47

## 2022-03-31 RX ADMIN — DEXAMETHASONE SODIUM PHOSPHATE 10 MG: 10 INJECTION, SOLUTION INTRAMUSCULAR; INTRAVENOUS at 13:07

## 2022-03-31 RX ADMIN — LIDOCAINE HYDROCHLORIDE 100 MG: 10 INJECTION, SOLUTION EPIDURAL; INFILTRATION; INTRACAUDAL; PERINEURAL at 12:19

## 2022-03-31 RX ADMIN — LORATADINE 10 MG: 10 TABLET ORAL at 08:46

## 2022-03-31 RX ADMIN — ONDANSETRON 4 MG: 2 INJECTION INTRAMUSCULAR; INTRAVENOUS at 12:15

## 2022-03-31 RX ADMIN — TAMSULOSIN HYDROCHLORIDE 0.4 MG: 0.4 CAPSULE ORAL at 16:31

## 2022-03-31 NOTE — CONSULTS
Consults: UROLOGY  Gage Iverson 63 y o  male [id number]   Unit/Bed #: ED 24  Encounter: [id number]        Assessment  & Plan  :    Nephrolithiasis:  -CT scan revealed 7 mm calculus at right proximal ureter on 03/17, originally scheduled for definitive ureteroscopy on 04/15  -patient presenting to the ER due to significant increase in pain  -patient NPO  -creatinine preserved at 1 22  -no leukocytosis  -UA feeling innumerable RBCs, 2-4 WBCs, no bacteria seen   -plan for cystoscopy ureteroscopy holmium laser lithotripsy basket extraction with ureteral stent placement on the right   -benefits and risks of procedure explained at bedside   -patient agreeable to plan  Proceed to OR    Subjective :     Gage Iverson  is a 63 y o  male well known to our practice for nephrolithiasis has undergone multiple surgical procedures for ureteral stones  He had a CT scan on 03/17/2022 which revealed a 7 mm calculus at the right proximal ureter  With mild hydronephrosis  Id and multiple bilateral intrarenal calculi  He was scheduled for definitive ureteroscopy on 04/15 unfortunately patient continued to experience worsening pain which resulted in him presenting to the emergency room  Patient currently reports pain is well controlled with oral medications  Denies any nausea or vomiting  Denies any fevers or chills  Patient denies any current questions or concerns regarding surgical procedures as he has undergone surgical intervention for ureteral stones in the past   He is requesting that if he has a ureteral stent that this should be without a string due to discomfort with prior stents          Allergies   Allergen Reactions    Ciprofloxacin Hives    Macrobid [Nitrofurantoin] Nausea Only and Palpitations      Current Outpatient Medications   Medication Instructions    b complex vitamins capsule 1 capsule, Oral, Daily    Cholecalciferol (VITAMIN D3) 1000 units CAPS 1 capsule, Oral, Daily    coenzyme Q-10 100 MG capsule 1 capsule, Oral, Daily    cyanocobalamin (VITAMIN B-12) 100 mcg tablet Oral, Daily    fenofibrate (TRICOR) 145 mg tablet TAKE 1 TABLET DAILY    fexofenadine (ALLEGRA) 180 mg, Oral, Daily (early morning)    FLUCELVAX QUADRIVALENT TO BE ADMINISTERED BY PHARMACIST FOR IMMUNIZATION    ibuprofen (MOTRIN) 600 mg, Oral, Every 6 hours PRN    lisinopril (ZESTRIL) 10 mg tablet TAKE 1 AND 1/2 TABLETS(15  MG TOTAL) DAILY    LORazepam (ATIVAN) 0 5 mg tablet TAKE 1 TABLET DAILY AS     NEEDED FOR ANXIETY    multivitamin (THERAGRAN) TABS 1 tablet, Oral, Daily    omeprazole (PRILOSEC) 40 mg, Oral, Daily (early morning)    potassium citrate (Urocit-K 10) 10 mEq 10 mEq, Oral, 3 times daily with meals    sildenafil (VIAGRA) 50 MG tablet TAKE 1 TABLET DAILY AS     NEEDED FOR ERECTILE        DYSFUNCTION    SYRINGE-NEEDLE, DISP, 3 ML (BD ECLIPSE SYRINGE) 25G X 1" 3 ML MISC Does not apply, Weekly    tamsulosin (FLOMAX) 0 4 mg, Oral, Daily with dinner    testosterone cypionate (DEPO-TESTOSTERONE) 200 mg/mL SOLN INJECT 0 4ML INTO SHOULDER,THIGH OR BUTTOCKS ONCE     WEEKLY  DISCARD THE        REMAINDER        Past Medical History:   Diagnosis Date    Chronic kidney disease     kidney stones    GERD (gastroesophageal reflux disease)     Hypertension     Kidney stone      Past Surgical History:   Procedure Laterality Date    COLONOSCOPY      ESOPHAGOGASTRODUODENOSCOPY      EXTRACORPOREAL SHOCK WAVE LITHOTRIPSY Bilateral     Renal Multiple times    IA CYSTO/URETERO W/LITHOTRIPSY &INDWELL STENT INSRT Left 8/6/2020    Procedure: CYSTOSCOPY URETEROSCOPY WITH LITHOTRIPSY HOLMIUM LASER, AND INSERTION STENT URETERAL;  Surgeon: Aubrey Curtis MD;  [facility];  Service: Urology    IA EXC SKIN BENIG >4 CM FACE,FACIAL Right 4/28/2016    Procedure: EXCISION  LESION UPPER EYELID, COMPLEX CLOSURE;  Surgeon: Margaret Quinn MD;  Location: [facility];  Service: Plastics    IA FRAGMENT KIDNEY STONE/ ESWL Right 4/18/2019 Procedure: [place] SHOCKWAVE (ESWL); Surgeon: Tyree De La Garza MD;  [facility];  Service: Urology     Family History   Problem Relation Age of Onset    Other Father         CABG (coronary artery bypass surgery)    Atrial fibrillation Sister     Other Paternal Uncle         Myocardial infarction    Coronary artery disease Family     Diabetes Family     Hypertension Family     Uterine cancer Family      Social History     Socioeconomic History    Marital status: /Civil Union     Spouse name: None    Number of children: None    Years of education: None    Highest education level: None   Occupational History    None   Tobacco Use    Smoking status: Former Smoker     Quit date: 1981     Years since quittin 9    Smokeless tobacco: Never Used   Vaping Use    Vaping Use: Never used   Substance and Sexual Activity    Alcohol use: Yes     Alcohol/week: 2 0 standard drinks     Types: 2 Cans of beer per week     Comment: weekend    Drug use: No    Sexual activity: None   Other Topics Concern    None   Social History Narrative    Daily caffeine consumption     Social Determinants of Health     Financial Resource Strain: Not on file   Food Insecurity: Not on file   Transportation Needs: Not on file   Physical Activity: Not on file   Stress: Not on file   Social Connections: Not on file   Intimate Partner Violence: Not on file   Housing Stability: Not on file        Review of Systems   Constitutional: Negative  Negative for chills and fever  HENT: Negative  Eyes: Negative  Respiratory: Negative  Cardiovascular: Negative  Gastrointestinal: Positive for abdominal pain  Negative for nausea and vomiting  Endocrine: Negative  Genitourinary: Positive for flank pain, frequency and urgency  Negative for difficulty urinating  Skin: Negative  Allergic/Immunologic: Negative  Neurological: Negative  Hematological: Negative  Psychiatric/Behavioral: Negative  Objective     Physical Exam  Constitutional:       General: He is not in acute distress  Appearance: He is normal weight  He is not ill-appearing, toxic-appearing or diaphoretic  HENT:      Head: Normocephalic and atraumatic  Right Ear: External ear normal       Left Ear: External ear normal       Nose: Nose normal       Mouth/Throat:      Pharynx: Oropharynx is clear  Eyes:      General: No scleral icterus  Conjunctiva/sclera: Conjunctivae normal    Cardiovascular:      Rate and Rhythm: Normal rate and regular rhythm  Pulses: Normal pulses  Heart sounds: No murmur heard  No friction rub  No gallop  Pulmonary:      Effort: Pulmonary effort is normal  No respiratory distress  Breath sounds: No wheezing, rhonchi or rales  Abdominal:      General: Bowel sounds are normal  There is no distension  Tenderness: There is abdominal tenderness  There is right CVA tenderness  There is no left CVA tenderness  Musculoskeletal:         General: Normal range of motion  Cervical back: Normal range of motion  Skin:     General: Skin is warm and dry  Neurological:      General: No focal deficit present  Mental Status: He is alert and oriented to person, place, and time  Psychiatric:         Mood and Affect: Mood normal          Behavior: Behavior normal          Thought Content: Thought content normal          Judgment: Judgment normal                 Imaging:  CT ABDOMEN AND PELVIS WITHOUT IV CONTRAST - LOW DOSE RENAL STONE      INDICATION:   N20 0: Calculus of kidney      COMPARISON:  CT abdomen pelvis 8/5/2020  Ultrasound kidney bladder 3/4/2022      TECHNIQUE:  Low radiation dose thin section CT examination of the abdomen and pelvis was performed without intravenous or oral contrast according to a protocol specifically designed to evaluate for urinary tract calculus    Axial, sagittal, and coronal 2D   reformatted images were created from the source data and submitted for interpretation  Evaluation for pathology in the abdomen and pelvis that is unrelated to urinary tract calculi is limited        Radiation dose length product (DLP) for this visit:  498 mGy-cm   This examination, like all CT scans performed in the [facility], was performed utilizing techniques to minimize radiation dose exposure, including the use of iterative   reconstruction and automated exposure control      URINARY TRACT FINDINGS:     RIGHT KIDNEY AND URETER:  Mild prominence of the right renal pelvis with a 7 mm calculus at the proximal ureter  No tisha hydronephrosis  Mild hydronephrosis was seen on the right side on recent prior ultrasound  Additional intrarenal calculi clustered   at the midpole measuring up to 5 mm      LEFT KIDNEY AND URETER: There are nonobstructing intrarenal calculi measuring on the order of 9 mm at the midpole  No hydronephrosis or hydroureter      URINARY BLADDER:  Underdistended limiting evaluation  No calculi          ADDITIONAL FINDINGS:     LOWER CHEST:  No clinically significant abnormality identified in the visualized lower chest      SOLID VISCERA: Limited low radiation dose noncontrast CT evaluation demonstrates no clinically significant abnormality of the imaged portions of the liver, spleen, pancreas, or adrenal glands        GALLBLADDER/BILIARY TREE:  No calcified gallstones  No pericholecystic inflammatory change  No biliary dilatation      STOMACH AND BOWEL:  Unremarkable      APPENDIX:  A normal appendix was visualized      ABDOMINOPELVIC CAVITY:  No ascites  No pneumoperitoneum  No lymphadenopathy      REPRODUCTIVE ORGANS:  Mildly prominent prostate      ABDOMINAL WALL/INGUINAL REGIONS:  Small fat containing left inguinal hernia noted      OSSEOUS STRUCTURES:  No acute fracture or destructive osseous lesion   Stable small rounded sclerotic density at the right posterior iliac bone close to the sacroiliac joint, question related to degenerative changes  This has been stable since at least   10/11/2019  Scattered degenerative spurring of the visualized spine      IMPRESSION:     1  There is now a 7 mm calculus at the right proximal ureter with only mild prominence of the renal pelvis and without tisha hydronephrosis  Mild hydronephrosis was seen on the right side on recent ultrasound    2  Multiple bilateral intrarenal calculi        Labs:  Lab Results   Component Value Date    SODIUM 138 03/31/2022    K 4 2 03/31/2022     (H) 03/31/2022    CO2 23 03/31/2022    BUN 20 03/31/2022    CREATININE 1 22 03/31/2022    GLUC 94 03/31/2022    CALCIUM 9 1 03/31/2022         Lab Results   Component Value Date    WBC 7 69 03/31/2022    HGB 11 1 (L) 03/31/2022    HCT 37 2 03/31/2022    MCV 81 (L) 03/31/2022     03/31/2022         VTE Pharmacologic Prophylaxis: Enoxaparin (Lovenox)  VTE Mechanical Prophylaxis: sequential compression device     Euell Aase PA-C

## 2022-03-31 NOTE — QUICK NOTE
Toney Gonzalez is a 45-year-old male who underwent successful ureteroscopy and laser lithotripsy of right obstructing ureteral calculus  Stent on string x1 week  No further  intervention indicated during this stay  Discharge per primary Internal Medicine team   Service will contact patient/caregiver with appointment date and time once discharged

## 2022-03-31 NOTE — UTILIZATION REVIEW
Initial Clinical Review    Admission: Date/Time/Statement:   Admission Orders (From admission, onward)     Ordered        03/30/22 2311  Inpatient Admission  Once                      Orders Placed This Encounter   Procedures    Inpatient Admission     Standing Status:   Standing     Number of Occurrences:   1     Order Specific Question:   Level of Care     Answer:   Med Surg [16]     Order Specific Question:   Estimated length of stay     Answer:   More than 2 Midnights     Order Specific Question:   Certification     Answer:   I certify that inpatient services are medically necessary for this patient for a duration of greater than two midnights  See H&P and MD Progress Notes for additional information about the patient's course of treatment  ED Arrival Information     Expected Arrival Acuity    - 3/30/2022 18:51 Urgent         Means of arrival Escorted by Service Admission type    Walk-In Family Member Hospitalist Urgent         Arrival complaint    kidney stone         Chief Complaint   Patient presents with    Flank Pain     patient has kidney stones in both kidneys  has a 7mm stone at right proximal ureter  woke up with increased pain today  has uroscopy scheduled for april 15th, but the pain is worse  prescribed flomax, but has not taken it today  has right flank pain radiating to right lower quadrant  Initial Presentation: 63 y o  male *presented to ED from home as inpatient admission for right ureteral stone  Past medical history significant for bilateral urolithiasis was been complaining of flank pain on the right side  Patient is on Flomax and ibuprofen and so for still right flank pain  CT scan of the abdomen and pelvis on the 17th showed the presence of bilateral urolithiasis with questionable hydronephrosis on the right side  Urology has been consulted and patient would undergo lithotripsy tomorrow morning   Plan NPO IVF IV Toradol RTC     Date: 03-31-22   OR   Nephrolithiasis [N20 0]     Post-Op Diagnosis Codes:     * Right ureteral stone [N20 1]     * Right kidney stone [N20 0]     Procedure(s) (LRB):  CYSTOSCOPY URETEROSCOPY WITH LITHOTRIPSY HOLMIUM LASER,BASKET STONE EXTRACTION, RETROGRADE PYELOGRAM AND INSERTION STENT URETERAL (Right)  General  Operative Findings:  Normal urethra with bilobar prostatic hypertrophy causing complete outflow obstruction  Ureteral orifices normal   Bladder is mildly trabeculated  There are no stones, tumors or diverticula  No definite stone was seen on preliminary fluoroscopic images  On right retrograde pyelography there was a filling defect noted in the upper ureter that did appear to migrate back to the kidney  Rigid ureteroscopy to the UPJ was performed which did not reveal a ureteral stone  I then performed flexible ureteroscopy and identified lied a free-floating stone in the midpole calyx  This was treated with the holmium laser and broken up nicely  Basket was used to remove a representative fragment  Another upper pole calcification was identified and treated with the holmium laser  No other stones were seen in spite of systematic pyeloscopy  Comparison with CT scan was made intraoperatively  A 6 Uzbek onlay stent was used at the conclusion the procedure    The Dangler was taped to the penis for removal in approximately 7 days        ED Triage Vitals [03/30/22 1904]   Temperature Pulse Respirations Blood Pressure SpO2   99 °F (37 2 °C) 83 18 (!) 166/115 97 %      Temp Source Heart Rate Source Patient Position - Orthostatic VS BP Location FiO2 (%)   Oral Monitor Sitting Left arm --      Pain Score       8          Wt Readings from Last 1 Encounters:   11/22/21 97 4 kg (214 lb 12 8 oz)     Additional Vital Signs:   Patient Position - Orthostatic VS             03/31/22 1400 -- 84 18 135/81 107 96 % -- None (Room air) -- --   03/31/22 1346 97 3 °F (36 3 °C) Abnormal  96 20 134/78 -- 95 % -- None (Room air) -- --   03/31/22 1345 97 3 °F (36 3 °C) Abnormal  94 18 134/78 100 99 % 6 L/min Simple mask X --   03/31/22 0909 -- 70 18 158/88 -- 100 % -- None (Room air) -- Lying   03/31/22 0846 -- -- -- 158/88 -- -- -- -- -- --   03/31/22 0632 -- 68 18 155/99 -- 98 % -- None (Room air) -- --   03/31/22 0345 -- 86 18 140/87 108 97 % -- None (Room air) -- Lying       Pertinent Labs/Diagnostic Test Results:   FL retrograde pyelogram    (Results Pending)     Results from last 7 days   Lab Units 03/31/22  0938   SARS-COV-2  Negative     Results from last 7 days   Lab Units 03/31/22 0431 03/30/22 2107   WBC Thousand/uL 7 69 9 63   HEMOGLOBIN g/dL 11 1* 12 7   HEMATOCRIT % 37 2 39 2   PLATELETS Thousands/uL 336 381   NEUTROS ABS Thousands/µL 3 79 5 61         Results from last 7 days   Lab Units 03/31/22 0431 03/30/22 2107   SODIUM mmol/L 138 138   POTASSIUM mmol/L 4 2 4 2   CHLORIDE mmol/L 109* 108   CO2 mmol/L 23 24   ANION GAP mmol/L 6 6   BUN mg/dL 20 21   CREATININE mg/dL 1 22 1 29   EGFR ml/min/1 73sq m 64 60   CALCIUM mg/dL 9 1 10 2*   MAGNESIUM mg/dL 1 8  --      Results from last 7 days   Lab Units 03/31/22 0431 03/30/22  2107   AST U/L 17 25   ALT U/L 22 30   ALK PHOS U/L 33* 47   TOTAL PROTEIN g/dL 6 5 7 9   ALBUMIN g/dL 3 2* 4 0   TOTAL BILIRUBIN mg/dL 0 48 0 23         Results from last 7 days   Lab Units 03/31/22 0431 03/30/22 2107   GLUCOSE RANDOM mg/dL 94 95       Results from last 7 days   Lab Units 03/30/22 2031   CLARITY UA  Clear   COLOR UA  Light Yellow   SPEC GRAV UA  1 019   PH UA  7 0   GLUCOSE UA mg/dl Negative   KETONES UA mg/dl Negative   BLOOD UA  Small*   PROTEIN UA mg/dl Trace*   NITRITE UA  Negative   BILIRUBIN UA  Negative   UROBILINOGEN UA (BE) mg/dl <2 0   LEUKOCYTES UA  Negative   WBC UA /hpf 2-4*   RBC UA /hpf Innumerable*   BACTERIA UA /hpf None Seen   EPITHELIAL CELLS WET PREP /hpf Occasional   MUCUS THREADS  Occasional*     Results from last 7 days   Lab Units 03/31/22  0938   INFLUENZA A PCR  Negative   INFLUENZA B PCR  Negative   RSV PCR  Negative   CT renal stone 03-23-22  1  There is now a 7 mm calculus at the right proximal ureter with only mild prominence of the renal pelvis and without tisha hydronephrosis  Mild hydronephrosis was seen on the right side on recent ultrasound  2  Multiple bilateral intrarenal calculi       ED Treatment:   Medication Administration from 03/30/2022 1851 to 03/31/2022 1140       Date/Time Order Dose Route Action     03/30/2022 2113 HYDROmorphone (DILAUDID) injection 0 5 mg 0 5 mg Intravenous Given     03/30/2022 2112 ketorolac (TORADOL) injection 15 mg 15 mg Intravenous Given     03/30/2022 2113 sodium chloride 0 9 % bolus 1,000 mL 1,000 mL Intravenous New Bag     03/30/2022 2248 fentanyl citrate (PF) 100 MCG/2ML 100 mcg 100 mcg Intravenous Given     03/31/2022 0847 fentanyl citrate (PF) 100 MCG/2ML 50 mcg 50 mcg Intravenous Given     03/31/2022 0343 fentanyl citrate (PF) 100 MCG/2ML 50 mcg 50 mcg Intravenous Given     03/31/2022 0846 cholecalciferol (VITAMIN D3) tablet 1,000 Units 1,000 Units Oral Given     03/31/2022 0847 co-enzyme Q-10 capsule 100 mg 100 mg Oral Given     03/31/2022 0847 cyanocobalamin (VITAMIN B-12) tablet 100 mcg 100 mcg Oral Given     03/31/2022 0847 fenofibrate (TRICOR) tablet 145 mg 145 mg Oral Given     03/31/2022 0846 loratadine (CLARITIN) tablet 10 mg 10 mg Oral Given     03/31/2022 0846 lisinopril (ZESTRIL) tablet 10 mg 10 mg Oral Given     03/31/2022 0847 multivitamin stress formula tablet 1 tablet 1 tablet Oral Given     03/31/2022 0628 pantoprazole (PROTONIX) EC tablet 40 mg 40 mg Oral Given     03/31/2022 0854 potassium citrate (UROCIT-K) CR tablet 10 mEq 10 mEq Oral Not Given     03/31/2022 0103 sodium chloride 0 9 % infusion 125 mL/hr Intravenous New Bag     03/31/2022 0656 ondansetron (ZOFRAN) injection 4 mg 4 mg Intravenous Given     03/31/2022 0910 enoxaparin (LOVENOX) subcutaneous injection 40 mg 40 mg Subcutaneous Not Given     03/31/2022 4910 oxyCODONE (ROXICODONE) immediate release tablet 10 mg 10 mg Oral Given     03/31/2022 0103 oxyCODONE (ROXICODONE) immediate release tablet 10 mg 10 mg Oral Given        Past Medical History:   Diagnosis Date    Chronic kidney disease     kidney stones    GERD (gastroesophageal reflux disease)     Hypertension     Kidney stone      Present on Admission:   Ureteral calculus, right   GERD (gastroesophageal reflux disease)   Essential hypertension   Hypogonadism in male      Admitting Diagnosis: Kidney stones [N20 0]  Nephrolithiasis [N20 0]  Right ureteral stone [N20 1]  Age/Sex: 63 y o  male  Admission Orders:  Scheduled Medications:  cholecalciferol, 1,000 Units, Oral, Daily  co-enzyme Q-10, 100 mg, Oral, Daily  cyanocobalamin, 100 mcg, Oral, Daily  enoxaparin, 40 mg, Subcutaneous, Daily  fenofibrate, 145 mg, Oral, Daily  ketorolac, 15 mg, Intravenous, Q6H TRACEY  lisinopril, 10 mg, Oral, Daily  loratadine, 10 mg, Oral, Daily  multivitamin stress formula, 1 tablet, Oral, Daily  pantoprazole, 40 mg, Oral, Early Morning  potassium citrate, 10 mEq, Oral, TID With Meals  tamsulosin, 0 4 mg, Oral, Daily With Dinner      Continuous IV Infusions:  sodium chloride, 125 mL/hr, Intravenous, Continuous      PRN Meds:  acetaminophen, 650 mg, Oral, Q6H PRN  aluminum-magnesium hydroxide-simethicone, 30 mL, Oral, Q6H PRN  docusate sodium, 100 mg, Oral, BID PRN  fentaNYL, 25 mcg, Intravenous, Q3 min PRN  fentanyl citrate (PF), 50 mcg, Intravenous, Q2H PRN  HYDROmorphone, 0 2 mg, Intravenous, Q5 Min PRN  LORazepam, 0 5 mg, Oral, Daily PRN  ondansetron, 4 mg, Intravenous, Q4H PRN  ondansetron, 4 mg, Intravenous, Once PRN  oxyCODONE, 10 mg, Oral, Q4H PRN  oxyCODONE, 5 mg, Oral, Q4H PRN        IP CONSULT TO UROLOGY    Network Utilization Review Department  ATTENTION: Please call with any questions or concerns to [phone] and carefully listen to the prompts so that you are directed to the right person   All voicemails are confidential   Renetta Gomez all requests for admission clinical reviews, approved or denied determinations and any other requests to dedicated fax number below belonging to the campus where the patient is receiving treatment   List of dedicated fax numbers for the Facilities:  [address] DENIALS (Administrative/Medical Necessity) [phone]   1000  [facility] (Maternity/NICU/Pediatrics) [phone]   [address]  [address] [address] [place] [address] [address] [facility] [place] [address] [phone]

## 2022-03-31 NOTE — ED ATTENDING ATTESTATION
3/30/2022  I saw and evaluated the patient  I have discussed the patient with the resident physician and agree with the resident's findings, assessment and plan as documented in the resident physician's note, unless otherwise documented below  All available laboratory and imaging studies were reviewed by myself  I was present for key portions of any procedure(s) performed by the resident and I was immediately available to provide assistance  I agree with the current assessment done in the Emergency Department  I have conducted an independent evaluation of this patient  Emergency Department Note- Erica Shine 63 y o  male MRN: [id number]    Unit/Bed#: ED 24 Encounter: [id number]    Chief Complaint   Patient presents with    Flank Pain     patient has kidney stones in both kidneys  has a 7mm stone at right proximal ureter  woke up with increased pain today  has uroscopy scheduled for april 15th, but the pain is worse  prescribed flomax, but has not taken it today  has right flank pain radiating to right lower quadrant  Erica Shine is a 63 y o  male with past medical history of nephrolithiasis presenting with significant right flank pain  Patient was recently diagnosed with a 7 mm ureteral stone and has an outpatient cystoscopy scheduled with Dr John Kang on 4/15  Today, he developed sudden worsening of pain, up to 10/10 earlier  He has been taking ibuprofen and Tylenol  He has been taking Flomax  He had no relief with the symptoms, and given severity of pain is presenting for further treatment  He has not had any fevers  He has not had any burning with urination or blood in urine, but has felt a change in his urination, everything feels as though it is swollen  He has been nauseated due to severity of pain, has not had any vomiting      REVIEW OF SYSTEMS    Constitutional: denies fevers, chills  Visual/Eyes: no changes in vision  HENT: no rhinorrhea, no sore throat  Cardiac: no chest pain  Respiratory: no shortness of breath, no cough  GI:  Severe right flank pain , nausea, no vomiting or diarrhea  : no burning with urination, current known right-sided ureteral stone  Heme/Onc: no easy bruising  Endocrine: no diabetes  Neuro: no focal weakness or numbness, no headaches    Ten systems reviewed and negative unless otherwise noted in HPI and above    PAST MEDICAL HISTORY  Past Medical History:   Diagnosis Date    Chronic kidney disease     kidney stones    GERD (gastroesophageal reflux disease)     Hypertension     Kidney stone        SURGICAL HISTORY  Past Surgical History:   Procedure Laterality Date    COLONOSCOPY      ESOPHAGOGASTRODUODENOSCOPY      EXTRACORPOREAL SHOCK WAVE LITHOTRIPSY Bilateral     Renal Multiple times    PA CYSTO/URETERO W/LITHOTRIPSY &INDWELL STENT INSRT Left 8/6/2020    Procedure: CYSTOSCOPY URETEROSCOPY WITH LITHOTRIPSY HOLMIUM LASER, AND INSERTION STENT URETERAL;  Surgeon: Vitor Gomez MD;  [facility];  Service: Urology    PA EXC SKIN BENIG >4 CM FACE,FACIAL Right 4/28/2016    Procedure: EXCISION  LESION UPPER EYELID, COMPLEX CLOSURE;  Surgeon: Ciro Rojas MD;  Location: [facility];  Service: Plastics    PA FRAGMENT KIDNEY STONE/ ESWL Right 4/18/2019    Procedure: Luis Daniel Bynum SHOCKWAVE (ESWL); Surgeon: Vasyl Portillo MD;  [facility];  Service: Urology       FAMILY HISTORY  Family History   Problem Relation Age of Onset    Other Father         CABG (coronary artery bypass surgery)    Atrial fibrillation Sister     Other Paternal Uncle         Myocardial infarction    Coronary artery disease Family     Diabetes Family     Hypertension Family     Uterine cancer Family         CURRENT MEDICATIONS  No current facility-administered medications on file prior to encounter  Current Outpatient Medications on File Prior to Encounter   Medication Sig    b complex vitamins capsule Take 1 capsule by mouth daily      Cholecalciferol (VITAMIN D3) 1000 units CAPS Take 1 capsule by mouth daily    coenzyme Q-10 100 MG capsule Take 1 capsule by mouth daily    cyanocobalamin (VITAMIN B-12) 100 mcg tablet Take by mouth daily   fenofibrate (TRICOR) 145 mg tablet TAKE 1 TABLET DAILY    fexofenadine (ALLEGRA) 180 MG tablet Take 180 mg by mouth daily in the early morning     FLUCELVAX QUADRIVALENT TO BE ADMINISTERED BY PHARMACIST FOR IMMUNIZATION    ibuprofen (MOTRIN) 600 mg tablet Take 600 mg by mouth every 6 (six) hours as needed    lisinopril (ZESTRIL) 10 mg tablet TAKE 1 AND 1/2 TABLETS(15  MG TOTAL) DAILY    LORazepam (ATIVAN) 0 5 mg tablet TAKE 1 TABLET DAILY AS     NEEDED FOR ANXIETY    multivitamin (THERAGRAN) TABS Take 1 tablet by mouth daily   omeprazole (PriLOSEC) 40 MG capsule Take 40 mg by mouth daily in the early morning     potassium citrate (Urocit-K 10) 10 mEq Take 1 tablet (10 mEq total) by mouth 3 (three) times a day with meals    sildenafil (VIAGRA) 50 MG tablet TAKE 1 TABLET DAILY AS     NEEDED FOR ERECTILE        DYSFUNCTION    SYRINGE-NEEDLE, DISP, 3 ML (BD ECLIPSE SYRINGE) 25G X 1" 3 ML MISC by Does not apply route once a week    tamsulosin (FLOMAX) 0 4 mg Take 1 capsule (0 4 mg total) by mouth daily with dinner    testosterone cypionate (DEPO-TESTOSTERONE) 200 mg/mL SOLN INJECT 0 4ML INTO SHOULDER,THIGH OR BUTTOCKS ONCE     WEEKLY  DISCARD THE        REMAINDER         ALLERGIES  Allergies   Allergen Reactions    Ciprofloxacin Hives    Macrobid [Nitrofurantoin] Nausea Only and Palpitations       SOCIAL HISTORY  Social History     Socioeconomic History    Marital status: /Civil Union     Spouse name: None    Number of children: None    Years of education: None    Highest education level: None   Occupational History    None   Tobacco Use    Smoking status: Former Smoker     Quit date: 1981     Years since quittin 9    Smokeless tobacco: Never Used   Vaping Use    Vaping Use: Never used   Substance and Sexual Activity    Alcohol use: Yes     Alcohol/week: 2 0 standard drinks     Types: 2 Cans of beer per week     Comment: weekend    Drug use: No    Sexual activity: None   Other Topics Concern    None   Social History Narrative    Daily caffeine consumption     Social Determinants of Health     Financial Resource Strain: Not on file   Food Insecurity: Not on file   Transportation Needs: Not on file   Physical Activity: Not on file   Stress: Not on file   Social Connections: Not on file   Intimate Partner Violence: Not on file   Housing Stability: Not on file       PHYSICAL EXAM  BP (!) 166/115 (BP Location: Left arm)   Pulse 83   Temp 99 °F (37 2 °C) (Oral)   Resp 18   SpO2 97%   Vital signs and nursing notes reviewed    CONSTITUTIONAL: male appearing stated age resting in bed, in distress due to right flank pain  HEENT: atraumatic, normocephalic  Sclera anicteric, conjunctiva are not injected  Moist oral mucosa  CARDIOVASCULAR/CHEST: RRR, no M/R/G  2+ radial pulses  PULMONARY: Breathing comfortably on RA  Breath sounds are equal and clear to auscultation  ABDOMEN: non-distended  BS present, normoactive tender with palpation of the right CVA  MSK: moves all extremities, no deformities, no peripheral edema, no calf asymmetry  NEURO: Awake, alert, and oriented x 3  Face symmetric  Moves all extremities spontaneously   No focal neurologic deficits  SKIN: Warm, appears well-perfused  MENTAL STATUS: Normal affect, pleasant        DIAGNOSTIC STUDIES  Results Reviewed     Procedure Component Value Units Date/Time    Comprehensive metabolic panel [[id number]]  (Abnormal) Collected: 03/30/22 2107    Lab Status: Final result Specimen: Blood from Arm, Right Updated: 03/30/22 2135     Sodium 138 mmol/L      Potassium 4 2 mmol/L      Chloride 108 mmol/L      CO2 24 mmol/L      ANION GAP 6 mmol/L      BUN 21 mg/dL      Creatinine 1 29 mg/dL      Glucose 95 mg/dL      Calcium 10 2 mg/dL AST 25 U/L      ALT 30 U/L      Alkaline Phosphatase 47 U/L      Total Protein 7 9 g/dL      Albumin 4 0 g/dL      Total Bilirubin 0 23 mg/dL      eGFR 60 ml/min/1 73sq m     Narrative:      [place] guidelines for Chronic Kidney Disease (CKD):     Stage 1 with normal or high GFR (GFR > 90 mL/min/1 73 square meters)    Stage 2 Mild CKD (GFR = 60-89 mL/min/1 73 square meters)    Stage 3A Moderate CKD (GFR = 45-59 mL/min/1 73 square meters)    Stage 3B Moderate CKD (GFR = 30-44 mL/min/1 73 square meters)    Stage 4 Severe CKD (GFR = 15-29 mL/min/1 73 square meters)    Stage 5 End Stage CKD (GFR <15 mL/min/1 73 square meters)  Note: GFR calculation is accurate only with a steady state creatinine    CBC and differential [[id number]]  (Abnormal) Collected: 03/30/22 2107    Lab Status: Final result Specimen: Blood from Arm, Right Updated: 03/30/22 2119     WBC 9 63 Thousand/uL      RBC 5 22 Million/uL      Hemoglobin 12 7 g/dL      Hematocrit 39 2 %      MCV 75 fL      MCH 24 3 pg      MCHC 32 4 g/dL      RDW 15 3 %      MPV 10 1 fL      Platelets 000 Thousands/uL      nRBC 0 /100 WBCs      Neutrophils Relative 59 %      Immat GRANS % 0 %      Lymphocytes Relative 26 %      Monocytes Relative 11 %      Eosinophils Relative 3 %      Basophils Relative 1 %      Neutrophils Absolute 5 61 Thousands/µL      Immature Grans Absolute 0 04 Thousand/uL      Lymphocytes Absolute 2 54 Thousands/µL      Monocytes Absolute 1 06 Thousand/µL      Eosinophils Absolute 0 28 Thousand/µL      Basophils Absolute 0 10 Thousands/µL     Urine Microscopic [[id number]]  (Abnormal) Collected: 03/30/22 2031    Lab Status: Final result Specimen: Urine, Clean Catch Updated: 03/30/22 2055     RBC, UA Innumerable /hpf      WBC, UA 2-4 /hpf      Epithelial Cells Occasional /hpf      Bacteria, UA None Seen /hpf      MUCUS THREADS Occasional    UA w Reflex to Microscopic w Reflex to Culture [[id number]]  (Abnormal) Collected: 03/30/22 2031    Lab Status: Final result Specimen: Urine, Clean Catch Updated: 03/30/22 2052     Color, UA Light Yellow     Clarity, UA Clear     Specific Gravity, UA 1 019     pH, UA 7 0     Leukocytes, UA Negative     Nitrite, UA Negative     Protein, UA Trace mg/dl      Glucose, UA Negative mg/dl      Ketones, UA Negative mg/dl      Urobilinogen, UA <2 0 mg/dl      Bilirubin, UA Negative     Blood, UA Small          No orders to display       PROCEDURES  Procedures            ED COURSE  Medications   HYDROmorphone (DILAUDID) injection 0 5 mg (0 5 mg Intravenous Given 3/30/22 2113)   ketorolac (TORADOL) injection 15 mg (15 mg Intravenous Given 3/30/22 2112)   sodium chloride 0 9 % bolus 1,000 mL (0 mL Intravenous Stopped 3/30/22 2213)   fentanyl citrate (PF) 100 MCG/2ML 100 mcg (100 mcg Intravenous Given 3/30/22 258)     44-year-old male presenting with right-sided flank pain in setting of known 7 mm ureteral stone  Vital signs reviewed, hypertensive likely due to pain, afebrile, not tachycardic or hypoxic  Concerning right flank pain, differential diagnosis does include other etiologies such as pyelonephritis, iliopsoas abscess, retroperitoneal hematoma, AAA, versus another etiology of symptoms, however most likely etiology is obstructing right-sided nephrolithiasis  Dilaudid and Toradol administered for pain  Labs reveal UA with hematuria, not consistent with UTI  CBCs without leukocytosis  CMP is with essentially baseline renal function  Given intractable pain in setting of nonobstructing nephrolithiasis, patient admitted to the hospital for pain control and Urology consultation, likely with intervention tomorrow morning      CLINICAL IMPRESSION  Final diagnoses:   Right ureteral stone

## 2022-03-31 NOTE — ED PROVIDER NOTES
History  Chief Complaint   Patient presents with    Flank Pain     patient has kidney stones in both kidneys  has a 7mm stone at right proximal ureter  woke up with increased pain today  has uroscopy scheduled for april 15th, but the pain is worse  prescribed flomax, but has not taken it today  has right flank pain radiating to right lower quadrant  74-year-old male with history of nephrolithiasis presenting for evaluation of severe right flank and groin pain with recently diagnosed known 7 mm proximal ureteral stone  Patient states that it feels like his kidney stone pain  He does not have any pain medications at home  Has been taking ibuprofen as well as Flomax  Gets no relief early  Has 10/10 pain earlier but now is around 8/10  Has required intervention for kidney stones in the past   Has passed up to 5 mm stones in the past however  He was hoping that he could wait it out at home but feels like at this point he is unable to tolerate the pain  He was supposed to have a cystoscopy on 04/15 at a [address] but does not feel he is able to make it to that time  Has not any fevers, has had some chills intermittently  No dysuria or or known hematuria  No vomiting but has had some nausea associated with severe pain  History provided by:  Patient   used: No    Flank Pain  Associated symptoms: chills and nausea    Associated symptoms: no chest pain, no cough, no diarrhea, no dysuria, no fatigue, no fever, no hematuria, no shortness of breath and no vomiting        Prior to Admission Medications   Prescriptions Last Dose Informant Patient Reported? Taking?    Cholecalciferol (VITAMIN D3) 1000 units CAPS  Self Yes No   Sig: Take 1 capsule by mouth daily   FLUCELVAX QUADRIVALENT  Self Yes No   Sig: TO BE ADMINISTERED BY PHARMACIST FOR IMMUNIZATION   LORazepam (ATIVAN) 0 5 mg tablet   No No   Sig: TAKE 1 TABLET DAILY AS     NEEDED FOR ANXIETY   SYRINGE-NEEDLE, DISP, 3 ML (BD ECLIPSE SYRINGE) 25G X 1" 3 ML MISC  Self No No   Sig: by Does not apply route once a week   b complex vitamins capsule  Self Yes No   Sig: Take 1 capsule by mouth daily  coenzyme Q-10 100 MG capsule  Self Yes No   Sig: Take 1 capsule by mouth daily   cyanocobalamin (VITAMIN B-12) 100 mcg tablet  Self Yes No   Sig: Take by mouth daily  fenofibrate (TRICOR) 145 mg tablet   No No   Sig: TAKE 1 TABLET DAILY   fexofenadine (ALLEGRA) 180 MG tablet  Self Yes No   Sig: Take 180 mg by mouth daily in the early morning    ibuprofen (MOTRIN) 600 mg tablet  Self Yes No   Sig: Take 600 mg by mouth every 6 (six) hours as needed   lisinopril (ZESTRIL) 10 mg tablet   No No   Sig: TAKE 1 AND 1/2 TABLETS(15  MG TOTAL) DAILY   multivitamin (THERAGRAN) TABS  Self Yes No   Sig: Take 1 tablet by mouth daily  omeprazole (PriLOSEC) 40 MG capsule  Self Yes No   Sig: Take 40 mg by mouth daily in the early morning    potassium citrate (Urocit-K 10) 10 mEq   No No   Sig: Take 1 tablet (10 mEq total) by mouth 3 (three) times a day with meals   sildenafil (VIAGRA) 50 MG tablet   No No   Sig: TAKE 1 TABLET DAILY AS     NEEDED FOR ERECTILE        DYSFUNCTION   tamsulosin (FLOMAX) 0 4 mg   No No   Sig: Take 1 capsule (0 4 mg total) by mouth daily with dinner   testosterone cypionate (DEPO-TESTOSTERONE) 200 mg/mL SOLN   No No   Sig: INJECT 0 4ML INTO SHOULDER,THIGH OR BUTTOCKS ONCE     WEEKLY  DISCARD THE        REMAINDER        Facility-Administered Medications: None       Past Medical History:   Diagnosis Date    Chronic kidney disease     kidney stones    GERD (gastroesophageal reflux disease)     Hypertension     Kidney stone        Past Surgical History:   Procedure Laterality Date    COLONOSCOPY      ESOPHAGOGASTRODUODENOSCOPY      EXTRACORPOREAL SHOCK WAVE LITHOTRIPSY Bilateral     Renal Multiple times    DE CYSTO/URETERO W/LITHOTRIPSY &INDWELL STENT INSRT Left 8/6/2020    Procedure: CYSTOSCOPY URETEROSCOPY WITH LITHOTRIPSY HOLMIUM LASER, AND INSERTION STENT URETERAL;  Surgeon: Regina Fallon MD;  [facility];  Service: Urology    MD EXC SKIN BENIG >4 CM FACE,FACIAL Right 2016    Procedure: EXCISION  LESION UPPER EYELID, COMPLEX CLOSURE;  Surgeon: Rony Gaona MD;  Location: [facility];  Service: Plastics    MD FRAGMENT KIDNEY STONE/ ESWL Right 2019    Procedure: Scott Myers SHOCKWAVE (ESWL); Surgeon: Meredith Fried MD;  [facility];  Service: Urology       Family History   Problem Relation Age of Onset    Other Father         CABG (coronary artery bypass surgery)    Atrial fibrillation Sister     Other Paternal Uncle         Myocardial infarction    Coronary artery disease Family     Diabetes Family     Hypertension Family     Uterine cancer Family      I have reviewed and agree with the history as documented  E-Cigarette/Vaping    E-Cigarette Use Never User      E-Cigarette/Vaping Substances     Social History     Tobacco Use    Smoking status: Former Smoker     Quit date: 1981     Years since quittin 9    Smokeless tobacco: Never Used   Vaping Use    Vaping Use: Never used   Substance Use Topics    Alcohol use: Yes     Alcohol/week: 2 0 standard drinks     Types: 2 Cans of beer per week     Comment: weekend    Drug use: No        Review of Systems   Constitutional: Positive for chills  Negative for fatigue and fever  HENT: Negative for congestion and rhinorrhea  Eyes: Negative for visual disturbance  Respiratory: Negative for cough and shortness of breath  Cardiovascular: Negative for chest pain and palpitations  Gastrointestinal: Positive for abdominal pain and nausea  Negative for diarrhea and vomiting  Genitourinary: Positive for flank pain  Negative for dysuria, frequency, hematuria and urgency  Musculoskeletal: Positive for back pain  Negative for myalgias  Skin: Negative for pallor and rash     Neurological: Negative for light-headedness and headaches  Psychiatric/Behavioral: Negative for confusion  The patient is not nervous/anxious  All other systems reviewed and are negative  Physical Exam  ED Triage Vitals [03/30/22 1904]   Temperature Pulse Respirations Blood Pressure SpO2   99 °F (37 2 °C) 83 18 (!) 166/115 97 %      Temp Source Heart Rate Source Patient Position - Orthostatic VS BP Location FiO2 (%)   Oral Monitor Sitting Left arm --      Pain Score       8             Orthostatic Vital Signs  Vitals:    03/31/22 0345 03/31/22 0632 03/31/22 0846 03/31/22 0909   BP: 140/87 155/99 158/88 158/88   Pulse: 86 68  70   Patient Position - Orthostatic VS: Lying   Lying       Physical Exam  Vitals and nursing note reviewed  Constitutional:       Appearance: Normal appearance  He is not ill-appearing or diaphoretic  HENT:      Head: Normocephalic and atraumatic  Right Ear: External ear normal       Left Ear: External ear normal       Nose: Nose normal       Mouth/Throat:      Mouth: Mucous membranes are moist       Pharynx: Oropharynx is clear  Eyes:      Conjunctiva/sclera: Conjunctivae normal    Cardiovascular:      Rate and Rhythm: Normal rate and regular rhythm  Heart sounds: No murmur heard  Pulmonary:      Effort: Pulmonary effort is normal       Breath sounds: Normal breath sounds  No wheezing or rales  Abdominal:      General: Abdomen is flat  There is no distension  Palpations: Abdomen is soft  Tenderness: There is no abdominal tenderness  There is right CVA tenderness  There is no left CVA tenderness  Musculoskeletal:         General: Normal range of motion  Cervical back: Normal range of motion and neck supple  Right lower leg: No edema  Left lower leg: No edema  Skin:     General: Skin is warm and dry  Neurological:      General: No focal deficit present  Mental Status: He is alert        Gait: Gait normal    Psychiatric:         Mood and Affect: Mood normal          ED Medications  Medications   fentanyl citrate (PF) 100 MCG/2ML 50 mcg (50 mcg Intravenous Given 3/31/22 0847)   cholecalciferol (VITAMIN D3) tablet 1,000 Units (1,000 Units Oral Given 3/31/22 0846)   co-enzyme Q-10 capsule 100 mg (100 mg Oral Given 3/31/22 0847)   cyanocobalamin (VITAMIN B-12) tablet 100 mcg (100 mcg Oral Given 3/31/22 0847)   fenofibrate (TRICOR) tablet 145 mg (145 mg Oral Given 3/31/22 0847)   loratadine (CLARITIN) tablet 10 mg (10 mg Oral Given 3/31/22 0846)   lisinopril (ZESTRIL) tablet 10 mg (10 mg Oral Given 3/31/22 0846)   LORazepam (ATIVAN) tablet 0 5 mg (has no administration in time range)   multivitamin stress formula tablet 1 tablet (1 tablet Oral Given 3/31/22 0847)   pantoprazole (PROTONIX) EC tablet 40 mg (40 mg Oral Given 3/31/22 0628)   potassium citrate (UROCIT-K) CR tablet 10 mEq (10 mEq Oral Not Given 3/31/22 0854)   tamsulosin (FLOMAX) capsule 0 4 mg (has no administration in time range)   sodium chloride 0 9 % infusion (125 mL/hr Intravenous New Bag 3/31/22 0103)   acetaminophen (TYLENOL) tablet 650 mg (has no administration in time range)   docusate sodium (COLACE) capsule 100 mg (has no administration in time range)   ondansetron (ZOFRAN) injection 4 mg (4 mg Intravenous Given 3/31/22 0656)   aluminum-magnesium hydroxide-simethicone (MYLANTA) oral suspension 30 mL (has no administration in time range)   enoxaparin (LOVENOX) subcutaneous injection 40 mg (40 mg Subcutaneous Not Given 3/31/22 0910)   oxyCODONE (ROXICODONE) oral solution 5 mg (has no administration in time range)   oxyCODONE (ROXICODONE) immediate release tablet 10 mg (10 mg Oral Given 3/31/22 0709)   HYDROmorphone (DILAUDID) injection 0 5 mg (0 5 mg Intravenous Given 3/30/22 2113)   ketorolac (TORADOL) injection 15 mg (15 mg Intravenous Given 3/30/22 2112)   sodium chloride 0 9 % bolus 1,000 mL (0 mL Intravenous Stopped 3/30/22 1563)   fentanyl citrate (PF) 100 MCG/2ML 100 mcg (100 mcg Intravenous Given 3/30/22 1900)       Diagnostic Studies  Results Reviewed     Procedure Component Value Units Date/Time    COVID/FLU/RSV [[id number]]  (Normal) Collected: 03/31/22 0938    Lab Status: Final result Specimen: Nares from Nose Updated: 03/31/22 1028     SARS-CoV-2 Negative     INFLUENZA A PCR Negative     INFLUENZA B PCR Negative     RSV PCR Negative    Narrative:      FOR PEDIATRIC PATIENTS - copy/paste COVID Guidelines URL to browser: [email or web address]  [employer]aps    SARS-CoV-2 assay is a Nucleic Acid Amplification assay intended for the  qualitative detection of nucleic acid from SARS-CoV-2 in nasopharyngeal  swabs  Results are for the presumptive identification of SARS-CoV-2 RNA  Positive results are indicative of infection with SARS-CoV-2, the virus  causing COVID-19, but do not rule out bacterial infection or co-infection  with other viruses  Laboratories within the [place] and its  territories are required to report all positive results to the appropriate  public health authorities  Negative results do not preclude SARS-CoV-2  infection and should not be used as the sole basis for treatment or other  patient management decisions  Negative results must be combined with  clinical observations, patient history, and epidemiological information  This test has not been FDA cleared or approved  This test has been authorized by FDA under an Emergency Use Authorization  (EUA)  This test is only authorized for the duration of time the  declaration that circumstances exist justifying the authorization of the  emergency use of an in vitro diagnostic tests for detection of SARS-CoV-2  virus and/or diagnosis of COVID-19 infection under section 564(b)(1) of  the Act, 21 U  S C  561ICJ-6(N)(0), unless the authorization is terminated  or revoked sooner  The test has been validated but independent review by FDA  and CLIA is pending      Test performed using [employer] GeneXpert: This RT-PCR assay targets N2,  a region unique to SARS-CoV-2  A conserved region in the E-gene was chosen  for pan-Sarbecovirus detection which includes SARS-CoV-2      CBC and differential [[id number]]  (Abnormal) Collected: 03/31/22 0431    Lab Status: Final result Specimen: Blood from Arm, Left Updated: 03/31/22 0626     WBC 7 69 Thousand/uL      RBC 4 60 Million/uL      Hemoglobin 11 1 g/dL      Hematocrit 37 2 %      MCV 81 fL      MCH 24 1 pg      MCHC 29 8 g/dL      RDW 15 3 %      MPV 10 2 fL      Platelets 173 Thousands/uL      nRBC 0 /100 WBCs      Neutrophils Relative 49 %      Immat GRANS % 1 %      Lymphocytes Relative 34 %      Monocytes Relative 11 %      Eosinophils Relative 4 %      Basophils Relative 1 %      Neutrophils Absolute 3 79 Thousands/µL      Immature Grans Absolute 0 04 Thousand/uL      Lymphocytes Absolute 2 62 Thousands/µL      Monocytes Absolute 0 83 Thousand/µL      Eosinophils Absolute 0 34 Thousand/µL      Basophils Absolute 0 07 Thousands/µL     Comprehensive metabolic panel [[id number]]  (Abnormal) Collected: 03/31/22 0431    Lab Status: Final result Specimen: Blood from Arm, Left Updated: 03/31/22 0553     Sodium 138 mmol/L      Potassium 4 2 mmol/L      Chloride 109 mmol/L      CO2 23 mmol/L      ANION GAP 6 mmol/L      BUN 20 mg/dL      Creatinine 1 22 mg/dL      Glucose 94 mg/dL      Calcium 9 1 mg/dL      Corrected Calcium 9 7 mg/dL      AST 17 U/L      ALT 22 U/L      Alkaline Phosphatase 33 U/L      Total Protein 6 5 g/dL      Albumin 3 2 g/dL      Total Bilirubin 0 48 mg/dL      eGFR 64 ml/min/1 73sq m     Narrative:      [place] guidelines for Chronic Kidney Disease (CKD):     Stage 1 with normal or high GFR (GFR > 90 mL/min/1 73 square meters)    Stage 2 Mild CKD (GFR = 60-89 mL/min/1 73 square meters)    Stage 3A Moderate CKD (GFR = 45-59 mL/min/1 73 square meters)    Stage 3B Moderate CKD (GFR = 30-44 mL/min/1 73 square meters)    Stage 4 Severe CKD (GFR = 15-29 mL/min/1 73 square meters)    Stage 5 End Stage CKD (GFR <15 mL/min/1 73 square meters)  Note: GFR calculation is accurate only with a steady state creatinine    Magnesium [[id number]]  (Normal) Collected: 03/31/22 0431    Lab Status: Final result Specimen: Blood from Arm, Left Updated: 03/31/22 0504     Magnesium 1 8 mg/dL     Comprehensive metabolic panel [[id number]]  (Abnormal) Collected: 03/30/22 2107    Lab Status: Final result Specimen: Blood from Arm, Right Updated: 03/30/22 2135     Sodium 138 mmol/L      Potassium 4 2 mmol/L      Chloride 108 mmol/L      CO2 24 mmol/L      ANION GAP 6 mmol/L      BUN 21 mg/dL      Creatinine 1 29 mg/dL      Glucose 95 mg/dL      Calcium 10 2 mg/dL      AST 25 U/L      ALT 30 U/L      Alkaline Phosphatase 47 U/L      Total Protein 7 9 g/dL      Albumin 4 0 g/dL      Total Bilirubin 0 23 mg/dL      eGFR 60 ml/min/1 73sq m     Narrative:      [facility] guidelines for Chronic Kidney Disease (CKD):     Stage 1 with normal or high GFR (GFR > 90 mL/min/1 73 square meters)    Stage 2 Mild CKD (GFR = 60-89 mL/min/1 73 square meters)    Stage 3A Moderate CKD (GFR = 45-59 mL/min/1 73 square meters)    Stage 3B Moderate CKD (GFR = 30-44 mL/min/1 73 square meters)    Stage 4 Severe CKD (GFR = 15-29 mL/min/1 73 square meters)    Stage 5 End Stage CKD (GFR <15 mL/min/1 73 square meters)  Note: GFR calculation is accurate only with a steady state creatinine    CBC and differential [[id number]]  (Abnormal) Collected: 03/30/22 2107    Lab Status: Final result Specimen: Blood from Arm, Right Updated: 03/30/22 2119     WBC 9 63 Thousand/uL      RBC 5 22 Million/uL      Hemoglobin 12 7 g/dL      Hematocrit 39 2 %      MCV 75 fL      MCH 24 3 pg      MCHC 32 4 g/dL      RDW 15 3 %      MPV 10 1 fL      Platelets 260 Thousands/uL      nRBC 0 /100 WBCs      Neutrophils Relative 59 %      Immat GRANS % 0 %      Lymphocytes Relative 26 %      Monocytes Relative 11 %      Eosinophils Relative 3 %      Basophils Relative 1 %      Neutrophils Absolute 5 61 Thousands/µL      Immature Grans Absolute 0 04 Thousand/uL      Lymphocytes Absolute 2 54 Thousands/µL      Monocytes Absolute 1 06 Thousand/µL      Eosinophils Absolute 0 28 Thousand/µL      Basophils Absolute 0 10 Thousands/µL     Urine Microscopic [[id number]]  (Abnormal) Collected: 03/30/22 2031    Lab Status: Final result Specimen: Urine, Clean Catch Updated: 03/30/22 2055     RBC, UA Innumerable /hpf      WBC, UA 2-4 /hpf      Epithelial Cells Occasional /hpf      Bacteria, UA None Seen /hpf      MUCUS THREADS Occasional    UA w Reflex to Microscopic w Reflex to Culture [[id number]]  (Abnormal) Collected: 03/30/22 2031    Lab Status: Final result Specimen: Urine, Clean Catch Updated: 03/30/22 2052     Color, UA Light Yellow     Clarity, UA Clear     Specific Gravity, UA 1 019     pH, UA 7 0     Leukocytes, UA Negative     Nitrite, UA Negative     Protein, UA Trace mg/dl      Glucose, UA Negative mg/dl      Ketones, UA Negative mg/dl      Urobilinogen, UA <2 0 mg/dl      Bilirubin, UA Negative     Blood, UA Small                 FL retrograde pyelogram    (Results Pending)         Procedures  Procedures      ED Course  ED Course as of 03/31/22 1248   Wed Mar 30, 2022   2230 Will require admission for severe pain associated with 7mm right proximal ureteral stone  SBIRT 20yo+      Most Recent Value   SBIRT (24 yo +)    In order to provide better care to our patients, we are screening all of our patients for alcohol and drug use  Would it be okay to ask you these screening questions? No Filed at: 03/30/2022 2118                [facility]  Number of Diagnoses or Management Options  Right ureteral stone  Diagnosis management comments: 62 yo M presenting with severe right sided flank pain associated with known recently diagnosed 7mm proximal ureteral stone, h/o same  Requiring multiple doses of IV pain medications in the ED  Creat baseline, no evidence of infection on UA  Will admit to medicine for urology consult given likelihood that he will require invasive procedure and will not pass spontaneously  Disposition  Final diagnoses:   Right ureteral stone     Time reflects when diagnosis was documented in both MDM as applicable and the Disposition within this note     Time User Action Codes Description Comment    3/30/2022 10:31 PM Aniket Spears Add [N20 1] Right ureteral stone     3/30/2022 10:41 PM Smita AGGARWAL Add [N20 0] Nephrolithiasis       ED Disposition     ED Disposition Condition Date/Time Comment    Admit Stable Wed Mar 30, 2022 11:49 PM Case was discussed with Dr Maria R Alberto and the patient's admission status was agreed to be Admission Status: inpatient status to the service of Dr Maria R Alberto   Follow-up Information    None         Current Discharge Medication List      CONTINUE these medications which have NOT CHANGED    Details   b complex vitamins capsule Take 1 capsule by mouth daily  Cholecalciferol (VITAMIN D3) 1000 units CAPS Take 1 capsule by mouth daily      coenzyme Q-10 100 MG capsule Take 1 capsule by mouth daily      cyanocobalamin (VITAMIN B-12) 100 mcg tablet Take by mouth daily        fenofibrate (TRICOR) 145 mg tablet TAKE 1 TABLET DAILY  Qty: 90 tablet, Refills: 1    Associated Diagnoses: Hypertriglyceridemia      fexofenadine (ALLEGRA) 180 MG tablet Take 180 mg by mouth daily in the early morning       FLUCELVAX QUADRIVALENT TO BE ADMINISTERED BY PHARMACIST FOR IMMUNIZATION  Refills: 0      ibuprofen (MOTRIN) 600 mg tablet Take 600 mg by mouth every 6 (six) hours as needed      lisinopril (ZESTRIL) 10 mg tablet TAKE 1 AND 1/2 TABLETS(15  MG TOTAL) DAILY  Qty: 135 tablet, Refills: 1    Associated Diagnoses: Essential hypertension      LORazepam (ATIVAN) 0 5 mg tablet TAKE 1 TABLET DAILY AS     NEEDED FOR ANXIETY  Qty: 90 tablet, Refills: 0    Comments: Do not fill till 1/27/22  Associated Diagnoses: Anxiety      multivitamin (THERAGRAN) TABS Take 1 tablet by mouth daily  omeprazole (PriLOSEC) 40 MG capsule Take 40 mg by mouth daily in the early morning       potassium citrate (Urocit-K 10) 10 mEq Take 1 tablet (10 mEq total) by mouth 3 (three) times a day with meals  Qty: 270 tablet, Refills: 3    Associated Diagnoses: Nephrolithiasis      sildenafil (VIAGRA) 50 MG tablet TAKE 1 TABLET DAILY AS     NEEDED FOR ERECTILE        DYSFUNCTION  Qty: 30 tablet, Refills: 1    Associated Diagnoses: Testicular hypogonadism      SYRINGE-NEEDLE, DISP, 3 ML (BD ECLIPSE SYRINGE) 25G X 1" 3 ML MISC by Does not apply route once a week  Qty: 50 each, Refills: 3    Associated Diagnoses: Hypogonadism in male      tamsulosin (FLOMAX) 0 4 mg Take 1 capsule (0 4 mg total) by mouth daily with dinner  Qty: 90 capsule, Refills: 0    Associated Diagnoses: Nephrolithiasis      testosterone cypionate (DEPO-TESTOSTERONE) 200 mg/mL SOLN INJECT 0 4ML INTO SHOULDER,THIGH OR BUTTOCKS ONCE     WEEKLY  DISCARD THE        REMAINDER  Qty: 4 mL, Refills: 3    Associated Diagnoses: Hypogonadism in male           No discharge procedures on file  PDMP Review       Value Time User    PDMP Reviewed  Yes 11/18/2021  3:23 PM Carolynn Baig DO           ED Provider  Attending physically available and evaluated Narendra Section  I managed the patient along with the ED Attending      Electronically Signed by         Aniya Faustin MD  03/31/22 0247

## 2022-03-31 NOTE — PROGRESS NOTES
[facility]  Progress Note - Emerald Dress 1960, 63 y o  male MRN: [id number]  Unit/Bed#: ED 24 Encounter: [id number]  Primary Care Provider: Daly Hernandes DO   Date and time admitted to hospital: 3/30/2022  8:33 PM    * Ureteral calculus, right  Assessment & Plan  · Patient reports longstanding history of stones  Follows with Urology, Dr Isabel Hamm, as outpatient   · Patient reports he was scheduled to have urologic procedure on the 15th but had worsening right-sided pain  · Urology consult pending  · Recent CT revealed, per the results report, "1  There is now a 7 mm calculus at the right proximal ureter with only mild prominence of the renal pelvis and without tisha hydronephrosis  Mild hydronephrosis was seen on the right side on recent ultrasound  2  Multiple bilateral intrarenal calculi "  · Recent US revealed, per the results report, "Mild right hydronephrosis  Multiple bilateral renal calculi "   · Patient is NPO  · Pain control  · IVF hydration     Renal calculus, bilateral  Assessment & Plan  · Imaging and plan as per above  · Urology consult pending     Hydronephrosis, right  Assessment & Plan  · Imaging and plan as per above  · Urology consult pending     Essential hypertension  Assessment & Plan  · Continue lisinopril    GERD (gastroesophageal reflux disease)  Assessment & Plan  · Continue pantoprazole 40 mg daily  Hypogonadism in male  Assessment & Plan  · On testosterone replacement weekly  · Follow up with PCP        VTE Pharmacologic Prophylaxis:   lovenox    Patient Centered Rounds: I performed bedside rounds with nursing staff today  Discussions with Specialists or Other Care Team Provider: Urology AP    Education and Discussions with Family / Patient: Patient declined call to   Time Spent for Care: 20 minutes  More than 50% of total time spent on counseling and coordination of care as described above      Current Length of Stay: 1 day(s)  Current Patient Status: Inpatient   Certification Statement: The patient will continue to require additional inpatient hospital stay due to pending urology recs  Discharge Plan: pending urology recs    Code Status: Level 1 - Full Code    Subjective:   Mr Stephenie Lombard reports suprapubic discomfort and occasional right sided and back pain  Denies fever, chills, CP, SOB    Objective:     Vitals:   Temp (24hrs), Av °F (37 2 °C), Min:99 °F (37 2 °C), Max:99 °F (37 2 °C)    Temp:  [99 °F (37 2 °C)] 99 °F (37 2 °C)  HR:  [68-86] 70  Resp:  [18] 18  BP: (140-166)/() 158/88  SpO2:  [97 %-100 %] 100 %  There is no height or weight on file to calculate BMI  Input and Output Summary (last 24 hours):   No intake or output data in the 24 hours ending 22 0971    Physical Exam:   Physical Exam  Vitals and nursing note reviewed  Constitutional:       Comments: Patient seen sitting in ED bed comfortably resting, NAD   Cardiovascular:      Rate and Rhythm: Normal rate and regular rhythm  Pulmonary:      Effort: Pulmonary effort is normal  No respiratory distress  Breath sounds: Normal breath sounds  Abdominal:      General: Bowel sounds are normal       Palpations: Abdomen is soft  Tenderness: There is no abdominal tenderness  Genitourinary:     Comments: Suprapubic discomfort to palpation  Musculoskeletal:      Right lower leg: No edema  Left lower leg: No edema  Skin:     General: Skin is warm  Neurological:      Mental Status: He is alert and oriented to person, place, and time     Psychiatric:         Mood and Affect: Mood normal          Behavior: Behavior normal           Additional Data:     Labs:  Results from last 7 days   Lab Units 22  0431   WBC Thousand/uL 7 69   HEMOGLOBIN g/dL 11 1*   HEMATOCRIT % 37 2   PLATELETS Thousands/uL 336   NEUTROS PCT % 49   LYMPHS PCT % 34   MONOS PCT % 11   EOS PCT % 4     Results from last 7 days   Lab Units 22  0431 SODIUM mmol/L 138   POTASSIUM mmol/L 4 2   CHLORIDE mmol/L 109*   CO2 mmol/L 23   BUN mg/dL 20   CREATININE mg/dL 1 22   ANION GAP mmol/L 6   CALCIUM mg/dL 9 1   ALBUMIN g/dL 3 2*   TOTAL BILIRUBIN mg/dL 0 48   ALK PHOS U/L 33*   ALT U/L 22   AST U/L 17   GLUCOSE RANDOM mg/dL 94                       Lines/Drains:  Invasive Devices  Report    Peripheral Intravenous Line            Peripheral IV 03/30/22 Left Antecubital <1 day                      Imaging: Reviewed radiology reports from this admission including: abdominal/pelvic CT and ultrasound(s)    Recent Cultures (last 7 days):         Last 24 Hours Medication List:   Current Facility-Administered Medications   Medication Dose Route Frequency Provider Last Rate    acetaminophen  650 mg Oral Q6H PRN Thirza Ground, MD      aluminum-magnesium hydroxide-simethicone  30 mL Oral Q6H PRN Thirza Ground, MD      cholecalciferol  1,000 Units Oral Daily Thirza Ground, MD      co-enzyme Q-10  100 mg Oral Daily Thirza Ground, MD      cyanocobalamin  100 mcg Oral Daily Thirza Ground, MD      docusate sodium  100 mg Oral BID PRN Thirza Ground, MD      enoxaparin  40 mg Subcutaneous Daily Thirza Ground, MD      fenofibrate  145 mg Oral Daily Thirza Ground, MD      fentanyl citrate (PF)  50 mcg Intravenous Q2H PRN Thirza Ground, MD      lisinopril  10 mg Oral Daily Thirza Ground, MD      loratadine  10 mg Oral Daily Thirza Ground, MD      LORazepam  0 5 mg Oral Daily PRN Thirza Ground, MD      multivitamin stress formula  1 tablet Oral Daily Thirza Ground, MD      ondansetron  4 mg Intravenous Q4H PRN Thirza Ground, MD      oxyCODONE  10 mg Oral Q4H PRN Thirza Ground, MD      oxyCODONE  5 mg Oral Q4H PRN Thirza Ground, MD      pantoprazole  40 mg Oral Early Morning Thirza Ground, MD      potassium citrate  10 mEq Oral TID With Meals Thirza Ground, MD      sodium chloride  125 mL/hr Intravenous Continuous Thirza Ground,  mL/hr (03/31/22 0103)    tamsulosin  0 4 mg Oral Daily With Ratna Quiroz MD          Today, Patient Was Seen By: Twyla Jean PA-C    **Please Note: This note may have been constructed using a voice recognition system  **

## 2022-03-31 NOTE — H&P
[facility]  H&P- Jane Chavis 1960, 63 y o  male MRN: [id number]  Unit/Bed#: ED 24 Encounter: [id number]  Primary Care Provider: Ioana Castillo DO   Date and time admitted to hospital: 3/30/2022  8:33 PM    * Bilateral ureteral calculi  Assessment & Plan  With possible hydronephrosis on the right side; urology consult  NPO post midnight  Aggressive fluid hydration  Continue tamsulosin  Patient states that for breakthrough pain fentanyl works well; on fentanyl 50 mg every 2 hours as needed for breakthrough pain with oxycodone 5 and 10 respectively for moderate and severe pain  Heat at flank  Hypogonadism in male  Assessment & Plan  On testosterone replacement weekly  GERD (gastroesophageal reflux disease)  Assessment & Plan  Continue pantoprazole 40 mg daily  Essential hypertension  Assessment & Plan  Continue Zestril 10 mg daily  VTE Prophylaxis: Enoxaparin (Lovenox)  / sequential compression device   Code Status: Level 1 - Full Code   POLST: There is no POLST form on file for this patient (pre-hospital)    Anticipated Length of Stay:  Patient will be admitted on an Inpatient basis with an anticipated length of stay of  greater than 2 midnights  Justification for Hospital Stay: Please see detailed plans noted above  Chief Complaint:     Bilateral specially right flank pain  History of Present Illness:  Jane Chavis is a 63 y o  male who has past medical history significant for bilateral urolithiasis was been complaining of flank pain on the right side  Patient is on Flomax and ibuprofen and so for still right flank pain  CT scan of the abdomen and pelvis on the 17th showed the presence of bilateral urolithiasis with questionable hydronephrosis on the right side  Urology has been consulted and patient would undergo lithotripsy tomorrow morning      Review of Systems:    Constitutional:  Denies fever or chills   Eyes:  Denies change in visual acuity   HENT:  Denies nasal congestion or sore throat   Respiratory:  Denies cough or shortness of breath   Cardiovascular:  Denies chest pain or edema   GI:  Denies abdominal pain, nausea, vomiting, bloody stools or diarrhea   :  Denies dysuria with costovertebral angle tenderness right side  Musculoskeletal:  Denies back pain or joint pain   Integument:  Denies rash   Neurologic:  Denies headache, focal weakness or sensory changes   Endocrine:  Denies polyuria or polydipsia   Lymphatic:  Denies swollen glands   Psychiatric:  Denies depression or anxiety     Past Medical and Surgical History:   Past Medical History:   Diagnosis Date    Chronic kidney disease     kidney stones    GERD (gastroesophageal reflux disease)     Hypertension     Kidney stone      Past Surgical History:   Procedure Laterality Date    COLONOSCOPY      ESOPHAGOGASTRODUODENOSCOPY      EXTRACORPOREAL SHOCK WAVE LITHOTRIPSY Bilateral     Renal Multiple times    ND CYSTO/URETERO W/LITHOTRIPSY &INDWELL STENT INSRT Left 8/6/2020    Procedure: CYSTOSCOPY URETEROSCOPY WITH LITHOTRIPSY HOLMIUM LASER, AND INSERTION STENT URETERAL;  Surgeon: Vitor Gomez MD;  [facility];  Service: Urology    ND EXC SKIN BENIG >4 CM FACE,FACIAL Right 4/28/2016    Procedure: EXCISION  LESION UPPER EYELID, COMPLEX CLOSURE;  Surgeon: Ciro Rojas MD;  Location: [facility];  Service: Plastics    ND FRAGMENT KIDNEY STONE/ ESWL Right 4/18/2019    Procedure: Luis Daniel Bynum SHOCKWAVE (ESWL); Surgeon: Vasyl Portillo MD;  [facility];  Service: Urology       Meds/Allergies:  (Not in a hospital admission)      Allergies:    Allergies   Allergen Reactions    Ciprofloxacin Hives    Macrobid [Nitrofurantoin] Nausea Only and Palpitations     History:  Marital Status: /Civil Union   Occupation:   Patient Pre-hospital Living Situation:  Home  Patient Pre-hospital Level of Mobility:  Ambulatory  Patient Pre-hospital Diet Restrictions:  Regular  Substance Use History:   Social History     Substance and Sexual Activity   Alcohol Use Yes    Alcohol/week: 2 0 standard drinks    Types: 2 Cans of beer per week    Comment: weekend     Social History     Tobacco Use   Smoking Status Former Smoker    Quit date: 1981    Years since quittin 9   Smokeless Tobacco Never Used     Social History     Substance and Sexual Activity   Drug Use No       Family History:  Family History   Problem Relation Age of Onset    Other Father         CABG (coronary artery bypass surgery)    Atrial fibrillation Sister     Other Paternal Uncle         Myocardial infarction    Coronary artery disease Family     Diabetes Family     Hypertension Family     Uterine cancer Family        Physical Exam:     Vitals:   Blood Pressure: 163/91 (22)  Pulse: 86 (22)  Temperature: 99 °F (37 2 °C) (22)  Temp Source: Oral (22)  Respirations: 18 (22)  SpO2: 97 % (22)    Constitutional:  Well developed, well nourished, no acute distress, non-toxic appearance and currently asleep  Eyes:  PERRL, conjunctiva normal   HENT:  Atraumatic, external ears normal, nose normal,   Respiratory:  No respiratory distress, normal breath sounds, no rales, no wheezing   Cardiovascular:  Normal rate, normal rhythm, no murmurs, no gallops, no rubs   GI:  Soft, nondistended, normal bowel sounds, nontender, no organomegaly, no mass, no rebound, no guarding   :  Right costovertebral angle tenderness   Musculoskeletal:  No edema, no tenderness, no deformities  Back- no tenderness  Integument:  Well hydrated, no rash   Lymphatic:  No lymphadenopathy noted   Neurologic:  Asleep  Psychiatric:  Asleep      Lab Results: I have personally reviewed pertinent reports        Results from last 7 days   Lab Units 22  2107   WBC Thousand/uL 9 63   HEMOGLOBIN g/dL 12 7   HEMATOCRIT % 39 2   PLATELETS Thousands/uL 381 NEUTROS PCT % 59   LYMPHS PCT % 26   MONOS PCT % 11   EOS PCT % 3     Results from last 7 days   Lab Units 03/30/22  2107   POTASSIUM mmol/L 4 2   CHLORIDE mmol/L 108   CO2 mmol/L 24   BUN mg/dL 21   CREATININE mg/dL 1 29   CALCIUM mg/dL 10 2*   ALK PHOS U/L 47   ALT U/L 30   AST U/L 25               Imaging: I have personally reviewed pertinent reports  CT renal stone study abdomen pelvis wo contrast    Result Date: 3/23/2022  Narrative: CT ABDOMEN AND PELVIS WITHOUT IV CONTRAST - LOW DOSE RENAL STONE INDICATION:   N20 0: Calculus of kidney  COMPARISON:  CT abdomen pelvis 8/5/2020  Ultrasound kidney bladder 3/4/2022  TECHNIQUE:  Low radiation dose thin section CT examination of the abdomen and pelvis was performed without intravenous or oral contrast according to a protocol specifically designed to evaluate for urinary tract calculus  Axial, sagittal, and coronal 2D  reformatted images were created from the source data and submitted for interpretation  Evaluation for pathology in the abdomen and pelvis that is unrelated to urinary tract calculi is limited  Radiation dose length product (DLP) for this visit:  498 mGy-cm   This examination, like all CT scans performed in the [facility], was performed utilizing techniques to minimize radiation dose exposure, including the use of iterative reconstruction and automated exposure control  URINARY TRACT FINDINGS: RIGHT KIDNEY AND URETER:  Mild prominence of the right renal pelvis with a 7 mm calculus at the proximal ureter  No tisha hydronephrosis  Mild hydronephrosis was seen on the right side on recent prior ultrasound  Additional intrarenal calculi clustered at the midpole measuring up to 5 mm  LEFT KIDNEY AND URETER: There are nonobstructing intrarenal calculi measuring on the order of 9 mm at the midpole  No hydronephrosis or hydroureter  URINARY BLADDER:  Underdistended limiting evaluation  No calculi   ADDITIONAL FINDINGS: LOWER CHEST: No clinically significant abnormality identified in the visualized lower chest  SOLID VISCERA: Limited low radiation dose noncontrast CT evaluation demonstrates no clinically significant abnormality of the imaged portions of the liver, spleen, pancreas, or adrenal glands  GALLBLADDER/BILIARY TREE:  No calcified gallstones  No pericholecystic inflammatory change  No biliary dilatation  STOMACH AND BOWEL:  Unremarkable  APPENDIX:  A normal appendix was visualized  ABDOMINOPELVIC CAVITY:  No ascites  No pneumoperitoneum  No lymphadenopathy  REPRODUCTIVE ORGANS:  Mildly prominent prostate  ABDOMINAL WALL/INGUINAL REGIONS:  Small fat containing left inguinal hernia noted  OSSEOUS STRUCTURES:  No acute fracture or destructive osseous lesion  Stable small rounded sclerotic density at the right posterior iliac bone close to the sacroiliac joint, question related to degenerative changes  This has been stable since at least 10/11/2019  Scattered degenerative spurring of the visualized spine  Impression: 1  There is now a 7 mm calculus at the right proximal ureter with only mild prominence of the renal pelvis and without tisha hydronephrosis  Mild hydronephrosis was seen on the right side on recent ultrasound  2  Multiple bilateral intrarenal calculi  The study was marked in [facility] for immediate notification  Workstation performed: [id number]     US kidney and bladder    Result Date: 3/9/2022  Narrative: RENAL ULTRASOUND INDICATION:   N20 0: Calculus of kidney  COMPARISON: Ultrasound 12/7/2020  CT 8/5/2020  TECHNIQUE:   Ultrasound of the retroperitoneum was performed with a curvilinear transducer utilizing volumetric sweeps and still imaging techniques  FINDINGS: KIDNEYS: Symmetric and normal size  Right kidney:  13 7 x 5 9 x 6 7 cm  Volume 286 3 mL Left kidney:  13 2 x 6 5 x 6 3 cm  Volume 282 4 mL Right kidney Normal echogenicity and contour  11 x 10 x 10 mm simple cyst lower pole  Mild hydronephrosis   6 mm calculus in the upper pole, 4 mm calculus in the midpole, 6 cm calculus in the lower pole  No perinephric fluid collections  Left kidney Normal echogenicity and contour  20 x 12 x 11 cm simple cyst in the midpole  No hydronephrosis  6 mm calculus midpole, 5 mm and 6 mm calculi lower pole No perinephric fluid collections  URETERS: Nonvisualized  BLADDER: Normally distended  No focal thickening or mass lesions  Bilateral ureteral jets detected  No significant post void volume  Prevoid volume is 201 mL, and the postvoid volume is 46 mL  Impression: Mild right hydronephrosis  Multiple bilateral renal calculi  Workstation performed: [id number]         ** Please Note: Dragon 360 Dictation voice to text software was used in the creation of this document   **

## 2022-03-31 NOTE — DISCHARGE INSTRUCTIONS
Ureteroscopy   WHAT YOU NEED TO KNOW:   A ureteroscopy is a procedure to examine in the inside of your urinary tract, which includes your urethra, bladder, ureters, and kidneys  A ureteroscope is a small, thin tube with a light and camera on the end  Ureteroscopy can help your healthcare provider diagnose and treat problems in your urinary tract, such as kidney stones  DISCHARGE INSTRUCTIONS:   Medicine:   · Antibiotics  may be given to treat or prevent an infection  · Take your medicine as directed  Contact your healthcare provider if you think your medicine is not helping or if you have side effects  Tell him or her if you are allergic to any medicine  Keep a list of the medicines, vitamins, and herbs you take  Include the amounts, and when and why you take them  Bring the list or the pill bottles to follow-up visits  Carry your medicine list with you in case of an emergency  Follow up with your healthcare provider as directed:  Write down your questions so you remember to ask them during your visits  Drink liquids as directed  Liquids can help prevent kidney stones and urinary tract infections  Drink water and limit the amount of caffeine you drink  Caffeine may be found in coffee, tea, soda, sports drinks, and foods  Ask your healthcare provider how much liquid to drink each day  Contact your healthcare provider if:   · You have a fever  · You cannot urinate  · You are vomiting  · You have pain in your abdomen or side  · You have questions or concerns about your condition or care  © Copyright [employer] 2018 Information is for End User's use only and may not be sold, redistributed or otherwise used for commercial purposes  All illustrations and images included in CareNotes® are the copyrighted property of [employer]  or [facility] Joaquin Menard   The above information is an  only  It is not intended as medical advice for individual conditions or treatments   Talk to your doctor, nurse or pharmacist before following any medical regimen to see if it is safe and effective for you

## 2022-03-31 NOTE — ASSESSMENT & PLAN NOTE
· Patient reports longstanding history of stones  Follows with Urology, Dr Karri Guerra, as outpatient   · Patient reports he was scheduled to have urologic procedure on the 15th but had worsening right-sided pain  · Urology consult pending  · Recent CT revealed, per the results report, "1  There is now a 7 mm calculus at the right proximal ureter with only mild prominence of the renal pelvis and without tisha hydronephrosis  Mild hydronephrosis was seen on the right side on recent ultrasound  2  Multiple bilateral intrarenal calculi "  · Recent US revealed, per the results report, "Mild right hydronephrosis   Multiple bilateral renal calculi "   · Patient is NPO  · Pain control  · IVF hydration

## 2022-03-31 NOTE — ANESTHESIA POSTPROCEDURE EVALUATION
Post-Op Assessment Note    CV Status:  Stable  Pain Score: 0    Pain management: adequate     Mental Status:  Alert and awake   Hydration Status:  Stable   PONV Controlled:  None   Airway Patency:  Patent      Post Op Vitals Reviewed: Yes      Staff: CRNA         No complications documented      /78 (03/31/22 1346)    Temp (!) 97 3 °F (36 3 °C) (03/31/22 1346)    Pulse 96 (03/31/22 1346)   Resp 20 (03/31/22 1346)    SpO2 95 % (03/31/22 1346)

## 2022-03-31 NOTE — INTERVAL H&P NOTE
H&P reviewed  After examining the patient I find no changes in the patients condition since the H&P had been written  Vitals:    03/31/22 0909   BP: 158/88   Pulse: 70   Resp: 18   Temp:    SpO2: 100%   Procedure and risks reviewed with the patient in the holding unit  Procedure was described in detail  Risks discussed as per consent form  Informed consent obtained and the consent form was signed  Laterality marked-right

## 2022-03-31 NOTE — TELEPHONE ENCOUNTER
Patient underwent ureteroscopy on March 31st   Stent is on a string and can be removed by the patient or staff in 7-10 days  He will then need a follow-up appointment in 6-8 weeks  Please call to arrange

## 2022-03-31 NOTE — H&P (VIEW-ONLY)
Consults: UROLOGY  Libia Prather 63 y o  male [id number]   Unit/Bed #: ED 24  Encounter: [id number]        Assessment  & Plan  :    Nephrolithiasis:  -CT scan revealed 7 mm calculus at right proximal ureter on 03/17, originally scheduled for definitive ureteroscopy on 04/15  -patient presenting to the ER due to significant increase in pain  -patient NPO  -creatinine preserved at 1 22  -no leukocytosis  -UA feeling innumerable RBCs, 2-4 WBCs, no bacteria seen   -plan for cystoscopy ureteroscopy holmium laser lithotripsy basket extraction with ureteral stent placement on the right   -benefits and risks of procedure explained at bedside   -patient agreeable to plan  Proceed to OR    Subjective :     Libia Prather  is a 63 y o  male well known to our practice for nephrolithiasis has undergone multiple surgical procedures for ureteral stones  He had a CT scan on 03/17/2022 which revealed a 7 mm calculus at the right proximal ureter  With mild hydronephrosis  Id and multiple bilateral intrarenal calculi  He was scheduled for definitive ureteroscopy on 04/15 unfortunately patient continued to experience worsening pain which resulted in him presenting to the emergency room  Patient currently reports pain is well controlled with oral medications  Denies any nausea or vomiting  Denies any fevers or chills  Patient denies any current questions or concerns regarding surgical procedures as he has undergone surgical intervention for ureteral stones in the past   He is requesting that if he has a ureteral stent that this should be without a string due to discomfort with prior stents          Allergies   Allergen Reactions    Ciprofloxacin Hives    Macrobid [Nitrofurantoin] Nausea Only and Palpitations      Current Outpatient Medications   Medication Instructions    b complex vitamins capsule 1 capsule, Oral, Daily    Cholecalciferol (VITAMIN D3) 1000 units CAPS 1 capsule, Oral, Daily    coenzyme Q-10 100 MG capsule 1 capsule, Oral, Daily    cyanocobalamin (VITAMIN B-12) 100 mcg tablet Oral, Daily    fenofibrate (TRICOR) 145 mg tablet TAKE 1 TABLET DAILY    fexofenadine (ALLEGRA) 180 mg, Oral, Daily (early morning)    FLUCELVAX QUADRIVALENT TO BE ADMINISTERED BY PHARMACIST FOR IMMUNIZATION    ibuprofen (MOTRIN) 600 mg, Oral, Every 6 hours PRN    lisinopril (ZESTRIL) 10 mg tablet TAKE 1 AND 1/2 TABLETS(15  MG TOTAL) DAILY    LORazepam (ATIVAN) 0 5 mg tablet TAKE 1 TABLET DAILY AS     NEEDED FOR ANXIETY    multivitamin (THERAGRAN) TABS 1 tablet, Oral, Daily    omeprazole (PRILOSEC) 40 mg, Oral, Daily (early morning)    potassium citrate (Urocit-K 10) 10 mEq 10 mEq, Oral, 3 times daily with meals    sildenafil (VIAGRA) 50 MG tablet TAKE 1 TABLET DAILY AS     NEEDED FOR ERECTILE        DYSFUNCTION    SYRINGE-NEEDLE, DISP, 3 ML (BD ECLIPSE SYRINGE) 25G X 1" 3 ML MISC Does not apply, Weekly    tamsulosin (FLOMAX) 0 4 mg, Oral, Daily with dinner    testosterone cypionate (DEPO-TESTOSTERONE) 200 mg/mL SOLN INJECT 0 4ML INTO SHOULDER,THIGH OR BUTTOCKS ONCE     WEEKLY  DISCARD THE        REMAINDER        Past Medical History:   Diagnosis Date    Chronic kidney disease     kidney stones    GERD (gastroesophageal reflux disease)     Hypertension     Kidney stone      Past Surgical History:   Procedure Laterality Date    COLONOSCOPY      ESOPHAGOGASTRODUODENOSCOPY      EXTRACORPOREAL SHOCK WAVE LITHOTRIPSY Bilateral     Renal Multiple times    RI CYSTO/URETERO W/LITHOTRIPSY &INDWELL STENT INSRT Left 8/6/2020    Procedure: CYSTOSCOPY URETEROSCOPY WITH LITHOTRIPSY HOLMIUM LASER, AND INSERTION STENT URETERAL;  Surgeon: Miko Carr MD;  Location[facility];  Service: Urology    RI EXC SKIN BENIG >4 CM FACE,FACIAL Right 4/28/2016    Procedure: EXCISION  LESION UPPER EYELID, COMPLEX CLOSURE;  Surgeon: Leida Hoffmann MD;  Location: [facility];  Service: Plastics    RI FRAGMENT KIDNEY STONE/ ESWL Right 4/18/2019 Procedure: Elvi Torres SHOCKWAVE (ESWL); Surgeon: Shelia Queen MD;  [facility];  Service: Urology     Family History   Problem Relation Age of Onset    Other Father         CABG (coronary artery bypass surgery)    Atrial fibrillation Sister     Other Paternal Uncle         Myocardial infarction    Coronary artery disease Family     Diabetes Family     Hypertension Family     Uterine cancer Family      Social History     Socioeconomic History    Marital status: /Civil Union     Spouse name: None    Number of children: None    Years of education: None    Highest education level: None   Occupational History    None   Tobacco Use    Smoking status: Former Smoker     Quit date: 1981     Years since quittin 9    Smokeless tobacco: Never Used   Vaping Use    Vaping Use: Never used   Substance and Sexual Activity    Alcohol use: Yes     Alcohol/week: 2 0 standard drinks     Types: 2 Cans of beer per week     Comment: weekend    Drug use: No    Sexual activity: None   Other Topics Concern    None   Social History Narrative    Daily caffeine consumption     Social Determinants of Health     Financial Resource Strain: Not on file   Food Insecurity: Not on file   Transportation Needs: Not on file   Physical Activity: Not on file   Stress: Not on file   Social Connections: Not on file   Intimate Partner Violence: Not on file   Housing Stability: Not on file        Review of Systems   Constitutional: Negative  Negative for chills and fever  HENT: Negative  Eyes: Negative  Respiratory: Negative  Cardiovascular: Negative  Gastrointestinal: Positive for abdominal pain  Negative for nausea and vomiting  Endocrine: Negative  Genitourinary: Positive for flank pain, frequency and urgency  Negative for difficulty urinating  Skin: Negative  Allergic/Immunologic: Negative  Neurological: Negative  Hematological: Negative  Psychiatric/Behavioral: Negative  Objective     Physical Exam  Constitutional:       General: He is not in acute distress  Appearance: He is normal weight  He is not ill-appearing, toxic-appearing or diaphoretic  HENT:      Head: Normocephalic and atraumatic  Right Ear: External ear normal       Left Ear: External ear normal       Nose: Nose normal       Mouth/Throat:      Pharynx: Oropharynx is clear  Eyes:      General: No scleral icterus  Conjunctiva/sclera: Conjunctivae normal    Cardiovascular:      Rate and Rhythm: Normal rate and regular rhythm  Pulses: Normal pulses  Heart sounds: No murmur heard  No friction rub  No gallop  Pulmonary:      Effort: Pulmonary effort is normal  No respiratory distress  Breath sounds: No wheezing, rhonchi or rales  Abdominal:      General: Bowel sounds are normal  There is no distension  Tenderness: There is abdominal tenderness  There is right CVA tenderness  There is no left CVA tenderness  Musculoskeletal:         General: Normal range of motion  Cervical back: Normal range of motion  Skin:     General: Skin is warm and dry  Neurological:      General: No focal deficit present  Mental Status: He is alert and oriented to person, place, and time  Psychiatric:         Mood and Affect: Mood normal          Behavior: Behavior normal          Thought Content: Thought content normal          Judgment: Judgment normal                 Imaging:  CT ABDOMEN AND PELVIS WITHOUT IV CONTRAST - LOW DOSE RENAL STONE      INDICATION:   N20 0: Calculus of kidney      COMPARISON:  CT abdomen pelvis 8/5/2020  Ultrasound kidney bladder 3/4/2022      TECHNIQUE:  Low radiation dose thin section CT examination of the abdomen and pelvis was performed without intravenous or oral contrast according to a protocol specifically designed to evaluate for urinary tract calculus    Axial, sagittal, and coronal 2D   reformatted images were created from the source data and submitted for interpretation  Evaluation for pathology in the abdomen and pelvis that is unrelated to urinary tract calculi is limited        Radiation dose length product (DLP) for this visit:  498 mGy-cm   This examination, like all CT scans performed in the [facility], was performed utilizing techniques to minimize radiation dose exposure, including the use of iterative   reconstruction and automated exposure control      URINARY TRACT FINDINGS:     RIGHT KIDNEY AND URETER:  Mild prominence of the right renal pelvis with a 7 mm calculus at the proximal ureter  No tisha hydronephrosis  Mild hydronephrosis was seen on the right side on recent prior ultrasound  Additional intrarenal calculi clustered   at the midpole measuring up to 5 mm      LEFT KIDNEY AND URETER: There are nonobstructing intrarenal calculi measuring on the order of 9 mm at the midpole  No hydronephrosis or hydroureter      URINARY BLADDER:  Underdistended limiting evaluation  No calculi          ADDITIONAL FINDINGS:     LOWER CHEST:  No clinically significant abnormality identified in the visualized lower chest      SOLID VISCERA: Limited low radiation dose noncontrast CT evaluation demonstrates no clinically significant abnormality of the imaged portions of the liver, spleen, pancreas, or adrenal glands        GALLBLADDER/BILIARY TREE:  No calcified gallstones  No pericholecystic inflammatory change  No biliary dilatation      STOMACH AND BOWEL:  Unremarkable      APPENDIX:  A normal appendix was visualized      ABDOMINOPELVIC CAVITY:  No ascites  No pneumoperitoneum  No lymphadenopathy      REPRODUCTIVE ORGANS:  Mildly prominent prostate      ABDOMINAL WALL/INGUINAL REGIONS:  Small fat containing left inguinal hernia noted      OSSEOUS STRUCTURES:  No acute fracture or destructive osseous lesion   Stable small rounded sclerotic density at the right posterior iliac bone close to the sacroiliac joint, question related to degenerative changes  This has been stable since at least   10/11/2019  Scattered degenerative spurring of the visualized spine      IMPRESSION:     1  There is now a 7 mm calculus at the right proximal ureter with only mild prominence of the renal pelvis and without tisha hydronephrosis  Mild hydronephrosis was seen on the right side on recent ultrasound    2  Multiple bilateral intrarenal calculi        Labs:  Lab Results   Component Value Date    SODIUM 138 03/31/2022    K 4 2 03/31/2022     (H) 03/31/2022    CO2 23 03/31/2022    BUN 20 03/31/2022    CREATININE 1 22 03/31/2022    GLUC 94 03/31/2022    CALCIUM 9 1 03/31/2022         Lab Results   Component Value Date    WBC 7 69 03/31/2022    HGB 11 1 (L) 03/31/2022    HCT 37 2 03/31/2022    MCV 81 (L) 03/31/2022     03/31/2022         VTE Pharmacologic Prophylaxis: Enoxaparin (Lovenox)  VTE Mechanical Prophylaxis: sequential compression device     Roberto White PA-C

## 2022-03-31 NOTE — ANESTHESIA PREPROCEDURE EVALUATION
Procedure:  CYSTOSCOPY URETEROSCOPY WITH LITHOTRIPSY HOLMIUM LASER, RETROGRADE PYELOGRAM AND INSERTION STENT URETERAL (Right Ureter)    Relevant Problems   ANESTHESIA (within normal limits)   (-) History of anesthesia complications      CARDIO   (+) Essential hypertension   (+) Hyperlipidemia   (-) Chest pain   (-) SELBY (dyspnea on exertion)      GI/HEPATIC   (+) Fatty liver   (+) GERD (gastroesophageal reflux disease) (well controlled)      /RENAL   (+) CKD (chronic kidney disease)   (+) Nephrolithiasis      PULMONARY   (-) Shortness of breath   (-) URI (upper respiratory infection)        Physical Exam    Airway    Mallampati score: II  TM Distance: >3 FB  Neck ROM: full     Dental       Cardiovascular      Pulmonary      Other Findings        Anesthesia Plan  ASA Score- 2     Anesthesia Type- general with ASA Monitors  Additional Monitors:   Airway Plan: LMA  Plan Factors-Exercise tolerance (METS): >4 METS  Chart reviewed  Existing labs reviewed  Patient summary reviewed  Induction- intravenous  Postoperative Plan-     Informed Consent- Anesthetic plan and risks discussed with patient  I personally reviewed this patient with the CRNA  Discussed and agreed on the Anesthesia Plan with the CRNA  Ligia Valencia

## 2022-03-31 NOTE — OP NOTE
OPERATIVE REPORT  PATIENT NAME: Nancy Kay    :  1960  MRN: [id number]  Pt Location: [facility] 01    SURGERY DATE: 3/31/2022    Surgeon(s) and Role:     * Leti Harris MD - Primary    Preop Diagnosis:  Nephrolithiasis [N20 0]    Post-Op Diagnosis Codes:     * Right ureteral stone [N20 1]     * Right kidney stone [N20 0]    Procedure(s) (LRB):  CYSTOSCOPY URETEROSCOPY WITH LITHOTRIPSY HOLMIUM LASER,BASKET STONE EXTRACTION, RETROGRADE PYELOGRAM AND INSERTION STENT URETERAL (Right)    Specimen(s):  ID Type Source Tests Collected by Time Destination   A :  Calculus Kidney, Right STONE ANALYSIS Leti Harris MD 3/31/2022 1313        Estimated Blood Loss:   Minimal    Drains:  * No LDAs found *    Anesthesia Type:   Choice    Operative Indications:  Nephrolithiasis [N20 0]  Right ureteral stone    Operative Findings:  Normal urethra with bilobar prostatic hypertrophy causing complete outflow obstruction  Ureteral orifices normal   Bladder is mildly trabeculated  There are no stones, tumors or diverticula  No definite stone was seen on preliminary fluoroscopic images  On right retrograde pyelography there was a filling defect noted in the upper ureter that did appear to migrate back to the kidney  Rigid ureteroscopy to the UPJ was performed which did not reveal a ureteral stone  I then performed flexible ureteroscopy and identified lied a free-floating stone in the midpole calyx  This was treated with the holmium laser and broken up nicely  Basket was used to remove a representative fragment  Another upper pole calcification was identified and treated with the holmium laser  No other stones were seen in spite of systematic pyeloscopy  Comparison with CT scan was made intraoperatively  A 6 [place] Islander onlay stent was used at the conclusion the procedure  The Dangler was taped to the penis for removal in approximately 7 days      Complications:   None    Procedure and Technique:  PLAN FOR STENT:  Removal by nursing staff or patient 7-10 days  The patient was brought into the OR, properly identified and positioned on the table  General anesthesia was administered and the patient was placed in lithotomy position and prepped and draped in the usual sterile fashion  Compression boots were employed  Intravenous antibiotic was administered  An appropriate time-out was performed  Cystoscopy was performed with a 25 Venezuelan cystoscope with findings as above  The right ureteral orifice was identified and retrograde pyelogram performed with findings as above  A guidewire was placed up the ureter under fluoroscopic guidance  The rigid ureteroscope was introduced and carefully advanced up to UPJ without identification of a stone  It was presumed the stone was pushed back into the kidney   The ureteral scope was removed and a 10-12 [place] access sheath placed over the wire  The flexible scope was placed thru a sheath and advanced into the kidney  A stone was identified in the renal pelvis and this was seen to migrate into a midpole calyx  The Holmium laser was used on various settings to break up stone into small particles using a 272 micron fiber  Care was taken not to laser tissue  A Nitinol basket was utilized to [employer] fragments for analysis  Contrast was then injected and systematic renal pyeloscopy performed  An additional stone was seen in the upper pole that was small and this was dusted  No other stones were identified  All remaining fragment appeared small enough to pass around the stent  The guidewire was replaced  The ureteral scope was removed in tandem with the ureteral access sheath and no damage noted to the ureter  There was no  ureteral stone  The guidewire was backloaded through the cystoscope    A 6 Venezuelan 26 cm Bard onlay stent was then placed in standard fashion and seen to be well coiled in the renal pelvis and in the bladder at the conclusion the procedure  The Dangler was left long and taped to the penis  The bladder was drained and the cystoscope removed  The patient tolerated the procedure well  He was awakened from anesthesia and taken to the recovery room in satisfactory condition     I was present for the entire procedure    Patient Disposition:  PACU       SIGNATURE: Mckayla Hancock MD  DATE: March 31, 2022  TIME: 1:55 PM

## 2022-03-31 NOTE — ASSESSMENT & PLAN NOTE
With possible hydronephrosis on the right side; urology consult  NPO post midnight  Aggressive fluid hydration  Continue tamsulosin  Patient states that for breakthrough pain fentanyl works well; on fentanyl 50 mg every 2 hours as needed for breakthrough pain with oxycodone 5 and 10 respectively for moderate and severe pain  Heat at flank

## 2022-03-31 NOTE — DISCHARGE SUMMARY
[facility]    Patient Information: Christen Howell 63 y o  male MRN: [id number]  Unit/Bed#: CW2 211-01 Encounter: [id number]    Discharging Physician / Practitioner: Twyla Jean PA-C  PCP: Renzo Medrano DO  Admission Date: 3/30/2022  Discharge Date: 03/31/22    Reason for Admission: right ureteral calculus, bilateral renal calculi, right hydronephrosis    Discharge Diagnoses:     Principal Problem:    Ureteral calculus, right  Active Problems:    Renal calculus, bilateral    Hydronephrosis, right    Essential hypertension    GERD (gastroesophageal reflux disease)    Hypogonadism in male    CKD (chronic kidney disease)  Resolved Problems:    * No resolved hospital problems  *      Consultations During Hospital Stay:  · Urology    Procedures Performed:   · CMP  · CBC  · UA  · COVID-19, influenza A/B, RSV  · Cystoscopy ureteroscopy with lithotripsy holmium laser, basket stone extraction, retrograde pyelogram and insertion stent ureteral (right)    Significant Findings:   · See Urology operative report    Incidental Findings:   · None     Test Results Pending at Discharge (will require follow up): · Stone analysis - follow up with Urology     Outpatient Tests Requested:  · Follow up with Urology  · Follow up with PCP    Complications:  None    Hospital Course:     Christen Howell is a 63 y o  male with HTN, GERD, hypogonadism, and history of stones who originally presented to the hospital on 3/30/2022 due to right flank pain  Patient had recent CT which revealed a 7 mm calculus at the right proximal ureter with only mild prominence of the renal pelvis and without tisha hydronephrosis  Multiple bilateral intrarenal calculi  Patient also had a recent ultrasound which revealed mild right hydronephrosis  Patient was seen in consultation by Urology    He underwent cystoscopy ureteroscopy with lithotripsy holmium laser, basket stone extraction, retrograde pyelogram and insertion stent ureteral (right) by Dr Chilel Alamance on 3/31/22  Post procedurally Urology cleared the patient from their standpoint for discharge home  Recommend close OP follow up with Urology and PCP  Of note, this patient was admitted under inpatient status with initial expected LOS of >2 midnights, however he was able to be discharged sooner than what was initially expected after being cleared for discharge by Urology postprocedurally  Condition at Discharge: stable     Discharge Day Visit / Exam:     * Please refer to separate progress for these details *    Discharge instructions/Information to patient and family:   See after visit summary for information provided to patient and family  Provisions for Follow-Up Care:  See after visit summary for information related to follow-up care and any pertinent home health orders  Disposition:     Home    For Discharges to [facility] SNF:   · Not Applicable to this Patient - Not Applicable to this Patient    Planned Readmission: None    Discharge Statement:  I spent 37 minutes discharging the patient  This time was spent on the day of discharge  I had direct contact with the patient on the day of discharge  Greater than 50% of the total time was spent examining patient, answering all patient questions, arranging and discussing plan of care with patient as well as directly providing post-discharge instructions  Additional time then spent on discharge activities  Discharge Medications:  See after visit summary for reconciled discharge medications provided to patient and family  ** Please Note: Dragon 360 Dictation voice to text software may have been used in the creation of this document   **

## 2022-04-01 ENCOUNTER — TRANSITIONAL CARE MANAGEMENT (OUTPATIENT)
Dept: INTERNAL MEDICINE CLINIC | Facility: CLINIC | Age: 62
End: 2022-04-01

## 2022-04-01 NOTE — UTILIZATION REVIEW
Notification of Discharge   This is a Notification of Discharge from our facility [address]  Please be advised that this patient has been discharge from our facility  Below you will find the admission and discharge date and time including the patients disposition  UTILIZATION REVIEW CONTACT:  Erick Alvarez  Utilization   Network Utilization Review Department  Phone: [phone] x carefully listen to the prompts  All voicemails are confidential   Email: [email or web address]     PHYSICIAN ADVISORY SERVICES:  FOR BAOE-XO-UEDO REVIEW - MEDICAL NECESSITY DENIAL  Phone: [phone]  Fax: [phone]  Email: [email or web address]     PRESENTATION DATE: 3/30/2022  8:33 PM  OBERVATION ADMISSION DATE:   INPATIENT ADMISSION DATE: 3/30/22 11:11 PM   DISCHARGE DATE: 3/31/2022  6:55 PM  DISPOSITION: Home/Self Care Home/Self Care      IMPORTANT INFORMATION:  Send all requests for admission clinical reviews, approved or denied determinations and any other requests to dedicated fax number below belonging to the campus where the patient is receiving treatment   List of dedicated fax numbers:  [address] DENIALS (Administrative/Medical Necessity) [phone]   [address] (Maternity/NICU/Pediatrics) [phone]   Krzysztof Salmeron [phone]   [address] [place] [address] [phone]   [address] [place] [address] [phone]   [address] [phone]   [facility]  [phone]   [address] [place], [place] [address] [address] [phone]

## 2022-04-01 NOTE — UTILIZATION REVIEW
Inpatient Admission Authorization Request   NOTIFICATION OF INPATIENT ADMISSION/INPATIENT AUTHORIZATION REQUEST   SERVICING FACILITY:   [facility]  Address: [address] [place] [address], [address] [place] [address]  Tax ID: [id number]  NPI: [id number]  Place of Service: Inpatient [address] [facility] Code: 24     ATTENDING PROVIDER:  Attending Name and NPI#: Gretel Mendes Md [[id number]]  Address: [address] [place] [address], [address] [place] [address]  Phone: [phone]     UTILIZATION REVIEW CONTACT:  Riana Grullon Utilization   Network Utilization Review Department  Phone: [phone]  Fax: [phone]  Email: [email or web address]     PHYSICIAN ADVISORY SERVICES:  FOR GQDV-AU-HJRT REVIEW - MEDICAL NECESSITY DENIAL  Phone: [phone]  Fax: [phone]  Email: [email or web address]     TYPE OF REQUEST:  Inpatient Status     ADMISSION INFORMATION:  ADMISSION DATE/TIME: 3/30/22 11:11 PM  PATIENT DIAGNOSIS CODE/DESCRIPTION:  Kidney stones [N20 0]  Nephrolithiasis [N20 0]  Right ureteral stone [N20 1]  DISCHARGE DATE/TIME: 3/31/2022  6:55 PM   IMPORTANT INFORMATION:  Please contact the Riana Grullon directly with any questions or concerns regarding this request  Department voicemails are confidential     Send requests for admission clinical reviews, concurrent reviews, approvals, and administrative denials due to lack of clinical to fax [phone]

## 2022-04-01 NOTE — TELEPHONE ENCOUNTER
Post Op Note    Edilson Carbajal is a 63 y o  male s/pCYSTOSCOPY URETEROSCOPY WITH LITHOTRIPSY HOLMIUM LASER,BASKET STONE EXTRACTION, RETROGRADE PYELOGRAM AND INSERTION STENT URETERAL (Right Ureter)  Performed 3-  Edilson Carbajal is a patient of Dr Winsome Amezquita and is seen at the [place] office  How would you rate your pain on a scale from 1 to 10, 10 being the worst pain ever? 1  Have you had a fever? No  Do you have any difficulty urinating? No      Do you have any other questions or concerns that I can address at this time? Call placed to patient and spoke with him  Pt stated that he is recovering well s/p stone procedure  I reviewed the recommendations of Dr George Marks with him and plan for stent removal    Pt is not comfortable removing his stent on his own and would prefer to come to the [place] office to have this procedure done since he is known to Dr Winsome Amezquita and the [facility] team      Pt is scheduled for 4-7-2022 at 10:30am with the nurse for stent removal  Pt is aware and thankful for the call

## 2022-04-05 ENCOUNTER — TELEPHONE (OUTPATIENT)
Dept: UROLOGY | Facility: MEDICAL CENTER | Age: 62
End: 2022-04-05

## 2022-04-05 ENCOUNTER — TELEPHONE (OUTPATIENT)
Dept: UROLOGY | Facility: AMBULATORY SURGERY CENTER | Age: 62
End: 2022-04-05

## 2022-04-05 LAB
CALCIUM OXALATE DIHYDRATE MFR STONE IR: 60 %
COLOR STONE: NORMAL
COM MFR STONE: 10 %
COMMENT-STONE3: NORMAL
COMPOSITION: NORMAL
HYDROXYAPATITE 24H ENGDIFF UR: 30 %
LABORATORY COMMENT REPORT: NORMAL
PHOTO: NORMAL
SIZE STONE: NORMAL MM
SPEC SOURCE SUBJ: NORMAL
STONE ANALYSIS-IMP: NORMAL
STONE ANALYSIS-IMP: NORMAL
WT STONE: 4 MG

## 2022-04-05 RX ORDER — OXYBUTYNIN CHLORIDE 5 MG/1
5 TABLET, EXTENDED RELEASE ORAL AS NEEDED
Qty: 30 TABLET | Refills: 3 | Status: SHIPPED | OUTPATIENT
Start: 2022-04-05 | End: 2022-05-02

## 2022-04-05 RX ORDER — PHENAZOPYRIDINE HYDROCHLORIDE 200 MG/1
200 TABLET, FILM COATED ORAL 3 TIMES DAILY PRN
Qty: 10 TABLET | Refills: 0 | Status: SHIPPED | OUTPATIENT
Start: 2022-04-05 | End: 2022-04-05 | Stop reason: SDUPTHER

## 2022-04-05 RX ORDER — PHENAZOPYRIDINE HYDROCHLORIDE 200 MG/1
200 TABLET, FILM COATED ORAL 3 TIMES DAILY PRN
Qty: 10 TABLET | Refills: 0 | Status: SHIPPED | OUTPATIENT
Start: 2022-04-05 | End: 2022-04-17 | Stop reason: SDUPTHER

## 2022-04-05 NOTE — TELEPHONE ENCOUNTER
Called Marcia Faye back and notified him of the recommendation  He states he was only given 5 days worth of the medication  Before he was given medication (hydrocodone) to last until until stent removal   He is also out of the oxybutynin  He is  asking for couple more days of hydrocodone & oxybutynin

## 2022-04-05 NOTE — TELEPHONE ENCOUNTER
Pt called in his prescription should of been sent to [facility] on [address] not the mail in order  Can you please correct? Prescription is:  phenazopyridine (PYRIDIUM) 200 mg tablet [[id number]]   Patient also wanted to see if you could get him more pain medication  He said you were looking into this for him   Thank you!

## 2022-04-05 NOTE — TELEPHONE ENCOUNTER
Recommend patient take oxybutynin 5mg TID as needed  This can help more for stent colic  Side effects of narcotics include constipation which can worsen discomfort  Patient's ureteral stent removal scheduled for 4/7/22

## 2022-04-05 NOTE — TELEPHONE ENCOUNTER
Patient calling and requesting more pain pills to hold him over until the stent is removed  He has taken all he was originally given, but still has discomfort with stent in  Please call at [phone] and advise

## 2022-04-05 NOTE — TELEPHONE ENCOUNTER
For management of stent colic, would recommend OTC NSAIDs, oxybutynin, Flomax, and Pyridium  Patient should also increase his water intake and avoid constipation

## 2022-04-07 ENCOUNTER — PROCEDURE VISIT (OUTPATIENT)
Dept: UROLOGY | Facility: AMBULATORY SURGERY CENTER | Age: 62
End: 2022-04-07

## 2022-04-07 VITALS
BODY MASS INDEX: 30.1 KG/M2 | HEIGHT: 71 IN | SYSTOLIC BLOOD PRESSURE: 146 MMHG | HEART RATE: 78 BPM | WEIGHT: 215 LBS | DIASTOLIC BLOOD PRESSURE: 68 MMHG

## 2022-04-07 DIAGNOSIS — N20.0 NEPHROLITHIASIS: Primary | ICD-10-CM

## 2022-04-07 PROCEDURE — 99024 POSTOP FOLLOW-UP VISIT: CPT

## 2022-04-07 NOTE — PROGRESS NOTES
4/7/2022  Teri Barron is a 63 y o  male  [id number]        Diagnosis  Chief Complaint     Nephrolithiasis; Post-op          Patient is s/p right ureteroscopy with stone extraction on 4/7/2022 with Dr Agustin Nickerson  Follow up as scheduled with KUB ptv  Knows to call the office in the meantime with concerns  Procedure Stent with String Removal    Vitals:    04/07/22 1016   BP: 146/68   Pulse: 78   Weight: 97 5 kg (215 lb)   Height: 5' 11" (1 803 m)       Stent with string removed intact without difficulty  Reviewed post stent removal symptoms including flank pain, dysuria, and hematuria  Instructed patient to increase oral fluid intake  Encouraged the use of NSAIDS and other prescribed pain medication as needed for discomfort  Patient instructed to call the office or report to the ER for uncontrolled pain, fever, chills, nausea or vomiting         Corine Carrel, RN

## 2022-04-12 ENCOUNTER — OFFICE VISIT (OUTPATIENT)
Dept: INTERNAL MEDICINE CLINIC | Facility: CLINIC | Age: 62
End: 2022-04-12
Payer: COMMERCIAL

## 2022-04-12 VITALS
HEART RATE: 80 BPM | BODY MASS INDEX: 30.13 KG/M2 | SYSTOLIC BLOOD PRESSURE: 138 MMHG | OXYGEN SATURATION: 97 % | DIASTOLIC BLOOD PRESSURE: 82 MMHG | RESPIRATION RATE: 16 BRPM | HEIGHT: 71 IN | WEIGHT: 215.2 LBS

## 2022-04-12 DIAGNOSIS — N20.1 URETERAL CALCULUS, RIGHT: Primary | ICD-10-CM

## 2022-04-12 PROCEDURE — 1111F DSCHRG MED/CURRENT MED MERGE: CPT | Performed by: INTERNAL MEDICINE

## 2022-04-12 PROCEDURE — 99495 TRANSJ CARE MGMT MOD F2F 14D: CPT | Performed by: INTERNAL MEDICINE

## 2022-04-12 NOTE — PROGRESS NOTES
Assessment/Plan:     Ureteral calculus, right  Transition care management visit regarding recent hospitalization for left ureteral calculus requiring surgical intervention/lithotripsy/stenting today we did review the discharge summary laboratories in test completed during the hospitalization we encourage patient increase water intake he is working with Urology would like a 2nd opinion he does report me dissatisfaction with the care team at the hospital he would like to get 2nd opinion which I also advised will have patient see urologist Dr Darlin Crouch and nephrologist Dr Senia DONOVANO as scheduled call if any problems         Problem List Items Addressed This Visit        Genitourinary    Ureteral calculus, right - Primary     Transition care management visit regarding recent hospitalization for left ureteral calculus requiring surgical intervention/lithotripsy/stenting today we did review the discharge summary laboratories in test completed during the hospitalization we encourage patient increase water intake he is working with Urology would like a 2nd opinion he does report me dissatisfaction with the care team at the hospital he would like to get 2nd opinion which I also advised will have patient see urologist Dr Darlin Crouch and nephrologist Dr Senia Miller; [place] as scheduled call if any problems         Relevant Orders    Ambulatory Referral to Urology    Ambulatory Referral to Nephrology           Subjective:     Patient ID: Narendra Calles is a 63 y o  male  HPI 66-year old male coming in for a transition care management visit regarding recent hospitalization for left ureteral calculus requiring surgery intervention, lithotripsy and stenting; today we did review the discharge summary laboratories in test completed during the hospitalization    16 hours in the er, nurses after care , nurses , nurse not clear with d/c meds and no pain meds ; not getting anywhere with urology the pt was give potassium citrate but not on formulary never got message back from Nephrology  Currently no flank pain normal urination increasing hydration and overall is doing well since discharge from the hospital no fevers no chills stent is out    Review of Systems   Constitutional: Negative for activity change, appetite change and unexpected weight change  HENT: Negative for congestion  Respiratory: Negative for cough and shortness of breath  Cardiovascular: Negative for chest pain  Gastrointestinal: Negative for abdominal pain, diarrhea, nausea and vomiting  Neurological: Negative for dizziness, light-headedness and headaches  Hematological: Negative for adenopathy  Objective:     Physical Exam  Constitutional:       General: He is not in acute distress  Appearance: He is well-developed  He is not diaphoretic  HENT:      Head: Normocephalic and atraumatic  Right Ear: External ear normal       Left Ear: External ear normal    Eyes:      General: No scleral icterus  Right eye: No discharge  Left eye: No discharge  Conjunctiva/sclera: Conjunctivae normal       Pupils: Pupils are equal, round, and reactive to light  Cardiovascular:      Rate and Rhythm: Normal rate and regular rhythm  Heart sounds: Normal heart sounds  No murmur heard  No friction rub  No gallop  Pulmonary:      Effort: No respiratory distress  Breath sounds: No wheezing or rales  Abdominal:      General: Bowel sounds are normal  There is no distension  Palpations: Abdomen is soft  There is no mass  Tenderness: There is no abdominal tenderness  There is no guarding or rebound  Musculoskeletal:         General: No deformity  Cervical back: Neck supple  Lymphadenopathy:      Cervical: No cervical adenopathy  Neurological:      Mental Status: He is alert  No follow-ups on file        Allergies   Allergen Reactions    Ciprofloxacin Hives    Macrobid [Nitrofurantoin] Nausea Only and Palpitations       Past Medical History:   Diagnosis Date    Chronic kidney disease     kidney stones    GERD (gastroesophageal reflux disease)     Hypertension     Kidney stone      Past Surgical History:   Procedure Laterality Date    COLONOSCOPY      ESOPHAGOGASTRODUODENOSCOPY      EXTRACORPOREAL SHOCK WAVE LITHOTRIPSY Bilateral     Renal Multiple times    FL RETROGRADE PYELOGRAM  3/31/2022    WI CYSTO/URETERO W/LITHOTRIPSY &INDWELL STENT INSRT Left 8/6/2020    Procedure: CYSTOSCOPY URETEROSCOPY WITH LITHOTRIPSY HOLMIUM LASER, AND INSERTION STENT URETERAL;  Surgeon: Ava Daly MD;  [facility];  Service: Urology    WI CYSTO/URETERO W/LITHOTRIPSY &INDWELL STENT INSRT Right 3/31/2022    Procedure: CYSTOSCOPY URETEROSCOPY WITH LITHOTRIPSY HOLMIUM LASER,BASKET STONE EXTRACTION, RETROGRADE PYELOGRAM AND INSERTION STENT URETERAL;  Surgeon: Tita Guidry MD;  [facility];  Service: Urology    WI EXC SKIN BENIG >4 CM FACE,FACIAL Right 4/28/2016    Procedure: EXCISION  LESION UPPER EYELID, COMPLEX CLOSURE;  Surgeon: Beba Recinos MD;  Location: [facility];  Service: Plastics    WI FRAGMENT KIDNEY STONE/ ESWL Right 4/18/2019    Procedure: LITHROTRIPSY EXTRACORPORAL SHOCKWAVE (ESWL); Surgeon: Margart Hamman, MD;  [facility];  Service: Urology     Current Outpatient Medications on File Prior to Visit   Medication Sig Dispense Refill    b complex vitamins capsule Take 1 capsule by mouth daily   Cholecalciferol (VITAMIN D3) 1000 units CAPS Take 1 capsule by mouth daily      coenzyme Q-10 100 MG capsule Take 1 capsule by mouth daily      cyanocobalamin (VITAMIN B-12) 100 mcg tablet Take by mouth daily        fenofibrate (TRICOR) 145 mg tablet TAKE 1 TABLET DAILY 90 tablet 1    fexofenadine (ALLEGRA) 180 MG tablet Take 180 mg by mouth daily in the early morning       lisinopril (ZESTRIL) 10 mg tablet TAKE 1 AND 1/2 TABLETS(15  MG TOTAL) DAILY 135 tablet 1    LORazepam (ATIVAN) 0 5 mg tablet TAKE 1 TABLET DAILY AS     NEEDED FOR ANXIETY 90 tablet 0    multivitamin (THERAGRAN) TABS Take 1 tablet by mouth daily   omeprazole (PriLOSEC) 40 MG capsule Take 40 mg by mouth daily in the early morning       oxybutynin (DITROPAN-XL) 5 mg 24 hr tablet Take 1 tablet (5 mg total) by mouth as needed (as needed) 30 tablet 3    phenazopyridine (PYRIDIUM) 200 mg tablet Take 1 tablet (200 mg total) by mouth 3 (three) times a day as needed for bladder spasms 10 tablet 0    potassium citrate (Urocit-K 10) 10 mEq Take 1 tablet (10 mEq total) by mouth 3 (three) times a day with meals 270 tablet 3    sildenafil (VIAGRA) 50 MG tablet TAKE 1 TABLET DAILY AS     NEEDED FOR ERECTILE        DYSFUNCTION 30 tablet 1    SYRINGE-NEEDLE, DISP, 3 ML (BD ECLIPSE SYRINGE) 25G X 1" 3 ML MISC by Does not apply route once a week 50 each 3    tamsulosin (FLOMAX) 0 4 mg Take 1 capsule (0 4 mg total) by mouth daily with dinner 90 capsule 0    testosterone cypionate (DEPO-TESTOSTERONE) 200 mg/mL SOLN INJECT 0 4ML INTO SHOULDER,THIGH OR BUTTOCKS ONCE     WEEKLY  DISCARD THE        REMAINDER  4 mL 3     No current facility-administered medications on file prior to visit       Family History   Problem Relation Age of Onset    Other Father         CABG (coronary artery bypass surgery)    Atrial fibrillation Sister     Other Paternal Uncle         Myocardial infarction    Coronary artery disease Family     Diabetes Family     Hypertension Family     Uterine cancer Family      Social History     Socioeconomic History    Marital status: /Civil Union     Spouse name: Not on file    Number of children: Not on file    Years of education: Not on file    Highest education level: Not on file   Occupational History    Not on file   Tobacco Use    Smoking status: Former Smoker     Quit date: 1981     Years since quittin 9    Smokeless tobacco: Never Used   Vaping Use    Vaping Use: Never used   Substance and Sexual Activity    Alcohol use:  Yes     Alcohol/week: 2 0 standard drinks     Types: 2 Cans of beer per week     Comment: weekend    Drug use: No    Sexual activity: Not on file   Other Topics Concern    Not on file   Social History Narrative    Daily caffeine consumption     Social Determinants of Health     Financial Resource Strain: Not on file   Food Insecurity: Not on file   Transportation Needs: Not on file   Physical Activity: Not on file   Stress: Not on file   Social Connections: Not on file   Intimate Partner Violence: Not on file   Housing Stability: Not on file     Vitals:    04/12/22 1321   BP: 138/82   Pulse: 80   Resp: 16   SpO2: 97%   Weight: 97 6 kg (215 lb 3 2 oz)   Height: 5' 11" (1 803 m)     Results for orders placed or performed during the hospital encounter of 03/30/22   COVID/FLU/RSV    Specimen: Nose; Nares   Result Value Ref Range    SARS-CoV-2 Negative Negative    INFLUENZA A PCR Negative Negative    INFLUENZA B PCR Negative Negative    RSV PCR Negative Negative   UA w Reflex to Microscopic w Reflex to Culture    Specimen: Urine, Clean Catch   Result Value Ref Range    Color, UA Light Yellow     Clarity, UA Clear     Specific [place], UA 1 019 1 003 - 1 030    pH, UA 7 0 4 5, 5 0, 5 5, 6 0, 6 5, 7 0, 7 5, 8 0    Leukocytes, UA Negative Negative    Nitrite, UA Negative Negative    Protein, UA Trace (A) Negative mg/dl    Glucose, UA Negative Negative mg/dl    Ketones, UA Negative Negative mg/dl    Urobilinogen, UA <2 0 <2 0 mg/dl mg/dl    Bilirubin, UA Negative Negative    Blood, UA Small (A) Negative   CBC and differential   Result Value Ref Range    WBC 9 63 4 31 - 10 16 Thousand/uL    RBC 5 22 3 88 - 5 62 Million/uL    Hemoglobin 12 7 12 0 - 17 0 g/dL    Hematocrit 39 2 36 5 - 49 3 %    MCV 75 (L) 82 - 98 fL    MCH 24 3 (L) 26 8 - 34 3 pg    MCHC 32 4 31 4 - 37 4 g/dL    RDW 15 3 (H) 11 6 - 15 1 %    MPV 10 1 8 9 - 12 7 fL    Platelets 351 151 - 429 Thousands/uL    nRBC 0 /100 WBCs    Neutrophils Relative 59 43 - 75 %    Immat GRANS % 0 0 - 2 %    Lymphocytes Relative 26 14 - 44 %    Monocytes Relative 11 4 - 12 %    Eosinophils Relative 3 0 - 6 %    Basophils Relative 1 0 - 1 %    Neutrophils Absolute 5 61 1 85 - 7 62 Thousands/µL    Immature Grans Absolute 0 04 0 00 - 0 20 Thousand/uL    Lymphocytes Absolute 2 54 0 60 - 4 47 Thousands/µL    Monocytes Absolute 1 06 0 17 - 1 22 Thousand/µL    Eosinophils Absolute 0 28 0 00 - 0 61 Thousand/µL    Basophils Absolute 0 10 0 00 - 0 10 Thousands/µL   Comprehensive metabolic panel   Result Value Ref Range    Sodium 138 136 - 145 mmol/L    Potassium 4 2 3 5 - 5 3 mmol/L    Chloride 108 100 - 108 mmol/L    CO2 24 21 - 32 mmol/L    ANION GAP 6 4 - 13 mmol/L    BUN 21 5 - 25 mg/dL    Creatinine 1 29 0 60 - 1 30 mg/dL    Glucose 95 65 - 140 mg/dL    Calcium 10 2 (H) 8 3 - 10 1 mg/dL    AST 25 5 - 45 U/L    ALT 30 12 - 78 U/L    Alkaline Phosphatase 47 46 - 116 U/L    Total Protein 7 9 6 4 - 8 2 g/dL    Albumin 4 0 3 5 - 5 0 g/dL    Total Bilirubin 0 23 0 20 - 1 00 mg/dL    eGFR 60 ml/min/1 73sq m   Urine Microscopic   Result Value Ref Range    RBC, UA Innumerable (A) None Seen, 1-2 /hpf    WBC, UA 2-4 (A) None Seen, 1-2 /hpf    Epithelial Cells Occasional None Seen, Occasional /hpf    Bacteria, UA None Seen None Seen, Occasional /hpf    MUCUS THREADS Occasional (A) None Seen   Comprehensive metabolic panel   Result Value Ref Range    Sodium 138 136 - 145 mmol/L    Potassium 4 2 3 5 - 5 3 mmol/L    Chloride 109 (H) 100 - 108 mmol/L    CO2 23 21 - 32 mmol/L    ANION GAP 6 4 - 13 mmol/L    BUN 20 5 - 25 mg/dL    Creatinine 1 22 0 60 - 1 30 mg/dL    Glucose 94 65 - 140 mg/dL    Calcium 9 1 8 3 - 10 1 mg/dL    Corrected Calcium 9 7 8 3 - 10 1 mg/dL    AST 17 5 - 45 U/L    ALT 22 12 - 78 U/L    Alkaline Phosphatase 33 (L) 46 - 116 U/L    Total Protein 6 5 6 4 - 8 2 g/dL    Albumin 3 2 (L) 3 5 - 5 0 g/dL    Total Bilirubin 0 48 0 20 - 1 00 mg/dL    eGFR 64 ml/min/1 73sq m   Magnesium   Result Value Ref Range    Magnesium 1 8 1 6 - 2 6 mg/dL   CBC and differential   Result Value Ref Range    WBC 7 69 4 31 - 10 16 Thousand/uL    RBC 4 60 3 88 - 5 62 Million/uL    Hemoglobin 11 1 (L) 12 0 - 17 0 g/dL    Hematocrit 37 2 36 5 - 49 3 %    MCV 81 (L) 82 - 98 fL    MCH 24 1 (L) 26 8 - 34 3 pg    MCHC 29 8 (L) 31 4 - 37 4 g/dL    RDW 15 3 (H) 11 6 - 15 1 %    MPV 10 2 8 9 - 12 7 fL    Platelets 666 087 - 967 Thousands/uL    nRBC 0 /100 WBCs    Neutrophils Relative 49 43 - 75 %    Immat GRANS % 1 0 - 2 %    Lymphocytes Relative 34 14 - 44 %    Monocytes Relative 11 4 - 12 %    Eosinophils Relative 4 0 - 6 %    Basophils Relative 1 0 - 1 %    Neutrophils Absolute 3 79 1 85 - 7 62 Thousands/µL    Immature Grans Absolute 0 04 0 00 - 0 20 Thousand/uL    Lymphocytes Absolute 2 62 0 60 - 4 47 Thousands/µL    Monocytes Absolute 0 83 0 17 - 1 22 Thousand/µL    Eosinophils Absolute 0 34 0 00 - 0 61 Thousand/µL    Basophils Absolute 0 07 0 00 - 0 10 Thousands/µL   Stone analysis   Result Value Ref Range    COLOR Tan     Size 1x1 mm    Stone Weight 4 mg    Composition Comment     Ca Oxalate,Dihydrate 60 %    Ca Oxalate,Monohydr  10 %    STONE COMMENT Comment     STONE COMMENT Comment     Please note Comment     Disclaimer Comment     Photo Comment     Stone Source Comment     Hydroxyapatite 30 %     Weight (last 2 days)     Date/Time Weight    04/12/22 1321 97 6 (215 2)        Body mass index is 30 01 kg/m²  BP      Temp      Pulse     Resp      SpO2        Vitals:    04/12/22 1321   Weight: 97 6 kg (215 lb 3 2 oz)     Vitals:    04/12/22 1321   Weight: 97 6 kg (215 lb 3 2 oz)     Vitals:    04/12/22 1321   BP: 138/82   Pulse: 80   Resp: 16   SpO2: 97%   Weight: 97 6 kg (215 lb 3 2 oz)   Height: 5' 11" (1 803 m)       Transitional Care Management Review:  Erica Shine is a 63 y o  male here for TCM follow up       During the TCM phone call patient stated:    TCM Call (since 3/12/2022)     Date and time call was made  4/1/2022 2000 [facility] reviewed  Records not available        Patient was hospitial[facility] at  [facility]        Date of Admission  03/30/22    Date of discharge  03/31/22    Diagnosis  URETERAL CALCULUS, RIGHT    Disposition  Home    Were the patients medications reviewed and updated  No    Current Symptoms  None      TCM Call (since 3/12/2022)     Should patient be enrolled in anticoag monitoring? No    Scheduled for follow up?   Yes    Patients specialists  Urologist    Urologist name  Bishop Reyes    Urologist contact #  [phone]    Referrals needed  NO    Did you obtain your prescribed medications  Yes    Do you need help managing your prescriptions or medications  No    Is transportation to your appointment needed  No    I have advised the patient to call PCP with any new or worsening symptoms  JAVIER RESTREPO/MA    Are you recieving any outpatient services  No    Are you recieving home care services  No    Are you using any community resources  No    Have you fallen in the last 12 months  No    Interperter language line needed  No          Abdoulayeda Points, DO

## 2022-04-13 NOTE — ASSESSMENT & PLAN NOTE
Transition care management visit regarding recent hospitalization for left ureteral calculus requiring surgical intervention/lithotripsy/stenting today we did review the discharge summary laboratories in test completed during the hospitalization we encourage patient increase water intake he is working with Urology would like a 2nd opinion he does report me dissatisfaction with the care team at the hospital he would like to get 2nd opinion which I also advised will have patient see urologist Dr Aime Chino and nephrologist Dr Chelly Rabago; [place] as scheduled call if any problems

## 2022-04-15 DIAGNOSIS — E29.1 TESTICULAR HYPOGONADISM: ICD-10-CM

## 2022-04-15 RX ORDER — SILDENAFIL 50 MG/1
50 TABLET, FILM COATED ORAL DAILY PRN
Qty: 30 TABLET | Refills: 0 | Status: SHIPPED | OUTPATIENT
Start: 2022-04-15 | End: 2022-05-09

## 2022-04-17 DIAGNOSIS — E29.1 HYPOGONADISM IN MALE: ICD-10-CM

## 2022-04-17 DIAGNOSIS — F41.9 ANXIETY: ICD-10-CM

## 2022-04-17 DIAGNOSIS — N20.0 NEPHROLITHIASIS: ICD-10-CM

## 2022-04-17 RX ORDER — PHENAZOPYRIDINE HYDROCHLORIDE 200 MG/1
TABLET, FILM COATED ORAL
Qty: 10 TABLET | Refills: 0 | Status: SHIPPED | OUTPATIENT
Start: 2022-04-17 | End: 2022-06-09 | Stop reason: SDUPTHER

## 2022-04-18 RX ORDER — LORAZEPAM 0.5 MG/1
TABLET ORAL
Qty: 90 TABLET | Refills: 0 | Status: SHIPPED | OUTPATIENT
Start: 2022-04-18 | End: 2022-07-12

## 2022-04-18 RX ORDER — TESTOSTERONE CYPIONATE 200 MG/ML
INJECTION INTRAMUSCULAR
Qty: 4 ML | Refills: 3 | Status: SHIPPED | OUTPATIENT
Start: 2022-04-18 | End: 2022-05-31

## 2022-04-19 ENCOUNTER — TELEPHONE (OUTPATIENT)
Dept: UROLOGY | Facility: MEDICAL CENTER | Age: 62
End: 2022-04-19

## 2022-04-19 NOTE — TELEPHONE ENCOUNTER
Patient of Dr Leena Collado in [place]      Patient called stating he will faxing la papers that need to be filled out after having surgery and he would like a call back as soon as paperwork is filled out       Patient can be reached at [phone]

## 2022-04-20 NOTE — TELEPHONE ENCOUNTER
Dr Socrates Daniels, patient had a Ureteroscopy with you on 3/31/2022  Stent was removed on 4/7/2022  Patient is requesting a Intermittent FMLA 3/18/2022 thru 9/15/2022  Please review chart and advise if Intermittent leave is appropriate

## 2022-04-21 NOTE — TELEPHONE ENCOUNTER
He has a follow-up visit scheduled  I do not see why he needs FMLA as he had a procedure and the stent is out  I do not in vision he is going to need many more visits in the next 6 months

## 2022-04-30 DIAGNOSIS — N20.1 RIGHT URETERAL STONE: ICD-10-CM

## 2022-05-02 RX ORDER — OXYBUTYNIN CHLORIDE 5 MG/1
5 TABLET, EXTENDED RELEASE ORAL AS NEEDED
Qty: 90 TABLET | Refills: 2 | Status: SHIPPED | OUTPATIENT
Start: 2022-05-02

## 2022-05-09 DIAGNOSIS — E29.1 TESTICULAR HYPOGONADISM: ICD-10-CM

## 2022-05-09 RX ORDER — SILDENAFIL 50 MG/1
TABLET, FILM COATED ORAL
Qty: 30 TABLET | Refills: 0 | Status: SHIPPED | OUTPATIENT
Start: 2022-05-09 | End: 2022-06-07

## 2022-05-29 DIAGNOSIS — E29.1 HYPOGONADISM IN MALE: ICD-10-CM

## 2022-05-31 ENCOUNTER — TELEPHONE (OUTPATIENT)
Dept: UROLOGY | Facility: AMBULATORY SURGERY CENTER | Age: 62
End: 2022-05-31

## 2022-05-31 DIAGNOSIS — N20.0 NEPHROLITHIASIS: ICD-10-CM

## 2022-05-31 RX ORDER — TAMSULOSIN HYDROCHLORIDE 0.4 MG/1
0.4 CAPSULE ORAL
Qty: 90 CAPSULE | Refills: 0 | Status: SHIPPED | OUTPATIENT
Start: 2022-05-31

## 2022-05-31 RX ORDER — TESTOSTERONE CYPIONATE 200 MG/ML
INJECTION INTRAMUSCULAR
Qty: 4 ML | Refills: 0 | Status: SHIPPED | OUTPATIENT
Start: 2022-05-31 | End: 2022-07-05

## 2022-05-31 NOTE — TELEPHONE ENCOUNTER
If patient symptomatic, recommend he go for urine testing and follow up imaging now versus in 1 week  Patient should increase his water intake, refill for Flomax was sent to his pharmacy, and he should strain all urine  Please review ER precautions

## 2022-05-31 NOTE — TELEPHONE ENCOUNTER
Notified him of KUB and urine studies  - He will go tomorrow- He is hydrating, using ibuprofen and heating pad - he also has some oyxybutynin   He is taking flomax and hydrating

## 2022-05-31 NOTE — TELEPHONE ENCOUNTER
Pt is followed by Charles Garcia    Pt called and stated that he is having back pain, flank pain and bladder pain  He said he does have kidney stones Three or four on the left side that are 6 mm  He is afraid that one is getting caught again  It feels the same as it did before when one did get caught passing       Pt can be reached at [phone]

## 2022-06-01 ENCOUNTER — APPOINTMENT (OUTPATIENT)
Dept: RADIOLOGY | Age: 62
End: 2022-06-01
Payer: COMMERCIAL

## 2022-06-01 ENCOUNTER — APPOINTMENT (OUTPATIENT)
Dept: LAB | Age: 62
End: 2022-06-01
Payer: COMMERCIAL

## 2022-06-01 DIAGNOSIS — N20.0 NEPHROLITHIASIS: ICD-10-CM

## 2022-06-01 LAB
AMORPH URATE CRY URNS QL MICRO: ABNORMAL
BACTERIA UR QL AUTO: ABNORMAL /HPF
BILIRUB UR QL STRIP: NEGATIVE
CAOX CRY URNS QL MICRO: ABNORMAL /HPF
CLARITY UR: ABNORMAL
COLOR UR: ABNORMAL
GLUCOSE UR STRIP-MCNC: NEGATIVE MG/DL
HGB UR QL STRIP.AUTO: ABNORMAL
KETONES UR STRIP-MCNC: NEGATIVE MG/DL
LEUKOCYTE ESTERASE UR QL STRIP: NEGATIVE
MUCOUS THREADS UR QL AUTO: ABNORMAL
NITRITE UR QL STRIP: NEGATIVE
NON-SQ EPI CELLS URNS QL MICRO: ABNORMAL /HPF
PH UR STRIP.AUTO: 7.5 [PH]
PROT UR STRIP-MCNC: NEGATIVE MG/DL
RBC #/AREA URNS AUTO: ABNORMAL /HPF
SP GR UR STRIP.AUTO: 1.02 (ref 1–1.03)
UROBILINOGEN UR STRIP-ACNC: <2 MG/DL
WBC #/AREA URNS AUTO: ABNORMAL /HPF

## 2022-06-01 PROCEDURE — 87086 URINE CULTURE/COLONY COUNT: CPT

## 2022-06-01 PROCEDURE — 81001 URINALYSIS AUTO W/SCOPE: CPT

## 2022-06-01 PROCEDURE — 74018 RADEX ABDOMEN 1 VIEW: CPT

## 2022-06-02 LAB — BACTERIA UR CULT: NORMAL

## 2022-06-03 NOTE — TELEPHONE ENCOUNTER
Spoke to patient to advise of CT has been ordered  Patient states he already has central scheduling number  Advised we will call him once the results are finalized  Patient is understanding

## 2022-06-03 NOTE — TELEPHONE ENCOUNTER
Patient c/o right and left side kidney pain  He is still awaiting results of Xray and urine culture  Please call

## 2022-06-03 NOTE — TELEPHONE ENCOUNTER
Advised patient of AS CRNP comments and recommendations from previuos encounter  Pt would like to proceed with ct renal stone study asap  Please place order for above testing as patient is very anxious to have completed

## 2022-06-03 NOTE — TELEPHONE ENCOUNTER
Recent urine culture was negative for infection  KUB identified bilateral nonobstructing renal calculi  Prior ultrasound order remains pending which can further evaluate for hydronephrosis  Otherwise, patient can proceed with CT renal stone study if he is experiencing symptoms of acute renal colic  Please review ER precautions

## 2022-06-03 NOTE — TELEPHONE ENCOUNTER
Spoke with patient who is requesting urine test results as well as XR abdomen kub  Contacted the reading room for XR results  Spoke to Johan Villaseñor who states will have results completed  Please be advised

## 2022-06-04 ENCOUNTER — HOSPITAL ENCOUNTER (OUTPATIENT)
Dept: RADIOLOGY | Facility: HOSPITAL | Age: 62
Discharge: HOME | End: 2022-06-04
Payer: COMMERCIAL

## 2022-06-04 DIAGNOSIS — N20.0 NEPHROLITHIASIS: ICD-10-CM

## 2022-06-04 PROCEDURE — 74176 CT ABD & PELVIS W/O CONTRAST: CPT

## 2022-06-04 PROCEDURE — G1004 CDSM NDSC: HCPCS

## 2022-06-05 NOTE — TELEPHONE ENCOUNTER
Results of CT scan identified a 4mm left distal ureteral stone without hydronephrosis  Recommend continue Flomax, increasing water intake, and strain all urine  He should obtain US prior to next visit to re-evaluate stone burden  Order remains in system  Please review ER precautions

## 2022-06-06 NOTE — TELEPHONE ENCOUNTER
Called and spoke with patient  Advised on AP's note and recommendations  Patient stated he will make sure that the [address] at the [address] is done ptv  He was appreciative of call

## 2022-06-07 DIAGNOSIS — E29.1 TESTICULAR HYPOGONADISM: ICD-10-CM

## 2022-06-07 RX ORDER — SILDENAFIL 50 MG/1
TABLET, FILM COATED ORAL
Qty: 30 TABLET | Refills: 0 | Status: SHIPPED | OUTPATIENT
Start: 2022-06-07

## 2022-06-08 ENCOUNTER — TELEPHONE (OUTPATIENT)
Dept: OTHER | Facility: OTHER | Age: 62
End: 2022-06-08

## 2022-06-08 ENCOUNTER — HOSPITAL ENCOUNTER (OUTPATIENT)
Dept: RADIOLOGY | Age: 62
Discharge: HOME | End: 2022-06-08
Payer: COMMERCIAL

## 2022-06-08 DIAGNOSIS — N20.0 NEPHROLITHIASIS: ICD-10-CM

## 2022-06-08 DIAGNOSIS — N20.1 RIGHT URETERAL STONE: ICD-10-CM

## 2022-06-08 PROCEDURE — 76770 US EXAM ABDO BACK WALL COMP: CPT

## 2022-06-08 NOTE — TELEPHONE ENCOUNTER
Patient had an [address] done this am and states that nephrosis and a stone was located  He does have an upcoming appointment in office to discuss results but he would like to speak to someone beforehand  He has a long history of passing stones and is very concerned that if the stone starts to pass, he is not going to have any pain medications at the start and does not want to have to go to ED  He says that OTC pain meds do not help when passing stones  Please call patient and address his pain management concerns

## 2022-06-08 NOTE — TELEPHONE ENCOUNTER
Dr Tucker Kwok recommended left ESWL for his stone  If he becomes symptomatic, we will treat accordingly   He has follow up next week to discuss

## 2022-06-09 RX ORDER — OXYCODONE HYDROCHLORIDE 5 MG/1
5 CAPSULE ORAL EVERY 4 HOURS PRN
Qty: 15 CAPSULE | Refills: 0 | Status: SHIPPED | OUTPATIENT
Start: 2022-06-09

## 2022-06-09 RX ORDER — PHENAZOPYRIDINE HYDROCHLORIDE 200 MG/1
200 TABLET, FILM COATED ORAL 3 TIMES DAILY PRN
Qty: 15 TABLET | Refills: 0 | Status: ON HOLD | OUTPATIENT
Start: 2022-06-09 | End: 2022-06-22 | Stop reason: SDUPTHER

## 2022-06-09 RX ORDER — NALOXONE HYDROCHLORIDE 4 MG/.1ML
SPRAY NASAL
Qty: 1 EACH | Refills: 1 | Status: SHIPPED | OUTPATIENT
Start: 2022-06-09

## 2022-06-09 NOTE — TELEPHONE ENCOUNTER
Called patient back ne has had stones for 30 years - he wants the surgery sooner than later and and moved him up one day  He is also requesting pain med As when his stone move they are very painful   He is also requesting a refill of  pyridium as he is out of medication

## 2022-06-09 NOTE — TELEPHONE ENCOUNTER
Please let patient know I renewed the Pyridium and sent oxycodone to his pharmacy  He should continue take the Flomax to make sure he is drinking at least 2 liters of water a day    Please provide ER precautions

## 2022-06-09 NOTE — TELEPHONE ENCOUNTER
Called Johan and notified him of new scripts   Yaima had already called him And is aware of ER precausions

## 2022-06-14 ENCOUNTER — TELEPHONE (OUTPATIENT)
Dept: OTHER | Facility: OTHER | Age: 62
End: 2022-06-14

## 2022-06-14 ENCOUNTER — TELEPHONE (OUTPATIENT)
Dept: UROLOGY | Facility: HOSPITAL | Age: 62
End: 2022-06-14

## 2022-06-14 DIAGNOSIS — N20.0 NEPHROLITHIASIS: Primary | ICD-10-CM

## 2022-06-14 RX ORDER — OXYCODONE HYDROCHLORIDE AND ACETAMINOPHEN 5; 325 MG/1; MG/1
1 TABLET ORAL EVERY 6 HOURS PRN
Qty: 6 TABLET | Refills: 0 | Status: ON HOLD | OUTPATIENT
Start: 2022-06-14 | End: 2022-06-22 | Stop reason: SDUPTHER

## 2022-06-14 NOTE — TELEPHONE ENCOUNTER
Patient calling that the prescription for oxyCODONE 5 mg capsule was not able to be filled due to the pharmacy having a shortage on capsules and he thinks he is currently passing a kidney stone and in need of this prescription  Patient requesting script be changed to tablet form and sent to the same pharmacy [facility] [address]  [employer] [address] [employer]      Patient requesting a call back when this medication is sent at [phone]

## 2022-06-14 NOTE — TELEPHONE ENCOUNTER
Called Juanjo Kirk back and he had already spoke with Elisabeth Cline  and she sent medication to pharmacy    He states that its not bad enough to go to ER - he has an appointment tomorrow in [place] with Nicolas Dimitriadis

## 2022-06-14 NOTE — TELEPHONE ENCOUNTER
Patient states his pain is well controlled at home with PRN percocet   Follow up in office tomorrow with Ami Polanco

## 2022-06-14 NOTE — TELEPHONE ENCOUNTER
Patient can not get his prescription of oxyCODONE (OXY-IR) 5 MG capsule [[id number]]     Order Details  Dose: 5 mg Route: Oral Frequency: Every 4 hours PRN for moderate pain     The pharmacy is not able to get it in capsule form  The suppliers do not have any  Can you please redo the order in tablet form? Patient is in pain and was hoping to get it tonight

## 2022-06-15 ENCOUNTER — OFFICE VISIT (OUTPATIENT)
Dept: UROLOGY | Facility: AMBULATORY SURGERY CENTER | Age: 62
End: 2022-06-15
Payer: COMMERCIAL

## 2022-06-15 VITALS
DIASTOLIC BLOOD PRESSURE: 80 MMHG | HEIGHT: 71 IN | BODY MASS INDEX: 30.1 KG/M2 | HEART RATE: 78 BPM | SYSTOLIC BLOOD PRESSURE: 122 MMHG | WEIGHT: 215 LBS

## 2022-06-15 DIAGNOSIS — N20.0 NEPHROLITHIASIS: Primary | ICD-10-CM

## 2022-06-15 PROCEDURE — 99214 OFFICE O/P EST MOD 30 MIN: CPT | Performed by: NURSE PRACTITIONER

## 2022-06-15 NOTE — PROGRESS NOTES
6/15/2022    Assessment and Plan    63 y o  male managed by our office  1  Nephrolithiasis  · Status post cystoscopy with ureteroscopy and laser lithotripsy on the right 03/31/2022  · Stone analysis 70% calcium oxalate  · Urine testing performed 06/01/2022 unremarkable for growth and infection  · CT stone study performed 06/04/2022 reveals a 4 mm stone in the distal left ureter with follow-up ultrasound of kidney and bladder performed 06/08/2022 with findings of mild left-sided hydronephrosis and weak left ureteral jet suggesting persistence of distal ureteral calculus   · OR case requested for URS with laser lithotripsy * pt requesting no use of stent if possible postop  · Recommend continued follow up with Nephrology for metabolic work up and evaluation   · Discussed dietary and behavioral modifications to assist in the reduction of stone formation - pt education material provided  · Urine culture performed 6/1/2022 negative for growth  · Follow up as scheduled postoperative        History of Present Illness  Ayleen Blandon is a 63 y o  male here for follow up evaluation of  nephrolithiasis  Patient is status post ureteroscopy with laser lithotripsy 03/31/2022  Stone analysis at that time revealed 70% calcium oxalate  Unfortunately, proximally 2-3 weeks ago patient developed left-sided flank pain and discomfort and was sent for CT scan and subsequent ultrasound which revealed a 4 mm left distal ureteral calculi with mild hydroureteronephrosis  Patient reports to be in moderate pain discomfort despite use of tamsulosin, ibuprofen  He denies fever and chills  He reports urinary frequency  Patient denies complications secondary to use of anesthesia  He denies results of chest pain, shortness of breath and dizziness  He denies smoking and the use/abuse of illicit substances and alcohol  He reports recently using a tea, chancha mili, to assist in decreasing stone formation      Review of Systems Constitutional: Negative for chills and fever  Respiratory: Negative for cough and shortness of breath  Cardiovascular: Negative for chest pain  Gastrointestinal: Negative for abdominal distention, abdominal pain, blood in stool, nausea and vomiting  Genitourinary: Positive for flank pain (left) and frequency  Negative for difficulty urinating, dysuria, enuresis, hematuria and urgency  Skin: Negative for rash             AUA SYMPTOM SCORE    Flowsheet Row Most Recent Value   AUA SYMPTOM SCORE    How often have you had a sensation of not emptying your bladder completely after you finished urinating? 4 (P)     How often have you had to urinate again less than two hours after you finished urinating? 5 (P)     How often have you found you stopped and started again several times when you urinate? 0 (P)     How often have you found it difficult to postpone urination? 0 (P)     How often have you had a weak urinary stream? 3 (P)     How often have you had to push or strain to begin urination? 1 (P)     How many times did you most typically get up to urinate from the time you went to bed at night until the time you got up in the morning? 1 (P)     Quality of Life: If you were to spend the rest of your life with your urinary condition just the way it is now, how would you feel about that? 5 (P)     AUA SYMPTOM SCORE 14 (P)              Past Medical History  Past Medical History:   Diagnosis Date    Chronic kidney disease     kidney stones    GERD (gastroesophageal reflux disease)     Hypertension     Kidney stone        Past Social History  Past Surgical History:   Procedure Laterality Date    COLONOSCOPY      ESOPHAGOGASTRODUODENOSCOPY      EXTRACORPOREAL SHOCK WAVE LITHOTRIPSY Bilateral     Renal Multiple times    FL RETROGRADE PYELOGRAM  3/31/2022    DE CYSTO/URETERO W/LITHOTRIPSY &INDWELL STENT INSRT Left 8/6/2020    Procedure: CYSTOSCOPY URETEROSCOPY WITH LITHOTRIPSY HOLMIUM LASER, AND INSERTION STENT URETERAL;  Surgeon: Nate Bonds MD;  [facility];  Service: Urology    WY CYSTO/URETERO W/LITHOTRIPSY &INDWELL STENT INSRT Right 3/31/2022    Procedure: CYSTOSCOPY URETEROSCOPY WITH LITHOTRIPSY HOLMIUM LASER,BASKET STONE EXTRACTION, RETROGRADE PYELOGRAM AND INSERTION STENT URETERAL;  Surgeon: Leti Harris MD;  [facility];  Service: Urology    WY EXC SKIN BENIG >4 CM FACE,FACIAL Right 2016    Procedure: EXCISION  LESION UPPER EYELID, COMPLEX CLOSURE;  Surgeon: Geeta Marte MD;  [facility];  Service: Plastics    WY FRAGMENT KIDNEY STONE/ ESWL Right 2019    Procedure: Leelee Mckeon SHOCKWAVE (ESWL); Surgeon: Tyree De La Garza MD;  Location[facility];  Service: Urology     Social History     Tobacco Use   Smoking Status Former Smoker    Quit date: 1981    Years since quittin 1   Smokeless Tobacco Never Used       Past Family History  Family History   Problem Relation Age of Onset    Other Father         CABG (coronary artery bypass surgery)    Atrial fibrillation Sister     Other Paternal Uncle         Myocardial infarction    Coronary artery disease Family     Diabetes Family     Hypertension Family     Uterine cancer Family        Past Social history  Social History     Socioeconomic History    Marital status: /Civil Union     Spouse name: Not on file    Number of children: Not on file    Years of education: Not on file    Highest education level: Not on file   Occupational History    Not on file   Tobacco Use    Smoking status: Former Smoker     Quit date: 1981     Years since quittin 1    Smokeless tobacco: Never Used   Vaping Use    Vaping Use: Never used   Substance and Sexual Activity    Alcohol use:  Yes     Alcohol/week: 2 0 standard drinks     Types: 2 Cans of beer per week     Comment: weekend    Drug use: No    Sexual activity: Not on file   Other Topics Concern    Not on file   Social History Narrative    Daily caffeine consumption     Social Determinants of Health     Financial Resource Strain: Not on file   Food Insecurity: Not on file   Transportation Needs: Not on file   Physical Activity: Not on file   Stress: Not on file   Social Connections: Not on file   Intimate Partner Violence: Not on file   Housing Stability: Not on file       Current Medications  Current Outpatient Medications   Medication Sig Dispense Refill    b complex vitamins capsule Take 1 capsule by mouth daily   Cholecalciferol (VITAMIN D3) 1000 units CAPS Take 1 capsule by mouth daily      coenzyme Q-10 100 MG capsule Take 1 capsule by mouth daily      cyanocobalamin (VITAMIN B-12) 100 mcg tablet Take by mouth daily   fenofibrate (TRICOR) 145 mg tablet TAKE 1 TABLET DAILY 90 tablet 1    fexofenadine (ALLEGRA) 180 MG tablet Take 180 mg by mouth daily in the early morning       lisinopril (ZESTRIL) 10 mg tablet TAKE 1 AND 1/2 TABLETS(15  MG TOTAL) DAILY 135 tablet 1    multivitamin (THERAGRAN) TABS Take 1 tablet by mouth daily   omeprazole (PriLOSEC) 40 MG capsule Take 40 mg by mouth daily in the early morning       sildenafil (VIAGRA) 50 MG tablet TAKE 1 TABLET DAILY AS     NEEDED FOR ERECTILE        DYSFUNCTION 30 tablet 0    SYRINGE-NEEDLE, DISP, 3 ML (BD ECLIPSE SYRINGE) 25G X 1" 3 ML MISC by Does not apply route once a week 50 each 3    tamsulosin (FLOMAX) 0 4 mg Take 1 capsule (0 4 mg total) by mouth daily with dinner 90 capsule 0    testosterone cypionate (DEPO-TESTOSTERONE) 200 mg/mL SOLN INJECT 0 4ML INTO SHOULDER,THIGH OR BUTTOCKS ONCE     WEEKLY  DISCARD THE        REMAINDER  4 mL 0    LORazepam (ATIVAN) 0 5 mg tablet TAKE 1 TABLET DAILY AS     NEEDED FOR ANXIETY (Patient not taking: Reported on 6/15/2022) 90 tablet 0    naloxone (NARCAN) 4 mg/0 1 mL nasal spray Administer 1 spray into a nostril  If no response after 2-3 minutes, give another dose in the other nostril using a new spray   1 each 1    oxybutynin (DITROPAN-XL) 5 mg 24 hr tablet TAKE 1 TABLET (5 MG TOTAL) BY MOUTH AS NEEDED (AS NEEDED) (Patient not taking: Reported on 6/15/2022) 90 tablet 2    oxyCODONE (OXY-IR) 5 MG capsule Take 1 capsule (5 mg total) by mouth every 4 (four) hours as needed for moderate pain Max Daily Amount: 30 mg 15 capsule 0    oxyCODONE-acetaminophen (Percocet) 5-325 mg per tablet Take 1 tablet by mouth every 6 (six) hours as needed for moderate pain or severe pain Max Daily Amount: 4 tablets (Patient not taking: Reported on 6/15/2022) 6 tablet 0    phenazopyridine (PYRIDIUM) 200 mg tablet Take 1 tablet (200 mg total) by mouth 3 (three) times a day as needed for bladder spasms 15 tablet 0     No current facility-administered medications for this visit  Allergies  Allergies   Allergen Reactions    Ciprofloxacin Hives    Macrobid [Nitrofurantoin] Nausea Only and Palpitations         The following portions of the patient's history were reviewed and updated as appropriate: allergies, current medications, past medical history, past social history, past surgical history and problem list       Vitals  Vitals:    06/15/22 0906   BP: 122/80   Pulse: 78   Weight: 97 5 kg (215 lb)   Height: 5' 11" (1 803 m)           Physical Exam  Physical Exam  Vitals reviewed  Constitutional:       General: He is not in acute distress  Appearance: Normal appearance  He is normal weight  HENT:      Head: Normocephalic  Cardiovascular:      Rate and Rhythm: Normal rate and regular rhythm  Heart sounds: Normal heart sounds  Pulmonary:      Effort: No respiratory distress  Breath sounds: Normal breath sounds  Abdominal:      General: Bowel sounds are normal       Palpations: Abdomen is soft  Tenderness: There is left CVA tenderness  There is no right CVA tenderness  Musculoskeletal:         General: Normal range of motion  Skin:     General: Skin is warm and dry     Neurological:      General: No focal deficit present  Mental Status: He is alert and oriented to person, place, and time  Psychiatric:         Mood and Affect: Mood normal          Behavior: Behavior normal            Results  No results found for this or any previous visit (from the past 1 hour(s)) ]  Lab Results   Component Value Date    PSA 0 9 06/24/2021    PSA 1 0 10/14/2020    PSA 1 0 07/10/2020     Lab Results   Component Value Date    GLUCOSE 97 01/08/2016    CALCIUM 9 1 03/31/2022     11/16/2017    K 4 2 03/31/2022    CO2 23 03/31/2022     (H) 03/31/2022    BUN 20 03/31/2022    CREATININE 1 22 03/31/2022     Lab Results   Component Value Date    WBC 7 69 03/31/2022    HGB 11 1 (L) 03/31/2022    HCT 37 2 03/31/2022    MCV 81 (L) 03/31/2022     03/31/2022     CT ABDOMEN AND PELVIS WITHOUT IV CONTRAST - LOW DOSE RENAL STONE      INDICATION:   Bilateral flank pain with past history of kidney stones      COMPARISON:  3/17/2022     TECHNIQUE:  Low radiation dose thin section CT examination of the abdomen and pelvis was performed without intravenous or oral contrast according to a protocol specifically designed to evaluate for urinary tract calculus  Axial, sagittal, and coronal 2D   reformatted images were created from the source data and submitted for interpretation  Evaluation for pathology in the abdomen and pelvis that is unrelated to urinary tract calculi is limited        Radiation dose length product (DLP) for this visit:  548 96 mGy-cm   This examination, like all CT scans performed in the [facility], was performed utilizing techniques to minimize radiation dose exposure, including the use of iterative   reconstruction and automated exposure control      URINARY TRACT FINDINGS:     RIGHT KIDNEY AND URETER: Nonobstructive nephroliths and Freddy's plaques  UPJ stone seen in March has resolved  No current collecting system stones or hydronephrosis    No suspicious contour distorting masses or perinephric collections      LEFT KIDNEY AND URETER: Nephroliths and Freddy's plaques at the level of the left kidney  New 4 mm stone in the distal left ureter  No significant proximal distention of the collecting system  Focal cortical scarring at the posterior left mid kidney  No   suspicious contour distorting masses     URINARY BLADDER:  Normal when accounting for degree of nondistention         ADDITIONAL FINDINGS:     LOWER CHEST:  No clinically significant abnormality identified in the visualized lower chest      SOLID VISCERA: Limited low radiation dose noncontrast CT evaluation demonstrates no clinically significant abnormality of the imaged portions of the liver, spleen, pancreas, or adrenal glands        GALLBLADDER/BILIARY TREE:  No calcified gallstones  No pericholecystic inflammatory change  No biliary dilatation      STOMACH AND BOWEL:  Unremarkable      APPENDIX:  A normal appendix was visualized      ABDOMINOPELVIC CAVITY:  No ascites  No pneumoperitoneum  No lymphadenopathy  Vessels are unremarkable      REPRODUCTIVE ORGANS:  The prostate is enlarged      ABDOMINAL WALL/INGUINAL REGIONS:  Small fat-containing inguinal hernias on each side      OSSEOUS STRUCTURES:  No acute fracture or destructive osseous lesion      IMPRESSION:     1  New 4 mm stone in the distal left ureter without significant associated hydronephrosis      2  No stones in the right collecting system  UPJ stone seen in March has resolved          RENAL ULTRASOUND     INDICATION:   N20 0: Calculus of kidney  History of left ureteral calculus, left flank pain      COMPARISON: CT 6/4/2022      TECHNIQUE:   Ultrasound of the retroperitoneum was performed with a curvilinear transducer utilizing volumetric sweeps and still imaging techniques       FINDINGS:     KIDNEYS:  Symmetric and normal size  Right kidney:  13 3 x 6 1 x 6 1 cm  Volume 256 7 mL  Left kidney:  13 4 x 6 8 x 6 2 cm    Volume 291 9 mL     Right kidney  Normal echogenicity and contour  Small simple cyst in the lower pole measuring 1 1 x 1 0 x 1 0 cm  No hydronephrosis  Multiple calculi again seen  No perinephric fluid collections      Left kidney  Normal echogenicity and contour  No mass is identified  Mild hydronephrosis  Multiple calculi again seen  No perinephric fluid collections      URETERS:  Nonvisualized      BLADDER:   Normally distended  No focal thickening or mass lesions  Weak left ureteral jet  Normal right ureteral jet  Mild post void residual volume  Measured post void volume in mL: 58 4        IMPRESSION:     Mild left hydronephrosis, with weak left ureteral jet suggesting persistence of the distal ureteral calculus although it is not identified on this study       Multiple bilateral renal calculi  Orders  No orders of the defined types were placed in this encounter        WOLF Hedrick

## 2022-06-16 ENCOUNTER — TELEPHONE (OUTPATIENT)
Dept: UROLOGY | Facility: MEDICAL CENTER | Age: 62
End: 2022-06-16

## 2022-06-16 NOTE — TELEPHONE ENCOUNTER
I spoke to the patient and scheduled his #5 ordered by Tressa Scott  for 6/22/2022 at [facility] with Dr Carmelo Michaels     -instructions given verbally and Emailed  -PATs are complete  -patient will avoid any potentially blood thinning medications 7 days prior  -[employer] - no auth required

## 2022-06-17 NOTE — PRE-PROCEDURE INSTRUCTIONS
Pre-Surgery Instructions:   Medication Instructions    b complex vitamins capsule Stop taking 7 days prior to surgery   Cholecalciferol (VITAMIN D3) 1000 units CAPS Stop taking 7 days prior to surgery   coenzyme Q-10 100 MG capsule Stop taking 7 days prior to surgery   cyanocobalamin (VITAMIN B-12) 100 mcg tablet Stop taking 7 days prior to surgery   fenofibrate (TRICOR) 145 mg tablet Take day of surgery   fexofenadine (ALLEGRA) 180 MG tablet Uses PRN- OK to take day of surgery    lisinopril (ZESTRIL) 10 mg tablet Take night before surgery    LORazepam (ATIVAN) 0 5 mg tablet Uses PRN- OK to take day of surgery    multivitamin (THERAGRAN) TABS Stop taking 7 days prior to surgery   omeprazole (PriLOSEC) 40 MG capsule Take day of surgery   oxybutynin (DITROPAN-XL) 5 mg 24 hr tablet Hold day of surgery   oxyCODONE (OXY-IR) 5 MG capsule Uses PRN- OK to take day of surgery    phenazopyridine (PYRIDIUM) 200 mg tablet Hold day of surgery      tamsulosin (FLOMAX) 0 4 mg Take night before surgery    testosterone cypionate (DEPO-TESTOSTERONE) 200 mg/mL SOLN does not impact surgery     Pt verbalizes understanding of the following:    - Bathing instructions, will shower in am  - No lotions, powders, sprays, deodorant, jewelry, body piercings     - NPO after MN  - Avoid OTC non-directed Vit/ Suppl/ Herbals 7 days prior to surgery to ensure no drug interactions with perioperative surgical/ anesthetic meds  - Avoid NSAIDs 3 days prior  - Avoid ASA containing products 5 days prior  - Continue statin medication up through and including the day of surgery    - Bring list of meds with last dose noted  - [employer] cards & photo id

## 2022-06-21 ENCOUNTER — ANESTHESIA EVENT (OUTPATIENT)
Dept: PERIOP | Facility: HOSPITAL | Age: 62
End: 2022-06-21
Payer: COMMERCIAL

## 2022-06-21 PROCEDURE — NC001 PR NO CHARGE: Performed by: UROLOGY

## 2022-06-21 NOTE — H&P
HISTORY AND PHYSICAL  ? ? Patient Name: Roxy Parrish  Patient MRN: [id number]  Attending Provider: Brandi Ortega MD  Service: Urology  Chief Complaint    Left renal stones    HPI   Roxy Parrish is a 63 y o  male with stones in left kidney and lower left ureter  I plan cysto, left ureteroscopy, laser stones, stent  Potential risks and complications discussed, and informed consent was given by the patient  Medications  Meds/Allergies   No current facility-administered medications for this encounter  Cannot display prior to admission medications because the patient has not been admitted in this contact  No current facility-administered medications for this encounter  Current Outpatient Medications:     b complex vitamins capsule, Take 1 capsule by mouth daily  , Disp: , Rfl:     Cholecalciferol (VITAMIN D3) 1000 units CAPS, Take 1 capsule by mouth daily, Disp: , Rfl:     coenzyme Q-10 100 MG capsule, Take 1 capsule by mouth daily, Disp: , Rfl:     cyanocobalamin (VITAMIN B-12) 100 mcg tablet, Take by mouth daily  , Disp: , Rfl:     fenofibrate (TRICOR) 145 mg tablet, TAKE 1 TABLET DAILY, Disp: 90 tablet, Rfl: 1    fexofenadine (ALLEGRA) 180 MG tablet, Take 180 mg by mouth daily in the early morning , Disp: , Rfl:     lisinopril (ZESTRIL) 10 mg tablet, TAKE 1 AND 1/2 TABLETS(15  MG TOTAL) DAILY, Disp: 135 tablet, Rfl: 1    LORazepam (ATIVAN) 0 5 mg tablet, TAKE 1 TABLET DAILY AS     NEEDED FOR ANXIETY, Disp: 90 tablet, Rfl: 0    multivitamin (THERAGRAN) TABS, Take 1 tablet by mouth daily  , Disp: , Rfl:     omeprazole (PriLOSEC) 40 MG capsule, Take 40 mg by mouth daily in the early morning , Disp: , Rfl:     oxybutynin (DITROPAN-XL) 5 mg 24 hr tablet, TAKE 1 TABLET (5 MG TOTAL) BY MOUTH AS NEEDED (AS NEEDED) (Patient taking differently: Take 5 mg by mouth as needed (as needed after cystoscopies)), Disp: 90 tablet, Rfl: 2    oxyCODONE (OXY-IR) 5 MG capsule, Take 1 capsule (5 mg total) by mouth every 4 (four) hours as needed for moderate pain Max Daily Amount: 30 mg, Disp: 15 capsule, Rfl: 0    phenazopyridine (PYRIDIUM) 200 mg tablet, Take 1 tablet (200 mg total) by mouth 3 (three) times a day as needed for bladder spasms, Disp: 15 tablet, Rfl: 0    tamsulosin (FLOMAX) 0 4 mg, Take 1 capsule (0 4 mg total) by mouth daily with dinner (Patient taking differently: Take 0 4 mg by mouth if needed), Disp: 90 capsule, Rfl: 0    testosterone cypionate (DEPO-TESTOSTERONE) 200 mg/mL SOLN, INJECT 0 4ML INTO SHOULDER,THIGH OR BUTTOCKS ONCE     WEEKLY  DISCARD THE        REMAINDER , Disp: 4 mL, Rfl: 0    naloxone (NARCAN) 4 mg/0 1 mL nasal spray, Administer 1 spray into a nostril   If no response after 2-3 minutes, give another dose in the other nostril using a new spray , Disp: 1 each, Rfl: 1    oxyCODONE-acetaminophen (Percocet) 5-325 mg per tablet, Take 1 tablet by mouth every 6 (six) hours as needed for moderate pain or severe pain Max Daily Amount: 4 tablets (Patient not taking: Reported on 6/15/2022), Disp: 6 tablet, Rfl: 0    sildenafil (VIAGRA) 50 MG tablet, TAKE 1 TABLET DAILY AS     NEEDED FOR ERECTILE        DYSFUNCTION, Disp: 30 tablet, Rfl: 0    SYRINGE-NEEDLE, DISP, 3 ML (BD ECLIPSE SYRINGE) 25G X 1" 3 ML MISC, by Does not apply route once a week, Disp: 50 each, Rfl: 3  Review of Systems  10 point review of systems negative except as noted in HPI  Allergies  Allergies   Allergen Reactions    Ciprofloxacin Hives    Macrobid [Nitrofurantoin] Nausea Only and Palpitations     PMH  Past Medical History:   Diagnosis Date    Anxiety     Cancer ([facility] [address] )     skin ca on nose    Chronic kidney disease     kidney stones    GERD (gastroesophageal reflux disease)     Hyperlipidemia     Hypertension     Kidney stone      Past surgical history  Past Surgical History:   Procedure Laterality Date    COLONOSCOPY      ESOPHAGOGASTRODUODENOSCOPY      EXTRACORPOREAL SHOCK WAVE LITHOTRIPSY Bilateral Renal Multiple times    FL RETROGRADE PYELOGRAM  3/31/2022    AL CYSTO/URETERO W/LITHOTRIPSY &INDWELL STENT INSRT Left 2020    Procedure: CYSTOSCOPY URETEROSCOPY WITH LITHOTRIPSY HOLMIUM LASER, AND INSERTION STENT URETERAL;  Surgeon: Carlita Jacobson MD;  [facility];  Service: Urology    AL CYSTO/URETERO W/LITHOTRIPSY &INDWELL STENT INSRT Right 3/31/2022    Procedure: CYSTOSCOPY URETEROSCOPY WITH LITHOTRIPSY HOLMIUM LASER,BASKET STONE EXTRACTION, RETROGRADE PYELOGRAM AND INSERTION STENT URETERAL;  Surgeon: Lashonda Gooden MD;  [facility];  Service: Urology    AL EXC SKIN BENIG >4 CM FACE,FACIAL Right 2016    Procedure: EXCISION  LESION UPPER EYELID, COMPLEX CLOSURE;  Surgeon: Lisa Bobo MD;  Location:  MAIN OR;  Service: Plastics    AL FRAGMENT KIDNEY STONE/ ESWL Right 2019    Procedure: Jessica Mil SHOCKWAVE (ESWL); Surgeon: Popeye Gonzalez MD;  [facility];  Service: Urology     Social history  Social History     Tobacco Use    Smoking status: Former Smoker     Quit date: 1981     Years since quittin 1    Smokeless tobacco: Never Used   Vaping Use    Vaping Use: Never used   Substance Use Topics    Alcohol use:  Yes     Alcohol/week: 2 0 standard drinks     Types: 2 Cans of beer per week     Comment: weekend    Drug use: No     ?  Physical Exam     vs  General appearance: alert and oriented, in no acute distress  Head: Normocephalic, without obvious abnormality, atraumatic  Throat: lips, mucosa, and tongue normal; teeth and gums normal  Neck: no adenopathy, no carotid bruit, no JVD, supple, symmetrical, trachea midline and thyroid not enlarged, symmetric, no tenderness/mass/nodules  Lungs: clear to auscultation bilaterally  Heart: regular rate and rhythm, S1, S2 normal, no murmur, click, rub or gallop  Abdomen: soft, non-tender; bowel sounds normal; no masses,  no organomegaly  Extremities: extremities normal, warm and well-perfused; no cyanosis, clubbing, or edema  Dirk Negro MD

## 2022-06-22 ENCOUNTER — APPOINTMENT (OUTPATIENT)
Dept: RADIOLOGY | Facility: HOSPITAL | Age: 62
End: 2022-06-22
Payer: COMMERCIAL

## 2022-06-22 ENCOUNTER — ANESTHESIA (OUTPATIENT)
Dept: PERIOP | Facility: HOSPITAL | Age: 62
End: 2022-06-22
Payer: COMMERCIAL

## 2022-06-22 ENCOUNTER — HOSPITAL ENCOUNTER (OUTPATIENT)
Facility: HOSPITAL | Age: 62
Setting detail: OUTPATIENT SURGERY
Discharge: HOME | End: 2022-06-22
Attending: UROLOGY | Admitting: UROLOGY
Payer: COMMERCIAL

## 2022-06-22 VITALS
RESPIRATION RATE: 18 BRPM | OXYGEN SATURATION: 95 % | WEIGHT: 212.52 LBS | HEIGHT: 71 IN | DIASTOLIC BLOOD PRESSURE: 97 MMHG | BODY MASS INDEX: 29.75 KG/M2 | TEMPERATURE: 97.7 F | HEART RATE: 94 BPM | SYSTOLIC BLOOD PRESSURE: 157 MMHG

## 2022-06-22 DIAGNOSIS — N20.0 NEPHROLITHIASIS: ICD-10-CM

## 2022-06-22 DIAGNOSIS — N20.0 RENAL CALCULUS, BILATERAL: Primary | ICD-10-CM

## 2022-06-22 PROCEDURE — 99024 POSTOP FOLLOW-UP VISIT: CPT | Performed by: UROLOGY

## 2022-06-22 PROCEDURE — C2617 STENT, NON-COR, TEM W/O DEL: HCPCS | Performed by: UROLOGY

## 2022-06-22 PROCEDURE — C1769 GUIDE WIRE: HCPCS | Performed by: UROLOGY

## 2022-06-22 PROCEDURE — 74420 UROGRAPHY RTRGR +-KUB: CPT

## 2022-06-22 PROCEDURE — 52356 CYSTO/URETERO W/LITHOTRIPSY: CPT | Performed by: UROLOGY

## 2022-06-22 PROCEDURE — 82360 CALCULUS ASSAY QUANT: CPT | Performed by: UROLOGY

## 2022-06-22 DEVICE — INJECTION STENT SET
Type: IMPLANTABLE DEVICE | Site: URETER | Status: FUNCTIONAL
Brand: CONTOUR™ INJECTION STENT SET

## 2022-06-22 RX ORDER — ROCURONIUM BROMIDE 10 MG/ML
INJECTION, SOLUTION INTRAVENOUS AS NEEDED
Status: DISCONTINUED | OUTPATIENT
Start: 2022-06-22 | End: 2022-06-22

## 2022-06-22 RX ORDER — MAGNESIUM HYDROXIDE 1200 MG/15ML
LIQUID ORAL AS NEEDED
Status: DISCONTINUED | OUTPATIENT
Start: 2022-06-22 | End: 2022-06-22 | Stop reason: HOSPADM

## 2022-06-22 RX ORDER — CEFAZOLIN SODIUM 2 G/50ML
SOLUTION INTRAVENOUS AS NEEDED
Status: DISCONTINUED | OUTPATIENT
Start: 2022-06-22 | End: 2022-06-22

## 2022-06-22 RX ORDER — LIDOCAINE HYDROCHLORIDE 10 MG/ML
INJECTION, SOLUTION EPIDURAL; INFILTRATION; INTRACAUDAL; PERINEURAL AS NEEDED
Status: DISCONTINUED | OUTPATIENT
Start: 2022-06-22 | End: 2022-06-22

## 2022-06-22 RX ORDER — PHENAZOPYRIDINE HYDROCHLORIDE 200 MG/1
200 TABLET, FILM COATED ORAL 3 TIMES DAILY PRN
Qty: 15 TABLET | Refills: 0 | Status: SHIPPED | OUTPATIENT
Start: 2022-06-22

## 2022-06-22 RX ORDER — OXYCODONE HYDROCHLORIDE AND ACETAMINOPHEN 5; 325 MG/1; MG/1
2 TABLET ORAL EVERY 4 HOURS PRN
Status: DISCONTINUED | OUTPATIENT
Start: 2022-06-22 | End: 2022-06-22 | Stop reason: HOSPADM

## 2022-06-22 RX ORDER — NEOSTIGMINE METHYLSULFATE 1 MG/ML
INJECTION INTRAVENOUS AS NEEDED
Status: DISCONTINUED | OUTPATIENT
Start: 2022-06-22 | End: 2022-06-22

## 2022-06-22 RX ORDER — CEFUROXIME AXETIL 250 MG/1
250 TABLET ORAL EVERY 12 HOURS SCHEDULED
Qty: 14 TABLET | Refills: 0 | Status: SHIPPED | OUTPATIENT
Start: 2022-06-22 | End: 2022-06-29

## 2022-06-22 RX ORDER — FENTANYL CITRATE 50 UG/ML
50 INJECTION, SOLUTION INTRAMUSCULAR; INTRAVENOUS
Status: COMPLETED | OUTPATIENT
Start: 2022-06-22 | End: 2022-06-22

## 2022-06-22 RX ORDER — MIDAZOLAM HYDROCHLORIDE 2 MG/2ML
INJECTION, SOLUTION INTRAMUSCULAR; INTRAVENOUS AS NEEDED
Status: DISCONTINUED | OUTPATIENT
Start: 2022-06-22 | End: 2022-06-22

## 2022-06-22 RX ORDER — KETOROLAC TROMETHAMINE 30 MG/ML
INJECTION, SOLUTION INTRAMUSCULAR; INTRAVENOUS AS NEEDED
Status: DISCONTINUED | OUTPATIENT
Start: 2022-06-22 | End: 2022-06-22

## 2022-06-22 RX ORDER — GLYCOPYRROLATE 0.2 MG/ML
INJECTION INTRAMUSCULAR; INTRAVENOUS AS NEEDED
Status: DISCONTINUED | OUTPATIENT
Start: 2022-06-22 | End: 2022-06-22

## 2022-06-22 RX ORDER — FENTANYL CITRATE 50 UG/ML
INJECTION, SOLUTION INTRAMUSCULAR; INTRAVENOUS AS NEEDED
Status: DISCONTINUED | OUTPATIENT
Start: 2022-06-22 | End: 2022-06-22

## 2022-06-22 RX ORDER — PROPOFOL 10 MG/ML
INJECTION, EMULSION INTRAVENOUS AS NEEDED
Status: DISCONTINUED | OUTPATIENT
Start: 2022-06-22 | End: 2022-06-22

## 2022-06-22 RX ORDER — OXYCODONE HYDROCHLORIDE AND ACETAMINOPHEN 5; 325 MG/1; MG/1
1 TABLET ORAL EVERY 6 HOURS PRN
Qty: 15 TABLET | Refills: 0 | Status: SHIPPED | OUTPATIENT
Start: 2022-06-22 | End: 2022-07-02

## 2022-06-22 RX ORDER — ONDANSETRON 2 MG/ML
INJECTION INTRAMUSCULAR; INTRAVENOUS AS NEEDED
Status: DISCONTINUED | OUTPATIENT
Start: 2022-06-22 | End: 2022-06-22

## 2022-06-22 RX ORDER — DEXAMETHASONE SODIUM PHOSPHATE 10 MG/ML
INJECTION, SOLUTION INTRAMUSCULAR; INTRAVENOUS AS NEEDED
Status: DISCONTINUED | OUTPATIENT
Start: 2022-06-22 | End: 2022-06-22

## 2022-06-22 RX ORDER — SODIUM CHLORIDE 9 MG/ML
125 INJECTION, SOLUTION INTRAVENOUS CONTINUOUS
Status: DISCONTINUED | OUTPATIENT
Start: 2022-06-22 | End: 2022-06-22 | Stop reason: HOSPADM

## 2022-06-22 RX ORDER — OXYCODONE HYDROCHLORIDE AND ACETAMINOPHEN 5; 325 MG/1; MG/1
1 TABLET ORAL EVERY 4 HOURS PRN
Status: DISCONTINUED | OUTPATIENT
Start: 2022-06-22 | End: 2022-06-22 | Stop reason: HOSPADM

## 2022-06-22 RX ORDER — ONDANSETRON 2 MG/ML
4 INJECTION INTRAMUSCULAR; INTRAVENOUS EVERY 6 HOURS PRN
Status: DISCONTINUED | OUTPATIENT
Start: 2022-06-22 | End: 2022-06-22 | Stop reason: HOSPADM

## 2022-06-22 RX ORDER — CEFAZOLIN SODIUM 2 G/50ML
2000 SOLUTION INTRAVENOUS ONCE
Status: DISCONTINUED | OUTPATIENT
Start: 2022-06-22 | End: 2022-06-22 | Stop reason: HOSPADM

## 2022-06-22 RX ADMIN — FENTANYL CITRATE 50 MCG: 50 INJECTION, SOLUTION INTRAMUSCULAR; INTRAVENOUS at 14:44

## 2022-06-22 RX ADMIN — GLYCOPYRROLATE 0.4 MG: 0.2 INJECTION, SOLUTION INTRAMUSCULAR; INTRAVENOUS at 14:05

## 2022-06-22 RX ADMIN — ROCURONIUM BROMIDE 50 MG: 50 INJECTION, SOLUTION INTRAVENOUS at 13:05

## 2022-06-22 RX ADMIN — FENTANYL CITRATE 50 MCG: 50 INJECTION, SOLUTION INTRAMUSCULAR; INTRAVENOUS at 14:34

## 2022-06-22 RX ADMIN — MIDAZOLAM 2 MG: 1 INJECTION INTRAMUSCULAR; INTRAVENOUS at 13:01

## 2022-06-22 RX ADMIN — NEOSTIGMINE METHYLSULFATE 2 MG: 1 INJECTION INTRAVENOUS at 14:06

## 2022-06-22 RX ADMIN — LIDOCAINE HYDROCHLORIDE 100 MG: 10 INJECTION, SOLUTION EPIDURAL; INFILTRATION; INTRACAUDAL; PERINEURAL at 13:04

## 2022-06-22 RX ADMIN — ONDANSETRON 4 MG: 2 INJECTION INTRAMUSCULAR; INTRAVENOUS at 13:21

## 2022-06-22 RX ADMIN — CEFAZOLIN SODIUM 2000 MG: 2 SOLUTION INTRAVENOUS at 13:01

## 2022-06-22 RX ADMIN — FENTANYL CITRATE 50 MCG: 50 INJECTION, SOLUTION INTRAMUSCULAR; INTRAVENOUS at 14:54

## 2022-06-22 RX ADMIN — PROPOFOL 200 MG: 10 INJECTION, EMULSION INTRAVENOUS at 13:04

## 2022-06-22 RX ADMIN — SODIUM CHLORIDE: 0.9 INJECTION, SOLUTION INTRAVENOUS at 13:41

## 2022-06-22 RX ADMIN — FENTANYL CITRATE 50 MCG: 50 INJECTION, SOLUTION INTRAMUSCULAR; INTRAVENOUS at 14:24

## 2022-06-22 RX ADMIN — DEXAMETHASONE SODIUM PHOSPHATE 10 MG: 10 INJECTION INTRAMUSCULAR; INTRAVENOUS at 13:21

## 2022-06-22 RX ADMIN — NEOSTIGMINE METHYLSULFATE 2 MG: 1 INJECTION INTRAVENOUS at 14:05

## 2022-06-22 RX ADMIN — SODIUM CHLORIDE 125 ML/HR: 0.9 INJECTION, SOLUTION INTRAVENOUS at 11:38

## 2022-06-22 RX ADMIN — GLYCOPYRROLATE 0.4 MG: 0.2 INJECTION, SOLUTION INTRAMUSCULAR; INTRAVENOUS at 14:06

## 2022-06-22 RX ADMIN — KETOROLAC TROMETHAMINE 30 MG: 30 INJECTION, SOLUTION INTRAMUSCULAR at 14:06

## 2022-06-22 RX ADMIN — OXYCODONE AND ACETAMINOPHEN 1 TABLET: 5; 325 TABLET ORAL at 15:29

## 2022-06-22 RX ADMIN — FENTANYL CITRATE 100 MCG: 50 INJECTION INTRAMUSCULAR; INTRAVENOUS at 13:04

## 2022-06-22 RX ADMIN — PROPOFOL 150 MG: 10 INJECTION, EMULSION INTRAVENOUS at 13:58

## 2022-06-22 NOTE — ANESTHESIA POSTPROCEDURE EVALUATION
Post-Op Assessment Note    CV Status:  Stable    Pain management: adequate     Mental Status:  Alert and awake   Hydration Status:  Euvolemic   PONV Controlled:  Controlled   Airway Patency:  Patent      Post Op Vitals Reviewed: Yes      Staff: Anesthesiologist         No complications documented      /80 (06/22/22 1420)    Temp 97 5 °F (36 4 °C) (06/22/22 1420)    Pulse 98 (06/22/22 1420)   Resp 15 (06/22/22 1420)    SpO2 98 % (06/22/22 1420)

## 2022-06-22 NOTE — ANESTHESIA PREPROCEDURE EVALUATION
Procedure:  CYSTOSCOPY URETEROSCOPY WITH LITHOTRIPSY HOLMIUM LASER, RETROGRADE PYELOGRAM AND INSERTION STENT URETERAL (Left Bladder)    Relevant Problems   ANESTHESIA (within normal limits)   (-) History of anesthesia complications      CARDIO   (+) Essential hypertension   (+) Hyperlipidemia   (-) Chest pain   (-) SELBY (dyspnea on exertion)      GI/HEPATIC   (+) Fatty liver   (+) GERD (gastroesophageal reflux disease) (well controlled)      /RENAL   (+) CKD (chronic kidney disease)   (+) Hydronephrosis, right   (+) Nephrolithiasis   (+) Renal calculus, bilateral      PULMONARY   (-) Shortness of breath   (-) URI (upper respiratory infection)        Physical Exam    Airway    Mallampati score: III  TM Distance: >3 FB  Neck ROM: full     Dental       Cardiovascular      Pulmonary      Other Findings        Anesthesia Plan  ASA Score- 2     Anesthesia Type- general with ASA Monitors  Additional Monitors:   Airway Plan: ETT and LMA  Plan Factors-Exercise tolerance (METS): >4 METS  Chart reviewed  Existing labs reviewed  Patient summary reviewed  Induction- intravenous  Postoperative Plan-     Informed Consent- Anesthetic plan and risks discussed with patient  I personally reviewed this patient with the CRNA  Discussed and agreed on the Anesthesia Plan with the CRNA  Yanique Burgess

## 2022-06-22 NOTE — DISCHARGE INSTRUCTIONS
ALL YOUR  PREVIOUS MEDS ARE THE SAME  NEW MEDS:  Cefuroxime antibiotic    BE CAREFUL NOT TO PULL THE STRING  EXPECT SOME BLOOD IN URINE, BURNING, FREQUENT URINATION  ACTIVITY:  RESUME FULL ACTIVITY 2-3 days

## 2022-06-22 NOTE — DISCHARGE SUMMARY
Discharge Summary - Flakita Christian 63 y o  male MRN: [id number]    Admission Date: 6/22/2022     Admitting Diagnosis: Nephrolithiasis [N20 0]    Procedures Performed:  Left ureteroscopy, laser stone, stent    Patient underwent planned outpt surgery and recovered without complication  Discharged in good condition  Medications were prescribed  Pt knows to have office follow-up at the appropriate time

## 2022-06-23 NOTE — OP NOTE
OPERATIVE REPORT  PATIENT NAME: Jane Chavis    :  1960  MRN: [id number]  Pt Location: [facility] OR ROOM 04    SURGERY DATE: 2022    Surgeon(s) and Role:     * Jenifer Samayoa MD - Primary    Preop Diagnosis:  Nephrolithiasis [N20 0]    Post-Op Diagnosis Codes:     * Nephrolithiasis [N20 0]    Procedure(s) (LRB):  CYSTOSCOPY URETEROSCOPY WITH LITHOTRIPSY HOLMIUM LASER, RETROGRADE PYELOGRAM AND INSERTION STENT URETERAL, STONE BASKET EXTRACTION (Left)    Specimen(s):  ID Type Source Tests Collected by Time Destination   A : LEFT URETERAL STONE Calculus Ureter, Left Lelia Valencia MD 2022  1:46 PM        Estimated Blood Loss:   0 mL    Drains:  Ureteral Internal Stent Left ureter 6 Fr  (Active)   Site Assessment Intact 22 1605   Dressing Status Old drainage; Intact 22 1605   Dressing Type Dry dressing 22 1605   Securement Method Tape 22 1605   Number of days: 1       Anesthesia Type:   Choice    Operative Indications:  Nephrolithiasis [N20 0]      Operative Findings:  1  Stone lodged at left UVJ with lots of inflammation and stenosis  Lasered and basketed  2  Several small stones upper pole left kidney, all lasered     Complications:   None    Procedure and Technique:        PLAN FOR STENT:  Remove in 7-8 days       The patient was brought into the OR, properly identified and positioned on the table  General anesthesia was induced, and they were placed in lithotomy position and prepped and draped using chlorhexidine solution  The 22F scope was introduced in the urethra, which showed no stricture  Other findings upon placement of the scope included mildly enlarged prostate  Bladder normal except first on stone lodged inside left orifice  Orifice quite inflamed, stenotic from the stone  Attempt to place a wire beyond the left UVJ stone were unsuccessful, so the rigid ureteroscope was passed  I was able to get this wire past the stone at that point    The stone was still lodged quite tight, I carefully lasered it into small particles, taking care not to laser the tissue of the ureter  After the stone was broken up well, particles removed  The orifice looked very inflamed and irritated from the stone having been there     The  second wire was passed, and a access sheath placed over one wire  The flexible scope was placed thru a sheath and advanced to stone, numerous small particles in the upper pole collecting system  Care was taken not to laser tissue  All particles appeared small enough to pass around the stent  The scope was removed from the ureter, and the cystoscope was used to place a 6F Contour VL stent in the ureter, with the upper coils in the renal pelvis, and the distal coil in the bladder  Retrograde pyelogram on that side confirmed good position and no extravasation of contrast    The patient was awaken from anesthesia and taken to the PACU in good condition        I was present for the entire procedure    Patient Disposition:  PACU       SIGNATURE: Marshall Salinas MD  DATE: June 23, 2022  TIME: 2:31 PM

## 2022-06-23 NOTE — TELEPHONE ENCOUNTER
Post Op Note    Judith Mcgrath is a 63 y o  male s/p Cysto Left stent performed 06/22/2022  Judith Mcgrath is a patient of Dr Bandar Rey and is seen at the [facility] office  How would you rate your pain on a scale from 1 to 10, 10 being the worst pain ever? 2 uses heating pad  ibuprofen and tylenol  Have you had a fever? No   Have your bowel movements been regular? No  Is taking stool softener  Do you have any difficulty urinating? No taking pyridium  Do you have any other questions or concerns that I can address at this time?  Scheduled f/u for stent removal next Wednesday

## 2022-06-23 NOTE — TELEPHONE ENCOUNTER
Pt called the office to schedule post-op visit     CYSTOSCOPY URETEROSCOPY WITH LITHOTRIPSY HOLMIUM LASER, RETROGRADE PYELOGRAM AND INSERTION STENT URETERAL, STONE BASKET EXTRACTION (Left Bladder)  Surgery was on 6/22/2022

## 2022-06-25 LAB
CALCIUM OXALATE DIHYDRATE MFR STONE IR: 80 %
COLOR STONE: NORMAL
COM MFR STONE: 20 %
COMMENT-STONE3: NORMAL
COMPOSITION: NORMAL
LABORATORY COMMENT REPORT: NORMAL
PHOTO: NORMAL
SIZE STONE: NORMAL MM
SPEC SOURCE SUBJ: NORMAL
STONE ANALYSIS-IMP: NORMAL
WT STONE: 4 MG

## 2022-06-29 ENCOUNTER — PROCEDURE VISIT (OUTPATIENT)
Dept: UROLOGY | Facility: AMBULATORY SURGERY CENTER | Age: 62
End: 2022-06-29

## 2022-06-29 VITALS
DIASTOLIC BLOOD PRESSURE: 88 MMHG | HEIGHT: 71 IN | SYSTOLIC BLOOD PRESSURE: 138 MMHG | HEART RATE: 86 BPM | BODY MASS INDEX: 29.68 KG/M2 | WEIGHT: 212 LBS

## 2022-06-29 DIAGNOSIS — N20.0 NEPHROLITHIASIS: Primary | ICD-10-CM

## 2022-06-29 PROCEDURE — 99024 POSTOP FOLLOW-UP VISIT: CPT

## 2022-06-29 NOTE — PROGRESS NOTES
6/29/2022  Christen Howell is a 63 y o  male  [id number]        Diagnosis  Chief Complaint     Post-op; stent removal          Patient is s/p left ureteroscopy with stone extraction on 6/22/22 with Dr Martinez Plan  Follow up as scheduled for OV with Dr Jana Rosado  Patient to obtain stone risk analysis and KUB ptv  Knows to call the office in the meantime with concerns  Procedure Stent with String Removal    Vitals:    06/29/22 1025   BP: 138/88   Pulse: 86   Weight: 96 2 kg (212 lb)   Height: 5' 11" (1 803 m)       Stent with string removed intact without difficulty  Reviewed post stent removal symptoms including flank pain, dysuria, and hematuria  Instructed patient to increase oral fluid intake  Encouraged the use of NSAIDS and other prescribed pain medication as needed for discomfort  Patient instructed to call the office or report to the ER for uncontrolled pain, fever, chills, nausea or vomiting         Marisabel Velasquez RN

## 2022-07-04 DIAGNOSIS — E29.1 HYPOGONADISM IN MALE: ICD-10-CM

## 2022-07-05 ENCOUNTER — APPOINTMENT (OUTPATIENT)
Dept: RADIOLOGY | Age: 62
End: 2022-07-05
Payer: COMMERCIAL

## 2022-07-05 DIAGNOSIS — N20.0 NEPHROLITHIASIS: ICD-10-CM

## 2022-07-05 PROCEDURE — 74018 RADEX ABDOMEN 1 VIEW: CPT

## 2022-07-05 RX ORDER — TESTOSTERONE CYPIONATE 200 MG/ML
INJECTION INTRAMUSCULAR
Qty: 4 ML | Refills: 0 | Status: SHIPPED | OUTPATIENT
Start: 2022-07-05 | End: 2022-08-29

## 2022-07-11 DIAGNOSIS — I10 ESSENTIAL HYPERTENSION: ICD-10-CM

## 2022-07-11 RX ORDER — LISINOPRIL 10 MG/1
TABLET ORAL
Qty: 135 TABLET | Refills: 1 | Status: SHIPPED | OUTPATIENT
Start: 2022-07-11

## 2022-08-01 NOTE — PROGRESS NOTES
Referring Physician: Ortiz Welch DO  A copy of this note was sent to the referring physician  Diagnoses and all orders for this visit:    Nephrolithiasis  -     Stone risk profile  -     Stone risk profile  -     XR abdomen 1 view kub; Future            Assessment and plan:       1  Extensive Nephrolithiasis  -status post multiple endoscopic interventions most recently left ureteroscopy performed by Dr Denia Bella June 2022  -patient has us not undergone a formal metabolic workup  - current stone burden:  5 mm right lower pole calculi, left interpolar calculi    It Was a pleasure to evaluate this patient  He has a history of recurrent nephrolithiasis  He has undergone metabolic testing a number of years ago but was never initiated consistently on targeted metabolic therapy  I reviewed his KUB and performed a physical exam today  In my opinion the most beneficial intervention for next steps would be reestablishing with Nephrology  He is scheduled to see my colleague Dr Mason Chung in a few weeks  I have ordered 2 24 hour urine studies to be completed prior to that time  He will follow-up with our advanced practitioner team in 4 months with a KUB  If the patient requires any additional intervention (either electively for worsening stone burden) or urgently I am happy to perform these procedures for him at our [facility]  If the patient calls in with stone pain, he should be scheduled for an ASAP CT scan, surgery can be expedited at the [facility] Bryce Mensah MD      Chief Complaint     Second opinion stay kidney stones      History of Present Illness     Froylan Garner is a 63 y o  male presents seeking additional opinion for recurrent nephrolithiasis  This is a 72-year-old male who has had extensive history of nephrolithiasis undergone multiple prior endoscopic surgical interventions with my partners    Most recently he underwent left ureteroscopy with Dr Denia hickman month  At that time a distal ureteral calculi as well as intrarenal calculi were addressed  Post ureteroscopically imaging in the form of a KUB demonstrates the right lower pole calculus contralateral ear and scattered left intrarenal calculi  Generally he is doing well from a symptomatic standpoint  He has had occasional bladder spasms which she has treated appropriately with oxybutynin  Aside from this he is feeling well but he feels that his recovery from recent staged ureteroscopy has taken a few weeks  We discussed that he is still healing  Detailed Urologic History     - please refer to HPI    Review of Systems     Review of Systems   Constitutional: Negative for activity change and fatigue  HENT: Negative for congestion  Eyes: Negative for visual disturbance  Respiratory: Negative for shortness of breath and wheezing  Cardiovascular: Negative for chest pain and leg swelling  Gastrointestinal: Negative for abdominal pain  Endocrine: Positive for polyuria  Genitourinary: Positive for dysuria  Negative for flank pain, hematuria and urgency  Musculoskeletal: Negative for back pain  Allergic/Immunologic: Negative for immunocompromised state  Neurological: Negative for dizziness and numbness  Psychiatric/Behavioral: Negative for dysphoric mood  All other systems reviewed and are negative  Allergies     Allergies   Allergen Reactions    Ciprofloxacin Hives    Macrobid [Nitrofurantoin] Nausea Only and Palpitations       Physical Exam     Physical Exam  Constitutional:       General: He is not in acute distress  Appearance: He is well-developed  HENT:      Head: Normocephalic and atraumatic  Cardiovascular:      Comments: The lower extremity edema  Pulmonary:      Effort: Pulmonary effort is normal       Breath sounds: Normal breath sounds  Abdominal:      Palpations: Abdomen is soft     Genitourinary:     Comments: Negative CVA tenderness  Musculoskeletal:         General: Normal range of motion  Cervical back: Normal range of motion  Skin:     General: Skin is warm  Neurological:      Mental Status: He is alert and oriented to person, place, and time  Psychiatric:         Behavior: Behavior normal              Vital Signs  There were no vitals filed for this visit  Current Medications       Current Outpatient Medications:     b complex vitamins capsule, Take 1 capsule by mouth daily  , Disp: , Rfl:     Cholecalciferol (VITAMIN D3) 1000 units CAPS, Take 1 capsule by mouth daily, Disp: , Rfl:     coenzyme Q-10 100 MG capsule, Take 1 capsule by mouth daily, Disp: , Rfl:     cyanocobalamin (VITAMIN B-12) 100 mcg tablet, Take by mouth daily  , Disp: , Rfl:     fenofibrate (TRICOR) 145 mg tablet, TAKE 1 TABLET DAILY, Disp: 90 tablet, Rfl: 1    fexofenadine (ALLEGRA) 180 MG tablet, Take 180 mg by mouth daily in the early morning , Disp: , Rfl:     lisinopril (ZESTRIL) 10 mg tablet, TAKE 1 AND 1/2 TABLETS(15  MG TOTAL) DAILY, Disp: 135 tablet, Rfl: 1    LORazepam (ATIVAN) 0 5 mg tablet, TAKE 1 TABLET DAILY AS     NEEDED FOR ANXIETY, Disp: 90 tablet, Rfl: 0    multivitamin (THERAGRAN) TABS, Take 1 tablet by mouth daily  , Disp: , Rfl:     naloxone (NARCAN) 4 mg/0 1 mL nasal spray, Administer 1 spray into a nostril   If no response after 2-3 minutes, give another dose in the other nostril using a new spray , Disp: 1 each, Rfl: 1    omeprazole (PriLOSEC) 40 MG capsule, Take 40 mg by mouth daily in the early morning , Disp: , Rfl:     oxybutynin (DITROPAN-XL) 5 mg 24 hr tablet, TAKE 1 TABLET (5 MG TOTAL) BY MOUTH AS NEEDED (AS NEEDED) (Patient taking differently: Take 5 mg by mouth as needed (as needed after cystoscopies)), Disp: 90 tablet, Rfl: 2    oxyCODONE (OXY-IR) 5 MG capsule, Take 1 capsule (5 mg total) by mouth every 4 (four) hours as needed for moderate pain Max Daily Amount: 30 mg, Disp: 15 capsule, Rfl: 0   phenazopyridine (PYRIDIUM) 200 mg tablet, Take 1 tablet (200 mg total) by mouth 3 (three) times a day as needed for bladder spasms, Disp: 15 tablet, Rfl: 0    sildenafil (VIAGRA) 50 MG tablet, TAKE 1 TABLET DAILY AS     NEEDED FOR ERECTILE        DYSFUNCTION, Disp: 30 tablet, Rfl: 0    SYRINGE-NEEDLE, DISP, 3 ML (BD ECLIPSE SYRINGE) 25G X 1" 3 ML MISC, by Does not apply route once a week, Disp: 50 each, Rfl: 3    tamsulosin (FLOMAX) 0 4 mg, Take 1 capsule (0 4 mg total) by mouth daily with dinner (Patient taking differently: Take 0 4 mg by mouth if needed), Disp: 90 capsule, Rfl: 0    testosterone cypionate (DEPO-TESTOSTERONE) 200 mg/mL SOLN, INJECT 0 4ML INTO SHOULDER,THIGH OR BUTTOCKS ONCE     WEEKLY   DISCARD THE        REMAINDER , Disp: 4 mL, Rfl: 0      Active Problems     Patient Active Problem List   Diagnosis    Essential hypertension    GERD (gastroesophageal reflux disease)    Overweight    Thyroid nodule    Testicular hypogonadism    Elevated blood sugar    Elevated hematocrit    Fatty liver    Low serum parathyroid hormone (PTH)    Encounter for hepatitis C screening test for low risk patient    Ureteral calculus, right    Leukocytosis    Dyslipidemia    Nephrolithiasis    Hypogonadism in male    Hyperlipidemia    CKD (chronic kidney disease)    Hydronephrosis, right    Renal calculus, bilateral         Past Medical History     Past Medical History:   Diagnosis Date    Anxiety     Cancer ([address] )     skin ca on nose    Chronic kidney disease     kidney stones    GERD (gastroesophageal reflux disease)     Hyperlipidemia     Hypertension     Kidney stone          Surgical History     Past Surgical History:   Procedure Laterality Date    COLONOSCOPY      ESOPHAGOGASTRODUODENOSCOPY      EXTRACORPOREAL SHOCK WAVE LITHOTRIPSY Bilateral     Renal Multiple times    FL RETROGRADE PYELOGRAM  3/31/2022    FL RETROGRADE PYELOGRAM  6/22/2022    MO CYSTO/URETERO W/LITHOTRIPSY &INDWELL STENT INSRT Left 2020    Procedure: CYSTOSCOPY URETEROSCOPY WITH LITHOTRIPSY HOLMIUM LASER, AND INSERTION STENT URETERAL;  Surgeon: Wade Barraza MD;  [facility];  Service: Urology    MD CYSTO/URETERO W/LITHOTRIPSY &INDWELL STENT INSRT Right 3/31/2022    Procedure: CYSTOSCOPY URETEROSCOPY WITH LITHOTRIPSY HOLMIUM LASER,BASKET STONE EXTRACTION, RETROGRADE PYELOGRAM AND INSERTION STENT URETERAL;  Surgeon: Yan Felix MD;  [facility];  Service: Urology    MD CYSTO/URETERO W/LITHOTRIPSY &INDWELL STENT INSRT Left 2022    Procedure: CYSTOSCOPY URETEROSCOPY WITH LITHOTRIPSY HOLMIUM LASER, RETROGRADE PYELOGRAM AND INSERTION STENT URETERAL, STONE BASKET EXTRACTION;  Surgeon: Ayleen Weiss MD;  Location[facility];  Service: Urology    MD EXC SKIN BENIG >4 CM FACE,FACIAL Right 2016    Procedure: EXCISION  LESION UPPER EYELID, COMPLEX CLOSURE;  Surgeon: Willi Lopez MD;  Location: [facility];  Service: Plastics    MD FRAGMENT KIDNEY STONE/ ESWL Right 2019    Procedure: Ny Pilling SHOCKWAVE (ESWL);   Surgeon: Eron Frost MD;  [facility];  Service: Urology         Family History     Family History   Problem Relation Age of Onset    Other Father         CABG (coronary artery bypass surgery)    Atrial fibrillation Sister     Other Paternal Uncle         Myocardial infarction    Coronary artery disease Family     Diabetes Family     Hypertension Family     Uterine cancer Family          Social History     Social History     Social History     Tobacco Use   Smoking Status Former Smoker    Quit date: 1981    Years since quittin 2   Smokeless Tobacco Never Used         Pertinent Lab Values     Lab Results   Component Value Date    CREATININE 1 22 2022       Lab Results   Component Value Date    PSA 0 9 2021    PSA 1 0 10/14/2020    PSA 1 0 07/10/2020       @RESULTRCNT(1H])@      Pertinent Imaging      - n/a    Portions of the record may have been created with voice recognition software   Occasional wrong word or "sound a like" substitutions may have occurred due to the inherent limitations of voice recognition software   Read the chart carefully and recognize, using context, where substitutions have occurred

## 2022-08-02 ENCOUNTER — OFFICE VISIT (OUTPATIENT)
Dept: UROLOGY | Facility: CLINIC | Age: 62
End: 2022-08-02
Payer: COMMERCIAL

## 2022-08-02 VITALS
HEIGHT: 71 IN | HEART RATE: 70 BPM | WEIGHT: 218.2 LBS | RESPIRATION RATE: 16 BRPM | SYSTOLIC BLOOD PRESSURE: 128 MMHG | BODY MASS INDEX: 30.55 KG/M2 | OXYGEN SATURATION: 98 % | DIASTOLIC BLOOD PRESSURE: 78 MMHG

## 2022-08-02 DIAGNOSIS — N20.0 NEPHROLITHIASIS: Primary | ICD-10-CM

## 2022-08-02 PROCEDURE — 3074F SYST BP LT 130 MM HG: CPT | Performed by: UROLOGY

## 2022-08-02 PROCEDURE — 3078F DIAST BP <80 MM HG: CPT | Performed by: UROLOGY

## 2022-08-02 PROCEDURE — 99214 OFFICE O/P EST MOD 30 MIN: CPT | Performed by: UROLOGY

## 2022-08-10 ENCOUNTER — OFFICE VISIT (OUTPATIENT)
Dept: INTERNAL MEDICINE CLINIC | Facility: CLINIC | Age: 62
End: 2022-08-10
Payer: COMMERCIAL

## 2022-08-10 VITALS
HEIGHT: 71 IN | DIASTOLIC BLOOD PRESSURE: 92 MMHG | SYSTOLIC BLOOD PRESSURE: 134 MMHG | BODY MASS INDEX: 30.46 KG/M2 | WEIGHT: 217.6 LBS | HEART RATE: 79 BPM | OXYGEN SATURATION: 99 %

## 2022-08-10 DIAGNOSIS — Z12.5 SCREENING PSA (PROSTATE SPECIFIC ANTIGEN): ICD-10-CM

## 2022-08-10 DIAGNOSIS — Z13.1 SCREENING FOR DIABETES MELLITUS: ICD-10-CM

## 2022-08-10 DIAGNOSIS — Z00.00 WELLNESS EXAMINATION: ICD-10-CM

## 2022-08-10 DIAGNOSIS — Z13.6 SCREENING FOR CARDIOVASCULAR CONDITION: ICD-10-CM

## 2022-08-10 DIAGNOSIS — Z00.00 ANNUAL PHYSICAL EXAM: Primary | ICD-10-CM

## 2022-08-10 DIAGNOSIS — E78.5 HYPERLIPIDEMIA, UNSPECIFIED HYPERLIPIDEMIA TYPE: ICD-10-CM

## 2022-08-10 DIAGNOSIS — I10 BENIGN ESSENTIAL HYPERTENSION: ICD-10-CM

## 2022-08-10 PROCEDURE — 99396 PREV VISIT EST AGE 40-64: CPT | Performed by: INTERNAL MEDICINE

## 2022-08-10 PROCEDURE — 3725F SCREEN DEPRESSION PERFORMED: CPT | Performed by: INTERNAL MEDICINE

## 2022-08-10 RX ORDER — OMEPRAZOLE 20 MG/1
20 CAPSULE, DELAYED RELEASE ORAL DAILY
COMMUNITY
Start: 2022-08-04

## 2022-08-10 NOTE — PROGRESS NOTES
Jahaira    NAME: Marcie Rader  AGE: 63 y o  SEX: male  : 1960     DATE: 2022     Assessment and Plan:     Problem List Items Addressed This Visit        Cardiovascular and Mediastinum    Essential hypertension     Slightly elevated in the office continue monitor home readings recommend a healthy balance diet reducing sodium weight loss and routine exercise         Relevant Orders    Comprehensive metabolic panel       Other    Hyperlipidemia     Low-cholesterol diet reduce carbohydrates and sweets continue monitor will check a lipid panel         Relevant Orders    Lipid Panel with Direct LDL reflex    Wellness examination     Assessment and plan 1  Health maintenance annual wellness examination overall the patient is clinically stable and doing well, we encouraged the patient to follow a healthy and balanced diet  We recommend that the patient exercise routinely approximately 30 minutes 5 times per week   We have reviewed the patient's vaccines and have made recommendations for updates if necessary   annual flu shot     We will be ordering screening laboratories which are age appropriate  Return to the office in  6 months    call if any problems  Other Visit Diagnoses     Annual physical exam    -  Primary    Screening PSA (prostate specific antigen)        Relevant Orders    PSA, Total Screen    Screening for cardiovascular condition        Relevant Orders    Stress test only, exercise    CT coronary calcium score    Screening for diabetes mellitus        Relevant Orders    Hemoglobin A1C          Immunizations and preventive care screenings were discussed with patient today  Appropriate education was printed on patient's after visit summary  Counseling:  Exercise: the importance of regular exercise/physical activity was discussed  Recommend exercise 3-5 times per week for at least 30 minutes  BMI Counseling: Body mass index is 30 35 kg/m²  The BMI is above normal  Nutrition recommendations include decreasing portion sizes and moderation in carbohydrate intake  Exercise recommendations include exercising 3-5 times per week  Rationale for BMI follow-up plan is due to patient being overweight or obese  Depression Screening and Follow-up Plan: Patient was screened for depression during today's encounter  They screened negative with a PHQ-2 score of 0  Return in about 6 months (around 2/10/2023)  Chief Complaint:     Chief Complaint   Patient presents with    Physical Exam      History of Present Illness:     Adult Annual Physical   Patient here for a comprehensive physical exam  The patient reports no problems  Diet and Physical Activity  Diet/Nutrition: well balanced diet  Exercise: walking  Depression Screening  PHQ-2/9 Depression Screening    Little interest or pleasure in doing things: 0 - not at all  Feeling down, depressed, or hopeless: 0 - not at all  PHQ-2 Score: 0  PHQ-2 Interpretation: Negative depression screen       General Health  Sleep well   Hearing: normal - bilateral   Vision: no vision problems  Dental: regular dental visits   Health  Symptoms include: none     Review of Systems:     Review of Systems   Constitutional: Negative for activity change, appetite change and unexpected weight change  HENT: Negative for congestion  Eyes: Negative for visual disturbance  Respiratory: Negative for cough and shortness of breath  Cardiovascular: Negative for chest pain  Gastrointestinal: Negative for abdominal pain, diarrhea, nausea and vomiting  Neurological: Negative for dizziness, light-headedness and headaches        Past Medical History:     Past Medical History:   Diagnosis Date    Anxiety     Cancer ([address] )     skin ca on nose    Chronic kidney disease     kidney stones    GERD (gastroesophageal reflux disease)     Hyperlipidemia     Hypertension     Kidney stone     6/2022      Past Surgical History:     Past Surgical History:   Procedure Laterality Date    COLONOSCOPY      ESOPHAGOGASTRODUODENOSCOPY      EXTRACORPOREAL SHOCK WAVE LITHOTRIPSY Bilateral     Renal Multiple times    FL RETROGRADE PYELOGRAM  3/31/2022    FL RETROGRADE PYELOGRAM  6/22/2022    AZ CYSTO/URETERO W/LITHOTRIPSY &INDWELL STENT INSRT Left 8/6/2020    Procedure: CYSTOSCOPY URETEROSCOPY WITH LITHOTRIPSY HOLMIUM LASER, AND INSERTION STENT URETERAL;  Surgeon: Rosa Ellis MD;  [facility];  Service: Urology    AZ CYSTO/URETERO W/LITHOTRIPSY &INDWELL STENT INSRT Right 3/31/2022    Procedure: CYSTOSCOPY URETEROSCOPY WITH LITHOTRIPSY HOLMIUM LASER,BASKET STONE EXTRACTION, RETROGRADE PYELOGRAM AND INSERTION STENT URETERAL;  Surgeon: Cora Otto MD;  [facility];  Service: Urology    AZ CYSTO/URETERO W/LITHOTRIPSY &INDWELL STENT INSRT Left 6/22/2022    Procedure: CYSTOSCOPY URETEROSCOPY WITH LITHOTRIPSY HOLMIUM LASER, RETROGRADE PYELOGRAM AND INSERTION STENT URETERAL, STONE BASKET EXTRACTION;  Surgeon: Angel Perry MD;  [facility];  Service: Urology     [address] >4 CM FACE,FACIAL Right 4/28/2016    Procedure: EXCISION  LESION UPPER EYELID, COMPLEX CLOSURE;  Surgeon: Jenae Stephens MD;  [facility]: [facility];  Service: Plastics    AZ FRAGMENT KIDNEY STONE/ ESWL Right 4/18/2019    Procedure: Chet Braswell SHOCKWAVE (ESWL);   Surgeon: Armani Madsen MD;  [facility];  Service: Urology      Family History:     Family History   Problem Relation Age of Onset    Other Father         CABG (coronary artery bypass surgery)    Atrial fibrillation Sister     Other Paternal Uncle         Myocardial infarction    Coronary artery disease Family     Diabetes Family     Hypertension Family     Uterine cancer Family       Social History:     Social History     Socioeconomic History    Marital status: /Civil Union     Spouse name: None    Number of children: None    Years of education: None    Highest education level: None   Occupational History    None   Tobacco Use    Smoking status: Former Smoker     Packs/day: 0 75     Years: 18 00     Pack years: 13 50     Start date:      Quit date:      Years since quittin 6    Smokeless tobacco: Never Used   Vaping Use    Vaping Use: Never used   Substance and Sexual Activity    Alcohol use: Yes     Alcohol/week: 3 0 standard drinks     Types: 1 Glasses of wine, 2 Cans of beer per week     Comment: weekend    Drug use: No    Sexual activity: Not Currently   Other Topics Concern    None   Social History Narrative    Daily caffeine consumption     Social Determinants of Health     Financial Resource Strain: Not on file   Food Insecurity: Not on file   Transportation Needs: Not on file   Physical Activity: Not on file   Stress: Not on file   Social Connections: Not on file   Intimate Partner Violence: Not on file   Housing Stability: Not on file      Current Medications:     Current Outpatient Medications   Medication Sig Dispense Refill    b complex vitamins capsule Take 1 capsule by mouth daily   Cholecalciferol (VITAMIN D3) 1000 units CAPS Take 1 capsule by mouth daily      coenzyme Q-10 100 MG capsule Take 1 capsule by mouth daily      cyanocobalamin (VITAMIN B-12) 100 mcg tablet Take by mouth daily   fenofibrate (TRICOR) 145 mg tablet TAKE 1 TABLET DAILY 90 tablet 1    fexofenadine (ALLEGRA) 180 MG tablet Take 180 mg by mouth daily in the early morning       lisinopril (ZESTRIL) 10 mg tablet TAKE 1 AND 1/2 TABLETS(15  MG TOTAL) DAILY 135 tablet 1    LORazepam (ATIVAN) 0 5 mg tablet TAKE 1 TABLET DAILY AS     NEEDED FOR ANXIETY 90 tablet 0    multivitamin (THERAGRAN) TABS Take 1 tablet by mouth daily        omeprazole (PriLOSEC) 20 mg delayed release capsule Take 20 mg by mouth daily      sildenafil (VIAGRA) 50 MG tablet TAKE 1 TABLET DAILY AS     NEEDED FOR ERECTILE        DYSFUNCTION 30 tablet 0    SYRINGE-NEEDLE, DISP, 3 ML (BD ECLIPSE SYRINGE) 25G X 1" 3 ML MISC by Does not apply route once a week 50 each 3    testosterone cypionate (DEPO-TESTOSTERONE) 200 mg/mL SOLN INJECT 0 4ML INTO SHOULDER,THIGH OR BUTTOCKS ONCE     WEEKLY  DISCARD THE        REMAINDER  4 mL 0    naloxone (NARCAN) 4 mg/0 1 mL nasal spray Administer 1 spray into a nostril  If no response after 2-3 minutes, give another dose in the other nostril using a new spray  (Patient not taking: Reported on 8/10/2022) 1 each 1    omeprazole (PriLOSEC) 40 MG capsule Take 40 mg by mouth daily in the early morning  (Patient not taking: Reported on 8/10/2022)      oxybutynin (DITROPAN-XL) 5 mg 24 hr tablet TAKE 1 TABLET (5 MG TOTAL) BY MOUTH AS NEEDED (AS NEEDED) (Patient not taking: Reported on 8/10/2022) 90 tablet 2    oxyCODONE (OXY-IR) 5 MG capsule Take 1 capsule (5 mg total) by mouth every 4 (four) hours as needed for moderate pain Max Daily Amount: 30 mg (Patient not taking: Reported on 8/10/2022) 15 capsule 0    phenazopyridine (PYRIDIUM) 200 mg tablet Take 1 tablet (200 mg total) by mouth 3 (three) times a day as needed for bladder spasms (Patient not taking: Reported on 8/10/2022) 15 tablet 0    tamsulosin (FLOMAX) 0 4 mg Take 1 capsule (0 4 mg total) by mouth daily with dinner (Patient not taking: Reported on 8/10/2022) 90 capsule 0     No current facility-administered medications for this visit  Allergies: Allergies   Allergen Reactions    Ciprofloxacin Hives    Macrobid [Nitrofurantoin] Nausea Only and Palpitations      Physical Exam:     /92   Pulse 79   Ht 5' 11" (1 803 m)   Wt 98 7 kg (217 lb 9 6 oz)   SpO2 99%   BMI 30 35 kg/m²     Physical Exam  Constitutional:       Appearance: He is well-developed  HENT:      Head: Normocephalic and atraumatic        Right Ear: External ear normal       Left Ear: External ear normal       Nose: Nose normal    Eyes:      Pupils: Pupils are equal, round, and reactive to light  Cardiovascular:      Rate and Rhythm: Normal rate and regular rhythm  Heart sounds: Normal heart sounds  No murmur heard  Pulmonary:      Effort: Pulmonary effort is normal       Breath sounds: Normal breath sounds  Abdominal:      General: There is no distension  Palpations: Abdomen is soft  Tenderness: There is no abdominal tenderness  There is no guarding            Tonya Berg DO  [facility]

## 2022-08-10 NOTE — PATIENT INSTRUCTIONS

## 2022-08-12 ENCOUNTER — TELEPHONE (OUTPATIENT)
Dept: NEPHROLOGY | Facility: CLINIC | Age: 62
End: 2022-08-12

## 2022-08-12 NOTE — TELEPHONE ENCOUNTER
----- Message from Rufino Light MD sent at 8/12/2022  2:19 PM EDT -----  Regarding: RE: Labs  Please order for BMP, UA urine microscopy to be done before the office visit for nephrolithiasis   ----- Message -----  From: Guy Vazquez  Sent: 8/12/2022   1:37 PM EDT  To: Rufino Light MD  Subject: Labs                                             Any labs for his patient? He is a transfer of care from Dr Hernandez Pun

## 2022-08-14 NOTE — ASSESSMENT & PLAN NOTE
Slightly elevated in the office continue monitor home readings recommend a healthy balance diet reducing sodium weight loss and routine exercise

## 2022-08-14 NOTE — ASSESSMENT & PLAN NOTE
Assessment and plan 1  Health maintenance annual wellness examination overall the patient is clinically stable and doing well, we encouraged the patient to follow a healthy and balanced diet  We recommend that the patient exercise routinely approximately 30 minutes 5 times per week   We have reviewed the patient's vaccines and have made recommendations for updates if necessary   annual flu shot     We will be ordering screening laboratories which are age appropriate  Return to the office in  6 months    call if any problems

## 2022-08-15 ENCOUNTER — TELEPHONE (OUTPATIENT)
Dept: NEPHROLOGY | Facility: CLINIC | Age: 62
End: 2022-08-15

## 2022-08-15 DIAGNOSIS — N20.0 NEPHROLITHIASIS: Primary | ICD-10-CM

## 2022-08-15 NOTE — TELEPHONE ENCOUNTER
Called and spoke with [employer] to complete their bloodwork prior to their appointment on 08/24/22 with Dr Aramis Thakkar at the [facility]

## 2022-08-16 ENCOUNTER — LAB (OUTPATIENT)
Dept: LAB | Age: 62
End: 2022-08-16
Payer: COMMERCIAL

## 2022-08-16 DIAGNOSIS — Z13.1 SCREENING FOR DIABETES MELLITUS: ICD-10-CM

## 2022-08-16 DIAGNOSIS — E29.1 TESTICULAR HYPOGONADISM: ICD-10-CM

## 2022-08-16 DIAGNOSIS — I10 BENIGN ESSENTIAL HYPERTENSION: ICD-10-CM

## 2022-08-16 DIAGNOSIS — N20.0 NEPHROLITHIASIS: ICD-10-CM

## 2022-08-16 DIAGNOSIS — E78.5 HYPERLIPIDEMIA, UNSPECIFIED HYPERLIPIDEMIA TYPE: ICD-10-CM

## 2022-08-16 LAB
ALBUMIN SERPL BCP-MCNC: 3.3 G/DL (ref 3.5–5)
ALP SERPL-CCNC: 45 U/L (ref 46–116)
ALT SERPL W P-5'-P-CCNC: 28 U/L (ref 12–78)
ANION GAP SERPL CALCULATED.3IONS-SCNC: 4 MMOL/L (ref 4–13)
AST SERPL W P-5'-P-CCNC: 22 U/L (ref 5–45)
BACTERIA UR QL AUTO: NORMAL /HPF
BILIRUB SERPL-MCNC: 0.4 MG/DL (ref 0.2–1)
BILIRUB UR QL STRIP: NEGATIVE
BUN SERPL-MCNC: 18 MG/DL (ref 5–25)
CALCIUM ALBUM COR SERPL-MCNC: 10 MG/DL (ref 8.3–10.1)
CALCIUM SERPL-MCNC: 9.4 MG/DL (ref 8.3–10.1)
CHLORIDE SERPL-SCNC: 106 MMOL/L (ref 96–108)
CHOLEST SERPL-MCNC: 199 MG/DL
CLARITY UR: CLEAR
CO2 SERPL-SCNC: 27 MMOL/L (ref 21–32)
COLOR UR: NORMAL
CREAT SERPL-MCNC: 1.22 MG/DL (ref 0.6–1.3)
EST. AVERAGE GLUCOSE BLD GHB EST-MCNC: 117 MG/DL
GFR SERPL CREATININE-BSD FRML MDRD: 64 ML/MIN/1.73SQ M
GLUCOSE P FAST SERPL-MCNC: 98 MG/DL (ref 65–99)
GLUCOSE UR STRIP-MCNC: NEGATIVE MG/DL
HBA1C MFR BLD: 5.7 %
HDLC SERPL-MCNC: 29 MG/DL
HGB UR QL STRIP.AUTO: NEGATIVE
KETONES UR STRIP-MCNC: NEGATIVE MG/DL
LDLC SERPL CALC-MCNC: 119 MG/DL (ref 0–100)
LEUKOCYTE ESTERASE UR QL STRIP: NEGATIVE
NITRITE UR QL STRIP: NEGATIVE
NON-SQ EPI CELLS URNS QL MICRO: NORMAL /HPF
PH UR STRIP.AUTO: 7 [PH]
POTASSIUM SERPL-SCNC: 4.1 MMOL/L (ref 3.5–5.3)
PROT SERPL-MCNC: 7.4 G/DL (ref 6.4–8.4)
PROT UR STRIP-MCNC: NEGATIVE MG/DL
RBC #/AREA URNS AUTO: NORMAL /HPF
SODIUM SERPL-SCNC: 137 MMOL/L (ref 135–147)
SP GR UR STRIP.AUTO: 1.01 (ref 1–1.03)
TRIGL SERPL-MCNC: 254 MG/DL
UROBILINOGEN UR STRIP-ACNC: <2 MG/DL
WBC #/AREA URNS AUTO: NORMAL /HPF

## 2022-08-16 PROCEDURE — 80061 LIPID PANEL: CPT

## 2022-08-16 PROCEDURE — 80053 COMPREHEN METABOLIC PANEL: CPT

## 2022-08-16 PROCEDURE — 83036 HEMOGLOBIN GLYCOSYLATED A1C: CPT

## 2022-08-16 PROCEDURE — 36415 COLL VENOUS BLD VENIPUNCTURE: CPT

## 2022-08-16 PROCEDURE — 81001 URINALYSIS AUTO W/SCOPE: CPT

## 2022-08-16 RX ORDER — SILDENAFIL 50 MG/1
TABLET, FILM COATED ORAL
Qty: 30 TABLET | Refills: 0 | Status: SHIPPED | OUTPATIENT
Start: 2022-08-16 | End: 2022-10-14

## 2022-08-16 NOTE — RESULT ENCOUNTER NOTE
Renal function stable at creatinine 1 2, electrolytes are stable, will discuss with patient during office visit

## 2022-08-18 ENCOUNTER — APPOINTMENT (OUTPATIENT)
Dept: LAB | Age: 62
End: 2022-08-18
Payer: COMMERCIAL

## 2022-08-18 PROCEDURE — 82507 ASSAY OF CITRATE: CPT | Performed by: INTERNAL MEDICINE

## 2022-08-18 PROCEDURE — 82570 ASSAY OF URINE CREATININE: CPT | Performed by: INTERNAL MEDICINE

## 2022-08-18 PROCEDURE — 83735 ASSAY OF MAGNESIUM: CPT | Performed by: INTERNAL MEDICINE

## 2022-08-18 PROCEDURE — 82140 ASSAY OF AMMONIA: CPT | Performed by: INTERNAL MEDICINE

## 2022-08-18 PROCEDURE — 81003 URINALYSIS AUTO W/O SCOPE: CPT | Performed by: INTERNAL MEDICINE

## 2022-08-18 PROCEDURE — 84133 ASSAY OF URINE POTASSIUM: CPT | Performed by: INTERNAL MEDICINE

## 2022-08-18 PROCEDURE — 82131 AMINO ACIDS SINGLE QUANT: CPT | Performed by: INTERNAL MEDICINE

## 2022-08-18 PROCEDURE — 83935 ASSAY OF URINE OSMOLALITY: CPT | Performed by: INTERNAL MEDICINE

## 2022-08-18 PROCEDURE — 84560 ASSAY OF URINE/URIC ACID: CPT | Performed by: INTERNAL MEDICINE

## 2022-08-18 PROCEDURE — 82436 ASSAY OF URINE CHLORIDE: CPT | Performed by: INTERNAL MEDICINE

## 2022-08-18 PROCEDURE — 82340 ASSAY OF CALCIUM IN URINE: CPT | Performed by: INTERNAL MEDICINE

## 2022-08-18 PROCEDURE — 84392 ASSAY OF URINE SULFATE: CPT | Performed by: INTERNAL MEDICINE

## 2022-08-18 PROCEDURE — 83945 ASSAY OF OXALATE: CPT | Performed by: INTERNAL MEDICINE

## 2022-08-18 PROCEDURE — 84300 ASSAY OF URINE SODIUM: CPT | Performed by: INTERNAL MEDICINE

## 2022-08-18 PROCEDURE — 84105 ASSAY OF URINE PHOSPHORUS: CPT | Performed by: INTERNAL MEDICINE

## 2022-08-19 ENCOUNTER — TELEPHONE (OUTPATIENT)
Dept: NEPHROLOGY | Facility: CLINIC | Age: 62
End: 2022-08-19

## 2022-08-23 NOTE — PROGRESS NOTES
NEPHROLOGY OUTPATIENT PROGRESS NOTE   Monster Garcia 63 y o  male MRN: [id number]  DATE: 8/24/2022  Reason for visit:   Chief Complaint   Patient presents with    Follow-up    Chronic Kidney Disease       ASSESSMENT and PLAN:  Recurrent nephrolithiasis/hypocitraturia  -kidney stone analysis from June 22, 2022 suggestive of 80% calcium oxalate dihydrate and 20% calcium oxalate monohydrate   -stone risk profile from December 2020: Urine volume 2 5 L, urine citratrate 243 mg which is low  -urine pH around 7 5 to 7 0  -previous stone analysis from 2021 as well as 2019 suggestive of calcium oxalate stones but around 40% calcium oxalate dihydrate  -stone risk profile from 08/18/2022- under process  -status post multiple urological procedures for recurrent nephrolithiasis  In June 2022, 2022 underwent cystoscopy with lithotripsy and insertion of stent, stone basket extraction on the left side  -x-ray KUB from July 5th 2022 still with persistent bilateral renal calculi which are unchanged   -renal ultrasound from June 2022 with finding of multiple calculi in both kidneys mild left hydronephrosis  -patient previously followed with Dr Miah Quesada and is now transferring care, was last seen by him in January 2021  -PTH previously low at 16 8 in 2020  -never took potassium citrate due to cost   [facility]ions cost was around 300-400 dollar for 3 months supply at that time  -Plan  · Stop vitamin-C supplementation due to risk of calcium oxalate stones with use of vitamin-C   · Continue oral hydration 3-4 L of water per day  · Discussed about low animal protein and low-sodium diet  · Started on uro crit K 10 mEq twice daily  Using lower dose as urine pH is around 7 and do not want to increase the urine pH and increase the risk of calcium phosphate stone  · Follow-up results of 24 hours stone risk profile  Further recommendations after the study report  May need to consider use of HCTZ to decrease urinary calcium if needed  Recommend low oxalate diet  · Renal function is currently stable, EGFR more than 60  Creatinine stable at 1 2, UA without any RBCs  Discussed about risk of CKD with recurrent nephrolithiasis and repeated Urology procedures  · Check PTH and uric acid level before the next office visit in 4 months  · Reviewed Urology note from 08/02-24 hours stone risk profile was ordered but currently under process  Noted plan to follow-up in 4 months with a KUB    Primary Hypertension:   -Current medication:  Lisinopril 15 mg daily     -Current blood pressure: stable   -Plan:    ·  continue same medications   -Recommend 2 g sodium diet  -Recommend daily exercise and weight loss  -Discussed home monitoring of BP and maintaining a log of blood pressure   -Recommend goal BP less than 130/80  Low serum PTH  -PTH level was 16 8 in October 2020  Will repeat before the office visit  Patient denies any radiation or neck surgery calcium level is at normal range    Right renal cyst, renal ultrasound showed simple cyst measuring 1 1 cm in the right kidney, no need for further follow-up  Follow-up in 4 months with repeat labs        SUBJECTIVE / HPI:  Larry Lange is a 63 y o   male with history of HTN, colon polyp presenting for follow-up of recurrent nephrolithiasis  STARTED  At the age of 29 yrs of age  Says had lithotripsy 8-9 times and 3 cystoscopies so far  -  Previously was following with Dr Ely Do and now transferring care  He was found to have low 24 hour urine citrate and was started on potassium citrate 10 mEq t i d     Cousin on mother side had kidney stones     On June 22, 2022 patient underwent cystoscopy, lithotripsy and extraction of stone    REVIEW OF SYSTEMS:    Review of Systems   Constitutional: Negative for activity change, appetite change, chills, diaphoresis, fatigue and fever  HENT: Negative for congestion, facial swelling and nosebleeds  Eyes: Negative for pain and visual disturbance  Respiratory: Negative for cough, chest tightness and shortness of breath  Cardiovascular: Negative for chest pain and palpitations  Gastrointestinal: Negative for abdominal distention, abdominal pain, diarrhea, nausea and vomiting  Genitourinary: Negative for difficulty urinating, dysuria, flank pain, frequency and hematuria  Musculoskeletal: Negative for arthralgias, back pain and joint swelling  Skin: Negative for rash  Neurological: Negative for dizziness, seizures, syncope, weakness and headaches  Psychiatric/Behavioral: Negative for agitation and confusion  The patient is not nervous/anxious  More than 10 point review of systems were obtained and discussed in length with the patient  Complete review of systems were negative / unremarkable except mentioned above         PAST MEDICAL HISTORY:  Past Medical History:   Diagnosis Date    Anxiety     Cancer ([address] )     skin ca on nose    Chronic kidney disease     kidney stones    GERD (gastroesophageal reflux disease)     Hyperlipidemia     Hypertension     Kidney stone     6/2022       PAST SURGICAL HISTORY:  Past Surgical History:   Procedure Laterality Date    COLONOSCOPY      ESOPHAGOGASTRODUODENOSCOPY      EXTRACORPOREAL SHOCK WAVE LITHOTRIPSY Bilateral     Renal Multiple times    FL RETROGRADE PYELOGRAM  3/31/2022    FL RETROGRADE PYELOGRAM  6/22/2022    AZ CYSTO/URETERO W/LITHOTRIPSY &INDWELL STENT INSRT Left 8/6/2020    Procedure: CYSTOSCOPY URETEROSCOPY WITH LITHOTRIPSY HOLMIUM LASER, AND INSERTION STENT URETERAL;  Surgeon: Luan Miller MD;  [facility];  Service: Urology    AZ CYSTO/URETERO W/LITHOTRIPSY &INDWELL STENT INSRT Right 3/31/2022    Procedure: CYSTOSCOPY URETEROSCOPY WITH LITHOTRIPSY HOLMIUM LASER,BASKET STONE EXTRACTION, RETROGRADE PYELOGRAM AND INSERTION STENT URETERAL;  Surgeon: Natali Lim MD;  [facility];  Service: Urology    AZ CYSTO/URETERO W/LITHOTRIPSY &INDWELL STENT INSRT Left 2022    Procedure: CYSTOSCOPY URETEROSCOPY WITH LITHOTRIPSY HOLMIUM LASER, RETROGRADE PYELOGRAM AND INSERTION STENT URETERAL, STONE BASKET EXTRACTION;  Surgeon: Yael Edouard MD;  [facility];  Service: Urology    RI EXC SKIN BENIG >4 CM FACE,FACIAL Right 2016    Procedure: EXCISION  LESION UPPER EYELID, COMPLEX CLOSURE;  Surgeon: Eleonora Madrid MD;  Location: [facility];  Service: Plastics    RI FRAGMENT KIDNEY STONE/ ESWL Right 2019    Procedure: Padilla Druze SHOCKWAVE (ESWL); Surgeon: Savanna Jensen MD;  [facility];  Service: Urology       SOCIAL HISTORY:  Social History     Substance and Sexual Activity   Alcohol Use Yes    Alcohol/week: 3 0 standard drinks    Types: 1 Glasses of wine, 2 Cans of beer per week    Comment: weekend     Social History     Substance and Sexual Activity   Drug Use No     Social History     Tobacco Use   Smoking Status Former Smoker    Packs/day: 0 75    Years: 18 00    Pack years: 13 50    Start date:     Quit date: 5    Years since quittin 6   Smokeless Tobacco Never Used       FAMILY HISTORY:  Family History   Problem Relation Age of Onset    Other Father         CABG (coronary artery bypass surgery)    Atrial fibrillation Sister     Other Paternal Uncle         Myocardial infarction    Coronary artery disease Family     Diabetes Family     Hypertension Family     Uterine cancer Family        MEDICATIONS:    Current Outpatient Medications:     b complex vitamins capsule, Take 1 capsule by mouth daily  , Disp: , Rfl:     Cholecalciferol (VITAMIN D3) 1000 units CAPS, Take 1 capsule by mouth daily, Disp: , Rfl:     coenzyme Q-10 100 MG capsule, Take 1 capsule by mouth daily, Disp: , Rfl:     cyanocobalamin (VITAMIN B-12) 100 mcg tablet, Take by mouth daily  , Disp: , Rfl:     fenofibrate (TRICOR) 145 mg tablet, TAKE 1 TABLET DAILY, Disp: 90 tablet, Rfl: 1    fexofenadine (ALLEGRA) 180 MG tablet, Take 180 mg by mouth daily in the early morning , Disp: , Rfl:     lisinopril (ZESTRIL) 10 mg tablet, TAKE 1 AND 1/2 TABLETS(15  MG TOTAL) DAILY, Disp: 135 tablet, Rfl: 1    LORazepam (ATIVAN) 0 5 mg tablet, TAKE 1 TABLET DAILY AS     NEEDED FOR ANXIETY, Disp: 90 tablet, Rfl: 0    multivitamin (THERAGRAN) TABS, Take 1 tablet by mouth daily  , Disp: , Rfl:     omeprazole (PriLOSEC) 20 mg delayed release capsule, Take 20 mg by mouth daily, Disp: , Rfl:     oxybutynin (DITROPAN-XL) 5 mg 24 hr tablet, TAKE 1 TABLET (5 MG TOTAL) BY MOUTH AS NEEDED (AS NEEDED), Disp: 90 tablet, Rfl: 2    oxyCODONE (OXY-IR) 5 MG capsule, Take 1 capsule (5 mg total) by mouth every 4 (four) hours as needed for moderate pain Max Daily Amount: 30 mg, Disp: 15 capsule, Rfl: 0    phenazopyridine (PYRIDIUM) 200 mg tablet, Take 1 tablet (200 mg total) by mouth 3 (three) times a day as needed for bladder spasms, Disp: 15 tablet, Rfl: 0    potassium citrate (UROCIT-K) 10 mEq, Take 1 tablet (10 mEq total) by mouth 2 (two) times a day, Disp: 180 tablet, Rfl: 3    sildenafil (VIAGRA) 50 MG tablet, TAKE 1 TABLET DAILY AS     NEEDED FOR ERECTILE        DYSFUNCTION, Disp: 30 tablet, Rfl: 0    SYRINGE-NEEDLE, DISP, 3 ML (BD ECLIPSE SYRINGE) 25G X 1" 3 ML MISC, by Does not apply route once a week, Disp: 50 each, Rfl: 3    tamsulosin (FLOMAX) 0 4 mg, Take 1 capsule (0 4 mg total) by mouth daily with dinner, Disp: 90 capsule, Rfl: 0    testosterone cypionate (DEPO-TESTOSTERONE) 200 mg/mL SOLN, INJECT 0 4ML INTO SHOULDER,THIGH OR BUTTOCKS ONCE     WEEKLY  DISCARD THE        REMAINDER , Disp: 4 mL, Rfl: 0    naloxone (NARCAN) 4 mg/0 1 mL nasal spray, Administer 1 spray into a nostril  If no response after 2-3 minutes, give another dose in the other nostril using a new spray   (Patient not taking: No sig reported), Disp: 1 each, Rfl: 1    omeprazole (PriLOSEC) 40 MG capsule, Take 40 mg by mouth daily in the early morning  (Patient not taking: No sig reported), Disp: , Rfl:       PHYSICAL EXAM:  Vitals:    08/24/22 1119 08/24/22 1147   BP: 140/90 120/78   BP Location: Left arm    Patient Position: Sitting    Cuff Size: Standard    Pulse: 82    SpO2: 99%    Weight: 98 kg (216 lb)    Height: 5' 11" (1 803 m)      Body mass index is 30 13 kg/m²  Physical Exam  Constitutional:       General: He is not in acute distress  Appearance: He is well-developed  He is not diaphoretic  HENT:      Head: Normocephalic and atraumatic  Mouth/Throat:      Mouth: Mucous membranes are moist    Eyes:      General: No scleral icterus  Conjunctiva/sclera: Conjunctivae normal       Pupils: Pupils are equal, round, and reactive to light  Neck:      Thyroid: No thyromegaly  Cardiovascular:      Rate and Rhythm: Normal rate and regular rhythm  Heart sounds: Normal heart sounds  No murmur heard  No friction rub  Pulmonary:      Effort: Pulmonary effort is normal  No respiratory distress  Breath sounds: Normal breath sounds  No wheezing or rales  Abdominal:      General: Bowel sounds are normal  There is no distension  Palpations: Abdomen is soft  Tenderness: There is no abdominal tenderness  Musculoskeletal:         General: No deformity  Cervical back: Neck supple  Right lower leg: No edema  Left lower leg: No edema  Lymphadenopathy:      Cervical: No cervical adenopathy  Skin:     Coloration: Skin is not pale  Nails: There is no clubbing  Neurological:      Mental Status: He is alert and oriented to person, place, and time  He is not disoriented  Psychiatric:         Mood and Affect: Mood normal  Mood is not anxious  Affect is not inappropriate  Behavior: Behavior normal          Thought Content:  Thought content normal          Lab Results:   Results for orders placed or performed in visit on 08/16/22   Comprehensive metabolic panel   Result Value Ref Range    Sodium 137 135 - 147 mmol/L    Potassium 4 1 3 5 - 5 3 mmol/L    Chloride 106 96 - 108 mmol/L    CO2 27 21 - 32 mmol/L    ANION GAP 4 4 - 13 mmol/L    BUN 18 5 - 25 mg/dL    Creatinine 1 22 0 60 - 1 30 mg/dL    Glucose, Fasting 98 65 - 99 mg/dL    Calcium 9 4 8 3 - 10 1 mg/dL    Corrected Calcium 10 0 8 3 - 10 1 mg/dL    AST 22 5 - 45 U/L    ALT 28 12 - 78 U/L    Alkaline Phosphatase 45 (L) 46 - 116 U/L    Total Protein 7 4 6 4 - 8 4 g/dL    Albumin 3 3 (L) 3 5 - 5 0 g/dL    Total Bilirubin 0 40 0 20 - 1 00 mg/dL    eGFR 64 ml/min/1 73sq m   Hemoglobin A1C   Result Value Ref Range    Hemoglobin A1C 5 7 (H) Normal 3 8-5 6%; PreDiabetic 5 7-6 4%;  Diabetic >=6 5%; Glycemic control for adults with diabetes <7 0% %     mg/dl   Lipid Panel with Direct LDL reflex   Result Value Ref Range    Cholesterol 199 See Comment mg/dL    Triglycerides 254 (H) See Comment mg/dL    HDL, Direct 29 (L) >=40 mg/dL    LDL Calculated 119 (H) 0 - 100 mg/dL

## 2022-08-24 ENCOUNTER — OFFICE VISIT (OUTPATIENT)
Dept: NEPHROLOGY | Facility: CLINIC | Age: 62
End: 2022-08-24
Payer: COMMERCIAL

## 2022-08-24 VITALS
DIASTOLIC BLOOD PRESSURE: 78 MMHG | OXYGEN SATURATION: 99 % | SYSTOLIC BLOOD PRESSURE: 120 MMHG | BODY MASS INDEX: 30.24 KG/M2 | HEART RATE: 82 BPM | HEIGHT: 71 IN | WEIGHT: 216 LBS

## 2022-08-24 DIAGNOSIS — N20.0 RECURRENT NEPHROLITHIASIS: Primary | ICD-10-CM

## 2022-08-24 DIAGNOSIS — R79.89 LOW SERUM PARATHYROID HORMONE (PTH): ICD-10-CM

## 2022-08-24 DIAGNOSIS — R82.991 HYPOCITRATURIA: ICD-10-CM

## 2022-08-24 DIAGNOSIS — I10 ESSENTIAL HYPERTENSION: ICD-10-CM

## 2022-08-24 PROCEDURE — 99214 OFFICE O/P EST MOD 30 MIN: CPT | Performed by: INTERNAL MEDICINE

## 2022-08-24 RX ORDER — POTASSIUM CITRATE 10 MEQ/1
10 TABLET, EXTENDED RELEASE ORAL 2 TIMES DAILY
Qty: 180 TABLET | Refills: 3 | Status: SHIPPED | OUTPATIENT
Start: 2022-08-24

## 2022-08-24 NOTE — PATIENT INSTRUCTIONS
Stressed on oral hydration 3-4 L of water per day as he has been doing  Continue low-sodium diet  Continue low oxalate diet  Started on potassium citrate 10 mEq twice daily  Will follow up the results of 24 hour urine studies and give further recommendations as needed  Follow-up in 4 months with repeat blood work urine test before the office visit  Blood pressure is stable continue same treatment    Kidney Stones   AMBULATORY CARE:   Kidney stones  form in the urinary system when the water and waste in your urine are out of balance  When this happens, certain types of waste crystals separate from the urine  The crystals build up and form kidney stones  Kidney stones can be made of uric acid, calcium, phosphate, or oxalate crystals  You may have 1 or more kidney stones  Common symptoms include the following:   Pain in the middle of your back that moves across to your side or that may spread to your groin    Nausea and vomiting    Urge to urinate often, burning feeling when you urinate, or pink or red urine    Tenderness in your lower back, side, or stomach    Seek care immediately if:   You are vomiting and it is not relieved with medicine  Call your doctor or kidney specialist if:   You have a fever  You have trouble urinating  You see blood in your urine  You have severe pain  You have any questions or concerns about your condition or care  Treatment for kidney stones  may include any of the following:  NSAIDs , such as ibuprofen, help decrease swelling, pain, and fever  This medicine is available with or without a doctor's order  NSAIDs can cause stomach bleeding or kidney problems in certain people  If you take blood thinner medicine, always ask your healthcare provider if NSAIDs are safe for you  Always read the medicine label and follow directions  Acetaminophen  decreases pain and fever  It is available without a doctor's order  Ask how much to take and how often to take it  Follow directions  Read the labels of all other medicines you are using to see if they also contain acetaminophen, or ask your doctor or pharmacist  Acetaminophen can cause liver damage if not taken correctly  Do not use more than 4 grams (4,000 milligrams) total of acetaminophen in one day  Prescription pain medicine  may be given  Ask your healthcare provider how to take this medicine safely  Some prescription pain medicines contain acetaminophen  Do not take other medicines that contain acetaminophen without talking to your healthcare provider  Too much acetaminophen may cause liver damage  Prescription pain medicine may cause constipation  Ask your healthcare provider how to prevent or treat constipation  Medicines  to balance your electrolytes may be needed  A procedure or surgery  to remove the kidney stones may be needed if they do not pass on their own  Your treatment will depend on the size and location of your kidney stones  What you can do to manage kidney stones:   Drink more liquids  Your healthcare provider may tell you to drink at least 8 to 12 (eight-ounce) cups of liquids each day  This helps flush out the kidney stones when you urinate  Water is the best liquid to drink  Strain your urine every time you go to the bathroom  Urinate through a strainer or a piece of thin cloth to catch the stones  Take the stones to your healthcare provider so they can be sent to the lab for tests  This will help your healthcare providers plan the best treatment for you  Ask if you should avoid any foods  You may need to limit oxalate  Oxalate is a chemical found in some plant foods  The most common type of kidney stone is made up of crystals that contain calcium and oxalate  Your healthcare provider or dietitian may recommend that you limit oxalate if you get this type of kidney stone often  You may need to limit how much sodium (salt) or protein you eat   Ask for information about the best foods for you  Be physically active as directed  Your stones may pass more easily if you stay active  Physical activity can also help you manage your weight  Ask about the best activities for you  After you pass the kidney stones: Your healthcare provider may  order a 24-hour urine test  Results from a 24-hour urine test will help your healthcare provider plan ways to prevent more stones from forming  Your healthcare provider will give you more instructions  Follow up with your doctor or kidney specialist as directed:  Write down your questions so you remember to ask them during your visits  © Copyright [employer] 2022 Information is for End User's use only and may not be sold, redistributed or otherwise used for commercial purposes  All illustrations and images included in CareNotes® are the copyrighted property of A D A M [employer] Inc  or [facility] Joaquin Menard   The above information is an  only  It is not intended as medical advice for individual conditions or treatments  Talk to your doctor, nurse or pharmacist before following any medical regimen to see if it is safe and effective for you  Low Oxalate Diet   WHAT YOU NEED TO KNOW:   What is a low-oxalate diet? Oxalate is a chemical found in plant foods  You may need to eat foods that are low in oxalate to help clear kidney stones or prevent them from forming  People who have had kidney stones are at a higher risk of forming kidney stones again  The most common type of kidney stone is made up of crystals that contain calcium and oxalate  Your healthcare provider or dietitian may recommend that you limit oxalate if you get this type of kidney stone often  Which foods should I include? Include the following foods that have a low to medium amount of oxalate    Grains:      Egg noodles    Jacob crackers    Pancakes and waffles    Cooked and dry cereals without nuts or bran    White or wild rice    White bread, cornbread, bagels, and white English muffins (medium oxalate)    Saltine or soda crackers and vanilla wafers (medium oxalate)    Brown rice, spaghetti, and other noodles and pastas (medium oxalate)    Fruit:      Apples, bananas, grapes    Cranberries    Peaches, nectarines, apricots, and pears    Papayas and strawberries    Melons and pineapples    Blackberries, blueberries, mangoes, and prunes (medium oxalate)    Vegetables:      Artichokes, asparagus, and brussels sprouts    Broccoli and cauliflower    Kale, endive, cabbage, and lettuce    Cucumbers, peas, and zucchini    Mushrooms, onions, and peppers    Corn    Carrots, celery, and green beans (medium oxalate)    Parsnips, summer squash, tomatoes, and turnips (medium oxalate)    Dairy:      American cheese, Swiss cheese, cottage cheese, ricotta cheese, and cheddar cheese    Milk and buttermilk    Yogurt    Protein foods:      Meat, fish, shellfish, chicken, and turkey    Lunch meat and ham (medium oxalate)    Hot dogs, bratwurst, rosales, and sausage (medium oxalate)    Drinks and desserts:      Coffee    Fruit punch and lemonade or limeade without added vitamin C    Desserts:      Cookies, cakes, and ice cream    Pudding without chocolate    Which foods should I limit or avoid? Limit the following foods that are high in oxalate    Grains:      Wheat bran, wheat germ, and barley    Grits and bran cereal    White corn flour and buckwheat flour    Whole wheat bread    Fruit:      Dried apricots    Red currants, figs, and rhubarb    Kiwi    Grapefruit    Vegetables:      Potatoes and yams    Fabiana greens, leeks, okra, and spinach    Wax beans     Eggplant    Beets and beet greens    Swiss chard, escarole, parsley, and rutabagas    Tomato paste    Protein foods:      Baked beans with tomato sauce    Nut butters and nuts (peanuts, almonds, pecans, cashews, hazelnuts)    Soy burgers    Miso    Dried beans    Desserts:      Fruitcake    Chocolate    Carob and marmalade    Beverages: Chocolate drink mixes    Soy milk    Instant iced tea    Other foods:      Sesame seeds and tahini (paste made of sesame seeds)    Poppy seeds    What other dietary guidelines should I follow? Drink about 12 to 16 (eight-ounce) cups of liquid each day  Liquids help clear kidney stones and prevent them from forming again  Water is the best liquid to drink  You may need more liquid if you are physically active  Ask your healthcare provider or dietitian how much liquid you need to drink each day  Your healthcare provider may suggest that you make other diet changes to help prevent kidney stones  This may include decreasing the amount of sodium you eat each day  CARE AGREEMENT:   You have the right to help plan your care  Discuss treatment options with your healthcare provider to decide what care you want to receive  You always have the right to refuse treatment  The above information is an  only  It is not intended as medical advice for individual conditions or treatments  Talk to your doctor, nurse or pharmacist before following any medical regimen to see if it is safe and effective for you  © Copyright [employer] [facility] 2022 Information is for End User's use only and may not be sold, redistributed or otherwise used for commercial purposes   All illustrations and images included in CareNotes® are the copyrighted property of [employer]  or [address] [place] [address] [employer]

## 2022-08-26 DIAGNOSIS — E29.1 HYPOGONADISM IN MALE: ICD-10-CM

## 2022-08-29 ENCOUNTER — TELEPHONE (OUTPATIENT)
Dept: NEPHROLOGY | Facility: CLINIC | Age: 62
End: 2022-08-29

## 2022-08-29 LAB
AMMONIA 24H UR-MRATE: 15 MEQ/24 HR
AMMONIA UR-SCNC: ABNORMAL UG/DL
CA H2 PHOS DIHYD CRY URNS QL MICRO: 1.82 RATIO (ref 0–3)
CALCIUM 24H UR-MCNC: 6.6 MG/DL
CALCIUM 24H UR-MRATE: 155.1 MG/24 HR (ref 0–320)
CHLORIDE 24H UR-SCNC: 41 MMOL/L
CHLORIDE 24H UR-SRATE: 96 MMOL/24 HR (ref 52–264)
CITRATE 24H UR-MCNC: 106 MG/L
CITRATE 24H UR-MRATE: 249 MG/24 HR (ref 320–1240)
COM CRY STONE QL IR: 0.91 RATIO (ref 0–6)
CREAT 24H UR-MCNC: 29.9 MG/DL
CREAT 24H UR-MRATE: 702.7 MG/24 HR (ref 1000–2000)
CYSTINE 24H UR-MCNC: 2.22 MG/L
CYSTINE 24H UR-MRATE: 5.22 MG/24 HR (ref 2.1–58)
MAGNESIUM 24H UR-MRATE: <33 MG/24 HR (ref 12–293)
MAGNESIUM UR-MCNC: <1.4 MG/DL
NA URATE CRY STONE QL IR: 1.56 RATIO (ref 0–4)
OSMOLALITY UR: 222 MOSMOL/KG (ref 300–900)
OXALATE 24H UR-MRATE: 7 MG/24 HR (ref 7–44)
OXALATE UR-MCNC: 3 MG/L
PH 24H UR: 6.9 [PH] (ref 4.5–8)
PHOSPHATE 24H UR-MCNC: 28.3 MG/DL
PHOSPHATE 24H UR-MRATE: 665.1 MG/24 HR (ref 390–1425)
PLEASE NOTE (STONE RISK): ABNORMAL
POTASSIUM 24H UR-SCNC: 33.6 MMOL/24 HR (ref 20–116)
POTASSIUM UR-SCNC: 14.3 MMOL/L
PRESERVED URINE: 2350 ML/24 HR (ref 800–1800)
SODIUM 24H UR-SCNC: 58 MMOL/L
SODIUM 24H UR-SRATE: 136 MMOL/24 HR (ref 58–337)
SPECIMEN VOL 24H UR: 2350 ML/24 HR (ref 800–1800)
SULFATE 24H UR-MCNC: 16 MEQ/24 HR (ref 0–30)
SULFATE UR-MCNC: 7 MEQ/L
TRI-PHOS CRY STONE MICRO: 0.03 RATIO (ref 0–1)
URATE 24H UR-MCNC: 22.2 MG/DL
URATE 24H UR-MRATE: 522 MG/24 HR (ref 197–1079)
URATE DIHYD CRY STONE QL IR: 0.13 RATIO (ref 0–1.2)

## 2022-08-29 RX ORDER — TESTOSTERONE CYPIONATE 200 MG/ML
INJECTION INTRAMUSCULAR
Qty: 4 ML | Refills: 0 | Status: SHIPPED | OUTPATIENT
Start: 2022-08-29 | End: 2022-10-17

## 2022-08-29 NOTE — TELEPHONE ENCOUNTER
Received a call from patient stating he has completed his 24 hr stone risk profile however, the results were sent to Dr Pradip Almendarez instead of Dr Cheryl Bolivar  I informed him that I would pass along the message

## 2022-08-29 NOTE — TELEPHONE ENCOUNTER
Not able to reach patient on the phone, left message  Reviewed results of 24 hour urine stone risk profile  Urine volume was adequate at 2 3 L  24 hour urine citrate was 249 which is low  Recommendation is to continue drinking water as he is currently doing  Recommend drinking around 3-4 L of water as he is doing  Was started on uro crit K 10 mEq twice daily during the last office visit which would help  Continue low oxalate diet

## 2022-09-02 DIAGNOSIS — E78.1 HYPERTRIGLYCERIDEMIA: ICD-10-CM

## 2022-09-02 RX ORDER — FENOFIBRATE 145 MG/1
145 TABLET, COATED ORAL DAILY
Qty: 90 TABLET | Refills: 1 | Status: SHIPPED | OUTPATIENT
Start: 2022-09-02

## 2022-09-12 ENCOUNTER — HOSPITAL ENCOUNTER (OUTPATIENT)
Dept: NON INVASIVE DIAGNOSTICS | Facility: CLINIC | Age: 62
Discharge: HOME | End: 2022-09-12
Payer: COMMERCIAL

## 2022-09-12 VITALS — DIASTOLIC BLOOD PRESSURE: 80 MMHG | BODY MASS INDEX: 30.13 KG/M2 | WEIGHT: 216 LBS | SYSTOLIC BLOOD PRESSURE: 130 MMHG

## 2022-09-12 DIAGNOSIS — Z13.6 SCREENING FOR CARDIOVASCULAR CONDITION: Primary | ICD-10-CM

## 2022-09-12 DIAGNOSIS — Z13.6 SCREENING FOR CARDIOVASCULAR CONDITION: ICD-10-CM

## 2022-09-12 LAB
BASELINE ST DEPRESSION: 0 MM
MAX HR PERCENT: 96 %
MAX HR: 155 BPM
RATE PRESSURE PRODUCT: NORMAL
SL CV STRESS RECOVERY BP: NORMAL MMHG
SL CV STRESS RECOVERY HR: 103 BPM
SL CV STRESS RECOVERY O2 SAT: 98 %
STRESS ANGINA INDEX: 0
STRESS BASELINE BP: NORMAL MMHG
STRESS BASELINE HR: 86 BPM
STRESS DUKE TREADMILL SCORE: 10
STRESS O2 SAT REST: 99 %
STRESS PEAK HR: 155 BPM
STRESS POST ESTIMATED WORKLOAD: 13.4 METS
STRESS POST EXERCISE DUR MIN: 10 MIN
STRESS POST EXERCISE DUR SEC: 0 SEC
STRESS POST O2 SAT PEAK: 98 %
STRESS POST PEAK BP: 158 MMHG
STRESS ST DEPRESSION: 0.5 MM

## 2022-09-12 PROCEDURE — 93016 CV STRESS TEST SUPVJ ONLY: CPT | Performed by: INTERNAL MEDICINE

## 2022-09-12 PROCEDURE — 93017 CV STRESS TEST TRACING ONLY: CPT

## 2022-09-12 PROCEDURE — 93018 CV STRESS TEST I&R ONLY: CPT | Performed by: INTERNAL MEDICINE

## 2022-09-13 LAB
CHEST PAIN STATEMENT: NORMAL
MAX DIASTOLIC BP: 84 MMHG
MAX HEART RATE: 155 BPM
MAX PREDICTED HEART RATE: 161 BPM
MAX. SYSTOLIC BP: 158 MMHG
PROTOCOL NAME: NORMAL
REASON FOR TERMINATION: NORMAL
TARGET HR FORMULA: NORMAL
TEST INDICATION: NORMAL
TIME IN EXERCISE PHASE: NORMAL

## 2022-10-14 DIAGNOSIS — E29.1 HYPOGONADISM IN MALE: ICD-10-CM

## 2022-10-14 DIAGNOSIS — E29.1 TESTICULAR HYPOGONADISM: ICD-10-CM

## 2022-10-14 DIAGNOSIS — F41.9 ANXIETY: ICD-10-CM

## 2022-10-14 RX ORDER — SILDENAFIL 50 MG/1
TABLET, FILM COATED ORAL
Qty: 30 TABLET | Refills: 0 | Status: SHIPPED | OUTPATIENT
Start: 2022-10-14

## 2022-10-17 ENCOUNTER — HOSPITAL ENCOUNTER (OUTPATIENT)
Dept: CT IMAGING | Facility: HOSPITAL | Age: 62
Discharge: HOME | End: 2022-10-17
Payer: COMMERCIAL

## 2022-10-17 DIAGNOSIS — Z13.6 SCREENING FOR CARDIOVASCULAR CONDITION: ICD-10-CM

## 2022-10-17 PROCEDURE — 75571 CT HRT W/O DYE W/CA TEST: CPT

## 2022-10-17 PROCEDURE — G1004 CDSM NDSC: HCPCS

## 2022-10-17 RX ORDER — LORAZEPAM 0.5 MG/1
TABLET ORAL
Qty: 90 TABLET | Refills: 0 | Status: SHIPPED | OUTPATIENT
Start: 2022-10-17

## 2022-10-17 RX ORDER — TESTOSTERONE CYPIONATE 200 MG/ML
INJECTION INTRAMUSCULAR
Qty: 4 ML | Refills: 0 | Status: SHIPPED | OUTPATIENT
Start: 2022-10-17

## 2022-10-26 ENCOUNTER — TELEPHONE (OUTPATIENT)
Dept: CARDIOLOGY CLINIC | Facility: CLINIC | Age: 62
End: 2022-10-26

## 2022-10-26 ENCOUNTER — CONSULT (OUTPATIENT)
Dept: CARDIOLOGY CLINIC | Facility: CLINIC | Age: 62
End: 2022-10-26
Payer: COMMERCIAL

## 2022-10-26 VITALS
HEIGHT: 71 IN | WEIGHT: 219.9 LBS | HEART RATE: 78 BPM | SYSTOLIC BLOOD PRESSURE: 120 MMHG | BODY MASS INDEX: 30.78 KG/M2 | DIASTOLIC BLOOD PRESSURE: 82 MMHG

## 2022-10-26 DIAGNOSIS — I10 PRIMARY HYPERTENSION: ICD-10-CM

## 2022-10-26 DIAGNOSIS — E78.2 MIXED HYPERLIPIDEMIA: ICD-10-CM

## 2022-10-26 DIAGNOSIS — Z82.49 FAMILY HISTORY OF EARLY CAD: ICD-10-CM

## 2022-10-26 DIAGNOSIS — R94.39 ABNORMAL CARDIOVASCULAR STRESS TEST: Primary | ICD-10-CM

## 2022-10-26 DIAGNOSIS — Z13.6 SCREENING FOR CARDIOVASCULAR CONDITION: ICD-10-CM

## 2022-10-26 DIAGNOSIS — E66.9 CLASS 1 OBESITY WITHOUT SERIOUS COMORBIDITY WITH BODY MASS INDEX (BMI) OF 30.0 TO 30.9 IN ADULT, UNSPECIFIED OBESITY TYPE: ICD-10-CM

## 2022-10-26 DIAGNOSIS — K21.9 GASTROESOPHAGEAL REFLUX DISEASE, UNSPECIFIED WHETHER ESOPHAGITIS PRESENT: ICD-10-CM

## 2022-10-26 DIAGNOSIS — R73.03 PREDIABETES: ICD-10-CM

## 2022-10-26 PROCEDURE — 99204 OFFICE O/P NEW MOD 45 MIN: CPT | Performed by: INTERNAL MEDICINE

## 2022-10-26 RX ORDER — ATORVASTATIN CALCIUM 40 MG/1
40 TABLET, FILM COATED ORAL DAILY
Qty: 90 TABLET | Refills: 3 | Status: SHIPPED | OUTPATIENT
Start: 2022-10-26

## 2022-10-26 RX ORDER — ASPIRIN 81 MG/1
81 TABLET ORAL DAILY
Qty: 30 TABLET | Refills: 0
Start: 2022-10-26

## 2022-10-26 NOTE — PROGRESS NOTES
Cardiology Consultation     Young Guevara  [id number]  1960  HEART & VASCULAR [facility] [address] [place] [address]    1  Abnormal cardiovascular stress test  Case Request Cath Lab: Cardiac catheterization    Case Request Cath Lab: Cardiac catheterization   2  Mixed hyperlipidemia  atorvastatin (LIPITOR) 40 mg tablet   3  Primary hypertension     4  Prediabetes     5  Gastroesophageal reflux disease, unspecified whether esophagitis present     6  Family history of early CAD     9  Class 1 obesity without serious comorbidity with body mass index (BMI) of 30 0 to 30 9 in adult, unspecified obesity type     8   Screening for cardiovascular condition  Ambulatory referral to Cardiology     Patient Active Problem List   Diagnosis   • Essential hypertension   • GERD (gastroesophageal reflux disease)   • Overweight   • Thyroid nodule   • Testicular hypogonadism   • Elevated blood sugar   • Elevated hematocrit   • Fatty liver   • Low serum parathyroid hormone (PTH)   • Encounter for hepatitis C screening test for low risk patient   • Ureteral calculus, right   • Leukocytosis   • Dyslipidemia   • Nephrolithiasis   • Hypogonadism in male   • Hyperlipidemia   • Wellness examination   • CKD (chronic kidney disease)   • Hydronephrosis, right   • Renal calculus, bilateral   • Recurrent nephrolithiasis   • Hypocitraturia     Past Medical History:   Diagnosis Date   • Anxiety    • Cancer (HCC)     skin ca on nose   • Chronic kidney disease     kidney stones   • GERD (gastroesophageal reflux disease)    • Hyperlipidemia    • Hypertension    • Kidney stone     6/2022     Social History     Socioeconomic History   • Marital status: /Civil Union     Spouse name: Not on file   • Number of children: Not on file   • Years of education: Not on file   • Highest education level: Not on file   Occupational History   • Not on file Tobacco Use   • Smoking status: Former Smoker     Packs/day: 0 75     Years: 18 00     Pack years: 13 50     Start date:      Quit date:      Years since quittin 8   • Smokeless tobacco: Never Used   Vaping Use   • Vaping Use: Never used   Substance and Sexual Activity   • Alcohol use:  Yes     Alcohol/week: 3 0 standard drinks     Types: 1 Glasses of wine, 2 Cans of beer per week     Comment: weekend   • Drug use: No   • Sexual activity: Not Currently   Other Topics Concern   • Not on file   Social History Narrative    Daily caffeine consumption     Social Determinants of Health     Financial Resource Strain: Not on file   Food Insecurity: Not on file   Transportation Needs: Not on file   Physical Activity: Not on file   Stress: Not on file   Social Connections: Not on file   Intimate Partner Violence: Not on file   Housing Stability: Not on file      Family History   Problem Relation Age of Onset   • Other Father         CABG (coronary artery bypass surgery)   • Atrial fibrillation Sister    • Other Paternal Uncle         Myocardial infarction   • Coronary artery disease Family    • Diabetes Family    • Hypertension Family    • Uterine cancer Family      Past Surgical History:   Procedure Laterality Date   • COLONOSCOPY     • ESOPHAGOGASTRODUODENOSCOPY     • EXTRACORPOREAL SHOCK WAVE LITHOTRIPSY Bilateral     Renal Multiple times   • FL RETROGRADE PYELOGRAM  3/31/2022   • FL RETROGRADE PYELOGRAM  2022   • PA CYSTO/URETERO W/LITHOTRIPSY &INDWELL STENT INSRT Left 2020    Procedure: CYSTOSCOPY URETEROSCOPY WITH LITHOTRIPSY HOLMIUM LASER, AND INSERTION STENT URETERAL;  Surgeon: Otoniel Munoz MD;  Location:  MAIN OR;  Service: Urology   • PA CYSTO/URETERO W/LITHOTRIPSY &INDWELL STENT INSRT Right 3/31/2022    Procedure: CYSTOSCOPY URETEROSCOPY WITH LITHOTRIPSY HOLMIUM LASER,BASKET STONE EXTRACTION, RETROGRADE PYELOGRAM AND INSERTION STENT URETERAL;  Surgeon: Bruno Partida MD;  Location: [facility];  Service: Urology   • TX CYSTO/URETERO W/LITHOTRIPSY &INDWELL STENT INSRT Left 6/22/2022    Procedure: CYSTOSCOPY URETEROSCOPY WITH LITHOTRIPSY HOLMIUM LASER, RETROGRADE PYELOGRAM AND INSERTION STENT URETERAL, STONE BASKET EXTRACTION;  Surgeon: Reagan Dacosta MD;  Location: [facility];  Service: Urology   • TX EXC SKIN BENIG >4 CM FACE,FACIAL Right 4/28/2016    Procedure: EXCISION  LESION UPPER EYELID, COMPLEX CLOSURE;  Surgeon: Cameron Kaye MD;  Location: [facility];  Service: Plastics   • TX FRAGMENT KIDNEY STONE/ ESWL Right 4/18/2019    Procedure: Cathern Organ SHOCKWAVE (ESWL); Surgeon: Juan Francisco Quezada MD;  Location[facility];  Service: Urology       Current Outpatient Medications:   •  b complex vitamins capsule, Take 1 capsule by mouth daily  , Disp: , Rfl:   •  Cholecalciferol (VITAMIN D3) 1000 units CAPS, Take 1 capsule by mouth daily, Disp: , Rfl:   •  coenzyme Q-10 100 MG capsule, Take 1 capsule by mouth daily, Disp: , Rfl:   •  cyanocobalamin (VITAMIN B-12) 100 mcg tablet, Take by mouth daily  , Disp: , Rfl:   •  fenofibrate (TRICOR) 145 mg tablet, Take 1 tablet (145 mg total) by mouth daily, Disp: 90 tablet, Rfl: 1  •  fexofenadine (ALLEGRA) 180 MG tablet, Take 180 mg by mouth daily in the early morning , Disp: , Rfl:   •  lisinopril (ZESTRIL) 10 mg tablet, TAKE 1 AND 1/2 TABLETS(15  MG TOTAL) DAILY, Disp: 135 tablet, Rfl: 1  •  LORazepam (ATIVAN) 0 5 mg tablet, TAKE 1 TABLET DAILY AS     NEEDED FOR ANXIETY, Disp: 90 tablet, Rfl: 0  •  multivitamin (THERAGRAN) TABS, Take 1 tablet by mouth daily  , Disp: , Rfl:   •  omeprazole (PriLOSEC) 20 mg delayed release capsule, Take 20 mg by mouth daily, Disp: , Rfl:   •  potassium citrate (UROCIT-K) 10 mEq, Take 1 tablet (10 mEq total) by mouth 2 (two) times a day, Disp: 180 tablet, Rfl: 3  •  sildenafil (VIAGRA) 50 MG tablet, TAKE 1 TABLET DAILY AS     NEEDED FOR ERECTILE        DYSFUNCTION, Disp: 30 tablet, Rfl: 0  •  SYRINGE-NEEDLE, DISP, 3 ML (BD ECLIPSE SYRINGE) 25G X 1" 3 ML MISC, by Does not apply route once a week, Disp: 50 each, Rfl: 3  •  testosterone cypionate (DEPO-TESTOSTERONE) 200 mg/mL SOLN, INJECT 0 4ML INTO SHOULDER,THIGH OR BUTTOCKS ONCE     WEEKLY  DISCARD THE        REMAINDER, Disp: 4 mL, Rfl: 0  •  naloxone (NARCAN) 4 mg/0 1 mL nasal spray, Administer 1 spray into a nostril  If no response after 2-3 minutes, give another dose in the other nostril using a new spray   (Patient not taking: No sig reported), Disp: 1 each, Rfl: 1  •  omeprazole (PriLOSEC) 40 MG capsule, Take 40 mg by mouth daily in the early morning  (Patient not taking: No sig reported), Disp: , Rfl:   •  oxybutynin (DITROPAN-XL) 5 mg 24 hr tablet, TAKE 1 TABLET (5 MG TOTAL) BY MOUTH AS NEEDED (AS NEEDED), Disp: 90 tablet, Rfl: 2  •  oxyCODONE (OXY-IR) 5 MG capsule, Take 1 capsule (5 mg total) by mouth every 4 (four) hours as needed for moderate pain Max Daily Amount: 30 mg, Disp: 15 capsule, Rfl: 0  •  phenazopyridine (PYRIDIUM) 200 mg tablet, Take 1 tablet (200 mg total) by mouth 3 (three) times a day as needed for bladder spasms, Disp: 15 tablet, Rfl: 0  •  tamsulosin (FLOMAX) 0 4 mg, Take 1 capsule (0 4 mg total) by mouth daily with dinner, Disp: 90 capsule, Rfl: 0  Allergies   Allergen Reactions   • Ciprofloxacin Hives   • Macrobid [Nitrofurantoin] Nausea Only and Palpitations     Vitals:    10/26/22 1450   BP: 120/82   BP Location: Right arm   Patient Position: Sitting   Cuff Size: Large   Pulse: 78   Weight: 99 7 kg (219 lb 14 4 oz)   Height: 5' 11" (1 803 m)       Labs:  Lab Results   Component Value Date     11/16/2017    K 4 1 08/16/2022    K 4 4 12/12/2018     08/16/2022     12/12/2018    CO2 27 08/16/2022    CO2 29 12/12/2018    BUN 18 08/16/2022    BUN 15 12/12/2018    CREATININE 1 22 08/16/2022    CREATININE 0 95 11/16/2017    GLUCOSE 97 01/08/2016    CALCIUM 9 4 08/16/2022    CALCIUM 9 6 12/12/2018     No results found for: CKTOTAL, CKMB, CKMBINDEX, TROPONINI  Lab Results   Component Value Date    WBC 7 69 03/31/2022    WBC 8 06 12/03/2015    HGB 11 1 (L) 03/31/2022    HGB 16 0 09/11/2017    HCT 37 2 03/31/2022    HCT 47 6 09/11/2017    MCV 81 (L) 03/31/2022    MCV 90 12/03/2015     03/31/2022     12/03/2015     Lab Results   Component Value Date    CHOL 165 11/16/2017    TRIG 254 (H) 08/16/2022    TRIG 221 (H) 03/11/2022    HDL 29 (L) 08/16/2022    HDL 32 (L) 12/12/2018    LDLDIRECT 116 01/30/2015     Imaging: CT coronary calcium score    Result Date: 10/20/2022  Narrative: CT CORONARY ARTERY CALCIUM SCREENING WITHOUT INTRAVENOUS CONTRAST INDICATION:  Z13 6: Encounter for screening for cardiovascular disorders  Assess for calcific coronary plaque  Patient Age:  63 years Patient Gender:  Male  COMPARISON:  None  TECHNIQUE: Low radiation dose computed tomography of the heart was performed with EKG gating, suspended respiration, and without intravenous contrast   This examination, like all CT scans performed in the [facility], was performed utilizing techniques to minimize radiation dose exposure, including the use of iterative reconstruction and automated exposure control  Postprocessing was performed on a 3-D computer workstation to measure the amount of calcium in the coronary arteries  Imaging was limited to the heart and the immediately adjacent lungs   FINDINGS: Coronary artery calcium score breakdown is as follows: Left main coronary artery:  0 Left anterior descending coronary artery and diagonal branches:  0 Left circumflex coronary artery and left marginal branches:  0 Right coronary artery and right marginal branches:  0 Posterior descending coronary artery: 0 TOTAL coronary calcium score:  0 Calcium score PERCENTILE of age, race, and gender matched database participants in the Multi-Ethnic Study of Atherosclerosis (BATISTA) trial:   0 percentile HEART/GREAT VESSELS: No significant low radiation dose noncontrast CT abnormality of the heart  No thoracic aortic aneurysm  EXTRACARDIAC FINDINGS: No significant findings identified on limited small field of view low radiation dose noncontrast images of the chest at the level of the heart  Impression: Total coronary calcium score equals 0  For more useful information regarding the prognostic significance of the calcium score, please consult the calculator at the website https://www [employer]/  aspx  Workstation performed: [id number]       Review of Systems:  Review of Systems   Constitutional: Negative for chills, diaphoresis and fever  HENT: Negative for rhinorrhea  Eyes: Negative for photophobia and pain  Respiratory: Negative for cough, shortness of breath and wheezing  Cardiovascular: Negative for chest pain, palpitations and leg swelling  Gastrointestinal: Negative for abdominal pain, constipation, nausea and vomiting  Genitourinary: Negative for dysuria and hematuria  Musculoskeletal: Negative for arthralgias and myalgias  Skin: Negative for rash  Neurological: Negative for light-headedness  Hematological: Does not bruise/bleed easily  Psychiatric/Behavioral: Negative for sleep disturbance  Physical Exam:  Physical Exam  HENT:      Head: Normocephalic and atraumatic  Right Ear: External ear normal       Left Ear: External ear normal    Eyes:      General: No scleral icterus  Right eye: No discharge  Left eye: No discharge  Neck:      Vascular: No carotid bruit  Cardiovascular:      Rate and Rhythm: Normal rate and regular rhythm  Pulses: Normal pulses  Heart sounds: Normal heart sounds  No murmur heard  No gallop  Pulmonary:      Effort: Pulmonary effort is normal  No respiratory distress  Breath sounds: Normal breath sounds  No wheezing or rales  Musculoskeletal:      Cervical back: Neck supple  Right lower leg: No edema  Left lower leg: No edema  Skin:     General: Skin is warm and dry  Capillary Refill: Capillary refill takes less than 2 seconds  Neurological:      General: No focal deficit present  Mental Status: He is oriented to person, place, and time  Psychiatric:         Mood and Affect: Mood normal          Behavior: Behavior normal          Discussion/Summary:  Mr Robles Boone is a 63year old male and former smoker with a PMH significant for HTN, HLD, Prediabetes, GERD, BPH, and Recurrent Nephrolithiasis  He presents to clinic for an Abnormal ETT  He has been at this baseline state of health, independent of ADLs, working regularly without cardiac complaints  He denies any exertional cp, diaphoresis, n/v, sob, vasques, palpitations, near syncope, syncope, orthopnea, pnd, or ble edema  He only notes occasional fatigue with exertion  However, both is father and uncle dealt with early CAD in their 62s (Father with 4V CABG at 72, and Uncle with fatal Acute MI at 61)  Mr Maria Luisa Ham CAC Score was Zero, and he achieved 13 4 METs on his ETT  His EKG portion however demonstrated 1 mm STD at max stress with good EKG recovery  Given this one aberrant ETT finding and intermediate 10-year risk for ASCVD we will proceed with a [facility] for further evaluation  We will initiate statin and aspirin therapy as well  We will hold on beta blockade at this time  We will plan on follow up in 4 weeks to discuss his [facility] findings

## 2022-10-26 NOTE — TELEPHONE ENCOUNTER
Patient scheduled for LHC on 11/11/22 at [facility] with Dr Venessa Shipley  Instructions given to patient in office  Can I have auth?

## 2022-10-31 DIAGNOSIS — R94.39 ABNORMAL STRESS ECG: Primary | ICD-10-CM

## 2022-10-31 NOTE — TELEPHONE ENCOUNTER
His insurance called just now and LHC has been denied  However, they approved a CCTA  This has been ordered  I have notified the patient, but would you be able to help him with the CCTA scheduling? Also, to cancel a LHC, is there anything more that I need to do?

## 2022-11-01 ENCOUNTER — APPOINTMENT (OUTPATIENT)
Dept: LAB | Age: 62
End: 2022-11-01

## 2022-11-01 ENCOUNTER — TELEPHONE (OUTPATIENT)
Dept: CARDIOLOGY CLINIC | Facility: CLINIC | Age: 62
End: 2022-11-01

## 2022-11-01 DIAGNOSIS — N20.0 RECURRENT NEPHROLITHIASIS: ICD-10-CM

## 2022-11-01 DIAGNOSIS — E83.39 HYPOPHOSPHATEMIA: Primary | ICD-10-CM

## 2022-11-01 DIAGNOSIS — I10 ESSENTIAL HYPERTENSION: ICD-10-CM

## 2022-11-01 DIAGNOSIS — R79.89 LOW SERUM PARATHYROID HORMONE (PTH): ICD-10-CM

## 2022-11-01 LAB
ALBUMIN SERPL BCP-MCNC: 3.5 G/DL (ref 3.5–5)
ALP SERPL-CCNC: 42 U/L (ref 46–116)
ALT SERPL W P-5'-P-CCNC: 29 U/L (ref 12–78)
ANION GAP SERPL CALCULATED.3IONS-SCNC: 5 MMOL/L (ref 4–13)
AST SERPL W P-5'-P-CCNC: 20 U/L (ref 5–45)
BACTERIA UR QL AUTO: ABNORMAL /HPF
BASOPHILS # BLD AUTO: 0.09 THOUSANDS/ÂΜL (ref 0–0.1)
BASOPHILS NFR BLD AUTO: 1 % (ref 0–1)
BILIRUB SERPL-MCNC: 0.52 MG/DL (ref 0.2–1)
BILIRUB UR QL STRIP: NEGATIVE
BUN SERPL-MCNC: 18 MG/DL (ref 5–25)
CALCIUM SERPL-MCNC: 9.7 MG/DL (ref 8.3–10.1)
CHLORIDE SERPL-SCNC: 104 MMOL/L (ref 96–108)
CLARITY UR: CLEAR
CO2 SERPL-SCNC: 27 MMOL/L (ref 21–32)
COLOR UR: ABNORMAL
CREAT SERPL-MCNC: 1.14 MG/DL (ref 0.6–1.3)
EOSINOPHIL # BLD AUTO: 0.34 THOUSAND/ÂΜL (ref 0–0.61)
EOSINOPHIL NFR BLD AUTO: 4 % (ref 0–6)
ERYTHROCYTE [DISTWIDTH] IN BLOOD BY AUTOMATED COUNT: 15.9 % (ref 11.6–15.1)
GFR SERPL CREATININE-BSD FRML MDRD: 70 ML/MIN/1.73SQ M
GLUCOSE P FAST SERPL-MCNC: 101 MG/DL (ref 65–99)
GLUCOSE UR STRIP-MCNC: NEGATIVE MG/DL
HCT VFR BLD AUTO: 41.4 % (ref 36.5–49.3)
HGB BLD-MCNC: 11.9 G/DL (ref 12–17)
HGB UR QL STRIP.AUTO: NEGATIVE
IMM GRANULOCYTES # BLD AUTO: 0.04 THOUSAND/UL (ref 0–0.2)
IMM GRANULOCYTES NFR BLD AUTO: 1 % (ref 0–2)
KETONES UR STRIP-MCNC: NEGATIVE MG/DL
LEUKOCYTE ESTERASE UR QL STRIP: NEGATIVE
LYMPHOCYTES # BLD AUTO: 2.34 THOUSANDS/ÂΜL (ref 0.6–4.47)
LYMPHOCYTES NFR BLD AUTO: 29 % (ref 14–44)
MCH RBC QN AUTO: 22 PG (ref 26.8–34.3)
MCHC RBC AUTO-ENTMCNC: 28.7 G/DL (ref 31.4–37.4)
MCV RBC AUTO: 77 FL (ref 82–98)
MONOCYTES # BLD AUTO: 0.88 THOUSAND/ÂΜL (ref 0.17–1.22)
MONOCYTES NFR BLD AUTO: 11 % (ref 4–12)
MUCOUS THREADS UR QL AUTO: ABNORMAL
NEUTROPHILS # BLD AUTO: 4.36 THOUSANDS/ÂΜL (ref 1.85–7.62)
NEUTS SEG NFR BLD AUTO: 54 % (ref 43–75)
NITRITE UR QL STRIP: NEGATIVE
NON-SQ EPI CELLS URNS QL MICRO: ABNORMAL /HPF
NRBC BLD AUTO-RTO: 0 /100 WBCS
PH UR STRIP.AUTO: 7 [PH]
PHOSPHATE SERPL-MCNC: 1.8 MG/DL (ref 2.7–4.5)
PLATELET # BLD AUTO: 444 THOUSANDS/UL (ref 149–390)
PMV BLD AUTO: 10.3 FL (ref 8.9–12.7)
POTASSIUM SERPL-SCNC: 4.4 MMOL/L (ref 3.5–5.3)
PROT SERPL-MCNC: 7.6 G/DL (ref 6.4–8.4)
PROT UR STRIP-MCNC: NEGATIVE MG/DL
PTH-INTACT SERPL-MCNC: 13.4 PG/ML (ref 18.4–80.1)
RBC # BLD AUTO: 5.41 MILLION/UL (ref 3.88–5.62)
RBC #/AREA URNS AUTO: ABNORMAL /HPF
SODIUM SERPL-SCNC: 136 MMOL/L (ref 135–147)
SP GR UR STRIP.AUTO: 1.01 (ref 1–1.03)
URATE SERPL-MCNC: 5.1 MG/DL (ref 3.5–8.5)
UROBILINOGEN UR STRIP-ACNC: <2 MG/DL
WBC # BLD AUTO: 8.05 THOUSAND/UL (ref 4.31–10.16)
WBC #/AREA URNS AUTO: ABNORMAL /HPF

## 2022-11-07 DIAGNOSIS — R30.0 DYSURIA: Primary | ICD-10-CM

## 2022-11-09 ENCOUNTER — APPOINTMENT (OUTPATIENT)
Dept: LAB | Age: 62
End: 2022-11-09

## 2022-11-09 DIAGNOSIS — R30.0 DYSURIA: ICD-10-CM

## 2022-11-09 DIAGNOSIS — N20.1 LEFT URETERAL STONE: ICD-10-CM

## 2022-11-09 DIAGNOSIS — E83.39 HYPOPHOSPHATEMIA: ICD-10-CM

## 2022-11-09 DIAGNOSIS — R39.89 SUSPECTED UTI: ICD-10-CM

## 2022-11-09 LAB
25(OH)D3 SERPL-MCNC: 49.6 NG/ML (ref 30–100)
PSA SERPL-MCNC: 0.9 NG/ML (ref 0–4)

## 2022-11-10 LAB — BACTERIA UR CULT: NORMAL

## 2022-11-23 DIAGNOSIS — E29.1 HYPOGONADISM IN MALE: ICD-10-CM

## 2022-11-23 DIAGNOSIS — E29.1 TESTICULAR HYPOGONADISM: ICD-10-CM

## 2022-11-23 RX ORDER — SILDENAFIL 50 MG/1
TABLET, FILM COATED ORAL
Qty: 30 TABLET | Refills: 0 | Status: SHIPPED | OUTPATIENT
Start: 2022-11-23

## 2022-11-23 RX ORDER — TESTOSTERONE CYPIONATE 200 MG/ML
INJECTION INTRAMUSCULAR
Qty: 4 ML | Refills: 0 | Status: SHIPPED | OUTPATIENT
Start: 2022-11-23

## 2022-11-23 NOTE — TELEPHONE ENCOUNTER
Medication not assigned to a protocol  [facility]  Please forward to your office staff for further review as this medication was reviewed by a [facility] RN

## 2022-11-29 ENCOUNTER — OFFICE VISIT (OUTPATIENT)
Dept: INTERNAL MEDICINE CLINIC | Facility: CLINIC | Age: 62
End: 2022-11-29

## 2022-11-29 VITALS
SYSTOLIC BLOOD PRESSURE: 152 MMHG | BODY MASS INDEX: 30.52 KG/M2 | HEIGHT: 71 IN | OXYGEN SATURATION: 98 % | DIASTOLIC BLOOD PRESSURE: 78 MMHG | HEART RATE: 91 BPM | WEIGHT: 218 LBS | RESPIRATION RATE: 16 BRPM

## 2022-11-29 DIAGNOSIS — E66.09 CLASS 1 OBESITY DUE TO EXCESS CALORIES WITH SERIOUS COMORBIDITY AND BODY MASS INDEX (BMI) OF 30.0 TO 30.9 IN ADULT: ICD-10-CM

## 2022-11-29 DIAGNOSIS — G44.86 CERVICOGENIC HEADACHE: ICD-10-CM

## 2022-11-29 DIAGNOSIS — E78.5 DYSLIPIDEMIA: ICD-10-CM

## 2022-11-29 DIAGNOSIS — S16.1XXA STRAIN OF NECK MUSCLE, INITIAL ENCOUNTER: ICD-10-CM

## 2022-11-29 DIAGNOSIS — I10 ESSENTIAL HYPERTENSION: Primary | ICD-10-CM

## 2022-11-29 RX ORDER — MINERAL OIL 100 MG/100ML
OIL ORAL; TOPICAL 2 TIMES DAILY
Qty: 1 EACH | Refills: 0 | Status: SHIPPED | OUTPATIENT
Start: 2022-11-29

## 2022-11-29 RX ORDER — AMLODIPINE BESYLATE 5 MG/1
5 TABLET ORAL DAILY
Qty: 30 TABLET | Refills: 4 | Status: SHIPPED | OUTPATIENT
Start: 2022-11-29

## 2022-11-29 RX ORDER — CYCLOBENZAPRINE HCL 5 MG
5 TABLET ORAL
Qty: 20 TABLET | Refills: 0 | Status: SHIPPED | OUTPATIENT
Start: 2022-11-29

## 2022-11-29 NOTE — PROGRESS NOTES
Assessment/Plan:    Essential hypertension  Blood pressure large cuff right arm seated position 144/86 patient's home readings have been running in the 150s over upper 80s suboptimal control continue with lisinopril 10 mg 1 5 tablets daily will add amlodipine 5 mg once daily like the patient to check his blood pressure twice a day get a new blood pressure monitor large cuff reduce sodium reduce stress RTO in 4-6 weeks call if any problems    Class 1 obesity due to excess calories with serious comorbidity and body mass index (BMI) of 30 0 to 30 9 in adult  Obesity -I have counseled patient following healthy and balanced diet, I would like the patient to lose weight, I would like the patient exercise routinely; we will continue monitor the patient's progress      Dyslipidemia  Patient did not tolerate the statin will follow-up with Cardiology consider Crestor    Cervicogenic headache  Examination is consistent with cervicogenic headache related to muscle strain of the cervical musculature related to construction work of a patio leading to a tension-type headache at this point time I would like the patient to use Flexeril 5 mg HS p r n  no alcohol or driving, Tylenol as directed p r n  warm compress range-of-motion exercises if the symptoms not improve next 2-4 weeks will consider physical therapy         Problem List Items Addressed This Visit        Cardiovascular and Mediastinum    Essential hypertension - Primary     Blood pressure large cuff right arm seated position 144/86 patient's home readings have been running in the 150s over upper 80s suboptimal control continue with lisinopril 10 mg 1 5 tablets daily will add amlodipine 5 mg once daily like the patient to check his blood pressure twice a day get a new blood pressure monitor large cuff reduce sodium reduce stress RTO in 4-6 weeks call if any problems         Relevant Medications    Blood Pressure Monitor NIKIA    amLODIPine (NORVASC) 5 mg tablet Musculoskeletal and Integument    Cervical strain    Relevant Medications    cyclobenzaprine (FLEXERIL) 5 mg tablet       Other    Class 1 obesity due to excess calories with serious comorbidity and body mass index (BMI) of 30 0 to 30 9 in adult     Obesity -I have counseled patient following healthy and balanced diet, I would like the patient to lose weight, I would like the patient exercise routinely; we will continue monitor the patient's progress  Dyslipidemia     Patient did not tolerate the statin will follow-up with Cardiology consider Crestor         Cervicogenic headache     Examination is consistent with cervicogenic headache related to muscle strain of the cervical musculature related to construction work of a patio leading to a tension-type headache at this point time I would like the patient to use Flexeril 5 mg HS p r n  no alcohol or driving, Tylenol as directed p r n  warm compress range-of-motion exercises if the symptoms not improve next 2-4 weeks will consider physical therapy            RTO in 4-6 weeks call if any problems  Subjective:      Patient ID: Corrina Jha is a 63 y o  male  HPI 63-year old male coming in for a follow up visit regarding hypertension suboptimal control, strain of the neck muscles, obesity and hyperlipidemia; The patient reports me compliant taking medications without untoward side effects the  The patient is here to review his medical condition, update me on the medical condition and the patient reports me no hospitalizations and no ER visits  No injuries no illnesses patient has noticed elevated blood pressure he has also noticed mild tension-type headache; he notices symptoms after starting a statin; of note the patient has been building and constructing a new patio he has noticed neck discomfort and pain because of reaching above his head with the construction work this is led to a tension-type headache    The patient has noticed increased blood pressure readings at home no 150 over upper 80s on average  the pt reports lipitor muscle aches , elevated pt and elevated bp off for 13 days , tension ha 8 day in a row,2/day 150-155/80-85  The pt reports last couple weeks  Framed a cover back patio     The following portions of the patient's history were reviewed and updated as appropriate: allergies, current medications, past family history, past medical history, past social history, past surgical history and problem list     Review of Systems   Constitutional: Negative for activity change, appetite change and unexpected weight change  HENT: Negative for congestion and postnasal drip  Eyes: Negative for visual disturbance  Respiratory: Negative for cough and shortness of breath  Cardiovascular: Negative for chest pain  Gastrointestinal: Negative for abdominal pain, diarrhea, nausea and vomiting  Neurological: Negative for dizziness, light-headedness and headaches  Hematological: Negative for adenopathy  headache    Objective:    Return in about 4 weeks (around 12/27/2022)  No results found        Allergies   Allergen Reactions   • Ciprofloxacin Hives   • Macrobid [Nitrofurantoin] Nausea Only and Palpitations       Past Medical History:   Diagnosis Date   • Anxiety    • Cancer ([address] )     skin ca on nose   • Chronic kidney disease     kidney stones   • GERD (gastroesophageal reflux disease)    • Hyperlipidemia    • Hypertension    • Kidney stone     6/2022     Past Surgical History:   Procedure Laterality Date   • COLONOSCOPY     • ESOPHAGOGASTRODUODENOSCOPY     • EXTRACORPOREAL SHOCK WAVE LITHOTRIPSY Bilateral     Renal Multiple times   • FL RETROGRADE PYELOGRAM  3/31/2022   • FL RETROGRADE PYELOGRAM  6/22/2022   • [place] CYSTO/URETERO W/LITHOTRIPSY &INDWELL STENT INSRT Left 8/6/2020    Procedure: CYSTOSCOPY URETEROSCOPY WITH LITHOTRIPSY HOLMIUM LASER, AND INSERTION STENT URETERAL;  Surgeon: Nirmal Garcia MD;  [facility];  Service: Urology   • AL CYSTO/URETERO W/LITHOTRIPSY &INDWELL STENT INSRT Right 3/31/2022    Procedure: CYSTOSCOPY URETEROSCOPY WITH LITHOTRIPSY HOLMIUM LASER,BASKET STONE EXTRACTION, RETROGRADE PYELOGRAM AND INSERTION STENT URETERAL;  Surgeon: Gerardo Umana MD;  [facility];  Service: Urology   • AL CYSTO/URETERO W/LITHOTRIPSY &INDWELL STENT INSRT Left 6/22/2022    Procedure: CYSTOSCOPY URETEROSCOPY WITH LITHOTRIPSY HOLMIUM LASER, RETROGRADE PYELOGRAM AND INSERTION STENT URETERAL, STONE BASKET EXTRACTION;  Surgeon: Mateo Umanzor MD;  [facility];  Service: Urology   • AL EXC SKIN BENIG >4 CM FACE,FACIAL Right 4/28/2016    Procedure: EXCISION  LESION UPPER EYELID, COMPLEX CLOSURE;  Surgeon: Jasmina Dunn MD;  Location: [facility];  Service: Plastics   • AL FRAGMENT KIDNEY STONE/ ESWL Right 4/18/2019    Procedure: Willie Knapp SHOCKWAVE (ESWL); Surgeon: Nataliia Martinez MD;  Location: [facility];  Service: Urology     Current Outpatient Medications on File Prior to Visit   Medication Sig Dispense Refill   • aspirin (ECOTRIN LOW STRENGTH) 81 mg EC tablet Take 1 tablet (81 mg total) by mouth daily 30 tablet 0   • b complex vitamins capsule Take 1 capsule by mouth daily  • Cholecalciferol (VITAMIN D3) 1000 units CAPS Take 1 capsule by mouth daily     • coenzyme Q-10 100 MG capsule Take 1 capsule by mouth daily     • cyanocobalamin (VITAMIN B-12) 100 mcg tablet Take by mouth daily  • fenofibrate (TRICOR) 145 mg tablet Take 1 tablet (145 mg total) by mouth daily 90 tablet 1   • fexofenadine (ALLEGRA) 180 MG tablet Take 180 mg by mouth daily in the early morning      • lisinopril (ZESTRIL) 10 mg tablet TAKE 1 AND 1/2 TABLETS(15  MG TOTAL) DAILY 135 tablet 1   • LORazepam (ATIVAN) 0 5 mg tablet TAKE 1 TABLET DAILY AS     NEEDED FOR ANXIETY 90 tablet 0   • multivitamin (THERAGRAN) TABS Take 1 tablet by mouth daily       • omeprazole (PriLOSEC) 20 mg delayed release capsule Take 20 mg by mouth daily     • potassium citrate (UROCIT-K) 10 mEq Take 1 tablet (10 mEq total) by mouth 2 (two) times a day 180 tablet 3   • sildenafil (VIAGRA) 50 MG tablet TAKE 1 TABLET DAILY AS     NEEDED FOR ERECTILE        DYSFUNCTION 30 tablet 0   • SYRINGE-NEEDLE, DISP, 3 ML (BD ECLIPSE SYRINGE) 25G X 1" 3 ML MISC by Does not apply route once a week 50 each 3   • testosterone cypionate (DEPO-TESTOSTERONE) 200 mg/mL SOLN INJECT 0 4ML INTO SHOULDER,THIGH OR BUTTOCKS ONCE     WEEKLY  DISCARD THE        REMAINDER 4 mL 0   • atorvastatin (LIPITOR) 40 mg tablet Take 1 tablet (40 mg total) by mouth daily (Patient not taking: Reported on 2022) 90 tablet 3     No current facility-administered medications on file prior to visit  Family History   Problem Relation Age of Onset   • Other Father         CABG (coronary artery bypass surgery)   • Atrial fibrillation Sister    • Other Paternal Uncle         Myocardial infarction   • Coronary artery disease Family    • Diabetes Family    • Hypertension Family    • Uterine cancer Family      Social History     Socioeconomic History   • Marital status: /Civil Union     Spouse name: Not on file   • Number of children: Not on file   • Years of education: Not on file   • Highest education level: Not on file   Occupational History   • Not on file   Tobacco Use   • Smoking status: Former     Packs/day: 0 75     Years: 18 00     Pack years: 13 50     Types: Cigarettes     Start date:      Quit date:      Years since quittin 9   • Smokeless tobacco: Never   Vaping Use   • Vaping Use: Never used   Substance and Sexual Activity   • Alcohol use:  Yes     Alcohol/week: 3 0 standard drinks     Types: 1 Glasses of wine, 2 Cans of beer per week     Comment: weekend   • Drug use: No   • Sexual activity: Not Currently   Other Topics Concern   • Not on file   Social History Narrative    Daily caffeine consumption     Social Determinants of Health     Financial Resource Strain: Not on file   Food Insecurity: Not on file   Transportation Needs: Not on file   Physical Activity: Not on file   Stress: Not on file   Social Connections: Not on file   Intimate Partner Violence: Not on file   Housing Stability: Not on file     Vitals:    11/29/22 1530 11/29/22 1534   BP: 154/80 152/78   BP Location: Left leg Left arm   Patient Position: Sitting Standing   Cuff Size: Standard Large   Pulse: 91    Resp: 16    SpO2: 98%    Weight: 98 9 kg (218 lb)    Height: 5' 11" (1 803 m)      Results for orders placed or performed in visit on 11/09/22   Urine culture    Specimen: Urine, Clean Catch   Result Value Ref Range    Urine Culture No Growth <1000 cfu/mL    Vitamin D 25 hydroxy   Result Value Ref Range    Vit D, 25-Hydroxy 49 6 30 0 - 100 0 ng/mL     Weight (last 2 days)     Date/Time Weight    11/29/22 1530 98 9 (218)        Body mass index is 30 4 kg/m²  BP      Temp      Pulse     Resp      SpO2        Vitals:    11/29/22 1530   Weight: 98 9 kg (218 lb)     Vitals:    11/29/22 1530   Weight: 98 9 kg (218 lb)       /78 (BP Location: Left arm, Patient Position: Standing, Cuff Size: Large)   Pulse 91   Resp 16   Ht 5' 11" (1 803 m)   Wt 98 9 kg (218 lb)   SpO2 98%   BMI 30 40 kg/m²          Physical Exam  Constitutional:       General: He is not in acute distress  Appearance: He is well-developed and well-nourished  He is not diaphoretic  HENT:      Head: Normocephalic and atraumatic  Right Ear: External ear normal       Left Ear: External ear normal       Mouth/Throat:      Mouth: Oropharynx is clear and moist    Eyes:      General: No scleral icterus  Right eye: No discharge  Left eye: No discharge  Conjunctiva/sclera: Conjunctivae normal       Pupils: Pupils are equal, round, and reactive to light  Cardiovascular:      Rate and Rhythm: Normal rate and regular rhythm  Heart sounds: Normal heart sounds  No murmur heard  No friction rub  No gallop  Pulmonary:      Effort: No respiratory distress  Breath sounds: No wheezing or rales  Abdominal:      General: Bowel sounds are normal  There is no distension  Palpations: Abdomen is soft  There is no mass  Tenderness: There is no abdominal tenderness  There is no guarding or rebound  Musculoskeletal:         General: No deformity or edema  Cervical back: Neck supple  Lymphadenopathy:      Cervical: No cervical adenopathy  Neurological:      Mental Status: He is alert     Psychiatric:         Mood and Affect: Mood and affect normal        full range of motion of the cervical spine, hypertonicity of the paravertebral musculature of the cervical spine

## 2022-11-30 DIAGNOSIS — I10 ESSENTIAL HYPERTENSION: Primary | ICD-10-CM

## 2022-11-30 NOTE — ASSESSMENT & PLAN NOTE
Examination is consistent with cervicogenic headache related to muscle strain of the cervical musculature related to construction work of a patio leading to a tension-type headache at this point time I would like the patient to use Flexeril 5 mg HS p r n  no alcohol or driving, Tylenol as directed p r n  warm compress range-of-motion exercises if the symptoms not improve next 2-4 weeks will consider physical therapy

## 2022-11-30 NOTE — ASSESSMENT & PLAN NOTE
Blood pressure large cuff right arm seated position 144/86 patient's home readings have been running in the 150s over upper 80s suboptimal control continue with lisinopril 10 mg 1 5 tablets daily will add amlodipine 5 mg once daily like the patient to check his blood pressure twice a day get a new blood pressure monitor large cuff reduce sodium reduce stress RTO in 4-6 weeks call if any problems

## 2022-12-02 ENCOUNTER — TELEPHONE (OUTPATIENT)
Dept: UROLOGY | Facility: CLINIC | Age: 62
End: 2022-12-02

## 2022-12-02 NOTE — TELEPHONE ENCOUNTER
Called patient and left message  Patient is required to get imaging (KUB, Ultasound)  done prior to his appointment,  The scripts  are in the chart and they can go to any [facility] radiology facuilty to have it done  If they are unable to get it done prior the patient will have to reschedule  I left the call centers number for any questions at [phone] I also left the call centers number at [phone] for scheduling of the tests that are required to be scheduled       If patient can not have it done prior please assist in rescheduling the appointment

## 2022-12-09 DIAGNOSIS — Z01.818 PREOPERATIVE TESTING: Primary | ICD-10-CM

## 2022-12-09 DIAGNOSIS — Z01.818 PREOPERATIVE TESTING: ICD-10-CM

## 2022-12-09 RX ORDER — METOPROLOL TARTRATE 100 MG/1
100 TABLET ORAL EVERY 12 HOURS SCHEDULED
Qty: 2 TABLET | Refills: 0 | Status: SHIPPED | OUTPATIENT
Start: 2022-12-14 | End: 2022-12-09 | Stop reason: SDUPTHER

## 2022-12-09 RX ORDER — METOPROLOL TARTRATE 100 MG/1
100 TABLET ORAL EVERY 12 HOURS SCHEDULED
Qty: 2 TABLET | Refills: 0 | Status: SHIPPED | OUTPATIENT
Start: 2022-12-14 | End: 2022-12-15

## 2022-12-12 ENCOUNTER — TELEPHONE (OUTPATIENT)
Dept: NEPHROLOGY | Facility: CLINIC | Age: 62
End: 2022-12-12

## 2022-12-12 NOTE — TELEPHONE ENCOUNTER
Spoke with Patient and schedule appointment for 5/3/2023 with Dr Kathleen Dolan in the [place] location   Patient rescheduled appointment from 12/14- placed pt on wait list

## 2022-12-15 ENCOUNTER — HOSPITAL ENCOUNTER (OUTPATIENT)
Dept: CT IMAGING | Facility: HOSPITAL | Age: 62
Discharge: HOME | End: 2022-12-15

## 2022-12-15 VITALS — DIASTOLIC BLOOD PRESSURE: 82 MMHG | HEART RATE: 69 BPM | SYSTOLIC BLOOD PRESSURE: 121 MMHG | RESPIRATION RATE: 20 BRPM

## 2022-12-15 DIAGNOSIS — R94.39 ABNORMAL STRESS ECG: ICD-10-CM

## 2022-12-15 RX ORDER — NITROGLYCERIN 0.4 MG/1
0.4 TABLET SUBLINGUAL ONCE
Status: COMPLETED | OUTPATIENT
Start: 2022-12-15 | End: 2022-12-15

## 2022-12-15 RX ORDER — IODIXANOL 320 MG/ML
100 INJECTION, SOLUTION INTRAVASCULAR
Status: COMPLETED | OUTPATIENT
Start: 2022-12-15 | End: 2022-12-15

## 2022-12-15 RX ORDER — METOPROLOL TARTRATE 5 MG/5ML
5 INJECTION INTRAVENOUS
Status: DISCONTINUED | OUTPATIENT
Start: 2022-12-15 | End: 2022-12-16 | Stop reason: HOSPADM

## 2022-12-15 RX ADMIN — NITROGLYCERIN 0.4 MG: 0.4 TABLET SUBLINGUAL at 11:39

## 2022-12-15 RX ADMIN — METOPROLOL TARTRATE 5 MG: 1 INJECTION, SOLUTION INTRAVENOUS at 11:14

## 2022-12-15 RX ADMIN — IODIXANOL 100 ML: 320 INJECTION, SOLUTION INTRAVASCULAR at 11:47

## 2022-12-15 RX ADMIN — METOPROLOL TARTRATE 5 MG: 1 INJECTION, SOLUTION INTRAVENOUS at 11:09

## 2022-12-15 RX ADMIN — METOPROLOL TARTRATE 5 MG: 1 INJECTION, SOLUTION INTRAVENOUS at 11:24

## 2022-12-15 RX ADMIN — METOPROLOL TARTRATE 5 MG: 1 INJECTION, SOLUTION INTRAVENOUS at 11:29

## 2022-12-15 RX ADMIN — METOPROLOL TARTRATE 5 MG: 1 INJECTION, SOLUTION INTRAVENOUS at 11:19

## 2022-12-15 NOTE — NURSING NOTE
Patient arrived for cardiac cta  Test/medications reviewed  Pt denies PDE5 inhibitor use  Pt took metoprolol tartrate 100 mg HS and this am per cardiology  Pt took prn ativan for anxiety  To reach target HR, pt given 5 mg IV metoprolol x 5 doses  See vitals flowsheet  Pt tolerated test well  Monitored in dept per protocol  Encouraged increased fluids remainder of day  Asymptomatic upon departure with spouse

## 2022-12-20 ENCOUNTER — APPOINTMENT (OUTPATIENT)
Dept: RADIOLOGY | Age: 62
End: 2022-12-20

## 2022-12-20 DIAGNOSIS — E78.5 HYPERLIPIDEMIA, UNSPECIFIED HYPERLIPIDEMIA TYPE: Primary | ICD-10-CM

## 2022-12-20 DIAGNOSIS — N20.0 NEPHROLITHIASIS: ICD-10-CM

## 2022-12-27 ENCOUNTER — OFFICE VISIT (OUTPATIENT)
Dept: INTERNAL MEDICINE CLINIC | Facility: CLINIC | Age: 62
End: 2022-12-27

## 2022-12-27 VITALS
DIASTOLIC BLOOD PRESSURE: 74 MMHG | SYSTOLIC BLOOD PRESSURE: 124 MMHG | HEART RATE: 81 BPM | RESPIRATION RATE: 16 BRPM | HEIGHT: 71 IN | BODY MASS INDEX: 31.11 KG/M2 | OXYGEN SATURATION: 98 % | WEIGHT: 222.2 LBS

## 2022-12-27 DIAGNOSIS — E66.09 CLASS 1 OBESITY DUE TO EXCESS CALORIES WITH SERIOUS COMORBIDITY AND BODY MASS INDEX (BMI) OF 30.0 TO 30.9 IN ADULT: ICD-10-CM

## 2022-12-27 DIAGNOSIS — F41.9 ANXIETY: ICD-10-CM

## 2022-12-27 DIAGNOSIS — N18.30 STAGE 3 CHRONIC KIDNEY DISEASE, UNSPECIFIED WHETHER STAGE 3A OR 3B CKD (HCC): ICD-10-CM

## 2022-12-27 DIAGNOSIS — R73.9 ELEVATED BLOOD SUGAR: ICD-10-CM

## 2022-12-27 DIAGNOSIS — E29.1 HYPOGONADISM IN MALE: ICD-10-CM

## 2022-12-27 DIAGNOSIS — E78.5 HYPERLIPIDEMIA, UNSPECIFIED HYPERLIPIDEMIA TYPE: ICD-10-CM

## 2022-12-27 DIAGNOSIS — I10 ESSENTIAL HYPERTENSION: Primary | ICD-10-CM

## 2022-12-27 NOTE — PROGRESS NOTES
Assessment/Plan:    Essential hypertension  Hypertension - controlled, I have counseled patient following healthy balance diet, I would like the patient reduce sodium, exercise routinely, I would like the patient continued the [facility] current medical regiment and we will continue to monitor  Recommend weight loss routine exercise 30 minutes of aerobic exercise daily limit salt/DASH diet monitor blood pressure routinely    CKD (chronic kidney disease)  Drink adequate amount of water, avoid anti-inflammatories, reduce sodium in the diet and will continue to monitor the kidney function    Class 1 obesity due to excess calories with serious comorbidity and body mass index (BMI) of 30 0 to 30 9 in adult  Obesity -I have counseled patient following healthy and balanced diet, I would like the patient to lose weight, I would like the patient exercise routinely; we will continue monitor the patient's progress  Hyperlipidemia  Hyperlipidemia controlled continue with current medical regiment recommend a low-cholesterol diet and recommend routine exercise we will continue to monitor the progress  Problem List Items Addressed This Visit        Cardiovascular and Mediastinum    Essential hypertension - Primary     Hypertension - controlled, I have counseled patient following healthy balance diet, I would like the patient reduce sodium, exercise routinely, I would like the patient continued the [facility] current medical regiment and we will continue to monitor    Recommend weight loss routine exercise 30 minutes of aerobic exercise daily limit salt/DASH diet monitor blood pressure routinely         Relevant Orders    Basic metabolic panel    Comprehensive metabolic panel       Genitourinary    CKD (chronic kidney disease)     Drink adequate amount of water, avoid anti-inflammatories, reduce sodium in the diet and will continue to monitor the kidney function            Other    Elevated blood sugar    Relevant Orders Hemoglobin A1C    Class 1 obesity due to excess calories with serious comorbidity and body mass index (BMI) of 30 0 to 30 9 in adult     Obesity -I have counseled patient following healthy and balanced diet, I would like the patient to lose weight, I would like the patient exercise routinely; we will continue monitor the patient's progress  Hyperlipidemia     Hyperlipidemia controlled continue with current medical regiment recommend a low-cholesterol diet and recommend routine exercise we will continue to monitor the progress  Relevant Orders    Lipid Panel with Direct LDL reflex       Return to office 6  months  call if any problems  Subjective:      Patient ID: Latonya Gasca is a 63 y o  male  HPI 66-year old male coming in for a follow up visit regarding essential hypertension, hyperlipidemia and cervicogenic headache, obesity; the pt got the larger cuff avg  Am 135/81 neck doing better with muscle relaxer, the patient reports me compliant taking medications without untoward side effects the  The patient is here to review his medical condition, update me on the medical condition and the patient reports me no hospitalizations and no ER visits  No injuries no illnesses his father has recently passed on he is creating but doing well  He will be starting exercise near future he reports that he is trying to follow healthy balanced diet  His blood pressures at home has improved since starting amlodipine he is tolerating well without any constipation and no leg swelling  Neck pain improved    The following portions of the patient's history were reviewed and updated as appropriate: allergies, current medications, past family history, past medical history, past social history, past surgical history and problem list     Review of Systems   Constitutional: Negative for activity change, appetite change and unexpected weight change  HENT: Negative for congestion and postnasal drip      Eyes: Negative for visual disturbance  Respiratory: Negative for cough and shortness of breath  Cardiovascular: Negative for chest pain  Gastrointestinal: Negative for abdominal pain, diarrhea, nausea and vomiting  Neurological: Negative for dizziness, light-headedness and headaches  Neck pain improved  Objective:    Return in about 4 months (around 4/27/2023) for Next scheduled follow up  Procedure: XR abdomen 1 view kub    Result Date: 12/23/2022  Narrative: ABDOMEN INDICATION:   N20 0: Calculus of kidney  COMPARISON:  7/5/2022 VIEWS:  AP supine FINDINGS: Right-sided renal calculi, largest lower pole measuring 6 mm  Left-sided renal calculi, largest at the midpole measuring 11 mm  No radiopaque ureteral calculi identified  Nonobstructive bowel gas pattern  No acute osseous abnormality is seen  Impression: Bilateral nephrolithiasis as above  Workstation performed: [id number]     Procedure: CTA cardiac    Result Date: 12/17/2022  Narrative: CORONARY CT ANGIOGRAM AND CT CALCIUM SCORE - WITHOUT AND WITH IV CONTRAST INDICATION:  R94 39: Abnormal result of other cardiovascular function study  Patient Age:  63 years Patient Gender:  Male  COMPARISON:  Calcium score study performed October 17, 2022  TECHNIQUE: Noncontrast CT examination of the heart examination was performed according to a protocol designed to obtain coronary calcium score  Thin section postcontrast images of the heart were obtained according to gated coronary CT angiographic protocol  2D and 3D image reconstruction was performed on an independent workstation in order to perform coronary vessel analysis  Premedication was administered according to institutional protocol, as detailed in the electronic health record  Prospective ECG triggering was used  Radiation dose length product (DLP) for this visit:  639 93 mGy-cm     This examination, like all CT scans performed in the [facility], was performed utilizing techniques to minimize radiation dose exposure, including the use of iterative  reconstruction and automated exposure control  IV CONTRAST:  100 mL of iodixanol (VISIPAQUE) IMAGE QUALITY:  Minor artifact present but good overall diagnostic image quality  FINDINGS: CORONARY CALCIUM SCORE:  0 The PROBABILITY of a non-zero calcium score in participants in the in the Multi-Ethnic Study of Atherosclerosis (BATISTA) trial matched to this patient's age, race and gender is:   68% CORONARY ARTERY ANATOMY:  Coronary arteries arise in normal position  There is right coronary artery dominance  There is typical bifurcation of the left main coronary artery into left anterior descending and left circumflex coronary arteries  LEFT MAIN:  No atherosclerotic plaque or vascular stenosis  LAD AND DIAGONAL BRANCHES:  No atherosclerotic plaque or vascular stenosis  LCX:  No atherosclerotic plaque or vascular stenosis  RCA:  Right coronary artery gives rise to patent posterior descending and posterolateral left ventricular branches  No atherosclerotic plaque or vascular stenosis  CARDIAC STRUCTURES: Valves:  Normal CT appearance of cardiac valves  Pericardium:  Normal pericardial contour without pericardial effusion, thickening, or calcifications  Myocardium:  No abnormal myocardial thinning, myocardial thickening, or myocardial calcification  GREAT VESSELS: Visualized thoracic aorta and central pulmonary arterial tree are within normal limits for the patient's age  EXTRACARDIAC FINDINGS: No significant findings identified on limited small field of view low radiation dose noncontrast images of the chest at the level of the heart  Impression: No coronary artery disease  CAD-RADS 0 - Degree of maximal coronary stenosis = 0% - Documented absence of coronary artery disease  Management recommendations: - Reassurance  Consider nonatherosclerotic causes of chest pain  Total coronary calcium score equals 0    For more useful information regarding the prognostic significance of the calcium score, please consult the calculator at the website https://www [employer]/  aspx  Workstation performed: [id number]          Allergies   Allergen Reactions   • Ciprofloxacin Hives   • Macrobid [Nitrofurantoin] Nausea Only and Palpitations       Past Medical History:   Diagnosis Date   • Anxiety    • Cancer ([address] )     skin ca on nose   • Chronic kidney disease     kidney stones   • GERD (gastroesophageal reflux disease)    • Hyperlipidemia    • Hypertension    • Kidney stone     6/2022     Past Surgical History:   Procedure Laterality Date   • COLONOSCOPY     • ESOPHAGOGASTRODUODENOSCOPY     • EXTRACORPOREAL SHOCK WAVE LITHOTRIPSY Bilateral     Renal Multiple times   • FL RETROGRADE PYELOGRAM  3/31/2022   • FL RETROGRADE PYELOGRAM  6/22/2022   • CA CYSTO/URETERO W/LITHOTRIPSY &INDWELL STENT INSRT Left 8/6/2020    Procedure: CYSTOSCOPY URETEROSCOPY WITH LITHOTRIPSY HOLMIUM LASER, AND INSERTION STENT URETERAL;  Surgeon: Desiree Barrera MD;  Location: [facility];  Service: Urology   • CA CYSTO/URETERO W/LITHOTRIPSY &INDWELL STENT INSRT Right 3/31/2022    Procedure: CYSTOSCOPY URETEROSCOPY WITH LITHOTRIPSY HOLMIUM LASER,BASKET STONE EXTRACTION, RETROGRADE PYELOGRAM AND INSERTION STENT URETERAL;  Surgeon: Lou Clark MD;  Location: [facility];  Service: Urology   • CA CYSTO/URETERO W/LITHOTRIPSY &INDWELL STENT INSRT Left 6/22/2022    Procedure: CYSTOSCOPY URETEROSCOPY WITH LITHOTRIPSY HOLMIUM LASER, RETROGRADE PYELOGRAM AND INSERTION STENT URETERAL, STONE BASKET EXTRACTION;  Surgeon: Suzi Rudolph MD;  Location: [facility];  Service: Urology   • CA EXC B9 LESION MRGN XCP SK TG F/E/E/N/L/M > 4 0CM Right 4/28/2016    Procedure: EXCISION  LESION UPPER EYELID, COMPLEX CLOSURE;  Surgeon: Napoleon Cuevas MD;  Location: [facility];  Service: Plastics   • CA LITHOTRIPSY [address] Right 4/18/2019    Procedure: Zoraida Muir SHOCKWAVE (ESWL);   Surgeon: Enoc Vieira MD;  [facility];  Service: Urology     Current Outpatient Medications on File Prior to Visit   Medication Sig Dispense Refill   • amLODIPine (NORVASC) 5 mg tablet Take 1 tablet (5 mg total) by mouth daily 30 tablet 4   • aspirin (ECOTRIN LOW STRENGTH) 81 mg EC tablet Take 1 tablet (81 mg total) by mouth daily 30 tablet 0   • atorvastatin (LIPITOR) 40 mg tablet Take 1 tablet (40 mg total) by mouth daily (Patient not taking: Reported on 11/29/2022) 90 tablet 3   • b complex vitamins capsule Take 1 capsule by mouth daily  • Cholecalciferol (VITAMIN D3) 1000 units CAPS Take 1 capsule by mouth daily     • coenzyme Q-10 100 MG capsule Take 1 capsule by mouth daily     • cyanocobalamin (VITAMIN B-12) 100 mcg tablet Take by mouth daily  • cyclobenzaprine (FLEXERIL) 5 mg tablet Take 1 tablet (5 mg total) by mouth daily at bedtime as needed for muscle spasms 20 tablet 0   • fenofibrate (TRICOR) 145 mg tablet Take 1 tablet (145 mg total) by mouth daily 90 tablet 1   • fexofenadine (ALLEGRA) 180 MG tablet Take 180 mg by mouth daily in the early morning      • lisinopril (ZESTRIL) 10 mg tablet TAKE 1 AND 1/2 TABLETS(15  MG TOTAL) DAILY 135 tablet 1   • LORazepam (ATIVAN) 0 5 mg tablet TAKE 1 TABLET DAILY AS     NEEDED FOR ANXIETY 90 tablet 0   • metoprolol tartrate (LOPRESSOR) 100 mg tablet Take 1 tablet (100 mg total) by mouth every 12 (twelve) hours for 2 doses Do not start before December 14, 2022  2 tablet 0   • multivitamin (THERAGRAN) TABS Take 1 tablet by mouth daily       • omeprazole (PriLOSEC) 20 mg delayed release capsule Take 20 mg by mouth daily     • potassium citrate (UROCIT-K) 10 mEq Take 1 tablet (10 mEq total) by mouth 2 (two) times a day 180 tablet 3   • sildenafil (VIAGRA) 50 MG tablet TAKE 1 TABLET DAILY AS     NEEDED FOR ERECTILE        DYSFUNCTION 30 tablet 0   • SYRINGE-NEEDLE, DISP, 3 ML (BD ECLIPSE SYRINGE) 25G X 1" 3 ML MISC by Does not apply route once a week 50 each 3 • testosterone cypionate (DEPO-TESTOSTERONE) 200 mg/mL SOLN INJECT 0 4ML INTO SHOULDER,THIGH OR BUTTOCKS ONCE     WEEKLY  DISCARD THE        REMAINDER 4 mL 0     No current facility-administered medications on file prior to visit  Family History   Problem Relation Age of Onset   • Other Father         CABG (coronary artery bypass surgery)   • Atrial fibrillation Sister    • Other Paternal Uncle         Myocardial infarction   • Coronary artery disease Family    • Diabetes Family    • Hypertension Family    • Uterine cancer Family      Social History     Socioeconomic History   • Marital status: /Civil Union     Spouse name: Not on file   • Number of children: Not on file   • Years of education: Not on file   • Highest education level: Not on file   Occupational History   • Not on file   Tobacco Use   • Smoking status: Former     Packs/day: 0 75     Years: 18 00     Pack years: 13 50     Types: Cigarettes     Start date: 5     Quit date:      Years since quittin 0   • Smokeless tobacco: Never   Vaping Use   • Vaping Use: Never used   Substance and Sexual Activity   • Alcohol use:  Yes     Alcohol/week: 3 0 standard drinks     Types: 1 Glasses of wine, 2 Cans of beer per week     Comment: weekend   • Drug use: No   • Sexual activity: Not Currently   Other Topics Concern   • Not on file   Social History Narrative    Daily caffeine consumption     Social Determinants of Health     Financial Resource Strain: Not on file   Food Insecurity: Not on file   Transportation Needs: Not on file   Physical Activity: Not on file   Stress: Not on file   Social Connections: Not on file   Intimate Partner Violence: Not on file   Housing Stability: Not on file     Vitals:    22 0758   BP: 124/74   Pulse: 81   Resp: 16   SpO2: 98%   Weight: 101 kg (222 lb 3 2 oz)   Height: 5' 11" (1 803 m)     Results for orders placed or performed in visit on 22   Urine culture    Specimen: Urine, Clean Catch Result Value Ref Range    Urine Culture No Growth <1000 cfu/mL    Vitamin D 25 hydroxy   Result Value Ref Range    Vit D, 25-Hydroxy 49 6 30 0 - 100 0 ng/mL     Weight (last 2 days)     Date/Time Weight    12/27/22 0758 101 (222 2)        Body mass index is 30 99 kg/m²  BP      Temp      Pulse     Resp      SpO2        Vitals:    12/27/22 0758   Weight: 101 kg (222 lb 3 2 oz)     Vitals:    12/27/22 0758   Weight: 101 kg (222 lb 3 2 oz)       /74   Pulse 81   Resp 16   Ht 5' 11" (1 803 m)   Wt 101 kg (222 lb 3 2 oz)   SpO2 98%   BMI 30 99 kg/m²          Physical Exam  Vitals and nursing note reviewed  Constitutional:       General: He is not in acute distress  Appearance: He is well-developed  He is obese  He is not ill-appearing, toxic-appearing or diaphoretic  HENT:      Head: Normocephalic and atraumatic  Right Ear: External ear normal       Left Ear: External ear normal    Eyes:      General: No scleral icterus  Right eye: No discharge  Left eye: No discharge  Conjunctiva/sclera: Conjunctivae normal       Pupils: Pupils are equal, round, and reactive to light  Cardiovascular:      Rate and Rhythm: Normal rate and regular rhythm  Heart sounds: Normal heart sounds  No murmur heard  No friction rub  No gallop  Pulmonary:      Effort: No respiratory distress  Breath sounds: No wheezing or rales  Abdominal:      General: Bowel sounds are normal  There is no distension  Palpations: Abdomen is soft  There is no mass  Tenderness: There is no abdominal tenderness  There is no guarding or rebound  Musculoskeletal:         General: No deformity  Cervical back: Neck supple  Lymphadenopathy:      Cervical: No cervical adenopathy  Neurological:      Mental Status: He is alert  Psychiatric:         Mood and Affect: Mood is not anxious or depressed

## 2022-12-28 RX ORDER — LORAZEPAM 0.5 MG/1
TABLET ORAL
Qty: 90 TABLET | Refills: 0 | Status: SHIPPED | OUTPATIENT
Start: 2022-12-28

## 2022-12-28 RX ORDER — TESTOSTERONE CYPIONATE 200 MG/ML
INJECTION INTRAMUSCULAR
Qty: 4 ML | Refills: 0 | Status: SHIPPED | OUTPATIENT
Start: 2022-12-28

## 2022-12-28 NOTE — ASSESSMENT & PLAN NOTE
Hypertension - controlled, I have counseled patient following healthy balance diet, I would like the patient reduce sodium, exercise routinely, I would like the patient continued the med current medical regiment and we will continue to monitor    Recommend weight loss routine exercise 30 minutes of aerobic exercise daily limit salt/DASH diet monitor blood pressure routinely

## 2023-01-04 DIAGNOSIS — I10 ESSENTIAL HYPERTENSION: ICD-10-CM

## 2023-01-04 RX ORDER — LISINOPRIL 10 MG/1
TABLET ORAL
Qty: 135 TABLET | Refills: 1 | Status: SHIPPED | OUTPATIENT
Start: 2023-01-04

## 2023-01-05 ENCOUNTER — HOSPITAL ENCOUNTER (OUTPATIENT)
Dept: CT IMAGING | Facility: HOSPITAL | Age: 63
Discharge: HOME | End: 2023-01-05

## 2023-01-18 ENCOUNTER — OFFICE VISIT (OUTPATIENT)
Dept: NEPHROLOGY | Facility: CLINIC | Age: 63
End: 2023-01-18

## 2023-01-18 VITALS
WEIGHT: 218 LBS | DIASTOLIC BLOOD PRESSURE: 80 MMHG | OXYGEN SATURATION: 97 % | HEIGHT: 71 IN | HEART RATE: 91 BPM | BODY MASS INDEX: 30.52 KG/M2 | SYSTOLIC BLOOD PRESSURE: 140 MMHG

## 2023-01-18 DIAGNOSIS — N20.0 RECURRENT NEPHROLITHIASIS: Primary | ICD-10-CM

## 2023-01-18 DIAGNOSIS — R82.991 HYPOCITRATURIA: ICD-10-CM

## 2023-01-18 DIAGNOSIS — D64.9 ANEMIA, UNSPECIFIED TYPE: ICD-10-CM

## 2023-01-18 DIAGNOSIS — E83.39 HYPOPHOSPHATEMIA: ICD-10-CM

## 2023-01-18 DIAGNOSIS — I10 ESSENTIAL HYPERTENSION: ICD-10-CM

## 2023-01-18 DIAGNOSIS — R79.89 LOW SERUM PARATHYROID HORMONE (PTH): ICD-10-CM

## 2023-01-18 NOTE — PATIENT INSTRUCTIONS
Blood pressure is stable    -Recommend 2 g sodium diet  -Recommend daily exercise and weight loss  -Discussed home monitoring of BP and maintaining a log of blood pressure   -Recommend goal BP less than 130/80  Please inform me if SBP below 110 or above 140's persistently  Continue oral hydration 3-4 L of water per day  Discussed about low animal protein and low-sodium diet  Continue on uro crit K 10 mEq twice daily  Follow up in 6 months with labs   Repeat stone risk profile and labs before the visit

## 2023-01-18 NOTE — PROGRESS NOTES
NEPHROLOGY OUTPATIENT PROGRESS NOTE   Sandra Tao 63 y o  male MRN: [id number]  DATE: 1/18/2023  Reason for visit:   Chief Complaint   Patient presents with   • Follow-up   • Chronic Kidney Disease       ASSESSMENT and PLAN:  Recurrent nephrolithiasis/hypocitraturia  -kidney stone analysis from June 22, 2022 suggestive of 80% calcium oxalate dihydrate and 20% calcium oxalate monohydrate   -stone risk profile from December 2020: Urine volume 2 5 L, urine citratrate 243 mg which is low  -previous stone analysis from 2021 as well as 2019 suggestive of calcium oxalate stones but around 40% calcium oxalate dihydrate  -24-hour stone risk profile from August 2022 showed- Urine volume was adequate at 2 3 L  24 hour urine citrate was 249 low  pH was around 6 9  -status post multiple urological procedures for recurrent nephrolithiasis  In June 2022, 2022 underwent cystoscopy with lithotripsy and insertion of stent, stone basket extraction on the left side  -x-ray KUB from July 5th 2022 still with persistent bilateral renal calculi which are unchanged   -renal ultrasound from June 2022 with finding of multiple calculi in both kidneys mild left hydronephrosis  -X-ray KUB from December 2022 showed bilateral nephrolithiasis largest on the right measuring 6 mm in largest on the left measuring 11 mm in size  -PTH level has been persistently low  -Was taken off vitamin C after initial office visit  -Plan  • No recent stone episodes  X-ray KUB result was reviewed  • Continue oral hydration 3-4 L of water per day  • Discussed about low animal protein and low-sodium diet  • Continue uro crit K 10 mEq twice daily    Using lower dose as urine pH is around 7 and do not want to increase the urine pH and increase the risk of calcium phosphate stone  • Repeat 24-hour stone risk profile before the next office visit in 6 months      Primary Hypertension:   -Current medication:  Lisinopril 15 mg daily, amlodipine 5 mg daily  -Current blood pressure:  stable and at goal on recheck   -Plan:    ?  Continue same medication   ? Stressed on exercise and weight loss   -Recommend 2 g sodium diet     -Discussed home monitoring of BP and maintaining a log of blood pressure   -Recommend goal BP less than 130/80  Hypophosphatemia  -vitamin D wnl at 49 6  -stressed on increasing phosphorus food intake  -will monitor phosphorus level on next blood work    Anemia, unspecified, hemoglobin was 11 9 in November, patient mention he had donated blood which could have caused drop in hemoglobin  We will recheck before the next office visit   -was found to have low MCV     Low serum PTH  -PTH level was 16 8 in October 2020  On repeat in nov 2022- PTH 13 4     Patient denies any radiation or neck surgery calcium level is at normal range     Right renal cyst, renal ultrasound showed simple cyst measuring 1 1 cm in the right kidney, no need for further follow-up        Patient Instructions   Blood pressure is stable    -Recommend 2 g sodium diet  -Recommend daily exercise and weight loss  -Discussed home monitoring of BP and maintaining a log of blood pressure   -Recommend goal BP less than 130/80  Please inform me if SBP below 110 or above 140's persistently  • Continue oral hydration 3-4 L of water per day  • Discussed about low animal protein and low-sodium diet  • Continue on uro crit K 10 mEq twice daily  Follow up in 6 months with labs   Repeat stone risk profile and labs before the visit   Diagnoses and all orders for this visit:    Recurrent nephrolithiasis  -     Basic metabolic panel; Future  -     Phosphorus; Future  -     Stone risk profile; Future    Hypocitraturia  -     Basic metabolic panel; Future    Essential hypertension  -     Basic metabolic panel; Future    Hypophosphatemia  -     Phosphorus; Future    Low serum parathyroid hormone (PTH)    Anemia, unspecified type  -     CBC;  Future            SUBJECTIVE / HPI:  Beatrice Elizabeth Valentine Bills is a 63 y o   male with history of HTN, colon polyp presenting for follow-up of recurrent nephrolithiasis  STARTED  At the age of 29 yrs of age  Says had lithotripsy 8-9 times and 3 cystoscopies so far  -  Previously was following with Dr Luisana Paredes and now transferring care  He was found to have low 24 hour urine citrate and was started on potassium citrate 10 mEq t i d     Repeat  24 hour urine stone risk profile  Urine volume was adequate at 2 3 L  24 hour urine citrate was 249 low  Was started on uro crit K 10 mEq twice daily during visit and told to continue same     Continue low oxalate diet  Cousin on mother side had kidney stones      On June 22, 2022 patient underwent cystoscopy, lithotripsy and extraction of stone    Reviewed labs from November 1, 2022 creatinine 1 14, stable electrolytes, phosphorus was low at 1 8, vitamin D level was checked and was 49 6  Hemoglobin stable at 11 9  PTH was low at 13 4 UA with 1-2 RBC per high-power field, uric acid was 5 1  Was recently started on amlodipine by PCP for elevated blood pressure  No new complaints, no recent flank pain    BP stable and at goal   Patient recently had cardiac CTA which documented absence of coronary artery disease  Had reaction to lipitor  REVIEW OF SYSTEMS:    Review of Systems   Constitutional: Negative for chills and fever  HENT: Negative for ear pain and sore throat  Eyes: Negative for pain and visual disturbance  Respiratory: Negative for cough and shortness of breath  Cardiovascular: Negative for chest pain and palpitations  Gastrointestinal: Negative for abdominal pain and vomiting  Genitourinary: Negative for dysuria and hematuria  Musculoskeletal: Negative for arthralgias and back pain  Skin: Negative for color change and rash  Neurological: Negative for seizures and syncope  All other systems reviewed and are negative        More than 10 point review of systems were obtained and discussed in length with the patient  Complete review of systems were negative / unremarkable except mentioned above  PAST MEDICAL HISTORY:  Past Medical History:   Diagnosis Date   • Anxiety    • Cancer ([address] )     skin ca on nose   • Chronic kidney disease     kidney stones   • GERD (gastroesophageal reflux disease)    • Hyperlipidemia    • Hypertension    • Kidney stone     6/2022       PAST SURGICAL HISTORY:  Past Surgical History:   Procedure Laterality Date   • COLONOSCOPY     • ESOPHAGOGASTRODUODENOSCOPY     • EXTRACORPOREAL SHOCK WAVE LITHOTRIPSY Bilateral     Renal Multiple times   • FL RETROGRADE PYELOGRAM  3/31/2022   • FL RETROGRADE PYELOGRAM  6/22/2022   • [place] CYSTO/URETERO W/LITHOTRIPSY &INDWELL STENT INSRT Left 8/6/2020    Procedure: CYSTOSCOPY URETEROSCOPY WITH LITHOTRIPSY HOLMIUM LASER, AND INSERTION STENT URETERAL;  Surgeon: Stacy Scott MD;  [facility];  Service: Urology   • [place] CYSTO/URETERO W/LITHOTRIPSY &INDWELL STENT INSRT Right 3/31/2022    Procedure: CYSTOSCOPY URETEROSCOPY WITH LITHOTRIPSY HOLMIUM LASER,BASKET STONE EXTRACTION, RETROGRADE PYELOGRAM AND INSERTION STENT URETERAL;  Surgeon: Marleny Faustin MD;  [facility];  Service: Urology   • [place] CYSTO/URETERO W/LITHOTRIPSY &INDWELL STENT INSRT Left 6/22/2022    Procedure: CYSTOSCOPY URETEROSCOPY WITH LITHOTRIPSY HOLMIUM LASER, RETROGRADE PYELOGRAM AND INSERTION STENT URETERAL, STONE BASKET EXTRACTION;  Surgeon: Tali Aldana MD;  [facility];  Service: Urology   • [place] EXC B9 LESION MRGN XCP SK TG F/E/E/N/L/M > 4 0CM Right 4/28/2016    Procedure: EXCISION  LESION UPPER EYELID, COMPLEX CLOSURE;  Surgeon: Dat Rivera MD;  [facility];  Service: Plastics   • [place] LITHOTRIPSY [address] Right 4/18/2019    Procedure: Darin Cooks SHOCKWAVE (ESWL);   Surgeon: Kevin Fernandez MD;  [facility];  Service: Urology       SOCIAL HISTORY:  Social History     Substance and Sexual Activity Alcohol Use Yes   • Alcohol/week: 3 0 standard drinks   • Types: 1 Glasses of wine, 2 Cans of beer per week    Comment: weekend     Social History     Substance and Sexual Activity   Drug Use No     Social History     Tobacco Use   Smoking Status Former   • Packs/day: 0 75   • Years: 18 00   • Pack years: 13 50   • Types: Cigarettes   • Start date: 5   • Quit date: 5   • Years since quittin 0   Smokeless Tobacco Never       FAMILY HISTORY:  Family History   Problem Relation Age of Onset   • Other Father         CABG (coronary artery bypass surgery)   • Atrial fibrillation Sister    • Other Paternal Uncle         Myocardial infarction   • Coronary artery disease Family    • Diabetes Family    • Hypertension Family    • Uterine cancer Family        MEDICATIONS:    Current Outpatient Medications:   •  amLODIPine (NORVASC) 5 mg tablet, Take 1 tablet (5 mg total) by mouth daily, Disp: 30 tablet, Rfl: 4  •  aspirin (ECOTRIN LOW STRENGTH) 81 mg EC tablet, Take 1 tablet (81 mg total) by mouth daily, Disp: 30 tablet, Rfl: 0  •  b complex vitamins capsule, Take 1 capsule by mouth daily  , Disp: , Rfl:   •  Cholecalciferol (VITAMIN D3) 1000 units CAPS, Take 1 capsule by mouth daily, Disp: , Rfl:   •  coenzyme Q-10 100 MG capsule, Take 1 capsule by mouth daily, Disp: , Rfl:   •  cyanocobalamin (VITAMIN B-12) 100 mcg tablet, Take by mouth daily  , Disp: , Rfl:   •  cyclobenzaprine (FLEXERIL) 5 mg tablet, Take 1 tablet (5 mg total) by mouth daily at bedtime as needed for muscle spasms, Disp: 20 tablet, Rfl: 0  •  fenofibrate (TRICOR) 145 mg tablet, Take 1 tablet (145 mg total) by mouth daily, Disp: 90 tablet, Rfl: 1  •  fexofenadine (ALLEGRA) 180 MG tablet, Take 180 mg by mouth daily in the early morning , Disp: , Rfl:   •  lisinopril (ZESTRIL) 10 mg tablet, TAKE 1 AND 1/2 TABLETS     DAILY, Disp: 135 tablet, Rfl: 1  •  LORazepam (ATIVAN) 0 5 mg tablet, TAKE 1 TABLET DAILY AS     NEEDED FOR ANXIETY, Disp: 90 tablet, Rfl: 0  •  multivitamin (THERAGRAN) TABS, Take 1 tablet by mouth daily  , Disp: , Rfl:   •  omeprazole (PriLOSEC) 20 mg delayed release capsule, Take 20 mg by mouth daily, Disp: , Rfl:   •  potassium citrate (UROCIT-K) 10 mEq, Take 1 tablet (10 mEq total) by mouth 2 (two) times a day, Disp: 180 tablet, Rfl: 3  •  sildenafil (VIAGRA) 50 MG tablet, TAKE 1 TABLET DAILY AS     NEEDED FOR ERECTILE        DYSFUNCTION, Disp: 30 tablet, Rfl: 0  •  SYRINGE-NEEDLE, DISP, 3 ML (BD ECLIPSE SYRINGE) 25G X 1" 3 ML MISC, by Does not apply route once a week, Disp: 50 each, Rfl: 3  •  testosterone cypionate (DEPO-TESTOSTERONE) 200 mg/mL SOLN, INJECT 0 4ML INTO SHOULDER,THIGH OR BUTTOCKS ONCE     WEEKLY  DISCARD THE        REMAINDER, Disp: 4 mL, Rfl: 0      PHYSICAL EXAM:  Vitals:    01/18/23 1250   BP: 140/80   BP Location: Left arm   Patient Position: Sitting   Cuff Size: Adult   Pulse: 91   SpO2: 97%   Weight: 98 9 kg (218 lb)   Height: 5' 11" (1 803 m)     Body mass index is 30 4 kg/m²  Physical Exam  Constitutional:       General: He is not in acute distress  Appearance: He is well-developed  He is not diaphoretic  HENT:      Head: Normocephalic and atraumatic  Mouth/Throat:      Mouth: Mucous membranes are moist    Eyes:      General: No scleral icterus  Conjunctiva/sclera: Conjunctivae normal       Pupils: Pupils are equal, round, and reactive to light  Neck:      Thyroid: No thyromegaly  Cardiovascular:      Rate and Rhythm: Normal rate and regular rhythm  Heart sounds: Normal heart sounds  No murmur heard  No friction rub  Pulmonary:      Effort: Pulmonary effort is normal  No respiratory distress  Breath sounds: Normal breath sounds  No wheezing or rales  Abdominal:      General: Bowel sounds are normal  There is no distension  Palpations: Abdomen is soft  Tenderness: There is no abdominal tenderness  Musculoskeletal:         General: No deformity        Cervical back: Neck supple  Right lower leg: No edema  Left lower leg: No edema  Lymphadenopathy:      Cervical: No cervical adenopathy  Skin:     Coloration: Skin is not pale  Nails: There is no clubbing  Neurological:      Mental Status: He is alert and oriented to person, place, and time  He is not disoriented  Psychiatric:         Mood and Affect: Mood normal  Mood is not anxious  Affect is not inappropriate  Behavior: Behavior normal          Thought Content:  Thought content normal          Lab Results:   Results for orders placed or performed in visit on 11/09/22   Urine culture    Specimen: Urine, Clean Catch   Result Value Ref Range    Urine Culture No Growth <1000 cfu/mL    Vitamin D 25 hydroxy   Result Value Ref Range    Vit D, 25-Hydroxy 49 6 30 0 - 100 0 ng/mL       Lab Results:         Invalid input(s): ALBUMIN    Laboratory Results:  Lab Results   Component Value Date    WBC 8 05 11/01/2022    HGB 11 9 (L) 11/01/2022    HCT 41 4 11/01/2022    MCV 77 (L) 11/01/2022     (H) 11/01/2022     Lab Results   Component Value Date    SODIUM 136 11/01/2022    K 4 4 11/01/2022     11/01/2022    CO2 27 11/01/2022    BUN 18 11/01/2022    CREATININE 1 14 11/01/2022    GLUC 94 03/31/2022    CALCIUM 9 7 11/01/2022     Lab Results   Component Value Date    CALCIUM 9 7 11/01/2022    PHOS 1 8 (L) 11/01/2022     No results found for: LABPROT  [unfilled]  Lab Results   Component Value Date    PTH 13 4 (L) 11/01/2022    CALCIUM 9 7 11/01/2022    PHOS 1 8 (L) 11/01/2022     [unfilled]

## 2023-01-26 ENCOUNTER — OFFICE VISIT (OUTPATIENT)
Dept: UROLOGY | Facility: CLINIC | Age: 63
End: 2023-01-26

## 2023-01-26 VITALS
HEIGHT: 71 IN | SYSTOLIC BLOOD PRESSURE: 140 MMHG | WEIGHT: 218 LBS | HEART RATE: 60 BPM | OXYGEN SATURATION: 99 % | BODY MASS INDEX: 30.52 KG/M2 | DIASTOLIC BLOOD PRESSURE: 82 MMHG

## 2023-01-26 DIAGNOSIS — Z12.5 SCREENING FOR PROSTATE CANCER: Primary | ICD-10-CM

## 2023-01-26 DIAGNOSIS — N20.0 NEPHROLITHIASIS: ICD-10-CM

## 2023-01-26 RX ORDER — HYDROCODONE BITARTRATE AND ACETAMINOPHEN 5; 325 MG/1; MG/1
1 TABLET ORAL EVERY 6 HOURS PRN
Qty: 10 TABLET | Refills: 0 | Status: SHIPPED | OUTPATIENT
Start: 2023-01-26 | End: 2023-02-05

## 2023-01-26 NOTE — PROGRESS NOTES
1  Screening for prostate cancer  PSA, Total Screen      2  Nephrolithiasis  XR abdomen 1 view kub    HYDROcodone-acetaminophen (NORCO) 5-325 mg per tablet            Assessment and plan:       1  Extensive Nephrolithiasis  -status post multiple endoscopic interventions most recently left ureteroscopy performed by Dr Carleen Beth June 2022  Sharron Flowers with nephrology for metabolic work-up and currently on potassium citrate  - Bilateral nonobstructing stones currently on KUB  -Plan for surveillance at this time with a repeat KUB in 1 year  If his left intrarenal stone increases, we will plan for ureteroscopy with high-powered laser as patient has not bonded well to ESWL in the past   - If the patient calls in with stone pain, he should be scheduled for an ASAP CT scan, surgery can be expedited at the [facility]    2  Prostate cancer screening  -Normal DIONNE  -PSA 0 9 (11/9/22)        Emily Garzon      Chief Complaint     Kidney stones    History of Present Illness     Harshad Spears is a 63 y o  male presenting for follow up of nephrolithiasis  Patient extensive history of nephrolithiasis undergone multiple prior endoscopic surgical interventions with my partners  Most recently he underwent left ureteroscopy with Dr Carleen Beth last year  At that time a distal ureteral calculi as well as intrarenal calculi were addressed  Post ureteroscopically imaging in the form of a KUB demonstrates the right lower pole calculus contralateral ear and scattered left intrarenal calculi  Patient has been asymptomatic over the past months of stones  Event patient's extensive past history of nephrolithiasis, he does have tamsulosin at home to initiate when he begins to have symptoms  He also has a small stock of pain medication as needed for medical expulsive therapy when he is passing stones  Updated KUB (12/20/22) bilateral nephrolithiasis - 6mm right lower pole, 11mm left midpole      PSA 0 9 (11/9/22)  Patient is on testosterone replacement therapy through his primary care provider  He has had some issues with polycythemia in the past and which has been rectified with routine blood donation  His last H&H was normal     Following with nephrology for metabolic workup - started on urocit-k  Review of Systems     Review of Systems   Constitutional: Negative for activity change and fatigue  HENT: Negative for congestion  Eyes: Negative for visual disturbance  Respiratory: Negative for shortness of breath and wheezing  Cardiovascular: Negative for chest pain and leg swelling  Gastrointestinal: Negative for abdominal pain  Endocrine: Negative for polyuria  Genitourinary: Negative for dysuria, flank pain, hematuria and urgency  Musculoskeletal: Negative for back pain  Allergic/Immunologic: Negative for immunocompromised state  Neurological: Negative for dizziness and numbness  Psychiatric/Behavioral: Negative for dysphoric mood  All other systems reviewed and are negative  Allergies     Allergies   Allergen Reactions   • Ciprofloxacin Hives   • Macrobid [Nitrofurantoin] Nausea Only and Palpitations       Physical Exam     Physical Exam  Constitutional:       General: He is not in acute distress  Appearance: He is well-developed  HENT:      Head: Normocephalic and atraumatic  Cardiovascular:      Comments: The lower extremity edema  Pulmonary:      Effort: Pulmonary effort is normal       Breath sounds: Normal breath sounds  Abdominal:      Palpations: Abdomen is soft  Genitourinary:     Comments: Good rectal tone  Prostate 35g, no nodules, smooth, symmetric  Musculoskeletal:         General: Normal range of motion  Cervical back: Normal range of motion  Skin:     General: Skin is warm  Neurological:      Mental Status: He is alert and oriented to person, place, and time     Psychiatric:         Behavior: Behavior normal          Vital Signs  Vitals:    01/26/23 0735   BP: 140/82   BP Location: Left arm   Patient Position: Sitting   Pulse: 60   SpO2: 99%   Weight: 98 9 kg (218 lb)   Height: 5' 11" (1 803 m)         Current Medications       Current Outpatient Medications:   •  amLODIPine (NORVASC) 5 mg tablet, Take 1 tablet (5 mg total) by mouth daily, Disp: 30 tablet, Rfl: 4  •  aspirin (ECOTRIN LOW STRENGTH) 81 mg EC tablet, Take 1 tablet (81 mg total) by mouth daily, Disp: 30 tablet, Rfl: 0  •  b complex vitamins capsule, Take 1 capsule by mouth daily  , Disp: , Rfl:   •  Cholecalciferol (VITAMIN D3) 1000 units CAPS, Take 1 capsule by mouth daily, Disp: , Rfl:   •  coenzyme Q-10 100 MG capsule, Take 1 capsule by mouth daily, Disp: , Rfl:   •  cyanocobalamin (VITAMIN B-12) 100 mcg tablet, Take by mouth daily  , Disp: , Rfl:   •  fenofibrate (TRICOR) 145 mg tablet, Take 1 tablet (145 mg total) by mouth daily, Disp: 90 tablet, Rfl: 1  •  fexofenadine (ALLEGRA) 180 MG tablet, Take 180 mg by mouth daily in the early morning , Disp: , Rfl:   •  HYDROcodone-acetaminophen (NORCO) 5-325 mg per tablet, Take 1 tablet by mouth every 6 (six) hours as needed for pain for up to 10 days Max Daily Amount: 4 tablets, Disp: 10 tablet, Rfl: 0  •  lisinopril (ZESTRIL) 10 mg tablet, TAKE 1 AND 1/2 TABLETS     DAILY, Disp: 135 tablet, Rfl: 1  •  LORazepam (ATIVAN) 0 5 mg tablet, TAKE 1 TABLET DAILY AS     NEEDED FOR ANXIETY, Disp: 90 tablet, Rfl: 0  •  multivitamin (THERAGRAN) TABS, Take 1 tablet by mouth daily  , Disp: , Rfl:   •  omeprazole (PriLOSEC) 20 mg delayed release capsule, Take 20 mg by mouth daily, Disp: , Rfl:   •  potassium citrate (UROCIT-K) 10 mEq, Take 1 tablet (10 mEq total) by mouth 2 (two) times a day, Disp: 180 tablet, Rfl: 3  •  sildenafil (VIAGRA) 50 MG tablet, TAKE 1 TABLET DAILY AS     NEEDED FOR ERECTILE        DYSFUNCTION, Disp: 30 tablet, Rfl: 0  •  SYRINGE-NEEDLE, DISP, 3 ML (BD ECLIPSE SYRINGE) 25G X 1" 3 ML MISC, by Does not apply route once a week, Disp: 50 each, Rfl: 3  • testosterone cypionate (DEPO-TESTOSTERONE) 200 mg/mL SOLN, INJECT 0 4ML INTO SHOULDER,THIGH OR BUTTOCKS ONCE     WEEKLY   DISCARD THE        REMAINDER, Disp: 4 mL, Rfl: 0      Active Problems     Patient Active Problem List   Diagnosis   • Essential hypertension   • GERD (gastroesophageal reflux disease)   • Overweight   • Thyroid nodule   • Testicular hypogonadism   • Elevated blood sugar   • Class 1 obesity due to excess calories with serious comorbidity and body mass index (BMI) of 30 0 to 30 9 in adult   • Elevated hematocrit   • Fatty liver   • Low serum parathyroid hormone (PTH)   • Encounter for hepatitis C screening test for low risk patient   • Ureteral calculus, right   • Leukocytosis   • Dyslipidemia   • Nephrolithiasis   • Hypogonadism in male   • Hyperlipidemia   • Wellness examination   • CKD (chronic kidney disease)   • Hydronephrosis, right   • Renal calculus, bilateral   • Recurrent nephrolithiasis   • Hypocitraturia   • Cervicogenic headache   • Cervical strain   • Anemia   • Hypophosphatemia         Past Medical History     Past Medical History:   Diagnosis Date   • Anxiety    • Cancer ([address] )     skin ca on nose   • Chronic kidney disease     kidney stones   • GERD (gastroesophageal reflux disease)    • Hyperlipidemia    • Hypertension    • Kidney stone     6/2022         Surgical History     Past Surgical History:   Procedure Laterality Date   • COLONOSCOPY     • ESOPHAGOGASTRODUODENOSCOPY     • EXTRACORPOREAL SHOCK WAVE LITHOTRIPSY Bilateral     Renal Multiple times   • FL RETROGRADE PYELOGRAM  3/31/2022   • FL RETROGRADE PYELOGRAM  6/22/2022   • [place] CYSTO/URETERO W/LITHOTRIPSY &INDWELL STENT INSRT Left 8/6/2020    Procedure: CYSTOSCOPY URETEROSCOPY WITH LITHOTRIPSY HOLMIUM LASER, AND INSERTION STENT URETERAL;  Surgeon: Misty Santos MD;  [facility];  Service: Urology   • [place] CYSTO/URETERO W/LITHOTRIPSY &INDWELL STENT INSRT Right 3/31/2022    Procedure: CYSTOSCOPY URETEROSCOPY WITH LITHOTRIPSY HOLMIUM LASER,BASKET STONE EXTRACTION, RETROGRADE PYELOGRAM AND INSERTION STENT URETERAL;  Surgeon: Abhishek Vogel MD;  [facility];  Service: Urology   • [place] CYSTO/URETERO W/LITHOTRIPSY &INDWELL STENT INSRT Left 2022    Procedure: CYSTOSCOPY URETEROSCOPY WITH LITHOTRIPSY HOLMIUM LASER, RETROGRADE PYELOGRAM AND INSERTION STENT URETERAL, STONE BASKET EXTRACTION;  Surgeon: Roney Mccord MD;  [facility];  Service: Urology   • OR EXC B9 LESION MRGN XCP SK TG F/E/E/N/L/M > 4 0CM Right 2016    Procedure: EXCISION  LESION UPPER EYELID, COMPLEX CLOSURE;  Surgeon: Antonio Jaimes MD;  [facility];  Service: Plastics   • OR LITHOTRIPSY [address] Right 2019    Procedure: Melissa Cortes SHOCKWAVE (ESWL);   Surgeon: Raman Moses MD;  [facility];  Service: Urology         Family History     Family History   Problem Relation Age of Onset   • Other Father         CABG (coronary artery bypass surgery)   • Atrial fibrillation Sister    • Other Paternal Uncle         Myocardial infarction   • Coronary artery disease Family    • Diabetes Family    • Hypertension Family    • Uterine cancer Family          Social History     Social History     Social History     Tobacco Use   Smoking Status Former   • Packs/day: 0 75   • Years: 18 00   • Pack years: 13 50   • Types: Cigarettes   • Start date:    • Quit date:    • Years since quittin 0   Smokeless Tobacco Never         Pertinent Lab Values     Lab Results   Component Value Date    CREATININE 1 14 2022       Lab Results   Component Value Date    PSA 0 9 2022    PSA 0 9 2021    PSA 1 0 10/14/2020       @RESULTRCNT(1H])@      Pertinent Imaging      - n/a    Portions of the record may have been created with voice recognition software   Occasional wrong word or "sound a like" substitutions may have occurred due to the inherent limitations of voice recognition software   Read the chart carefully and recognize, using context, where substitutions have occurred

## 2023-01-28 DIAGNOSIS — E29.1 HYPOGONADISM IN MALE: ICD-10-CM

## 2023-01-30 DIAGNOSIS — R82.991 HYPOCITRATURIA: ICD-10-CM

## 2023-01-30 DIAGNOSIS — N20.0 RECURRENT NEPHROLITHIASIS: ICD-10-CM

## 2023-01-30 RX ORDER — TESTOSTERONE CYPIONATE 200 MG/ML
INJECTION INTRAMUSCULAR
Qty: 4 ML | Refills: 0 | Status: SHIPPED | OUTPATIENT
Start: 2023-01-30

## 2023-01-30 RX ORDER — POTASSIUM CITRATE 10 MEQ/1
10 TABLET, EXTENDED RELEASE ORAL 2 TIMES DAILY
Qty: 180 TABLET | Refills: 4 | Status: SHIPPED | OUTPATIENT
Start: 2023-01-30

## 2023-02-06 DIAGNOSIS — N20.0 KIDNEY STONES: Primary | ICD-10-CM

## 2023-02-06 RX ORDER — OXYCODONE HYDROCHLORIDE AND ACETAMINOPHEN 5; 325 MG/1; MG/1
1 TABLET ORAL EVERY 4 HOURS PRN
Qty: 4 TABLET | Refills: 0 | Status: SHIPPED | OUTPATIENT
Start: 2023-02-06 | End: 2023-03-07 | Stop reason: SDUPTHER

## 2023-02-22 DIAGNOSIS — E78.1 HYPERTRIGLYCERIDEMIA: ICD-10-CM

## 2023-02-23 RX ORDER — FENOFIBRATE 145 MG/1
TABLET, COATED ORAL
Qty: 90 TABLET | Refills: 1 | Status: SHIPPED | OUTPATIENT
Start: 2023-02-23

## 2023-03-02 DIAGNOSIS — E29.1 HYPOGONADISM IN MALE: ICD-10-CM

## 2023-03-02 RX ORDER — TESTOSTERONE CYPIONATE 200 MG/ML
INJECTION, SOLUTION INTRAMUSCULAR
Qty: 4 ML | Refills: 0 | Status: SHIPPED | OUTPATIENT
Start: 2023-03-02

## 2023-03-07 DIAGNOSIS — N20.0 KIDNEY STONES: ICD-10-CM

## 2023-03-07 RX ORDER — OXYCODONE HYDROCHLORIDE AND ACETAMINOPHEN 5; 325 MG/1; MG/1
1 TABLET ORAL EVERY 4 HOURS PRN
Qty: 6 TABLET | Refills: 0 | Status: SHIPPED | OUTPATIENT
Start: 2023-03-07 | End: 2023-05-01 | Stop reason: ALTCHOICE

## 2023-03-10 ENCOUNTER — APPOINTMENT (OUTPATIENT)
Dept: LAB | Age: 63
End: 2023-03-10

## 2023-03-10 DIAGNOSIS — E78.1 HYPERTRIGLYCERIDEMIA: ICD-10-CM

## 2023-03-10 DIAGNOSIS — K76.0 FATTY LIVER: ICD-10-CM

## 2023-03-10 DIAGNOSIS — K75.81 NASH (NONALCOHOLIC STEATOHEPATITIS): ICD-10-CM

## 2023-03-10 LAB
ALBUMIN SERPL BCP-MCNC: 3.9 G/DL (ref 3.5–5)
ALP SERPL-CCNC: 44 U/L (ref 46–116)
ALT SERPL W P-5'-P-CCNC: 29 U/L (ref 12–78)
ANION GAP SERPL CALCULATED.3IONS-SCNC: 9 MMOL/L (ref 4–13)
AST SERPL W P-5'-P-CCNC: 18 U/L (ref 5–45)
BASOPHILS # BLD AUTO: 0.08 THOUSANDS/ÂΜL (ref 0–0.1)
BASOPHILS NFR BLD AUTO: 1 % (ref 0–1)
BILIRUB SERPL-MCNC: 0.32 MG/DL (ref 0.2–1)
BUN SERPL-MCNC: 19 MG/DL (ref 5–25)
CALCIUM SERPL-MCNC: 10 MG/DL (ref 8.3–10.1)
CHLORIDE SERPL-SCNC: 105 MMOL/L (ref 96–108)
CHOLEST SERPL-MCNC: 160 MG/DL
CO2 SERPL-SCNC: 23 MMOL/L (ref 21–32)
CREAT SERPL-MCNC: 1.1 MG/DL (ref 0.6–1.3)
EOSINOPHIL # BLD AUTO: 0.15 THOUSAND/ÂΜL (ref 0–0.61)
EOSINOPHIL NFR BLD AUTO: 2 % (ref 0–6)
ERYTHROCYTE [DISTWIDTH] IN BLOOD BY AUTOMATED COUNT: 19.1 % (ref 11.6–15.1)
GFR SERPL CREATININE-BSD FRML MDRD: 72 ML/MIN/1.73SQ M
GLUCOSE P FAST SERPL-MCNC: 104 MG/DL (ref 65–99)
HCT VFR BLD AUTO: 43.3 % (ref 36.5–49.3)
HDLC SERPL-MCNC: 30 MG/DL
HGB BLD-MCNC: 12.6 G/DL (ref 12–17)
IMM GRANULOCYTES # BLD AUTO: 0.03 THOUSAND/UL (ref 0–0.2)
IMM GRANULOCYTES NFR BLD AUTO: 1 % (ref 0–2)
LDLC SERPL CALC-MCNC: 93 MG/DL (ref 0–100)
LYMPHOCYTES # BLD AUTO: 1.88 THOUSANDS/ÂΜL (ref 0.6–4.47)
LYMPHOCYTES NFR BLD AUTO: 29 % (ref 14–44)
MCH RBC QN AUTO: 21.8 PG (ref 26.8–34.3)
MCHC RBC AUTO-ENTMCNC: 29.1 G/DL (ref 31.4–37.4)
MCV RBC AUTO: 75 FL (ref 82–98)
MONOCYTES # BLD AUTO: 0.63 THOUSAND/ÂΜL (ref 0.17–1.22)
MONOCYTES NFR BLD AUTO: 10 % (ref 4–12)
NEUTROPHILS # BLD AUTO: 3.82 THOUSANDS/ÂΜL (ref 1.85–7.62)
NEUTS SEG NFR BLD AUTO: 57 % (ref 43–75)
NONHDLC SERPL-MCNC: 130 MG/DL
NRBC BLD AUTO-RTO: 0 /100 WBCS
PLATELET # BLD AUTO: 410 THOUSANDS/UL (ref 149–390)
PMV BLD AUTO: 10 FL (ref 8.9–12.7)
POTASSIUM SERPL-SCNC: 4.2 MMOL/L (ref 3.5–5.3)
PROT SERPL-MCNC: 7.4 G/DL (ref 6.4–8.4)
RBC # BLD AUTO: 5.78 MILLION/UL (ref 3.88–5.62)
SODIUM SERPL-SCNC: 137 MMOL/L (ref 135–147)
TRIGL SERPL-MCNC: 186 MG/DL
WBC # BLD AUTO: 6.59 THOUSAND/UL (ref 4.31–10.16)

## 2023-03-14 ENCOUNTER — TELEPHONE (OUTPATIENT)
Dept: CARDIOLOGY CLINIC | Facility: CLINIC | Age: 63
End: 2023-03-14

## 2023-03-14 ENCOUNTER — APPOINTMENT (OUTPATIENT)
Dept: LAB | Age: 63
End: 2023-03-14

## 2023-03-14 DIAGNOSIS — I10 ESSENTIAL HYPERTENSION: ICD-10-CM

## 2023-03-14 DIAGNOSIS — E61.1 IRON DEFICIENCY: ICD-10-CM

## 2023-03-14 DIAGNOSIS — E78.49 OTHER HYPERLIPIDEMIA: ICD-10-CM

## 2023-03-14 DIAGNOSIS — K75.81 NONALCOHOLIC STEATOHEPATITIS: ICD-10-CM

## 2023-03-14 DIAGNOSIS — E78.5 HYPERLIPIDEMIA, UNSPECIFIED HYPERLIPIDEMIA TYPE: ICD-10-CM

## 2023-03-14 DIAGNOSIS — E78.1 HYPERTRIGLYCERIDEMIA: Primary | ICD-10-CM

## 2023-03-14 DIAGNOSIS — K76.0 FATTY METAMORPHOSIS OF LIVER: ICD-10-CM

## 2023-03-14 LAB
ALBUMIN SERPL BCP-MCNC: 3.7 G/DL (ref 3.5–5)
ALP SERPL-CCNC: 43 U/L (ref 46–116)
ALT SERPL W P-5'-P-CCNC: 24 U/L (ref 12–78)
ANION GAP SERPL CALCULATED.3IONS-SCNC: 4 MMOL/L (ref 4–13)
AST SERPL W P-5'-P-CCNC: 20 U/L (ref 5–45)
BASOPHILS # BLD AUTO: 0.09 THOUSANDS/ÂΜL (ref 0–0.1)
BASOPHILS NFR BLD AUTO: 1 % (ref 0–1)
BILIRUB SERPL-MCNC: 0.34 MG/DL (ref 0.2–1)
BUN SERPL-MCNC: 19 MG/DL (ref 5–25)
CALCIUM SERPL-MCNC: 9.9 MG/DL (ref 8.3–10.1)
CHLORIDE SERPL-SCNC: 108 MMOL/L (ref 96–108)
CHOLEST SERPL-MCNC: 176 MG/DL
CO2 SERPL-SCNC: 25 MMOL/L (ref 21–32)
CREAT SERPL-MCNC: 1.16 MG/DL (ref 0.6–1.3)
EOSINOPHIL # BLD AUTO: 0.21 THOUSAND/ÂΜL (ref 0–0.61)
EOSINOPHIL NFR BLD AUTO: 3 % (ref 0–6)
ERYTHROCYTE [DISTWIDTH] IN BLOOD BY AUTOMATED COUNT: 19.3 % (ref 11.6–15.1)
FERRITIN SERPL-MCNC: 6 NG/ML (ref 8–388)
GFR SERPL CREATININE-BSD FRML MDRD: 68 ML/MIN/1.73SQ M
GLUCOSE P FAST SERPL-MCNC: 96 MG/DL (ref 65–99)
HCT VFR BLD AUTO: 41.9 % (ref 36.5–49.3)
HDLC SERPL-MCNC: 29 MG/DL
HEMOCCULT STL QL IA: NEGATIVE
HGB BLD-MCNC: 11.9 G/DL (ref 12–17)
IMM GRANULOCYTES # BLD AUTO: 0.02 THOUSAND/UL (ref 0–0.2)
IMM GRANULOCYTES NFR BLD AUTO: 0 % (ref 0–2)
IRON SERPL-MCNC: 22 UG/DL (ref 65–175)
LDLC SERPL CALC-MCNC: 100 MG/DL (ref 0–100)
LYMPHOCYTES # BLD AUTO: 2.29 THOUSANDS/ÂΜL (ref 0.6–4.47)
LYMPHOCYTES NFR BLD AUTO: 35 % (ref 14–44)
MCH RBC QN AUTO: 20.9 PG (ref 26.8–34.3)
MCHC RBC AUTO-ENTMCNC: 28.4 G/DL (ref 31.4–37.4)
MCV RBC AUTO: 74 FL (ref 82–98)
MONOCYTES # BLD AUTO: 0.94 THOUSAND/ÂΜL (ref 0.17–1.22)
MONOCYTES NFR BLD AUTO: 14 % (ref 4–12)
NEUTROPHILS # BLD AUTO: 2.97 THOUSANDS/ÂΜL (ref 1.85–7.62)
NEUTS SEG NFR BLD AUTO: 47 % (ref 43–75)
NONHDLC SERPL-MCNC: 147 MG/DL
NRBC BLD AUTO-RTO: 0 /100 WBCS
PLATELET # BLD AUTO: 408 THOUSANDS/UL (ref 149–390)
PMV BLD AUTO: 10.2 FL (ref 8.9–12.7)
POTASSIUM SERPL-SCNC: 4 MMOL/L (ref 3.5–5.3)
PROT SERPL-MCNC: 7.3 G/DL (ref 6.4–8.4)
RBC # BLD AUTO: 5.69 MILLION/UL (ref 3.88–5.62)
SODIUM SERPL-SCNC: 137 MMOL/L (ref 135–147)
TRANSFERRIN SERPL-MCNC: 329 MG/DL (ref 200–400)
TRIGL SERPL-MCNC: 233 MG/DL
WBC # BLD AUTO: 6.52 THOUSAND/UL (ref 4.31–10.16)

## 2023-03-14 RX ORDER — EZETIMIBE 10 MG/1
10 TABLET ORAL DAILY
Qty: 30 TABLET | Refills: 0 | Status: SHIPPED | OUTPATIENT
Start: 2023-03-14 | End: 2023-03-15

## 2023-03-14 NOTE — TELEPHONE ENCOUNTER
----- Message from Jose Interiano DO sent at 3/14/2023  1:33 PM EDT -----  Would you let him know that his GI team will follow up on his Iron Studies, but that I ordered ezetimibe for his elevated triglyceride levels  We will follow repeat labs in three-six months thereafter    Thank you  ----- Message -----  From: Lab, Background User  Sent: 3/14/2023  12:50 PM EDT  To: Jose Interiano DO

## 2023-03-15 ENCOUNTER — TELEPHONE (OUTPATIENT)
Dept: CARDIOLOGY CLINIC | Facility: CLINIC | Age: 63
End: 2023-03-15

## 2023-03-15 NOTE — TELEPHONE ENCOUNTER
As per Dr Bertrand Nevarez, patient was called and notify of his blood work and iron levels  Patient states that b/w work from 03/14/23 are inaccurate due to the reason he was not fasting, but the ones he did on 03/10/23 those are correct because he did fast for those,    Asked if Dr Bertrand Nevarez will go over those and please advise

## 2023-04-03 NOTE — TELEPHONE ENCOUNTER
Prior pt Dr Miko Dominguez called to report he's passed stone fragments about 2 weeks ago he's looking ahead for prevention if he has stone he does not want to report to ER,questioning if pain medication could be prescribed if he needs or recommendation   He also had a change in pharmacy [address] To Dr. Ballesteros   Please review and advise   Also, okay for note for school?

## 2023-04-05 DIAGNOSIS — I10 ESSENTIAL HYPERTENSION: ICD-10-CM

## 2023-04-05 RX ORDER — AMLODIPINE BESYLATE 5 MG/1
5 TABLET ORAL DAILY
Qty: 90 TABLET | Refills: 1 | Status: SHIPPED | OUTPATIENT
Start: 2023-04-05

## 2023-05-03 ENCOUNTER — OFFICE VISIT (OUTPATIENT)
Dept: INTERNAL MEDICINE CLINIC | Facility: CLINIC | Age: 63
End: 2023-05-03

## 2023-05-03 VITALS
OXYGEN SATURATION: 97 % | BODY MASS INDEX: 29.48 KG/M2 | SYSTOLIC BLOOD PRESSURE: 124 MMHG | HEART RATE: 79 BPM | WEIGHT: 210.6 LBS | RESPIRATION RATE: 16 BRPM | HEIGHT: 71 IN | DIASTOLIC BLOOD PRESSURE: 88 MMHG

## 2023-05-03 DIAGNOSIS — E78.5 HYPERLIPIDEMIA, UNSPECIFIED HYPERLIPIDEMIA TYPE: ICD-10-CM

## 2023-05-03 DIAGNOSIS — K21.9 GASTROESOPHAGEAL REFLUX DISEASE WITHOUT ESOPHAGITIS: ICD-10-CM

## 2023-05-03 DIAGNOSIS — E66.3 OVERWEIGHT: ICD-10-CM

## 2023-05-03 DIAGNOSIS — N18.30 STAGE 3 CHRONIC KIDNEY DISEASE, UNSPECIFIED WHETHER STAGE 3A OR 3B CKD (HCC): ICD-10-CM

## 2023-05-03 DIAGNOSIS — I10 ESSENTIAL HYPERTENSION: ICD-10-CM

## 2023-05-03 DIAGNOSIS — E29.1 HYPOGONADISM IN MALE: ICD-10-CM

## 2023-05-03 DIAGNOSIS — E78.5 DYSLIPIDEMIA: ICD-10-CM

## 2023-05-03 DIAGNOSIS — Z23 ENCOUNTER FOR IMMUNIZATION: Primary | ICD-10-CM

## 2023-05-03 DIAGNOSIS — K76.0 FATTY LIVER: ICD-10-CM

## 2023-05-03 DIAGNOSIS — Z13.1 SCREENING FOR DIABETES MELLITUS: ICD-10-CM

## 2023-05-03 DIAGNOSIS — Z12.5 SCREENING PSA (PROSTATE SPECIFIC ANTIGEN): ICD-10-CM

## 2023-05-03 RX ORDER — NEEDLES, SAFETY 18GX1 1/2"
NEEDLE, DISPOSABLE MISCELLANEOUS WEEKLY
Qty: 50 EACH | Refills: 3 | Status: SHIPPED | OUTPATIENT
Start: 2023-05-03

## 2023-05-03 NOTE — PROGRESS NOTES
"  Assessment/Plan:    Fatty liver  Recommend weight loss reduction of carbs and sweets routine exercise    GERD (gastroesophageal reflux disease)   ulcer free diet, continue omeprazole 20 mg once daily    Hypogonadism in male  Clinically stable and doing well continue the current medical regiment will continue monitor  Continue testosterone check PSA    Essential hypertension  Hypertension - controlled, I have counseled patient following healthy balance diet, I would like the patient reduce sodium, exercise routinely, I would like the patient continued the med current medical regiment and we will continue to monitor  Blood pressure today 124/88 continue amlodipine 5 mg once daily, lisinopril 10 mg once daily    CKD (chronic kidney disease)  Drink adequate amount of water, avoid anti-inflammatories, reduce sodium in the diet and will continue to monitor the kidney function    Dyslipidemia  Hyperlipidemia controlled continue with current medical regiment recommend a low-cholesterol diet and recommend routine exercise we will continue to monitor the progress  Continue Tricor 145 mg once daily    Overweight  I have counselled the pt to follow a healthy and balanced diet ,and recommend routine exercise  Problem List Items Addressed This Visit        Digestive    GERD (gastroesophageal reflux disease)      ulcer free diet, continue omeprazole 20 mg once daily         Fatty liver     Recommend weight loss reduction of carbs and sweets routine exercise            Endocrine    Hypogonadism in male     Clinically stable and doing well continue the current medical regiment will continue monitor    Continue testosterone check PSA         Relevant Medications    SYRINGE-NEEDLE, DISP, 3 ML (BD Eclipse Syringe) 25G X 1\" 3 ML MISC       Cardiovascular and Mediastinum    Essential hypertension     Hypertension - controlled, I have counseled patient following healthy balance diet, I would like the patient reduce sodium, " exercise routinely, I would like the patient continued the med current medical regiment and we will continue to monitor  Blood pressure today 124/88 continue amlodipine 5 mg once daily, lisinopril 10 mg once daily            Genitourinary    CKD (chronic kidney disease)     Drink adequate amount of water, avoid anti-inflammatories, reduce sodium in the diet and will continue to monitor the kidney function         Relevant Orders    Comprehensive metabolic panel       Other    Overweight     I have counselled the pt to follow a healthy and balanced diet ,and recommend routine exercise  Dyslipidemia     Hyperlipidemia controlled continue with current medical regiment recommend a low-cholesterol diet and recommend routine exercise we will continue to monitor the progress  Continue Tricor 145 mg once daily         Relevant Orders    Lipid Panel with Direct LDL reflex    Hyperlipidemia   Other Visit Diagnoses     Encounter for immunization    -  Primary    Relevant Orders    TDAP VACCINE GREATER THAN OR EQUAL TO 6YO IM (Completed)    Screening PSA (prostate specific antigen)        Relevant Orders    PSA, Total Screen    Screening for diabetes mellitus        Relevant Orders    Hemoglobin A1C          Return to office 6  months  call if any problems  Subjective:      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 63 y o  male  HPI 66-year old male coming in for a follow up visit regarding hypogonadism, essential hypertension, GERD, chronic kidney disease, overweight, hyperlipidemia; the patient reports me compliant taking medications without untoward side effects the  The patient is here to review his medical condition, update me on the medical condition and the patient reports me no hospitalizations and no ER visits  No injuries no illnesses here to review laboratories in details    Overall is feeling well has developed a mild anemia secondary to blood currently working with GI Dr Robby Armenta; last hemoglobin 11 9, mildly elevated platelet 143 low carb 45 gram less than , treadmill and out side, very motivated; weighing the foods , eats in smaller window 10am-6pm    Essential hypertension  Hypertension - controlled, I have counseled patient following healthy balance diet, I would like the patient reduce sodium, exercise routinely, I would like the patient continued the med current medical regiment and we will continue to monitor  Recommend weight loss routine exercise 30 minutes of aerobic exercise daily limit salt/DASH diet monitor blood pressure routinely     CKD (chronic kidney disease)  Drink adequate amount of water, avoid anti-inflammatories, reduce sodium in the diet and will continue to monitor the kidney function     Class 1 obesity due to excess calories with serious comorbidity and body mass index (BMI) of 30 0 to 30 9 in adult  Obesity -I have counseled patient following healthy and balanced diet, I would like the patient to lose weight, I would like the patient exercise routinely; we will continue monitor the patient's progress      Hyperlipidemia  Hyperlipidemia controlled continue with current medical regiment recommend a low-cholesterol diet and recommend routine exercise we will continue to monitor the progress  ollowing portions of the patient's history were reviewed and updated as appropriate: allergies, current medications, past family history, past medical history, past social history, past surgical history and problem list     Review of Systems   Constitutional: Negative for activity change, appetite change and unexpected weight change  HENT: Negative for congestion and postnasal drip  Eyes: Negative for visual disturbance  Respiratory: Negative for cough and shortness of breath  Cardiovascular: Negative for chest pain  Gastrointestinal: Negative for abdominal pain, diarrhea, nausea and vomiting  Neurological: Negative for dizziness, light-headedness and headaches           Objective:    Return in about 6 months (around 11/3/2023)  No results found  Allergies   Allergen Reactions    Ciprofloxacin Hives    Macrobid [Nitrofurantoin] Nausea Only and Palpitations       Past Medical History:   Diagnosis Date    Anxiety     Cancer ([address] )     skin ca on nose    Chronic kidney disease     kidney stones    GERD (gastroesophageal reflux disease)     Hyperlipidemia     Hypertension     Kidney stone     6/2022     Past Surgical History:   Procedure Laterality Date    COLONOSCOPY      ESOPHAGOGASTRODUODENOSCOPY      EXTRACORPOREAL SHOCK WAVE LITHOTRIPSY Bilateral     Renal Multiple times    FL RETROGRADE PYELOGRAM  3/31/2022    FL RETROGRADE PYELOGRAM  6/22/2022    [place] CYSTO/URETERO W/LITHOTRIPSY &INDWELL STENT INSRT Left 8/6/2020    Procedure: CYSTOSCOPY URETEROSCOPY WITH LITHOTRIPSY HOLMIUM LASER, AND INSERTION STENT URETERAL;  Surgeon: Leora Garay MD;  [facility];  Service: Urology    [place] CYSTO/URETERO W/LITHOTRIPSY &INDWELL STENT INSRT Right 3/31/2022    Procedure: CYSTOSCOPY URETEROSCOPY WITH LITHOTRIPSY HOLMIUM LASER,BASKET STONE EXTRACTION, RETROGRADE PYELOGRAM AND INSERTION STENT URETERAL;  Surgeon: Lianne Greer MD;  [facility];  Service: Urology    [place] CYSTO/URETERO W/LITHOTRIPSY &INDWELL STENT INSRT Left 6/22/2022    Procedure: CYSTOSCOPY URETEROSCOPY WITH LITHOTRIPSY HOLMIUM LASER, RETROGRADE PYELOGRAM AND INSERTION STENT URETERAL, STONE BASKET EXTRACTION;  Surgeon: Gabe Cornelius MD;  [facility];  Service: Urology    [place] EXC B9 LESION MRGN XCP SK TG F/E/E/N/L/M > 4 0CM Right 4/28/2016    Procedure: EXCISION  LESION UPPER EYELID, COMPLEX CLOSURE;  Surgeon: Nayan Mckenna MD;  Location: [facility];  Service: Plastics    [address] Right 4/18/2019    Procedure: Beatriz Pinto SHOCKWAVE (ESWL);   Surgeon: Urvashi Swanson MD;  [facility];  Service: Urology     Current Outpatient Medications on File Prior to Visit Medication Sig Dispense Refill    amLODIPine (NORVASC) 5 mg tablet TAKE 1 TABLET (5 MG TOTAL) BY MOUTH DAILY  90 tablet 1    aspirin (ECOTRIN LOW STRENGTH) 81 mg EC tablet Take 1 tablet (81 mg total) by mouth daily 30 tablet 0    b complex vitamins capsule Take 1 capsule by mouth daily   Cholecalciferol (VITAMIN D3) 1000 units CAPS Take 1 capsule by mouth daily      coenzyme Q-10 100 MG capsule Take 1 capsule by mouth daily      cyanocobalamin (VITAMIN B-12) 100 mcg tablet Take by mouth daily   fenofibrate (TRICOR) 145 mg tablet TAKE 1 TABLET DAILY 90 tablet 1    fexofenadine (ALLEGRA) 180 MG tablet Take 180 mg by mouth daily in the early morning       lisinopril (ZESTRIL) 10 mg tablet TAKE 1 AND 1/2 TABLETS     DAILY 135 tablet 1    LORazepam (ATIVAN) 0 5 mg tablet TAKE 1 TABLET DAILY AS     NEEDED FOR ANXIETY 90 tablet 0    multivitamin (THERAGRAN) TABS Take 1 tablet by mouth daily   omeprazole (PriLOSEC) 20 mg delayed release capsule Take 20 mg by mouth daily      potassium citrate (UROCIT-K) 10 mEq Take 1 tablet (10 mEq total) by mouth 2 (two) times a day 180 tablet 4    sildenafil (VIAGRA) 50 MG tablet TAKE 1 TABLET DAILY AS     NEEDED FOR ERECTILE        DYSFUNCTION 30 tablet 0    testosterone cypionate (DEPO-TESTOSTERONE) 200 mg/mL SOLN INJECT 0 4ML INTRAMUSCULARLY INTO SHOULDER, THIGH OR BUTTOCKS ONCE WEEKLY  DISCARD THE REMAINDER 4 mL 0     No current facility-administered medications on file prior to visit       Family History   Problem Relation Age of Onset    Other Father         CABG (coronary artery bypass surgery)    Atrial fibrillation Sister     Other Paternal Uncle         Myocardial infarction    Coronary artery disease Family     Diabetes Family     Hypertension Family     Uterine cancer Family      Social History     Socioeconomic History    Marital status: /Civil Union     Spouse name: Not on file    Number of children: Not on file    Years of "education: Not on file    Highest education level: Not on file   Occupational History    Not on file   Tobacco Use    Smoking status: Former     Packs/day: 0 75     Years: 18 00     Pack years: 13 50     Types: Cigarettes     Start date: 5     Quit date:      Years since quittin 3    Smokeless tobacco: Never   Vaping Use    Vaping Use: Never used   Substance and Sexual Activity    Alcohol use: Yes     Alcohol/week: 3 0 standard drinks     Types: 1 Glasses of wine, 2 Cans of beer per week     Comment: weekend    Drug use: No    Sexual activity: Not Currently   Other Topics Concern    Not on file   Social History Narrative    Daily caffeine consumption     Social Determinants of Health     Financial Resource Strain: Not on file   Food Insecurity: Not on file   Transportation Needs: Not on file   Physical Activity: Not on file   Stress: Not on file   Social Connections: Not on file   Intimate Partner Violence: Not on file   Housing Stability: Not on file     Vitals:    23 1321   BP: 124/88   Pulse: 79   Resp: 16   SpO2: 97%   Weight: 95 5 kg (210 lb 9 6 oz)   Height: 5' 11\" (1 803 m)     Results for orders placed or performed in visit on 23   Occult Bloood,Fecal Immunochemical   Result Value Ref Range    OCCULT BLD, FECAL IMMUNOLOGICAL Negative Negative     Weight (last 2 days)     Date/Time Weight    23 1321 95 5 (210 6)        Body mass index is 29 37 kg/m²  BP      Temp      Pulse     Resp      SpO2        Vitals:    23 1321   Weight: 95 5 kg (210 lb 9 6 oz)     Vitals:    23 1321   Weight: 95 5 kg (210 lb 9 6 oz)       /88   Pulse 79   Resp 16   Ht 5' 11\" (1 803 m)   Wt 95 5 kg (210 lb 9 6 oz)   SpO2 97%   BMI 29 37 kg/m²          Physical Exam  Vitals and nursing note reviewed  Constitutional:       General: He is not in acute distress  Appearance: Normal appearance  He is well-developed and normal weight   He is not ill-appearing, " toxic-appearing or diaphoretic  HENT:      Head: Normocephalic and atraumatic  Right Ear: External ear normal       Left Ear: External ear normal    Eyes:      General: No scleral icterus  Right eye: No discharge  Left eye: No discharge  Conjunctiva/sclera: Conjunctivae normal       Pupils: Pupils are equal, round, and reactive to light  Cardiovascular:      Rate and Rhythm: Normal rate and regular rhythm  Heart sounds: Normal heart sounds  No murmur heard  No friction rub  No gallop  Pulmonary:      Effort: No respiratory distress  Breath sounds: No wheezing or rales  Abdominal:      General: Bowel sounds are normal  There is no distension  Palpations: Abdomen is soft  There is no mass  Tenderness: There is no abdominal tenderness  There is no guarding or rebound  Musculoskeletal:         General: No deformity  Cervical back: Neck supple  Lymphadenopathy:      Cervical: No cervical adenopathy  Neurological:      Mental Status: He is alert

## 2023-05-04 NOTE — ASSESSMENT & PLAN NOTE
Hypertension - controlled, I have counseled patient following healthy balance diet, I would like the patient reduce sodium, exercise routinely, I would like the patient continued the med current medical regiment and we will continue to monitor    Blood pressure today 124/88 continue amlodipine 5 mg once daily, lisinopril 10 mg once daily

## 2023-05-04 NOTE — ASSESSMENT & PLAN NOTE
Clinically stable and doing well continue the current medical regiment will continue monitor    Continue testosterone check PSA

## 2023-05-04 NOTE — ASSESSMENT & PLAN NOTE
Hyperlipidemia controlled continue with current medical regiment recommend a low-cholesterol diet and recommend routine exercise we will continue to monitor the progress    Continue Tricor 145 mg once daily

## 2023-05-25 DIAGNOSIS — I10 ESSENTIAL HYPERTENSION: ICD-10-CM

## 2023-05-25 RX ORDER — LISINOPRIL 10 MG/1
TABLET ORAL
Qty: 135 TABLET | Refills: 1 | Status: SHIPPED | OUTPATIENT
Start: 2023-05-25

## 2023-06-08 DIAGNOSIS — E29.1 HYPOGONADISM IN MALE: ICD-10-CM

## 2023-06-08 RX ORDER — TESTOSTERONE CYPIONATE 200 MG/ML
INJECTION, SOLUTION INTRAMUSCULAR
Qty: 4 ML | Refills: 0 | Status: SHIPPED | OUTPATIENT
Start: 2023-06-08

## 2023-06-26 DIAGNOSIS — I10 ESSENTIAL HYPERTENSION: ICD-10-CM

## 2023-06-27 RX ORDER — AMLODIPINE BESYLATE 5 MG/1
5 TABLET ORAL DAILY
Qty: 90 TABLET | Refills: 1 | Status: SHIPPED | OUTPATIENT
Start: 2023-06-27

## 2023-06-28 DIAGNOSIS — F41.9 ANXIETY: ICD-10-CM

## 2023-07-01 RX ORDER — LORAZEPAM 0.5 MG/1
0.5 TABLET ORAL DAILY PRN
Qty: 90 TABLET | Refills: 0 | Status: SHIPPED | OUTPATIENT
Start: 2023-07-01

## 2023-07-27 DIAGNOSIS — E29.1 HYPOGONADISM IN MALE: ICD-10-CM

## 2023-07-27 RX ORDER — TESTOSTERONE CYPIONATE 200 MG/ML
INJECTION, SOLUTION INTRAMUSCULAR
Qty: 4 ML | Refills: 1 | Status: SHIPPED | OUTPATIENT
Start: 2023-07-27 | End: 2023-09-28

## 2023-08-21 DIAGNOSIS — E78.1 HYPERTRIGLYCERIDEMIA: ICD-10-CM

## 2023-08-21 RX ORDER — FENOFIBRATE 145 MG/1
TABLET, COATED ORAL
Qty: 90 TABLET | Refills: 1 | Status: SHIPPED | OUTPATIENT
Start: 2023-08-21

## 2023-09-28 DIAGNOSIS — E29.1 HYPOGONADISM IN MALE: ICD-10-CM

## 2023-09-28 RX ORDER — TESTOSTERONE CYPIONATE 200 MG/ML
INJECTION, SOLUTION INTRAMUSCULAR
Qty: 4 ML | Refills: 1 | Status: SHIPPED | OUTPATIENT
Start: 2023-09-28

## 2023-10-05 ENCOUNTER — APPOINTMENT (OUTPATIENT)
Dept: LAB | Age: 63
End: 2023-10-05
Payer: COMMERCIAL

## 2023-10-05 DIAGNOSIS — E83.39 HYPOPHOSPHATEMIA: ICD-10-CM

## 2023-10-05 DIAGNOSIS — K75.81 NONALCOHOLIC STEATOHEPATITIS: ICD-10-CM

## 2023-10-05 DIAGNOSIS — D64.9 ANEMIA, UNSPECIFIED TYPE: ICD-10-CM

## 2023-10-05 DIAGNOSIS — I10 ESSENTIAL HYPERTENSION: ICD-10-CM

## 2023-10-05 DIAGNOSIS — N18.30 STAGE 3 CHRONIC KIDNEY DISEASE, UNSPECIFIED WHETHER STAGE 3A OR 3B CKD (HCC): ICD-10-CM

## 2023-10-05 DIAGNOSIS — N20.0 RECURRENT NEPHROLITHIASIS: ICD-10-CM

## 2023-10-05 DIAGNOSIS — R73.9 ELEVATED BLOOD SUGAR: ICD-10-CM

## 2023-10-05 DIAGNOSIS — Z12.5 SCREENING PSA (PROSTATE SPECIFIC ANTIGEN): ICD-10-CM

## 2023-10-05 DIAGNOSIS — E78.5 HYPERLIPIDEMIA, UNSPECIFIED HYPERLIPIDEMIA TYPE: ICD-10-CM

## 2023-10-05 DIAGNOSIS — Z13.1 SCREENING FOR DIABETES MELLITUS: ICD-10-CM

## 2023-10-05 DIAGNOSIS — R82.991 HYPOCITRATURIA: ICD-10-CM

## 2023-10-05 DIAGNOSIS — D50.9 IRON DEFICIENCY ANEMIA, UNSPECIFIED IRON DEFICIENCY ANEMIA TYPE: ICD-10-CM

## 2023-10-05 DIAGNOSIS — E78.5 DYSLIPIDEMIA: ICD-10-CM

## 2023-10-05 LAB
ALBUMIN SERPL BCP-MCNC: 4.5 G/DL (ref 3.5–5)
ALP SERPL-CCNC: 36 U/L (ref 34–104)
ALT SERPL W P-5'-P-CCNC: 22 U/L (ref 7–52)
ANION GAP SERPL CALCULATED.3IONS-SCNC: 9 MMOL/L
AST SERPL W P-5'-P-CCNC: 24 U/L (ref 13–39)
BASOPHILS # BLD AUTO: 0.09 THOUSANDS/ÂΜL (ref 0–0.1)
BASOPHILS NFR BLD AUTO: 1 % (ref 0–1)
BILIRUB SERPL-MCNC: 0.72 MG/DL (ref 0.2–1)
BUN SERPL-MCNC: 22 MG/DL (ref 5–25)
CALCIUM SERPL-MCNC: 9.9 MG/DL (ref 8.4–10.2)
CHLORIDE SERPL-SCNC: 100 MMOL/L (ref 96–108)
CHOLEST SERPL-MCNC: 194 MG/DL
CO2 SERPL-SCNC: 27 MMOL/L (ref 21–32)
CREAT SERPL-MCNC: 1.05 MG/DL (ref 0.6–1.3)
EOSINOPHIL # BLD AUTO: 0.22 THOUSAND/ÂΜL (ref 0–0.61)
EOSINOPHIL NFR BLD AUTO: 3 % (ref 0–6)
ERYTHROCYTE [DISTWIDTH] IN BLOOD BY AUTOMATED COUNT: 14.8 % (ref 11.6–15.1)
EST. AVERAGE GLUCOSE BLD GHB EST-MCNC: 114 MG/DL
FERRITIN SERPL-MCNC: 31 NG/ML (ref 24–336)
GFR SERPL CREATININE-BSD FRML MDRD: 76 ML/MIN/1.73SQ M
GLUCOSE P FAST SERPL-MCNC: 94 MG/DL (ref 65–99)
HBA1C MFR BLD: 5.6 %
HCT VFR BLD AUTO: 55.3 % (ref 36.5–49.3)
HDLC SERPL-MCNC: 33 MG/DL
HGB BLD-MCNC: 18.4 G/DL (ref 12–17)
IMM GRANULOCYTES # BLD AUTO: 0.05 THOUSAND/UL (ref 0–0.2)
IMM GRANULOCYTES NFR BLD AUTO: 1 % (ref 0–2)
IRON SERPL-MCNC: 141 UG/DL (ref 50–212)
LDLC SERPL CALC-MCNC: 113 MG/DL (ref 0–100)
LYMPHOCYTES # BLD AUTO: 1.96 THOUSANDS/ÂΜL (ref 0.6–4.47)
LYMPHOCYTES NFR BLD AUTO: 28 % (ref 14–44)
MCH RBC QN AUTO: 29.7 PG (ref 26.8–34.3)
MCHC RBC AUTO-ENTMCNC: 33.3 G/DL (ref 31.4–37.4)
MCV RBC AUTO: 89 FL (ref 82–98)
MONOCYTES # BLD AUTO: 0.93 THOUSAND/ÂΜL (ref 0.17–1.22)
MONOCYTES NFR BLD AUTO: 13 % (ref 4–12)
NEUTROPHILS # BLD AUTO: 3.76 THOUSANDS/ÂΜL (ref 1.85–7.62)
NEUTS SEG NFR BLD AUTO: 54 % (ref 43–75)
NRBC BLD AUTO-RTO: 0 /100 WBCS
PHOSPHATE SERPL-MCNC: 1.8 MG/DL (ref 2.3–4.1)
PLATELET # BLD AUTO: 283 THOUSANDS/UL (ref 149–390)
PMV BLD AUTO: 10.5 FL (ref 8.9–12.7)
POTASSIUM SERPL-SCNC: 4.7 MMOL/L (ref 3.5–5.3)
PROT SERPL-MCNC: 7.8 G/DL (ref 6.4–8.4)
PSA SERPL-MCNC: 0.53 NG/ML (ref 0–4)
RBC # BLD AUTO: 6.2 MILLION/UL (ref 3.88–5.62)
SODIUM SERPL-SCNC: 136 MMOL/L (ref 135–147)
TRANSFERRIN SERPL-MCNC: 334 MG/DL (ref 203–362)
TRIGL SERPL-MCNC: 238 MG/DL
WBC # BLD AUTO: 7.01 THOUSAND/UL (ref 4.31–10.16)

## 2023-10-05 PROCEDURE — 83735 ASSAY OF MAGNESIUM: CPT

## 2023-10-05 PROCEDURE — 82140 ASSAY OF AMMONIA: CPT

## 2023-10-05 PROCEDURE — 84100 ASSAY OF PHOSPHORUS: CPT

## 2023-10-05 PROCEDURE — 84105 ASSAY OF URINE PHOSPHORUS: CPT

## 2023-10-05 PROCEDURE — 84133 ASSAY OF URINE POTASSIUM: CPT

## 2023-10-05 PROCEDURE — 80061 LIPID PANEL: CPT

## 2023-10-05 PROCEDURE — 82436 ASSAY OF URINE CHLORIDE: CPT

## 2023-10-05 PROCEDURE — 83036 HEMOGLOBIN GLYCOSYLATED A1C: CPT

## 2023-10-05 PROCEDURE — 85025 COMPLETE CBC W/AUTO DIFF WBC: CPT

## 2023-10-05 PROCEDURE — 82507 ASSAY OF CITRATE: CPT

## 2023-10-05 PROCEDURE — 36415 COLL VENOUS BLD VENIPUNCTURE: CPT

## 2023-10-05 PROCEDURE — 84300 ASSAY OF URINE SODIUM: CPT

## 2023-10-05 PROCEDURE — 84560 ASSAY OF URINE/URIC ACID: CPT

## 2023-10-05 PROCEDURE — 82131 AMINO ACIDS SINGLE QUANT: CPT

## 2023-10-05 PROCEDURE — G0103 PSA SCREENING: HCPCS

## 2023-10-05 PROCEDURE — 84392 ASSAY OF URINE SULFATE: CPT

## 2023-10-05 PROCEDURE — 83945 ASSAY OF OXALATE: CPT

## 2023-10-05 PROCEDURE — 81003 URINALYSIS AUTO W/O SCOPE: CPT

## 2023-10-05 PROCEDURE — 83935 ASSAY OF URINE OSMOLALITY: CPT

## 2023-10-05 PROCEDURE — 84466 ASSAY OF TRANSFERRIN: CPT

## 2023-10-05 PROCEDURE — 82340 ASSAY OF CALCIUM IN URINE: CPT

## 2023-10-05 PROCEDURE — 82570 ASSAY OF URINE CREATININE: CPT

## 2023-10-05 PROCEDURE — 83540 ASSAY OF IRON: CPT

## 2023-10-05 PROCEDURE — 80053 COMPREHEN METABOLIC PANEL: CPT

## 2023-10-05 PROCEDURE — 82728 ASSAY OF FERRITIN: CPT

## 2023-10-13 DIAGNOSIS — F41.9 ANXIETY: ICD-10-CM

## 2023-10-16 RX ORDER — LORAZEPAM 0.5 MG/1
0.5 TABLET ORAL DAILY PRN
Qty: 90 TABLET | Refills: 0 | Status: SHIPPED | OUTPATIENT
Start: 2023-10-16

## 2023-10-18 ENCOUNTER — OFFICE VISIT (OUTPATIENT)
Dept: NEPHROLOGY | Facility: CLINIC | Age: 63
End: 2023-10-18

## 2023-10-18 VITALS
SYSTOLIC BLOOD PRESSURE: 114 MMHG | HEIGHT: 71 IN | DIASTOLIC BLOOD PRESSURE: 84 MMHG | WEIGHT: 209 LBS | BODY MASS INDEX: 29.26 KG/M2

## 2023-10-18 DIAGNOSIS — E83.39 HYPOPHOSPHATEMIA: ICD-10-CM

## 2023-10-18 DIAGNOSIS — R79.89 LOW SERUM PARATHYROID HORMONE (PTH): ICD-10-CM

## 2023-10-18 DIAGNOSIS — N20.0 RECURRENT NEPHROLITHIASIS: Primary | ICD-10-CM

## 2023-10-18 DIAGNOSIS — R82.991 HYPOCITRATURIA: ICD-10-CM

## 2023-10-18 DIAGNOSIS — I10 ESSENTIAL HYPERTENSION: ICD-10-CM

## 2023-10-18 LAB
AMMONIA 24H UR-MRATE: 23 MEQ/24 HR
AMMONIA UR-SCNC: ABNORMAL UG/DL
CA H2 PHOS DIHYD CRY URNS QL MICRO: 0.99 RATIO (ref 0–3)
CALCIUM 24H UR-MCNC: 7 MG/DL
CALCIUM 24H UR-MRATE: 182 MG/24 HR (ref 0–320)
CHLORIDE 24H UR-SCNC: 22 MMOL/L
CHLORIDE 24H UR-SRATE: 57 MMOL/24 HR (ref 52–264)
CITRATE 24H UR-MCNC: 130 MG/L
CITRATE 24H UR-MRATE: 338 MG/24 HR (ref 320–1240)
COM CRY STONE QL IR: 1.38 RATIO (ref 0–6)
CREAT 24H UR-MCNC: 35.9 MG/DL
CREAT 24H UR-MRATE: 933.4 MG/24 HR (ref 1000–2000)
CYSTINE 24H UR-MCNC: 2.7 MG/L
CYSTINE 24H UR-MRATE: 7.02 MG/24 HR (ref 2.1–58)
MAGNESIUM 24H UR-MRATE: <36 MG/24 HR (ref 12–293)
MAGNESIUM UR-MCNC: <1.4 MG/DL
NA URATE CRY STONE QL IR: 0.52 RATIO (ref 0–4)
OSMOLALITY UR: 229 MOSMOL/KG (ref 300–900)
OXALATE 24H UR-MRATE: 10 MG/24 HR (ref 7–44)
OXALATE UR-MCNC: 4 MG/L
PH 24H UR: 6.4 [PH] (ref 4.5–8)
PHOSPHATE 24H UR-MCNC: 23.7 MG/DL
PHOSPHATE 24H UR-MRATE: 616.2 MG/24 HR (ref 390–1425)
PLEASE NOTE (STONE RISK): ABNORMAL
POTASSIUM 24H UR-SCNC: 57.7 MMOL/24 HR (ref 20–116)
POTASSIUM UR-SCNC: 22.2 MMOL/L
PRESERVED URINE: 2600 ML/24 HR (ref 800–1800)
SODIUM 24H UR-SCNC: 22 MMOL/L
SODIUM 24H UR-SRATE: 57 MMOL/24 HR (ref 58–337)
SPECIMEN VOL 24H UR: 2600 ML/24 HR (ref 800–1800)
SULFATE 24H UR-MCNC: 16 MEQ/24 HR (ref 0–30)
SULFATE UR-MCNC: 6 MEQ/L
TRI-PHOS CRY STONE MICRO: 0.01 RATIO (ref 0–1)
URATE 24H UR-MCNC: 20.2 MG/DL
URATE 24H UR-MRATE: 525 MG/24 HR (ref 197–1079)
URATE DIHYD CRY STONE QL IR: 0.37 RATIO (ref 0–1.2)

## 2023-10-18 NOTE — PATIENT INSTRUCTIONS
Blood pressure is stable    -Recommend 2 g sodium diet. -Recommend daily exercise and weight loss  -Discussed home monitoring of BP and maintaining a log of blood pressure.  -Recommend goal BP less than 130/80. Please inform me if SBP below 110 or above 140's persistently. Stressed on hydration 3-4 lts/day. Recommend low-sodium diet and low animal protein. Continue Urocit-K 10 mEq twice daily. Follow up: 6 months with repeat Lab work within a week of the scheduled office visit. Will discuss the results of the previsit Labs during the office visit. Hypophosphatemia   WHAT YOU NEED TO KNOW:   Hypophosphatemia is a low level of phosphate in your blood. Phosphate is an electrolyte (mineral) that works with calcium to help build bones. It also helps produce energy. Hypophosphatemia can be acute or chronic. Acute means the level in your blood drops suddenly. Chronic means the level has been low or drops slowly, over time. DISCHARGE INSTRUCTIONS:   Call your local emergency number ([phone] in the [address]) or have someone call if:   You have a seizure. Return to the emergency department if:   You are confused or have severe trouble thinking. You break a bone. Call your doctor if:   You have new or worsening symptoms. You have nausea or are vomiting. You have diarrhea or constipation that continues for more than 2 days. You have muscle pain. You have questions or concerns about your condition or care. Medicines: You may need any of the following:  Medicines  may be given to increase your phosphate level. Medicines may also be used to lower your calcium level or to raise other mineral levels. You will be given instructions on how to take these medicines. Take your medicine as directed. Contact your healthcare provider if you think your medicine is not helping or if you have side effects. Tell your provider if you are allergic to any medicine.  Keep a list of the medicines, vitamins, and herbs you take. Include the amounts, and when and why you take them. Bring the list or the pill bottles to follow-up visits. Carry your medicine list with you in case of an emergency. Prevent or manage hypophosphatemia:   Manage health conditions that can lead to hypophosphatemia. If you have diabetes, it is important to follow your management plan so you prevent DKA. Ask your healthcare provider for information if you are having problems with alcoholism and need help to stop drinking. Obesity and eating disorders such as bulimia or anorexia can cause malnutrition. This increases your risk for hypophosphatemia. Your provider can help you manage these health conditions or give you information on treatment plans. Do not take more antacids or water pills than directed. Follow the directions on the label or that are given to you by your healthcare provider. These medicines are often available without a prescription. It can be easy to take too many at one time or in the same day. Eat a variety of healthy foods. Healthy foods include fruits, vegetables, low-fat dairy foods, beans, meats, fish, and whole-wheat breads and cereals. You can prevent malnutrition by eating enough healthy foods every day. Your healthcare provider or dietitian can help you plan meals and snacks. Your provider may recommend that you have more food or drinks that contain phosphate. Examples include breads that contain yeast, dairy products, meat, eggs, peas, nuts, and beans. Your provider or a dietitian can tell you how much of these to have each day. Exercise as directed. Your phosphate level may drop suddenly if you exercise too much or too intensely. Always warm up before you exercise and cool down after. Your healthcare provider can help you create a safe exercise plan. Drink liquids as directed. Liquids help your kidneys function well. Liquids can also help prevent dehydration, especially if you have diarrhea. Talk to your healthcare provider about how much liquid to have every day. Too much or too little liquid can affect the balance of phosphate and other minerals in your body. Follow up with your doctor as directed:  Write down your questions so you remember to ask them during your visits. © Copyright [employer] 2023 Information is for End User's use only and may not be sold, redistributed or otherwise used for commercial purposes. The above information is an  only. It is not intended as medical advice for individual conditions or treatments. Talk to your doctor, nurse or pharmacist before following any medical regimen to see if it is safe and effective for you.

## 2023-10-18 NOTE — PROGRESS NOTES
NEPHROLOGY OUTPATIENT PROGRESS NOTE   Gi Leahy 63 y.o. male MRN: [id number]  DATE: 10/18/2023  Reason for visit:   Chief Complaint   Patient presents with    Follow-up    Nephrolithiasis       ASSESSMENT and PLAN:  Recurrent nephrolithiasis/hypocitraturia  -kidney stone analysis from June 22, 2022 suggestive of 80% calcium oxalate dihydrate and 20% calcium oxalate monohydrate.  -stone risk profile from December 2020: Urine volume 2.5 L, urine citratrate 243 mg which is low  -previous stone analysis from 2021 as well as 2019 suggestive of calcium oxalate stones but around 40% calcium oxalate dihydrate  -24-hour stone risk profile from August 2022 showed- Urine volume was adequate at 2.3 L. 24 hour urine citrate was 249 low. pH was around 6.9  -status post multiple urological procedures for recurrent nephrolithiasis. In June 2022, 2022 underwent cystoscopy with lithotripsy and insertion of stent, stone basket extraction on the left side. -x-ray KUB from July 5th 2022 still with persistent bilateral renal calculi which are unchanged.  -renal ultrasound from June 2022 with finding of multiple calculi in both kidneys mild left hydronephrosis  -X-ray KUB from December 2022 showed bilateral nephrolithiasis largest on the right measuring 6 mm in largest on the left measuring 11 mm in size  -PTH level has been persistently low  -Was taken off vitamin C after initial office visit  -Plan  No recent stone episodes, continue hydration with water 3 to 4 L/day. Recommend low-sodium diet and low animal protein.   Continue Urocit-K 10 mEq twice daily  Using lower dose as urine pH is around 7 and do not want to increase the urine pH and increase the risk of calcium phosphate stone  Repeat stone risk profile under process, we will follow-up the results       Primary Hypertension:   -Current medication:  Lisinopril 15 mg daily, amlodipine 5 mg daily  -Current blood pressure:  stable and at goal   -Plan:     continue same medications   -Recommend 2 g sodium diet.    -Discussed home monitoring of BP and maintaining a log of blood pressure.  -Recommend goal BP less than 130/80. Hypophosphatemia  -level 1.8, asymptomatic, continue to monitor, check 125 vitamin D  -vitamin D wnl at 49.6  -Etiology for hypophosphatemia not clear, urinary phosphorus was around 600 in the setting of hypophosphatemia which is slightly high. Patient is not on any medication which could cause hypophosphatemia. Not considering Fanconi syndrome. Denies diarrhea . PTH is actually low so less likely primary or secondary hyperparathyroidism as a cause  -stressed on increasing phosphorus food intake. If persistently low, may need to add oral phosphorus replacement  -will monitor phosphorus level on next blood work    Anemia, unspecified  -now resolved. Hb 18.4 g/dl  -was due to blood donation  -okay stop iron tablets as hb improved . Low serum PTH  -PTH level low last PTH was 13.4 in November 2022,    patient denies any radiation or neck surgery calcium level is at normal range     Right renal cyst, renal ultrasound showed simple cyst measuring 1.1 cm in the right kidney, no need for further follow-up. Patient Instructions   Blood pressure is stable    -Recommend 2 g sodium diet. -Recommend daily exercise and weight loss  -Discussed home monitoring of BP and maintaining a log of blood pressure.  -Recommend goal BP less than 130/80. Please inform me if SBP below 110 or above 140's persistently. Stressed on hydration 3-4 lts/day. Recommend low-sodium diet and low animal protein. Continue Urocit-K 10 mEq twice daily. Follow up: 6 months with repeat Lab work within a week of the scheduled office visit. Will discuss the results of the previsit Labs during the office visit. Hypophosphatemia   WHAT YOU NEED TO KNOW:   Hypophosphatemia is a low level of phosphate in your blood.  Phosphate is an electrolyte (mineral) that works with calcium to help build bones. It also helps produce energy. Hypophosphatemia can be acute or chronic. Acute means the level in your blood drops suddenly. Chronic means the level has been low or drops slowly, over time. DISCHARGE INSTRUCTIONS:   Call your local emergency number ([phone] in the [address]) or have someone call if:   You have a seizure. Return to the emergency department if:   You are confused or have severe trouble thinking. You break a bone. Call your doctor if:   You have new or worsening symptoms. You have nausea or are vomiting. You have diarrhea or constipation that continues for more than 2 days. You have muscle pain. You have questions or concerns about your condition or care. Medicines: You may need any of the following:  Medicines  may be given to increase your phosphate level. Medicines may also be used to lower your calcium level or to raise other mineral levels. You will be given instructions on how to take these medicines. Take your medicine as directed. Contact your healthcare provider if you think your medicine is not helping or if you have side effects. Tell your provider if you are allergic to any medicine. Keep a list of the medicines, vitamins, and herbs you take. Include the amounts, and when and why you take them. Bring the list or the pill bottles to follow-up visits. Carry your medicine list with you in case of an emergency. Prevent or manage hypophosphatemia:   Manage health conditions that can lead to hypophosphatemia. If you have diabetes, it is important to follow your management plan so you prevent DKA. Ask your healthcare provider for information if you are having problems with alcoholism and need help to stop drinking. Obesity and eating disorders such as bulimia or anorexia can cause malnutrition. This increases your risk for hypophosphatemia. Your provider can help you manage these health conditions or give you information on treatment plans.     Do not take more antacids or water pills than directed. Follow the directions on the label or that are given to you by your healthcare provider. These medicines are often available without a prescription. It can be easy to take too many at one time or in the same day. Eat a variety of healthy foods. Healthy foods include fruits, vegetables, low-fat dairy foods, beans, meats, fish, and whole-wheat breads and cereals. You can prevent malnutrition by eating enough healthy foods every day. Your healthcare provider or dietitian can help you plan meals and snacks. Your provider may recommend that you have more food or drinks that contain phosphate. Examples include breads that contain yeast, dairy products, meat, eggs, peas, nuts, and beans. Your provider or a dietitian can tell you how much of these to have each day. Exercise as directed. Your phosphate level may drop suddenly if you exercise too much or too intensely. Always warm up before you exercise and cool down after. Your healthcare provider can help you create a safe exercise plan. Drink liquids as directed. Liquids help your kidneys function well. Liquids can also help prevent dehydration, especially if you have diarrhea. Talk to your healthcare provider about how much liquid to have every day. Too much or too little liquid can affect the balance of phosphate and other minerals in your body. Follow up with your doctor as directed:  Write down your questions so you remember to ask them during your visits. © Copyright [employer] 2023 Information is for End User's use only and may not be sold, redistributed or otherwise used for commercial purposes. The above information is an  only. It is not intended as medical advice for individual conditions or treatments. Talk to your doctor, nurse or pharmacist before following any medical regimen to see if it is safe and effective for you.       Diagnoses and all orders for this visit:    Recurrent nephrolithiasis  -     Basic metabolic panel; Future  -     Protein / creatinine ratio, urine; Future  -     Phosphorus; Future  -     Magnesium; Future    Hypocitraturia  -     Basic metabolic panel; Future    Essential hypertension  -     Basic metabolic panel; Future  -     Protein / creatinine ratio, urine; Future    Hypophosphatemia  -     Vitamin D 25 hydroxy; Future  -     Vitamin D 1,25 dihydroxy; Future    Low serum parathyroid hormone (PTH)            SUBJECTIVE / HPI:  Laura Ernst is a 63 y.o.  male with history of HTN, colon polyp presenting for follow-up of recurrent nephrolithiasis. STARTED  At the age of 29 yrs of age. Says had lithotripsy 8-9 times and 3 cystoscopies so far. -  Previously was following with Dr. Rivas Yu and now transferring care. He was found to have low 24 hour urine citrate and was started on potassium citrate 10 mEq t.i.d.    Repeat  24 hour urine stone risk profile. Urine volume was adequate at 2.3 L. 24 hour urine citrate was 249 low. Was started on uro crit K 10 mEq twice daily during visit and told to continue same     Continue low oxalate diet. Cousin on mother side had kidney stones      On June 22, 2022 patient underwent cystoscopy, lithotripsy and extraction of stone    No recent flank  Reviewed labs- creatinine stable at 1.05  mg per deciliter. Low phosphorus at 1.8. Iron level was at normal range and improved. LDL slightly high at 113. Hemoglobin improved to 18.4.  - Reviewed last PCP note. Blood pressure was stable at that time    REVIEW OF SYSTEMS:    Review of Systems   Constitutional:  Negative for chills and fever. HENT:  Negative for ear pain and sore throat. Eyes:  Negative for pain and visual disturbance. Respiratory:  Negative for cough and shortness of breath. Cardiovascular:  Negative for chest pain and palpitations. Gastrointestinal:  Negative for abdominal pain and vomiting.    Genitourinary:  Negative for dysuria and hematuria. Musculoskeletal:  Negative for arthralgias and back pain. Skin:  Negative for color change and rash. Neurological:  Negative for seizures and syncope. All other systems reviewed and are negative. More than 10 point review of systems were obtained and discussed in length with the patient. Complete review of systems were negative / unremarkable except mentioned above.        PAST MEDICAL HISTORY:  Past Medical History:   Diagnosis Date    Anxiety     Cancer ([address])     skin ca on nose    Chronic kidney disease     kidney stones    GERD (gastroesophageal reflux disease)     Hyperlipidemia     Hypertension     Kidney stone     6/2022       PAST SURGICAL HISTORY:  Past Surgical History:   Procedure Laterality Date    COLONOSCOPY      ESOPHAGOGASTRODUODENOSCOPY      EXTRACORPOREAL SHOCK WAVE LITHOTRIPSY Bilateral     Renal Multiple times    FL RETROGRADE PYELOGRAM  3/31/2022    FL RETROGRADE PYELOGRAM  6/22/2022    [place] CYSTO/URETERO W/LITHOTRIPSY &INDWELL STENT INSRT Left 8/6/2020    Procedure: CYSTOSCOPY URETEROSCOPY WITH LITHOTRIPSY HOLMIUM LASER, AND INSERTION STENT URETERAL;  Surgeon: Cherylene Quince, MD;  Location[facility];  Service: Urology    [place] CYSTO/URETERO W/LITHOTRIPSY &INDWELL STENT INSRT Right 3/31/2022    Procedure: CYSTOSCOPY URETEROSCOPY WITH LITHOTRIPSY HOLMIUM LASER,BASKET STONE EXTRACTION, RETROGRADE PYELOGRAM AND INSERTION STENT URETERAL;  Surgeon: Stacy Flores MD;  Location: [facility];  Service: Urology    [place] CYSTO/URETERO W/LITHOTRIPSY &INDWELL STENT INSRT Left 6/22/2022    Procedure: CYSTOSCOPY URETEROSCOPY WITH LITHOTRIPSY HOLMIUM LASER, RETROGRADE PYELOGRAM AND INSERTION STENT URETERAL, STONE BASKET EXTRACTION;  Surgeon: Patricio Gillespie MD;  Location: [facility];  Service: Urology    [place] EXC B9 LESION MRGN XCP SK TG F/E/E/N/L/M > 4.0CM Right 4/28/2016    Procedure: EXCISION  LESION UPPER EYELID, COMPLEX CLOSURE;  Surgeon: Pippa Estrada MD;  Location: [facility];  Service: Plastics    [place] LITHOTRIPSY XTRCORP SHOCK WAVE Right 2019    Procedure: [place] SHOCKWAVE (ESWL); Surgeon: David Naranjo MD;  [facility];  Service: Urology       SOCIAL HISTORY:  Social History     Substance and Sexual Activity   Alcohol Use Yes    Alcohol/week: 3.0 standard drinks of alcohol    Types: 1 Glasses of wine, 2 Cans of beer per week    Comment: weekend     Social History     Substance and Sexual Activity   Drug Use No     Social History     Tobacco Use   Smoking Status Former    Packs/day: 0.75    Years: 18.00    Total pack years: 13.50    Types: Cigarettes    Start date: 5    Quit date:     Years since quittin.8   Smokeless Tobacco Never       FAMILY HISTORY:  Family History   Problem Relation Age of Onset    Other Father         CABG (coronary artery bypass surgery)    Atrial fibrillation Sister     Other Paternal Uncle         Myocardial infarction    Coronary artery disease Family     Diabetes Family     Hypertension Family     Uterine cancer Family        MEDICATIONS:    Current Outpatient Medications:     amLODIPine (NORVASC) 5 mg tablet, Take 1 tablet (5 mg total) by mouth daily, Disp: 90 tablet, Rfl: 1    b complex vitamins capsule, Take 1 capsule by mouth daily. , Disp: , Rfl:     Cholecalciferol (VITAMIN D3) 1000 units CAPS, Take 1 capsule by mouth daily, Disp: , Rfl:     coenzyme Q-10 100 MG capsule, Take 1 capsule by mouth daily, Disp: , Rfl:     cyanocobalamin (VITAMIN B-12) 100 mcg tablet, Take by mouth daily. , Disp: , Rfl:     fenofibrate (TRICOR) 145 mg tablet, TAKE 1 TABLET DAILY, Disp: 90 tablet, Rfl: 1    fexofenadine (ALLEGRA) 180 MG tablet, Take 180 mg by mouth daily in the early morning , Disp: , Rfl:     lisinopril (ZESTRIL) 10 mg tablet, TAKE 1 AND 1/2 TABLETS     DAILY, Disp: 135 tablet, Rfl: 1    LORazepam (ATIVAN) 0.5 mg tablet, TAKE 1 TABLET DAILY AS     NEEDED FOR ANXIETY, Disp: 90 tablet, Rfl: 0    multivitamin [facility]) TABS, Take 1 tablet by mouth daily. , Disp: , Rfl:     omeprazole (PriLOSEC) 20 mg delayed release capsule, Take 20 mg by mouth daily, Disp: , Rfl:     potassium citrate (UROCIT-K) 10 mEq, Take 1 tablet (10 mEq total) by mouth 2 (two) times a day, Disp: 180 tablet, Rfl: 4    sildenafil (VIAGRA) 50 MG tablet, TAKE 1 TABLET DAILY AS     NEEDED FOR ERECTILE        DYSFUNCTION, Disp: 30 tablet, Rfl: 0    SYRINGE-NEEDLE, DISP, 3 ML (BD Eclipse Syringe) 25G X 1" 3 ML MISC, Use once a week, Disp: 50 each, Rfl: 3    testosterone cypionate (DEPO-TESTOSTERONE) 200 mg/mL SOLN, INJECT 0.4ML INTRAMUSCULARLY INTO SHOULDER, THIGH, OR BUTTOCKS ONCE WEEKLY. DISCARD THE REMAINDER., Disp: 4 mL, Rfl: 1    aspirin (ECOTRIN LOW STRENGTH) 81 mg EC tablet, Take 1 tablet (81 mg total) by mouth daily, Disp: 30 tablet, Rfl: 0      PHYSICAL EXAM:  Vitals:    10/18/23 1318   BP: 114/84   BP Location: Left arm   Patient Position: Sitting   Cuff Size: Large   Weight: 94.8 kg (209 lb)   Height: 5' 11" (1.803 m)     Body mass index is 29.15 kg/m². Physical Exam  Constitutional:       General: He is not in acute distress. Appearance: He is well-developed. He is not diaphoretic. HENT:      Head: Normocephalic and atraumatic. Mouth/Throat:      Mouth: Mucous membranes are moist.   Eyes:      General: No scleral icterus. Conjunctiva/sclera: Conjunctivae normal.      Pupils: Pupils are equal, round, and reactive to light. Neck:      Thyroid: No thyromegaly. Cardiovascular:      Rate and Rhythm: Normal rate and regular rhythm. Heart sounds: Normal heart sounds. No murmur heard. No friction rub. Pulmonary:      Effort: Pulmonary effort is normal. No respiratory distress. Breath sounds: Normal breath sounds. No wheezing or rales. Abdominal:      General: Bowel sounds are normal. There is no distension. Palpations: Abdomen is soft. Tenderness: There is no abdominal tenderness.    Musculoskeletal: General: No deformity. Cervical back: Neck supple. Right lower leg: No edema. Left lower leg: No edema. Lymphadenopathy:      Cervical: No cervical adenopathy. Skin:     Coloration: Skin is not pale. Nails: There is no clubbing. Neurological:      Mental Status: He is alert and oriented to person, place, and time. He is not disoriented. Psychiatric:         Mood and Affect: Mood normal. Mood is not anxious. Affect is not inappropriate. Behavior: Behavior normal.         Thought Content:  Thought content normal.         Lab Results:   Results for orders placed or performed in visit on 10/05/23   Phosphorus   Result Value Ref Range    Phosphorus 1.8 (L) 2.3 - 4.1 mg/dL   PSA, Total Screen   Result Value Ref Range    PSA 0.53 0.00 - 4.00 ng/mL   CBC and differential   Result Value Ref Range    WBC 7.01 4.31 - 10.16 Thousand/uL    RBC 6.20 (H) 3.88 - 5.62 Million/uL    Hemoglobin 18.4 (H) 12.0 - 17.0 g/dL    Hematocrit 55.3 (H) 36.5 - 49.3 %    MCV 89 82 - 98 fL    MCH 29.7 26.8 - 34.3 pg    MCHC 33.3 31.4 - 37.4 g/dL    RDW 14.8 11.6 - 15.1 %    MPV 10.5 8.9 - 12.7 fL    Platelets 654 543 - 722 Thousands/uL    nRBC 0 /100 WBCs    Neutrophils Relative 54 43 - 75 %    Immat GRANS % 1 0 - 2 %    Lymphocytes Relative 28 14 - 44 %    Monocytes Relative 13 (H) 4 - 12 %    Eosinophils Relative 3 0 - 6 %    Basophils Relative 1 0 - 1 %    Neutrophils Absolute 3.76 1.85 - 7.62 Thousands/µL    Immature Grans Absolute 0.05 0.00 - 0.20 Thousand/uL    Lymphocytes Absolute 1.96 0.60 - 4.47 Thousands/µL    Monocytes Absolute 0.93 0.17 - 1.22 Thousand/µL    Eosinophils Absolute 0.22 0.00 - 0.61 Thousand/µL    Basophils Absolute 0.09 0.00 - 0.10 Thousands/µL   Iron   Result Value Ref Range    Iron 141 50 - 212 ug/dL   Ferritin   Result Value Ref Range    Ferritin 31 24 - 336 ng/mL   Transferrin   Result Value Ref Range    Transferrin 334 203 - 362 mg/dL   Comprehensive metabolic panel   Result Value Ref Range    Sodium 136 135 - 147 mmol/L    Potassium 4.7 3.5 - 5.3 mmol/L    Chloride 100 96 - 108 mmol/L    CO2 27 21 - 32 mmol/L    ANION GAP 9 mmol/L    BUN 22 5 - 25 mg/dL    Creatinine 1.05 0.60 - 1.30 mg/dL    Glucose, Fasting 94 65 - 99 mg/dL    Calcium 9.9 8.4 - 10.2 mg/dL    AST 24 13 - 39 U/L    ALT 22 7 - 52 U/L    Alkaline Phosphatase 36 34 - 104 U/L    Total Protein 7.8 6.4 - 8.4 g/dL    Albumin 4.5 3.5 - 5.0 g/dL    Total Bilirubin 0.72 0.20 - 1.00 mg/dL    eGFR 76 ml/min/1.73sq m   Hemoglobin A1C   Result Value Ref Range    Hemoglobin A1C 5.6 Normal 4.0-5.6%; PreDiabetic 5.7-6.4%;  Diabetic >=6.5%; Glycemic control for adults with diabetes <7.0% %     mg/dl   Lipid Panel with Direct LDL reflex   Result Value Ref Range    Cholesterol 194 See Comment mg/dL    Triglycerides 238 (H) See Comment mg/dL    HDL, Direct 33 (L) >=40 mg/dL    LDL Calculated 113 (H) 0 - 100 mg/dL       Lab Results:         Invalid input(s): "ALBUMIN"    Laboratory Results:  Lab Results   Component Value Date    WBC 7.01 10/05/2023    HGB 18.4 (H) 10/05/2023    HCT 55.3 (H) 10/05/2023    MCV 89 10/05/2023     10/05/2023     Lab Results   Component Value Date    SODIUM 136 10/05/2023    K 4.7 10/05/2023     10/05/2023    CO2 27 10/05/2023    BUN 22 10/05/2023    CREATININE 1.05 10/05/2023    GLUC 94 03/31/2022    CALCIUM 9.9 10/05/2023     Lab Results   Component Value Date    CALCIUM 9.9 10/05/2023    PHOS 1.8 (L) 10/05/2023     No results found for: "LABPROT"  [unfilled]  Lab Results   Component Value Date    PTH 13.4 (L) 11/01/2022    CALCIUM 9.9 10/05/2023    PHOS 1.8 (L) 10/05/2023     @[facility]@

## 2023-10-19 ENCOUNTER — TELEPHONE (OUTPATIENT)
Dept: NEPHROLOGY | Facility: HOSPITAL | Age: 63
End: 2023-10-19

## 2023-10-19 NOTE — TELEPHONE ENCOUNTER
----- Message from Tracey Sun MD sent at 10/19/2023 12:41 PM EDT -----  Please inform patient that I reviewed 24-hour urine stone risk profile. Total volume is adequate at 2.6 L and urine citrate level improved to 338 mg from previous level of 249 mg. No other abnormalities.   Continue same treatment with oral hydration and potassium citrate as he has been doing

## 2023-10-19 NOTE — RESULT ENCOUNTER NOTE
Please inform patient that I reviewed 24-hour urine stone risk profile. Total volume is adequate at 2.6 L and urine citrate level improved to 338 mg from previous level of 249 mg. No other abnormalities.   Continue same treatment with oral hydration and potassium citrate as he has been doing

## 2023-10-25 DIAGNOSIS — Z13.29 SCREENING FOR HYPOTHYROIDISM: Primary | ICD-10-CM

## 2023-11-14 DIAGNOSIS — I10 ESSENTIAL HYPERTENSION: ICD-10-CM

## 2023-11-14 RX ORDER — LISINOPRIL 10 MG/1
15 TABLET ORAL DAILY
Qty: 135 TABLET | Refills: 0 | Status: SHIPPED | OUTPATIENT
Start: 2023-11-14

## 2023-11-20 ENCOUNTER — APPOINTMENT (OUTPATIENT)
Dept: LAB | Age: 63
End: 2023-11-20
Payer: COMMERCIAL

## 2023-11-20 DIAGNOSIS — Z13.29 SCREENING FOR HYPOTHYROIDISM: ICD-10-CM

## 2023-11-20 LAB — TSH SERPL DL<=0.05 MIU/L-ACNC: 2.21 UIU/ML (ref 0.45–4.5)

## 2023-11-20 PROCEDURE — 84443 ASSAY THYROID STIM HORMONE: CPT

## 2023-11-20 PROCEDURE — 36415 COLL VENOUS BLD VENIPUNCTURE: CPT

## 2023-11-28 ENCOUNTER — TELEPHONE (OUTPATIENT)
Age: 63
End: 2023-11-28

## 2023-11-28 DIAGNOSIS — E29.1 HYPOGONADISM IN MALE: ICD-10-CM

## 2023-11-28 NOTE — TELEPHONE ENCOUNTER
FYI: reschedule physical appt. Transferred to [facility] and she assisted with finding a slot with a resident.

## 2023-11-29 RX ORDER — TESTOSTERONE CYPIONATE 200 MG/ML
INJECTION, SOLUTION INTRAMUSCULAR
Qty: 4 ML | Refills: 0 | Status: SHIPPED | OUTPATIENT
Start: 2023-11-29

## 2023-12-13 DIAGNOSIS — I10 ESSENTIAL HYPERTENSION: ICD-10-CM

## 2023-12-13 RX ORDER — AMLODIPINE BESYLATE 5 MG/1
5 TABLET ORAL DAILY
Qty: 90 TABLET | Refills: 1 | Status: SHIPPED | OUTPATIENT
Start: 2023-12-13

## 2023-12-28 DIAGNOSIS — E29.1 HYPOGONADISM IN MALE: ICD-10-CM

## 2023-12-28 RX ORDER — TESTOSTERONE CYPIONATE 200 MG/ML
INJECTION, SOLUTION INTRAMUSCULAR
Qty: 4 ML | Refills: 0 | Status: SHIPPED | OUTPATIENT
Start: 2023-12-28

## 2024-01-07 DIAGNOSIS — F41.9 ANXIETY: ICD-10-CM

## 2024-01-08 RX ORDER — LORAZEPAM 0.5 MG/1
0.5 TABLET ORAL DAILY PRN
Qty: 90 TABLET | Refills: 0 | Status: SHIPPED | OUTPATIENT
Start: 2024-01-08

## 2024-01-19 DIAGNOSIS — I10 ESSENTIAL HYPERTENSION: ICD-10-CM

## 2024-01-22 RX ORDER — LISINOPRIL 10 MG/1
15 TABLET ORAL DAILY
Qty: 135 TABLET | Refills: 1 | Status: SHIPPED | OUTPATIENT
Start: 2024-01-22

## 2024-02-01 ENCOUNTER — AMB VIDEO VISIT (OUTPATIENT)
Dept: OTHER | Facility: HOSPITAL | Age: 64
End: 2024-02-01

## 2024-02-01 VITALS — RESPIRATION RATE: 16 BRPM | DIASTOLIC BLOOD PRESSURE: 74 MMHG | SYSTOLIC BLOOD PRESSURE: 118 MMHG

## 2024-02-01 DIAGNOSIS — S16.1XXA NECK MUSCLE STRAIN, INITIAL ENCOUNTER: Primary | ICD-10-CM

## 2024-02-01 PROCEDURE — ECARE PR SL URGENT CARE VIRTUAL VISIT: Performed by: NURSE PRACTITIONER

## 2024-02-01 RX ORDER — CYCLOBENZAPRINE HCL 10 MG
10 TABLET ORAL 3 TIMES DAILY PRN
Qty: 15 TABLET | Refills: 0 | Status: SHIPPED | OUTPATIENT
Start: 2024-02-01 | End: 2024-02-06

## 2024-02-01 NOTE — PATIENT INSTRUCTIONS
Will start flexeril.  Warm compresses 3 times per day.  Tylenol or motrin as needed.  Follow up with PCP if no improvement.  Go to ER with any worsening symptoms.     Cervical Strain   WHAT YOU NEED TO KNOW:   A cervical strain is a stretched or torn muscle or tendon in your neck. Tendons are strong tissues that connect muscles to bones.  DISCHARGE INSTRUCTIONS:   Return to the emergency department if:   You have pain or numbness from your shoulder down to your hand.    You have problems with your vision, hearing, or balance.    You feel confused or cannot concentrate.    You have problems with movement and strength.    Call your doctor if:   You have increased swelling or pain in your neck.     You have questions or concerns about your condition or care.    Medicines:  You may need any of the following:  Acetaminophen  decreases pain and fever. It is available without a doctor's order. Ask how much to take and how often to take it. Follow directions. Read the labels of all other medicines you are using to see if they also contain acetaminophen, or ask your doctor or pharmacist. Acetaminophen can cause liver damage if not taken correctly.    NSAIDs , such as ibuprofen, help decrease swelling, pain, and fever. This medicine is available with or without a doctor's order. NSAIDs can cause stomach bleeding or kidney problems in certain people. If you take blood thinner medicine, always ask your healthcare provider if NSAIDs are safe for you. Always read the medicine label and follow directions.    Muscle relaxers  help decrease pain and muscle spasms.    Prescription pain medicine  may be given. Ask your healthcare provider how to take this medicine safely. Some prescription pain medicines contain acetaminophen. Do not take other medicines that contain acetaminophen without talking to your healthcare provider. Too much acetaminophen may cause liver damage. Prescription pain medicine may cause constipation. Ask your  healthcare provider how to prevent or treat constipation.     Take your medicine as directed.  Contact your healthcare provider if you think your medicine is not helping or if you have side effects. Tell your provider if you are allergic to any medicine. Keep a list of the medicines, vitamins, and herbs you take. Include the amounts, and when and why you take them. Bring the list or the pill bottles to follow-up visits. Carry your medicine list with you in case of an emergency.    Manage your symptoms:   Apply heat  on your neck for 15 to 20 minutes, 4 to 6 times a day or as directed. Heat helps decrease pain, stiffness, and muscle spasms.    Begin gentle neck exercises  as soon as you can move your neck without pain. Exercises will help decrease stiffness and improve the strength and movement of your neck. Ask your healthcare provider what kind of exercises you should do.    Gradually return to your usual activities as directed.  Stop if you have pain. Avoid activities that can cause more damage to your neck, such as heavy lifting or strenuous exercise.    Sleep without a pillow  to help decrease pain. Instead, roll a small towel tightly and place it under your neck.     Go to physical therapy as directed.  A physical therapist teaches you exercises to help improve movement and strength, and to decrease pain.    Prevent another neck injury:   Drive safely.  Make sure everyone in your car wears a seatbelt. A seatbelt can save your life if you are in an accident. Do not use your cell phone when you are driving. This could distract you and cause an accident. Pull over if you need to make a call or send a text message.     Wear helmets, lifejackets, and protective gear.  Always wear a helmet when you ride a bike or motorcycle, go skiing, or play sports that could cause a head injury. Wear protective equipment when you play sports. Wear a lifejacket when you are on a boat or doing water sports.    Follow up with your  doctor as directed:  You may be referred to an orthopedist or physical therapies. Write down your questions so you remember to ask them during your visits.   © Copyright [employer] 2023 Information is for End User's use only and may not be sold, redistributed or otherwise used for commercial purposes.  The above information is an  only. It is not intended as medical advice for individual conditions or treatments. Talk to your doctor, nurse or pharmacist before following any medical regimen to see if it is safe and effective for you.

## 2024-02-01 NOTE — CARE ANYWHERE EVISITS
Visit Summary for DAWN DORSEY - Gender: Male - Date of Birth: 1960  Date: [id number] - Duration: 4 minutes  Patient: DAWN DORSEY  Provider: Ryanen BLOOM    Patient Contact Information  Address  [address]  [place]; [place] [address]      Visit Topics    Triage Questions   What is your current physical address in the event of a medical emergency? Answer []  Are you allergic to any medications? Answer []  Are you now or could you be pregnant? Answer []  Do you have any immune system compromise or chronic lung   disease? Answer []  Do you have any vulnerable family members in the home (infant, pregnant, cancer, elderly)? Answer []     Conversation Transcripts  [0A][0A] [Notification] You are connected with Ryanne BLOOM, Urgent Care Specialist.[0A][Notification] DAWN DORSEY is located in [place].[0A][Notification] DAWN DORSEY has shared health history...[0A]    Diagnosis  Strain of muscle, fascia and tendon at neck level, init    Procedures  Value: [id number] Code: CPT-4 UNLISTED E&M SERVICE    Medications Prescribed    No prescriptions ordered    Electronically signed by: Ryanne Lazcano(NPI [id number])

## 2024-02-01 NOTE — PROGRESS NOTES
Required Documentation:  Encounter provider WOLF Galindo    Provider located at [facility]  VIRTUAL CARE   [address] [place] [id number]    Identify all parties in room with patient during virtual visit:  No one else    The patient was identified by name and date of birth. Jack Dewey was informed that this is a telemedicine visit and that the visit is being conducted through the Care Anywhere ngmoco platform. He agrees to proceed..  My office door was closed. No one else was in the room.  He acknowledged consent and understanding of privacy and security of the video platform. The patient has agreed to participate and understands they can discontinue the visit at any time.    Verification of patient location:    Patient is located at home in the following state in which I hold an active license [place]    Patient is aware this is a billable service.     Reason for visit is No chief complaint on file.       Subjective  This is a 63 year old male here today for video visit.  He states he was working under sink and felt some pain in his neck.  Since then he has had stiffness and soreness.  Incident was 5 days ago.   He states he had this about 1 year ago and treated with muscle relaxer which did help.  He denies any numbness or tingling in his arms or leg.  He has ROM but looking down and right causes pain.            Past Medical History:   Diagnosis Date    Anxiety     Cancer (HCC)     skin ca on nose    Chronic kidney disease     kidney stones    GERD (gastroesophageal reflux disease)     Hyperlipidemia     Hypertension     Kidney stone     6/2022       Past Surgical History:   Procedure Laterality Date    COLONOSCOPY      ESOPHAGOGASTRODUODENOSCOPY      EXTRACORPOREAL SHOCK WAVE LITHOTRIPSY Bilateral     Renal Multiple times    FL RETROGRADE PYELOGRAM  3/31/2022    FL RETROGRADE PYELOGRAM  6/22/2022    NE CYSTO/URETERO W/LITHOTRIPSY &INDWELL STENT INSRT  Left 8/6/2020    Procedure: CYSTOSCOPY URETEROSCOPY WITH LITHOTRIPSY HOLMIUM LASER, AND INSERTION STENT URETERAL;  Surgeon: Otis Rutledge MD;  Location: [facility];  Service: Urology    ID CYSTO/URETERO W/LITHOTRIPSY &INDWELL STENT INSRT Right 3/31/2022    Procedure: CYSTOSCOPY URETEROSCOPY WITH LITHOTRIPSY HOLMIUM LASER,BASKET STONE EXTRACTION, RETROGRADE PYELOGRAM AND INSERTION STENT URETERAL;  Surgeon: Jamal Finley MD;  Location[facility];  Service: Urology    ID CYSTO/URETERO W/LITHOTRIPSY &INDWELL STENT INSRT Left 6/22/2022    Procedure: CYSTOSCOPY URETEROSCOPY WITH LITHOTRIPSY HOLMIUM LASER, RETROGRADE PYELOGRAM AND INSERTION STENT URETERAL, STONE BASKET EXTRACTION;  Surgeon: Ed Beck MD;  Location[facility];  Service: Urology    ID EXC B9 LESION MRGN XCP SK TG F/E/E/N/L/M > 4.0CM Right 4/28/2016    Procedure: EXCISION  LESION UPPER EYELID, COMPLEX CLOSURE;  Surgeon: Gianluca Salazar MD;  Location: [facility];  Service: Plastics    ID LITHOTRIPSY XTRCORP SHOCK WAVE Right 4/18/2019    Procedure: LITHROTRIPSY EXTRACORPORAL SHOCKWAVE (ESWL);  Surgeon: Orlando Crouch MD;  [facility];  Service: Urology        Allergies   Allergen Reactions    Ciprofloxacin Hives    Macrobid [Nitrofurantoin] Nausea Only and Palpitations       Review of Systems   Constitutional:  Negative for activity change, chills and fatigue.   Respiratory: Negative.     Cardiovascular: Negative.    Musculoskeletal:  Positive for neck pain.   Neurological:  Negative for weakness and numbness.   Psychiatric/Behavioral: Negative.         Video Exam    Vitals:    02/01/24 1302   BP: 118/74   Resp: 16       Physical Exam  Constitutional:       General: He is not in acute distress.     Appearance: Normal appearance. He is not ill-appearing or toxic-appearing.   HENT:      Head: Normocephalic and atraumatic.   Eyes:      Conjunctiva/sclera: Conjunctivae normal.   Neck:      Comments: No ttp over the left side of neck on  palpation. No pain over the neck.    Decreased rom with looking down and looking to right.   Pulmonary:      Effort: Pulmonary effort is normal. No respiratory distress.   Skin:     Comments: No rash on head or neck.    Neurological:      Mental Status: He is alert and oriented to person, place, and time.   Psychiatric:         Mood and Affect: Mood normal.         Behavior: Behavior normal.         Thought Content: Thought content normal.         Judgment: Judgment normal.         Visit Time  Total Visit Duration: 7 minutes    Assessment/Plan:    Diagnoses and all orders for this visit:    Neck muscle strain, initial encounter  -     cyclobenzaprine (FLEXERIL) 10 mg tablet; Take 1 tablet (10 mg total) by mouth 3 (three) times a day as needed for muscle spasms for up to 5 days  -     Ambulatory Referral to Physical Therapy; Future        Patient Instructions   Will start flexeril.  Warm compresses 3 times per day.  Tylenol or motrin as needed.  Follow up with PCP if no improvement.  Go to ER with any worsening symptoms.     Cervical Strain   WHAT YOU NEED TO KNOW:   A cervical strain is a stretched or torn muscle or tendon in your neck. Tendons are strong tissues that connect muscles to bones.  DISCHARGE INSTRUCTIONS:   Return to the emergency department if:   You have pain or numbness from your shoulder down to your hand.    You have problems with your vision, hearing, or balance.    You feel confused or cannot concentrate.    You have problems with movement and strength.    Call your doctor if:   You have increased swelling or pain in your neck.     You have questions or concerns about your condition or care.    Medicines:  You may need any of the following:  Acetaminophen  decreases pain and fever. It is available without a doctor's order. Ask how much to take and how often to take it. Follow directions. Read the labels of all other medicines you are using to see if they also contain acetaminophen, or ask your  doctor or pharmacist. Acetaminophen can cause liver damage if not taken correctly.    NSAIDs , such as ibuprofen, help decrease swelling, pain, and fever. This medicine is available with or without a doctor's order. NSAIDs can cause stomach bleeding or kidney problems in certain people. If you take blood thinner medicine, always ask your healthcare provider if NSAIDs are safe for you. Always read the medicine label and follow directions.    Muscle relaxers  help decrease pain and muscle spasms.    Prescription pain medicine  may be given. Ask your healthcare provider how to take this medicine safely. Some prescription pain medicines contain acetaminophen. Do not take other medicines that contain acetaminophen without talking to your healthcare provider. Too much acetaminophen may cause liver damage. Prescription pain medicine may cause constipation. Ask your healthcare provider how to prevent or treat constipation.     Take your medicine as directed.  Contact your healthcare provider if you think your medicine is not helping or if you have side effects. Tell your provider if you are allergic to any medicine. Keep a list of the medicines, vitamins, and herbs you take. Include the amounts, and when and why you take them. Bring the list or the pill bottles to follow-up visits. Carry your medicine list with you in case of an emergency.    Manage your symptoms:   Apply heat  on your neck for 15 to 20 minutes, 4 to 6 times a day or as directed. Heat helps decrease pain, stiffness, and muscle spasms.    Begin gentle neck exercises  as soon as you can move your neck without pain. Exercises will help decrease stiffness and improve the strength and movement of your neck. Ask your healthcare provider what kind of exercises you should do.    Gradually return to your usual activities as directed.  Stop if you have pain. Avoid activities that can cause more damage to your neck, such as heavy lifting or strenuous  exercise.    Sleep without a pillow  to help decrease pain. Instead, roll a small towel tightly and place it under your neck.     Go to physical therapy as directed.  A physical therapist teaches you exercises to help improve movement and strength, and to decrease pain.    Prevent another neck injury:   Drive safely.  Make sure everyone in your car wears a seatbelt. A seatbelt can save your life if you are in an accident. Do not use your cell phone when you are driving. This could distract you and cause an accident. Pull over if you need to make a call or send a text message.     Wear helmets, lifejackets, and protective gear.  Always wear a helmet when you ride a bike or motorcycle, go skiing, or play sports that could cause a head injury. Wear protective equipment when you play sports. Wear a lifejacket when you are on a boat or doing water sports.    Follow up with your doctor as directed:  You may be referred to an orthopedist or physical therapies. Write down your questions so you remember to ask them during your visits.   © Copyright [employer] 2023 Information is for End User's use only and may not be sold, redistributed or otherwise used for commercial purposes.  The above information is an  only. It is not intended as medical advice for individual conditions or treatments. Talk to your doctor, nurse or pharmacist before following any medical regimen to see if it is safe and effective for you.     No

## 2024-02-02 DIAGNOSIS — E29.1 HYPOGONADISM IN MALE: ICD-10-CM

## 2024-02-02 RX ORDER — TESTOSTERONE CYPIONATE 200 MG/ML
INJECTION, SOLUTION INTRAMUSCULAR
Qty: 4 ML | Refills: 0 | Status: SHIPPED | OUTPATIENT
Start: 2024-02-02

## 2024-02-10 DIAGNOSIS — E78.1 HYPERTRIGLYCERIDEMIA: ICD-10-CM

## 2024-02-10 RX ORDER — FENOFIBRATE 145 MG/1
TABLET, COATED ORAL
Qty: 90 TABLET | Refills: 1 | Status: SHIPPED | OUTPATIENT
Start: 2024-02-10

## 2024-02-25 ENCOUNTER — AMB VIDEO VISIT (OUTPATIENT)
Dept: OTHER | Facility: HOSPITAL | Age: 64
End: 2024-02-25

## 2024-02-25 VITALS
OXYGEN SATURATION: 98 % | SYSTOLIC BLOOD PRESSURE: 120 MMHG | RESPIRATION RATE: 12 BRPM | DIASTOLIC BLOOD PRESSURE: 78 MMHG | TEMPERATURE: 98.6 F | HEART RATE: 78 BPM

## 2024-02-25 DIAGNOSIS — J20.9 ACUTE BRONCHITIS, UNSPECIFIED ORGANISM: Primary | ICD-10-CM

## 2024-02-25 PROBLEM — D64.9 ANEMIA: Status: RESOLVED | Noted: 2023-01-18 | Resolved: 2024-02-25

## 2024-02-25 PROBLEM — N20.0 NEPHROLITHIASIS: Status: RESOLVED | Noted: 2021-01-13 | Resolved: 2024-02-25

## 2024-02-25 PROBLEM — N20.0 RENAL CALCULUS, BILATERAL: Status: RESOLVED | Noted: 2022-03-31 | Resolved: 2024-02-25

## 2024-02-25 PROBLEM — E29.1 HYPOGONADISM IN MALE: Status: RESOLVED | Noted: 2021-06-30 | Resolved: 2024-02-25

## 2024-02-25 PROBLEM — N20.1 URETERAL CALCULUS, RIGHT: Status: RESOLVED | Noted: 2020-08-05 | Resolved: 2024-02-25

## 2024-02-25 PROBLEM — D72.829 LEUKOCYTOSIS: Status: RESOLVED | Noted: 2020-08-05 | Resolved: 2024-02-25

## 2024-02-25 PROBLEM — Z11.59 ENCOUNTER FOR HEPATITIS C SCREENING TEST FOR LOW RISK PATIENT: Status: RESOLVED | Noted: 2019-10-27 | Resolved: 2024-02-25

## 2024-02-25 PROBLEM — E78.5 HYPERLIPIDEMIA: Status: RESOLVED | Noted: 2021-06-30 | Resolved: 2024-02-25

## 2024-02-25 PROBLEM — N13.30 HYDRONEPHROSIS, RIGHT: Status: RESOLVED | Noted: 2022-03-31 | Resolved: 2024-02-25

## 2024-02-25 PROBLEM — E66.811 CLASS 1 OBESITY DUE TO EXCESS CALORIES WITH SERIOUS COMORBIDITY AND BODY MASS INDEX (BMI) OF 30.0 TO 30.9 IN ADULT: Status: RESOLVED | Noted: 2019-03-19 | Resolved: 2024-02-25

## 2024-02-25 PROBLEM — G44.86 CERVICOGENIC HEADACHE: Status: RESOLVED | Noted: 2022-11-29 | Resolved: 2024-02-25

## 2024-02-25 PROBLEM — S16.1XXA CERVICAL STRAIN: Status: RESOLVED | Noted: 2022-11-29 | Resolved: 2024-02-25

## 2024-02-25 PROBLEM — R73.9 ELEVATED BLOOD SUGAR: Status: RESOLVED | Noted: 2019-03-19 | Resolved: 2024-02-25

## 2024-02-25 PROBLEM — Z00.00 WELLNESS EXAMINATION: Status: RESOLVED | Noted: 2021-06-30 | Resolved: 2024-02-25

## 2024-02-25 PROBLEM — E66.09 CLASS 1 OBESITY DUE TO EXCESS CALORIES WITH SERIOUS COMORBIDITY AND BODY MASS INDEX (BMI) OF 30.0 TO 30.9 IN ADULT: Status: RESOLVED | Noted: 2019-03-19 | Resolved: 2024-02-25

## 2024-02-25 PROCEDURE — ECARE PR SL URGENT CARE VIRTUAL VISIT: Performed by: PHYSICIAN ASSISTANT

## 2024-02-25 RX ORDER — ALBUTEROL SULFATE 90 UG/1
1 AEROSOL, METERED RESPIRATORY (INHALATION) EVERY 4 HOURS PRN
Qty: 6.7 G | Refills: 0 | Status: SHIPPED | OUTPATIENT
Start: 2024-02-25

## 2024-02-25 RX ORDER — BENZONATATE 200 MG/1
200 CAPSULE ORAL 3 TIMES DAILY PRN
Qty: 20 CAPSULE | Refills: 0 | Status: SHIPPED | OUTPATIENT
Start: 2024-02-25

## 2024-02-25 RX ORDER — AZITHROMYCIN 250 MG/1
TABLET, FILM COATED ORAL DAILY
Qty: 6 TABLET | Refills: 0 | Status: SHIPPED | OUTPATIENT
Start: 2024-02-25 | End: 2024-03-01

## 2024-02-25 NOTE — PROGRESS NOTES
Required Documentation:  Encounter provider Shannon D Severino, PA-C    Provider located at [facility]  VIRTUAL CARE   [address] [facility] [address] [address]    Identify all parties in room with patient during virtual visit:  No one else    The patient was identified by name and date of birth. Jack Dewey was informed that this is a telemedicine visit and that the visit is being conducted through the Care Anywhere [employer] platform. He agrees to proceed..  My office door was closed. No one else was in the room.  He acknowledged consent and understanding of privacy and security of the video platform. The patient has agreed to participate and understands they can discontinue the visit at any time.    Verification of patient location:    Patient is located at Home in the following state in which I hold an active license [place]    Patient is aware this is a billable service.     Reason for visit is No chief complaint on file.       Subjective  HPI   Pt reports sinus problem starting about 1 week ago with PND and sore throat that has moved down into his chest about 4 days ago. Hx bronchitis, feels similar. Reports trouble sleeping due to cough, cough is productive of milky green to dark sputum, wheezing, chest congestion. Taking  mucinex, flonase, OTC cough meds. Usually gets zpak, albuterol, cough meds. Denies fevers, CP, SOB.    Past Medical History:   Diagnosis Date    Anxiety     Cancer (HCC)     skin ca on nose    Chronic kidney disease     kidney stones    GERD (gastroesophageal reflux disease)     Hyperlipidemia     Hypertension     Kidney stone     6/2022       Past Surgical History:   Procedure Laterality Date    COLONOSCOPY      ESOPHAGOGASTRODUODENOSCOPY      EXTRACORPOREAL SHOCK WAVE LITHOTRIPSY Bilateral     Renal Multiple times    FL RETROGRADE PYELOGRAM  3/31/2022    FL RETROGRADE PYELOGRAM  6/22/2022    MN CYSTO/URETERO W/LITHOTRIPSY &INDWELL STENT INSRT Left  8/6/2020    Procedure: CYSTOSCOPY URETEROSCOPY WITH LITHOTRIPSY HOLMIUM LASER, AND INSERTION STENT URETERAL;  Surgeon: Otis Rutledge MD;  [facility];  Service: Urology    KS CYSTO/URETERO W/LITHOTRIPSY &INDWELL STENT INSRT Right 3/31/2022    Procedure: CYSTOSCOPY URETEROSCOPY WITH LITHOTRIPSY HOLMIUM LASER,BASKET STONE EXTRACTION, RETROGRADE PYELOGRAM AND INSERTION STENT URETERAL;  Surgeon: Jamal Finley MD;  Location[facility];  Service: Urology    KS CYSTO/URETERO W/LITHOTRIPSY &INDWELL STENT INSRT Left 6/22/2022    Procedure: CYSTOSCOPY URETEROSCOPY WITH LITHOTRIPSY HOLMIUM LASER, RETROGRADE PYELOGRAM AND INSERTION STENT URETERAL, STONE BASKET EXTRACTION;  Surgeon: Ed Beck MD;  Location: [facility];  Service: Urology    KS EXC B9 LESION MRGN XCP SK TG F/E/E/N/L/M > 4.0CM Right 4/28/2016    Procedure: EXCISION  LESION UPPER EYELID, COMPLEX CLOSURE;  Surgeon: Gianluca Salazar MD;  Location: [facility];  Service: Plastics    KS LITHOTRIPSY XTRCORP SHOCK WAVE Right 4/18/2019    Procedure: LITHROTRIPSY EXTRACORPORAL SHOCKWAVE (ESWL);  Surgeon: Orlando Crouch MD;  [facility];  Service: Urology        Allergies   Allergen Reactions    Ciprofloxacin Hives    Macrobid [Nitrofurantoin] Nausea Only and Palpitations       Review of Systems   Constitutional:  Negative for fever.   HENT:  Negative for nosebleeds.    Eyes:  Negative for redness.   Respiratory:  Positive for cough, chest tightness and wheezing. Negative for shortness of breath.    Cardiovascular:  Negative for chest pain.   Gastrointestinal:  Negative for blood in stool.   Genitourinary:  Negative for hematuria.   Musculoskeletal:  Negative for gait problem.   Skin:  Negative for rash.   Neurological:  Negative for seizures.   Psychiatric/Behavioral:  Negative for behavioral problems.        Video Exam    Vitals:    02/25/24 1005   BP: 120/78   Pulse: 78   Resp: 12   Temp: 98.6 °F (37 °C)   SpO2: 98%       Physical  "Exam  Constitutional:       General: He is not in acute distress.     Appearance: Normal appearance. He is not ill-appearing or toxic-appearing.      Comments: pleasant   HENT:      Head: Normocephalic and atraumatic.      Nose: No rhinorrhea.      Mouth/Throat:      Mouth: Mucous membranes are moist.   Eyes:      Conjunctiva/sclera: Conjunctivae normal.   Cardiovascular:      Rate and Rhythm: Normal rate.   Pulmonary:      Effort: Pulmonary effort is normal. No respiratory distress.      Breath sounds: No wheezing (no gross audible wheeze through computer).   Musculoskeletal:      Cervical back: Normal range of motion.   Skin:     Findings: No rash (on face or neck).   Neurological:      Mental Status: He is alert.      Cranial Nerves: No dysarthria or facial asymmetry.   Psychiatric:         Mood and Affect: Mood normal.         Behavior: Behavior normal.         Visit Time  Total Visit Duration: 16 minutes    Assessment/Plan:    Diagnoses and all orders for this visit:    Acute bronchitis, unspecified organism  -     benzonatate (TESSALON) 200 MG capsule; Take 1 capsule (200 mg total) by mouth 3 (three) times a day as needed for cough  -     albuterol (Proventil HFA) 90 mcg/act inhaler; Inhale 1 puff every 4 (four) hours as needed for wheezing  -     azithromycin (Zithromax) 250 mg tablet; Take 2 tablets (500 mg total) by mouth daily for 1 day, THEN 1 tablet (250 mg total) daily for 4 days.        Patient Instructions   Schedule a follow-up appointment with your primary care physician for recheck in 2-3 days. If you cannot see your PCP, you can schedule a follow up appointment at a [facility]. Go to the emergency department if you develop any new or worsening symptoms including shortness of breath, chest pain, oxygen <92, or anything else that is concerning.    Excuses can be found in \"Letters\" section of [employer] akshat. Can print if opened from a web browser  Care Anywhere phone number is [phone] if " you need assistance or have further questions    [phone] DANNY ([phone])  Schedule or Reschedule Outpatient Testing - Option 2  Billing - Option 3  General Info - Option 4  MyChart Help - Option 5  Comprehensive Spine Program - Option 6   COVID - Option 7    Acute Bronchitis   WHAT YOU NEED TO KNOW:   Acute bronchitis is swelling and irritation in your lungs. It is usually caused by a virus and most often happens in the winter. Bronchitis may also be caused by bacteria or by a chemical irritant, such as smoke.  DISCHARGE INSTRUCTIONS:   Return to the emergency department if:   You cough up blood.    Your lips or fingernails turn blue.    You feel like you are not getting enough air when you breathe.    Call your doctor if:   Your symptoms do not go away or get worse, even after treatment.    Your cough does not get better within 4 weeks.    You have questions or concerns about your condition or care.    Medicines:  You may need any of the following:  Cough suppressants  decrease your urge to cough.    Decongestants  help loosen mucus in your lungs and make it easier to cough up. This can help you breathe easier.    Inhalers  may be given. Your healthcare provider may give you one or more inhalers to help you breathe easier and cough less. An inhaler gives you medicine to open your airways. Ask your healthcare provider to show you how to use your inhaler correctly.         Antiviral medicine  treats infections caused by a virus.    Antibiotics  may be given if your bronchitis is caused by bacteria or if you have lung condition.    Acetaminophen  decreases pain and fever. It is available without a doctor's order. Ask how much to take and how often to take it. Follow directions. Read the labels of all other medicines you are using to see if they also contain acetaminophen, or ask your doctor or pharmacist. Acetaminophen can cause liver damage if not taken correctly.    NSAIDs  help decrease swelling and pain or  fever. This medicine is available with or without a doctor's order. NSAIDs can cause stomach bleeding or kidney problems in certain people. If you take blood thinner medicine, always ask your healthcare provider if NSAIDs are safe for you. Always read the medicine label and follow directions.    Self-care:   Drink liquids as directed.  You may need to drink more liquids than usual to stay hydrated. Ask how much liquid to drink each day and which liquids are best for you.    Use a cool mist humidifier.  This increases air moisture in your home. This may make it easier for you to breathe and help decrease your cough.     Get more rest.  Rest helps your body to heal. Slowly start to do more each day. Rest when you feel it is needed.    Prevent acute bronchitis:       Ask about vaccines you may need.  Get a flu vaccine each year as soon as recommended, usually in September or October. Ask your healthcare provider if you should also get a pneumonia or COVID-19 vaccine. Your healthcare provider can tell you if you should also get other vaccines, and when to get them.    Prevent the spread of germs.  You can decrease your risk for acute bronchitis and other illnesses by doing the following:     Wash your hands often with soap and water. Carry germ-killing hand lotion or gel with you. You can use the lotion or gel to clean your hands when soap and water are not available.         Do not touch your eyes, nose, or mouth unless you have washed your hands first.    Always cover your mouth when you cough to prevent the spread of germs. It is best to cough into a tissue or your shirt sleeve instead of into your hand. Ask those around you to cover their mouths when they cough.    Try to avoid people who have a cold or the flu. If you are sick, stay away from others as much as possible.    Avoid irritants in the air.  Avoid chemicals, fumes, and dust. Wear a face mask if you must work around dust or fumes. Stay inside on days when  air pollution levels are high. If you have allergies, stay inside when pollen counts are high. Do not use aerosol products, such as spray-on deodorant, bug spray, and hair spray.    Do not smoke or be around others who are smoking.  Nicotine and other chemicals in cigarettes and cigars can cause lung damage. Ask your healthcare provider for information if you currently smoke and need help to quit. E-cigarettes or smokeless tobacco still contain nicotine. Talk to your healthcare provider before you use these products.  Follow up with your doctor as directed:  Write down questions you have so you will remember to ask them during your follow-up visits.  © Copyright [employer] 2023 Information is for End User's use only and may not be sold, redistributed or otherwise used for commercial purposes.  The above information is an  only. It is not intended as medical advice for individual conditions or treatments. Talk to your doctor, nurse or pharmacist before following any medical regimen to see if it is safe and effective for you.

## 2024-02-25 NOTE — CARE ANYWHERE EVISITS
Visit Summary for DAWN DORSEY - Gender: Male - Date of Birth: 1960  Date: [id number] - Duration: 16 minutes  Patient: DAWN DORSEY  Provider: Shannon Severino PA-C    Patient Contact Information  Address  [address]  [place]; [place] [address]      Visit Topics  Cold [Added By: Self - 2024-02-25]    Triage Questions   What is your current physical address in the event of a medical emergency? Answer []  Are you allergic to any medications? Answer []  Are you now or could you be pregnant? Answer []  Do you have any immune system compromise or chronic lung   disease? Answer []  Do you have any vulnerable family members in the home (infant, pregnant, cancer, elderly)? Answer []     Conversation Transcripts  [0A][0A] [Notification] You are connected with Shannon Severino PA-C, Urgent Care Specialist.[0A][Notification] DAWN DORSEY is located in [place].[0A][Notification] DAWN DORSEY has shared health history...[0A]    Diagnosis  Acute bronchitis    Procedures  Value: [id number] Code: CPT-4 UNLISTED E&M SERVICE    Medications Prescribed    No prescriptions ordered    Electronically signed by: Severino PA-C, Shannon(NPI [id number])

## 2024-02-25 NOTE — PATIENT INSTRUCTIONS
"Schedule a follow-up appointment with your primary care physician for recheck in 2-3 days. If you cannot see your PCP, you can schedule a follow up appointment at a [facility]. Go to the emergency department if you develop any new or worsening symptoms including shortness of breath, chest pain, oxygen <92, or anything else that is concerning.    Excuses can be found in \"Letters\" section of [employer] akshat. Can print if opened from a web browser  Care Anywhere phone number is [phone] if you need assistance or have further questions    [phone] DANNY ([phone])  Schedule or Reschedule Outpatient Testing - Option 2  Billing - Option 3  General Info - Option 4  Deal Co-opt Help - Option 5  Comprehensive Spine Program - Option 6   COVID - Option 7    Acute Bronchitis   WHAT YOU NEED TO KNOW:   Acute bronchitis is swelling and irritation in your lungs. It is usually caused by a virus and most often happens in the winter. Bronchitis may also be caused by bacteria or by a chemical irritant, such as smoke.  DISCHARGE INSTRUCTIONS:   Return to the emergency department if:   You cough up blood.    Your lips or fingernails turn blue.    You feel like you are not getting enough air when you breathe.    Call your doctor if:   Your symptoms do not go away or get worse, even after treatment.    Your cough does not get better within 4 weeks.    You have questions or concerns about your condition or care.    Medicines:  You may need any of the following:  Cough suppressants  decrease your urge to cough.    Decongestants  help loosen mucus in your lungs and make it easier to cough up. This can help you breathe easier.    Inhalers  may be given. Your healthcare provider may give you one or more inhalers to help you breathe easier and cough less. An inhaler gives you medicine to open your airways. Ask your healthcare provider to show you how to use your inhaler correctly.         Antiviral medicine  treats infections caused by a " virus.    Antibiotics  may be given if your bronchitis is caused by bacteria or if you have lung condition.    Acetaminophen  decreases pain and fever. It is available without a doctor's order. Ask how much to take and how often to take it. Follow directions. Read the labels of all other medicines you are using to see if they also contain acetaminophen, or ask your doctor or pharmacist. Acetaminophen can cause liver damage if not taken correctly.    NSAIDs  help decrease swelling and pain or fever. This medicine is available with or without a doctor's order. NSAIDs can cause stomach bleeding or kidney problems in certain people. If you take blood thinner medicine, always ask your healthcare provider if NSAIDs are safe for you. Always read the medicine label and follow directions.    Self-care:   Drink liquids as directed.  You may need to drink more liquids than usual to stay hydrated. Ask how much liquid to drink each day and which liquids are best for you.    Use a cool mist humidifier.  This increases air moisture in your home. This may make it easier for you to breathe and help decrease your cough.     Get more rest.  Rest helps your body to heal. Slowly start to do more each day. Rest when you feel it is needed.    Prevent acute bronchitis:       Ask about vaccines you may need.  Get a flu vaccine each year as soon as recommended, usually in September or October. Ask your healthcare provider if you should also get a pneumonia or COVID-19 vaccine. Your healthcare provider can tell you if you should also get other vaccines, and when to get them.    Prevent the spread of germs.  You can decrease your risk for acute bronchitis and other illnesses by doing the following:     Wash your hands often with soap and water. Carry germ-killing hand lotion or gel with you. You can use the lotion or gel to clean your hands when soap and water are not available.         Do not touch your eyes, nose, or mouth unless you have  washed your hands first.    Always cover your mouth when you cough to prevent the spread of germs. It is best to cough into a tissue or your shirt sleeve instead of into your hand. Ask those around you to cover their mouths when they cough.    Try to avoid people who have a cold or the flu. If you are sick, stay away from others as much as possible.    Avoid irritants in the air.  Avoid chemicals, fumes, and dust. Wear a face mask if you must work around dust or fumes. Stay inside on days when air pollution levels are high. If you have allergies, stay inside when pollen counts are high. Do not use aerosol products, such as spray-on deodorant, bug spray, and hair spray.    Do not smoke or be around others who are smoking.  Nicotine and other chemicals in cigarettes and cigars can cause lung damage. Ask your healthcare provider for information if you currently smoke and need help to quit. E-cigarettes or smokeless tobacco still contain nicotine. Talk to your healthcare provider before you use these products.  Follow up with your doctor as directed:  Write down questions you have so you will remember to ask them during your follow-up visits.  © Copyright [employer] 2023 Information is for End User's use only and may not be sold, redistributed or otherwise used for commercial purposes.  The above information is an  only. It is not intended as medical advice for individual conditions or treatments. Talk to your doctor, nurse or pharmacist before following any medical regimen to see if it is safe and effective for you.

## 2024-02-27 ENCOUNTER — OFFICE VISIT (OUTPATIENT)
Dept: INTERNAL MEDICINE CLINIC | Facility: CLINIC | Age: 64
End: 2024-02-27
Payer: COMMERCIAL

## 2024-02-27 VITALS
DIASTOLIC BLOOD PRESSURE: 86 MMHG | HEART RATE: 82 BPM | OXYGEN SATURATION: 99 % | HEIGHT: 71 IN | SYSTOLIC BLOOD PRESSURE: 146 MMHG | WEIGHT: 212.2 LBS | BODY MASS INDEX: 29.71 KG/M2

## 2024-02-27 DIAGNOSIS — R82.991 HYPOCITRATURIA: ICD-10-CM

## 2024-02-27 DIAGNOSIS — J20.9 ACUTE BRONCHITIS, UNSPECIFIED ORGANISM: Primary | ICD-10-CM

## 2024-02-27 DIAGNOSIS — N20.0 RECURRENT NEPHROLITHIASIS: ICD-10-CM

## 2024-02-27 DIAGNOSIS — J20.9 ACUTE BRONCHITIS, UNSPECIFIED ORGANISM: ICD-10-CM

## 2024-02-27 PROCEDURE — 99213 OFFICE O/P EST LOW 20 MIN: CPT | Performed by: INTERNAL MEDICINE

## 2024-02-27 RX ORDER — POTASSIUM CITRATE 10 MEQ/1
10 TABLET, EXTENDED RELEASE ORAL 2 TIMES DAILY
Qty: 180 TABLET | Refills: 3 | Status: SHIPPED | OUTPATIENT
Start: 2024-02-27

## 2024-02-27 RX ORDER — HYDROCODONE POLISTIREX AND CHLORPHENIRAMINE POLISTIREX 10; 8 MG/5ML; MG/5ML
5 SUSPENSION, EXTENDED RELEASE ORAL EVERY 12 HOURS PRN
Qty: 115 ML | Refills: 0 | Status: SHIPPED | OUTPATIENT
Start: 2024-02-27

## 2024-02-27 RX ORDER — HYDROCODONE POLISTIREX AND CHLORPHENIRAMINE POLISTIREX 10; 8 MG/5ML; MG/5ML
5 SUSPENSION, EXTENDED RELEASE ORAL EVERY 12 HOURS PRN
Qty: 115 ML | Refills: 0 | Status: SHIPPED | OUTPATIENT
Start: 2024-02-27 | End: 2024-02-27 | Stop reason: SDUPTHER

## 2024-02-27 RX ORDER — PREDNISONE 20 MG/1
20 TABLET ORAL 2 TIMES DAILY WITH MEALS
Qty: 10 TABLET | Refills: 0 | Status: SHIPPED | OUTPATIENT
Start: 2024-02-27 | End: 2024-03-03

## 2024-02-27 NOTE — PROGRESS NOTES
Name: Jack Dewey      : 1960      MRN: [id number]  Encounter Provider: Lea Reyes, MD  Encounter Date: 2024   Enc[facility]    Assessment & Plan     1. Acute bronchitis, unspecified organism  -     predniSONE 20 mg tablet; Take 1 tablet (20 mg total) by mouth 2 (two) times a day with meals for 5 days  -     Hydrocod Bruce-Chlorphe Bruce ER (TUSSIONEX) 10-8 mg/5 mL ER suspension; Take 5 mL by mouth every 12 (twelve) hours as needed for cough Max Daily Amount: 10 mL    Finish Zpack and continue albuterol, benzonatate, Mucinex PRN  Call if fever develops or symptoms do not improve       Subjective      > 1 week of a cough, chest tightness, wheezing  He had a televisit 2 days ago and was prescribed albuterol benzonatate and azithromycin without improvement  He is also taking Mucinex and using Flonase  No fever chills body aches  Mild sore throat, nasal congestion  No vomiting diarrhea      Review of Systems   Constitutional:  Negative for fever.   HENT:  Positive for congestion and sore throat.    Respiratory:  Positive for cough, chest tightness and wheezing.    Gastrointestinal:  Negative for diarrhea, nausea and vomiting.       Current Outpatient Medications on File Prior to Visit   Medication Sig   • albuterol (Proventil HFA) 90 mcg/act inhaler Inhale 1 puff every 4 (four) hours as needed for wheezing   • amLODIPine (NORVASC) 5 mg tablet TAKE 1 TABLET DAILY   • azithromycin (Zithromax) 250 mg tablet Take 2 tablets (500 mg total) by mouth daily for 1 day, THEN 1 tablet (250 mg total) daily for 4 days.   • b complex vitamins capsule Take 1 capsule by mouth daily.   • benzonatate (TESSALON) 200 MG capsule Take 1 capsule (200 mg total) by mouth 3 (three) times a day as needed for cough   • Cholecalciferol (VITAMIN D3) 1000 units CAPS Take 1 capsule by mouth daily   • coenzyme Q-10 100 MG capsule Take 1 capsule by mouth daily   • cyanocobalamin (VITAMIN B-12) 100  "mcg tablet Take by mouth daily.   • fenofibrate (TRICOR) 145 mg tablet TAKE 1 TABLET DAILY   • fexofenadine (ALLEGRA) 180 MG tablet Take 180 mg by mouth daily in the early morning    • lisinopril (ZESTRIL) 10 mg tablet Take 1.5 tablets (15 mg total) by mouth daily   • LORazepam (ATIVAN) 0.5 mg tablet Take 1 tablet (0.5 mg total) by mouth daily as needed for anxiety   • multivitamin (THERAGRAN) TABS Take 1 tablet by mouth daily.   • omeprazole (PriLOSEC) 20 mg delayed release capsule Take 20 mg by mouth daily   • potassium citrate (UROCIT-K) 10 mEq Take 1 tablet (10 mEq total) by mouth 2 (two) times a day   • sildenafil (VIAGRA) 50 MG tablet TAKE 1 TABLET DAILY AS     NEEDED FOR ERECTILE        DYSFUNCTION   • SYRINGE-NEEDLE, DISP, 3 ML (BD Eclipse Syringe) 25G X 1\" 3 ML MISC Use once a week   • testosterone cypionate (DEPO-TESTOSTERONE) 200 mg/mL SOLN Inject 0.4 mL intramuscularly into the shoulder, thigh, or buttocks once weekly discard the remainder       Objective     /86 (BP Location: Left arm, Patient Position: Sitting, Cuff Size: Standard)   Pulse 82   Ht 5' 11\" (1.803 m)   Wt 96.3 kg (212 lb 3.2 oz)   SpO2 99%   BMI 29.60 kg/m²     Physical Exam  Constitutional:       Appearance: He is well-developed. He is ill-appearing.   HENT:      Right Ear: Tympanic membrane and ear canal normal.      Left Ear: Tympanic membrane and ear canal normal.      Mouth/Throat:      Pharynx: No posterior oropharyngeal erythema.   Eyes:      Conjunctiva/sclera: Conjunctivae normal.   Cardiovascular:      Rate and Rhythm: Normal rate and regular rhythm.      Heart sounds: Normal heart sounds. No murmur heard.  Pulmonary:      Effort: Pulmonary effort is normal. No respiratory distress.      Breath sounds: Wheezing present. No rales.   Musculoskeletal:      Cervical back: Neck supple.   Neurological:      Mental Status: He is alert and oriented to person, place, and time.   Psychiatric:         Mood and Affect: Mood " normal.         Behavior: Behavior normal.         Thought Content: Thought content normal.         Judgment: Judgment normal.       Lea Reyes, MD

## 2024-03-21 ENCOUNTER — TELEPHONE (OUTPATIENT)
Dept: UROLOGY | Facility: CLINIC | Age: 64
End: 2024-03-21

## 2024-03-22 ENCOUNTER — APPOINTMENT (OUTPATIENT)
Dept: RADIOLOGY | Age: 64
End: 2024-03-22
Payer: COMMERCIAL

## 2024-03-22 DIAGNOSIS — N20.0 NEPHROLITHIASIS: ICD-10-CM

## 2024-03-22 PROCEDURE — 74018 RADEX ABDOMEN 1 VIEW: CPT

## 2024-04-03 ENCOUNTER — OFFICE VISIT (OUTPATIENT)
Dept: UROLOGY | Facility: CLINIC | Age: 64
End: 2024-04-03
Payer: COMMERCIAL

## 2024-04-03 VITALS
WEIGHT: 217 LBS | DIASTOLIC BLOOD PRESSURE: 78 MMHG | SYSTOLIC BLOOD PRESSURE: 132 MMHG | HEIGHT: 71 IN | HEART RATE: 86 BPM | BODY MASS INDEX: 30.38 KG/M2 | OXYGEN SATURATION: 98 %

## 2024-04-03 DIAGNOSIS — N20.0 KIDNEY STONES: Primary | ICD-10-CM

## 2024-04-03 PROCEDURE — 99214 OFFICE O/P EST MOD 30 MIN: CPT | Performed by: UROLOGY

## 2024-04-03 PROCEDURE — 87086 URINE CULTURE/COLONY COUNT: CPT | Performed by: UROLOGY

## 2024-04-03 NOTE — PROGRESS NOTES
Referring Physician: Guillermo Eller DO  A copy of this note was sent to the referring physician.       Diagnoses and all orders for this visit:    Kidney stones  -     Urine culture  -     Case request operating room: CYSTOSCOPY URETEROSCOPY WITH LITHOTRIPSY HOLMIUM LASER, RETROGRADE PYELOGRAM AND INSERTION STENT URETERAL; Standing  -     Case request operating room: CYSTOSCOPY URETEROSCOPY WITH LITHOTRIPSY HOLMIUM LASER, RETROGRADE PYELOGRAM AND INSERTION STENT URETERAL    Other orders  -     Place sequential compression device; Standing  -     ceFAZolin (ANCEF) 2,000 mg in sodium chloride 0.9 % 50 mL IVPB            Assessment and plan:       1. Extensive Nephrolithiasis  -status post multiple endoscopic interventions most recently left ureteroscopy performed by Dr. Beck June 2022  -patient has us not undergone a formal metabolic workup  - prior stone burden:  5 mm right lower pole calculi, left interpolar calculi  - on metabolic therapy with nephrology  -Urine stone burden has worsened, now with enlarging left upper pole stones up to 1.8 cm on personal review of KUB    Discussed options for management of the patient's ureteral stone.  We discussed surgical options including ureteroscopy and shock wave lithotripsy.  In addition, we discussed conservative management with medical expulsive therapy.  The patient has elected to undergo ureteroscopy.  I discussed with the patients risks and benefits and alternatives to ureteroscopy with laser lithotripsy.  The risks include bleeding, infection, injury to the urethra, bladder, ureter or kidney, risk of a staged procedure, risk of stricture, risk of residual fragments, risk of loss of kidney, risks of anesthesia including DVT, PE, MI and death.  The patient understands that a ureteral stent will likely be left in place at the time of the procedure.  We reviewed the expected postoperative care.     .I did present the option of performing bilateral ureteroscopy.  We  discussed the risk benefits and long glitter recovery time and high risk of complications with bilateral procedure.  As such she has elected for left ureteroscopy which is certainly very reasonable.    The patient understands these risks and has provided informed consent for  LEFT ureteroscopy.          Christopher Nicole      Chief Complaint     Follow-up nephrolithiasis      History of Present Illness     Jack Dewey is a 63 y.o. male followed for nephrolithiasis.        Detailed Urologic History     - please refer to HPI    Review of Systems     Review of Systems   Constitutional:  Negative for activity change and fatigue.   HENT:  Negative for congestion.    Eyes:  Negative for visual disturbance.   Respiratory:  Negative for shortness of breath and wheezing.    Cardiovascular:  Negative for chest pain and leg swelling.   Gastrointestinal:  Negative for abdominal pain.   Endocrine: Positive for polyuria.   Genitourinary:  Positive for dysuria. Negative for flank pain, hematuria and urgency.   Musculoskeletal:  Negative for back pain.   Allergic/Immunologic: Negative for immunocompromised state.   Neurological:  Negative for dizziness and numbness.   Psychiatric/Behavioral:  Negative for dysphoric mood.    All other systems reviewed and are negative.            Allergies     Allergies   Allergen Reactions    Ciprofloxacin Hives    Macrobid [Nitrofurantoin] Nausea Only and Palpitations       Physical Exam     Physical Exam  Constitutional:       General: He is not in acute distress.     Appearance: He is well-developed.   HENT:      Head: Normocephalic and atraumatic.   Cardiovascular:      Comments: The lower extremity edema  Pulmonary:      Effort: Pulmonary effort is normal.      Breath sounds: Normal breath sounds.   Abdominal:      Palpations: Abdomen is soft.   Genitourinary:     Comments: Negative CVA tenderness  Musculoskeletal:         General: Normal range of motion.      Cervical back: Normal range  "of motion.   Skin:     General: Skin is warm.   Neurological:      Mental Status: He is alert and oriented to person, place, and time.   Psychiatric:         Behavior: Behavior normal.             Vital Signs  Vitals:    04/03/24 1524   BP: 132/78   BP Location: Left arm   Patient Position: Sitting   Cuff Size: Adult   Pulse: 86   SpO2: 98%   Weight: 98.4 kg (217 lb)   Height: 5' 11\" (1.803 m)         Current Medications       Current Outpatient Medications:     amLODIPine (NORVASC) 5 mg tablet, TAKE 1 TABLET DAILY, Disp: 90 tablet, Rfl: 1    b complex vitamins capsule, Take 1 capsule by mouth daily., Disp: , Rfl:     Cholecalciferol (VITAMIN D3) 1000 units CAPS, Take 1 capsule by mouth daily, Disp: , Rfl:     coenzyme Q-10 100 MG capsule, Take 1 capsule by mouth daily, Disp: , Rfl:     cyanocobalamin (VITAMIN B-12) 100 mcg tablet, Take by mouth daily., Disp: , Rfl:     fenofibrate (TRICOR) 145 mg tablet, TAKE 1 TABLET DAILY, Disp: 90 tablet, Rfl: 1    fexofenadine (ALLEGRA) 180 MG tablet, Take 180 mg by mouth daily in the early morning , Disp: , Rfl:     lisinopril (ZESTRIL) 10 mg tablet, Take 1.5 tablets (15 mg total) by mouth daily, Disp: 135 tablet, Rfl: 1    LORazepam (ATIVAN) 0.5 mg tablet, Take 1 tablet (0.5 mg total) by mouth daily as needed for anxiety, Disp: 90 tablet, Rfl: 0    multivitamin (THERAGRAN) TABS, Take 1 tablet by mouth daily., Disp: , Rfl:     omeprazole (PriLOSEC) 20 mg delayed release capsule, Take 20 mg by mouth daily, Disp: , Rfl:     potassium citrate (UROCIT-K) 10 mEq, Take 1 tablet (10 mEq total) by mouth 2 (two) times a day, Disp: 180 tablet, Rfl: 3    sildenafil (VIAGRA) 50 MG tablet, TAKE 1 TABLET DAILY AS     NEEDED FOR ERECTILE        DYSFUNCTION, Disp: 30 tablet, Rfl: 0    SYRINGE-NEEDLE, DISP, 3 ML (BD Eclipse Syringe) 25G X 1\" 3 ML MISC, Use once a week, Disp: 50 each, Rfl: 3    testosterone cypionate (DEPO-TESTOSTERONE) 200 mg/mL SOLN, Inject 0.4 mL intramuscularly into the " shoulder, thigh, or buttocks once weekly discard the remainder, Disp: 4 mL, Rfl: 0      Active Problems     Patient Active Problem List   Diagnosis    Essential hypertension    GERD (gastroesophageal reflux disease)    Overweight    Thyroid nodule    Testicular hypogonadism    Elevated hematocrit    Fatty liver    Low serum parathyroid hormone (PTH)    Dyslipidemia    CKD (chronic kidney disease)    Recurrent nephrolithiasis    Hypocitraturia    Hypophosphatemia         Past Medical History     Past Medical History:   Diagnosis Date    Anxiety     Cancer (HCC)     skin ca on nose    Chronic kidney disease     kidney stones    GERD (gastroesophageal reflux disease)     Hyperlipidemia     Hypertension     Kidney stone     6/2022         Surgical History     Past Surgical History:   Procedure Laterality Date    COLONOSCOPY      ESOPHAGOGASTRODUODENOSCOPY      EXTRACORPOREAL SHOCK WAVE LITHOTRIPSY Bilateral     Renal Multiple times    FL RETROGRADE PYELOGRAM  3/31/2022    FL RETROGRADE PYELOGRAM  6/22/2022    OH CYSTO/URETERO W/LITHOTRIPSY &INDWELL STENT INSRT Left 8/6/2020    Procedure: CYSTOSCOPY URETEROSCOPY WITH LITHOTRIPSY HOLMIUM LASER, AND INSERTION STENT URETERAL;  Surgeon: Otis Rutledge MD;  [facility];  Service: Urology    OH CYSTO/URETERO W/LITHOTRIPSY &INDWELL STENT INSRT Right 3/31/2022    Procedure: CYSTOSCOPY URETEROSCOPY WITH LITHOTRIPSY HOLMIUM LASER,BASKET STONE EXTRACTION, RETROGRADE PYELOGRAM AND INSERTION STENT URETERAL;  Surgeon: Jamal Finley MD;  [facility];  Service: Urology    OH CYSTO/URETERO W/LITHOTRIPSY &INDWELL STENT INSRT Left 6/22/2022    Procedure: CYSTOSCOPY URETEROSCOPY WITH LITHOTRIPSY HOLMIUM LASER, RETROGRADE PYELOGRAM AND INSERTION STENT URETERAL, STONE BASKET EXTRACTION;  Surgeon: Ed Beck MD;  [facility];  Service: Urology    OH EXC B9 LESION MRGN XCP SK TG F/E/E/N/L/M > 4.0CM Right 4/28/2016    Procedure: EXCISION  LESION UPPER EYELID,  "COMPLEX CLOSURE;  Surgeon: Gianluca Salazar MD;  [facility];  Service: Plastics    NE LITHOTRIPSY XTRCORP SHOCK WAVE Right 2019    Procedure: LITHROTRIPSY EXTRACORPORAL SHOCKWAVE (ESWL);  Surgeon: Orlando Crouch MD;  [facility];  Service: Urology         Family History     Family History   Problem Relation Age of Onset    Other Father         CABG (coronary artery bypass surgery)    Atrial fibrillation Sister     Other Paternal Uncle         Myocardial infarction    Coronary artery disease Family     Diabetes Family     Hypertension Family     Uterine cancer Family          Social History     Social History     Social History     Tobacco Use   Smoking Status Former    Current packs/day: 0.00    Average packs/day: 0.8 packs/day for 18.0 years (13.5 ttl pk-yrs)    Types: Cigarettes    Start date:     Quit date:     Years since quittin.2   Smokeless Tobacco Never         Pertinent Lab Values     Lab Results   Component Value Date    CREATININE 1.05 10/05/2023       Lab Results   Component Value Date    PSA 0.53 10/05/2023    PSA 0.9 2022    PSA 0.9 2021       @RESULTRCNT(1H])@      Pertinent Imaging      - n/a    Portions of the record may have been created with voice recognition software.  Occasional wrong word or \"sound a like\" substitutions may have occurred due to the inherent limitations of voice recognition software.  Read the chart carefully and recognize, using context, where substitutions have occurred.  "

## 2024-04-04 LAB — BACTERIA UR CULT: NORMAL

## 2024-04-12 ENCOUNTER — OFFICE VISIT (OUTPATIENT)
Dept: INTERNAL MEDICINE CLINIC | Facility: CLINIC | Age: 64
End: 2024-04-12
Payer: COMMERCIAL

## 2024-04-12 VITALS
RESPIRATION RATE: 16 BRPM | DIASTOLIC BLOOD PRESSURE: 82 MMHG | HEIGHT: 71 IN | OXYGEN SATURATION: 95 % | HEART RATE: 78 BPM | WEIGHT: 212.6 LBS | SYSTOLIC BLOOD PRESSURE: 122 MMHG | BODY MASS INDEX: 29.76 KG/M2

## 2024-04-12 DIAGNOSIS — I10 ESSENTIAL HYPERTENSION: ICD-10-CM

## 2024-04-12 DIAGNOSIS — E29.1 TESTICULAR HYPOGONADISM: ICD-10-CM

## 2024-04-12 DIAGNOSIS — K63.5 POLYP OF COLON, UNSPECIFIED PART OF COLON, UNSPECIFIED TYPE: ICD-10-CM

## 2024-04-12 DIAGNOSIS — E78.5 DYSLIPIDEMIA: Primary | ICD-10-CM

## 2024-04-12 DIAGNOSIS — N20.0 RECURRENT NEPHROLITHIASIS: ICD-10-CM

## 2024-04-12 DIAGNOSIS — Z00.00 WELLNESS EXAMINATION: ICD-10-CM

## 2024-04-12 DIAGNOSIS — E66.3 OVERWEIGHT: ICD-10-CM

## 2024-04-12 DIAGNOSIS — Z13.1 SCREENING FOR DIABETES MELLITUS: ICD-10-CM

## 2024-04-12 DIAGNOSIS — Z12.5 SCREENING PSA (PROSTATE SPECIFIC ANTIGEN): ICD-10-CM

## 2024-04-12 PROCEDURE — 99396 PREV VISIT EST AGE 40-64: CPT | Performed by: INTERNAL MEDICINE

## 2024-04-12 PROCEDURE — 99213 OFFICE O/P EST LOW 20 MIN: CPT | Performed by: INTERNAL MEDICINE

## 2024-04-12 NOTE — ASSESSMENT & PLAN NOTE
Clinically stable and doing well continue the current medical regiment will continue monitor.  Continue Depo testosterone 200 mg/mL inject 0.4 IM once weekly will check PSA

## 2024-04-12 NOTE — PROGRESS NOTES
ADULT ANNUAL PHYSICAL  [facility] [address] - [facility]    NAME: Jack Dewey  AGE: 63 y.o. SEX: male  : 1960     DATE: 2024     Assessment and Plan:     Problem List Items Addressed This Visit        Cardiovascular and Mediastinum    Essential hypertension     Hypertension - controlled, I have counseled patient following healthy balance diet, I would like the patient reduce sodium, exercise routinely, I would like the patient continued the med current medical regiment and we will continue to monitor.  Blood pressure today 122/82            Endocrine    Testicular hypogonadism     Clinically stable and doing well continue the current medical regiment will continue monitor.  Continue Depo testosterone 200 mg/mL inject 0.4 IM once weekly will check PSA            Genitourinary    Recurrent nephrolithiasis     Working with urology reviewed KUB showing a 1.5 cm kidney stone he will be undergoing cystoscopy with lithotripsy in the near future drinking adequate amounts of fluid the patient is currently on a low oxalate diet he will consider a possible dietitian consult next visit to work on hyperlipidemia, prediabetes and low oxalate diet he would like to consider it for              Other    Overweight     I have counselled the pt to follow a healthy and balanced diet ,and recommend routine exercise.         Dyslipidemia - Primary     Hyperlipidemia controlled continue with current medical regiment recommend a low-cholesterol diet and recommend routine exercise we will continue to monitor the progress.  Patient would prefer to hold off on a statin he will continue with Tricor 145 mg once daily we did review the laboratories from previous we also reviewed his coronary calcium score and cardiac CTA that were negative.  He will continue to optimize his diet following a low-cholesterol diet         Relevant Orders    Comprehensive metabolic panel    Lipid Panel with  Direct LDL reflex    Wellness examination     Assessment and plan 1.  Health maintenance annual wellness examination overall the patient is clinically stable and doing well, we encouraged the patient to follow a healthy and balanced diet.  We recommend that the patient exercise routinely approximately 30 minutes 5 times per week .  We have reviewed the patient's vaccines and have made recommendations for updates if necessary annual flu shot, I did  patient about the shingles vaccine he would like to hold off at this point but may consider it in the future.  He would like to hold off on COVID-vaccine he is due for his routine colonoscopy he has had a polyp in the past I have given him an order to see GI Dr. Mohr, I have ordered the routine PSA    .  We will be ordering screening laboratories which are age appropriate.  Return to the office in   6 months   call if any problems.        Other Visit Diagnoses     Screening for diabetes mellitus        Relevant Orders    Hemoglobin A1C    Screening PSA (prostate specific antigen)        Relevant Orders    PSA, Total Screen    Polyp of colon, unspecified part of colon, unspecified type        Relevant Orders    Ambulatory Referral to Gastroenterology        RTO in 6 months call if any problems    Immunizations and preventive care screenings were discussed with patient today. Appropriate education was printed on patient's after visit summary.        Counseling:  Exercise: the importance of regular exercise/physical activity was discussed. Recommend exercise 3-5 times per week for at least 30 minutes.       Depression Screening and Follow-up Plan: Patient was screened for depression during today's encounter. They screened negative with a PHQ-2 score of 0.        Return in about 6 months (around 10/12/2024).     Chief Complaint:     Chief Complaint   Patient presents with   • Annual Exam      History of Present Illness:     Adult Annual Physical   Patient here for a  comprehensive physical exam.  63-year old male coming in for a follow up visit regarding hyperlipidemia, colon polyp, hypertension, overweight, testicular hypogonadism, recurrent nephrolithiasis; the patient reports me compliant taking medications without untoward side effects the.  The patient is here to review his medical condition, update me on the medical condition and the patient reports me no hospitalizations and no ER visits.  Patient does report to me found to have bilateral kidney stones the patient be undergoing cystoscopy and lithotripsy in the near future via urology he is drinking increased amounts of water.  The patient does report to me some difficulty with his diet because of the different diets that he needs to be on for the kidney stone, low cholesterol and prediabetes he may consider seeing a dietitian in the future for now he will continue to optimize.  He reports me when he is exercising really good he is able to follow a low-carb diet which is very helpful with his weight but sometimes the diet is off he continues to optimize and work on improving his diet.  Overall he is feeling well no chest pain no shortness of breath we did review his coronary calcium score along with his cardiac CTA that does not show atherosclerosis he reports to me he was recently cut down large trees without any symptoms no chest pain no shortness of breath and reports me feeling well.  No injuries no illnesses    Diet and Physical Activity  Diet/Nutrition: well balanced diet.   Exercise: moderate cardiovascular exercise. 30 min card and weight train      Depression Screening  PHQ-2/9 Depression Screening    Little interest or pleasure in doing things: 0 - not at all  Feeling down, depressed, or hopeless: 0 - not at all  PHQ-2 Score: 0  PHQ-2 Interpretation: Negative depression screen       General Health  Sleep: gets more than 8 hours of sleep on average.   Hearing: normal - bilateral.  Vision: goes for regular eye  exams.   Dental: regular dental visits.        Health  Symptoms include: none    Advanced Care Planning  Do you have an advanced directive? no  Do you have a durable medical power of ? no  ACP document given to patient? no     Review of Systems:     Review of Systems   Constitutional:  Negative for activity change, appetite change and unexpected weight change.   HENT:  Negative for congestion and postnasal drip.    Eyes:  Negative for visual disturbance.   Respiratory:  Negative for cough and shortness of breath.    Cardiovascular:  Negative for chest pain.   Gastrointestinal:  Negative for abdominal pain, diarrhea, nausea and vomiting.   Neurological:  Negative for dizziness, light-headedness and headaches.   Hematological:  Negative for adenopathy.      Past Medical History:     Past Medical History:   Diagnosis Date   • Anxiety    • Cancer (HCC)     skin ca on nose   • Chronic kidney disease     kidney stones   • GERD (gastroesophageal reflux disease)    • Hyperlipidemia    • Hypertension    • Kidney stone     6/2022      Past Surgical History:     Past Surgical History:   Procedure Laterality Date   • COLONOSCOPY     • ESOPHAGOGASTRODUODENOSCOPY     • EXTRACORPOREAL SHOCK WAVE LITHOTRIPSY Bilateral     Renal Multiple times   • FL RETROGRADE PYELOGRAM  3/31/2022   • FL RETROGRADE PYELOGRAM  6/22/2022   • ND CYSTO/URETERO W/LITHOTRIPSY &INDWELL STENT INSRT Left 8/6/2020    Procedure: CYSTOSCOPY URETEROSCOPY WITH LITHOTRIPSY HOLMIUM LASER, AND INSERTION STENT URETERAL;  Surgeon: Otis Rutledge MD;  [facility];  Service: Urology   • ND CYSTO/URETERO W/LITHOTRIPSY &INDWELL STENT INSRT Right 3/31/2022    Procedure: CYSTOSCOPY URETEROSCOPY WITH LITHOTRIPSY HOLMIUM LASER,BASKET STONE EXTRACTION, RETROGRADE PYELOGRAM AND INSERTION STENT URETERAL;  Surgeon: Jamal Finley MD;  [facility];  Service: Urology   • ND CYSTO/URETERO W/LITHOTRIPSY &INDWELL STENT INSRT Left 6/22/2022    Procedure:  CYSTOSCOPY URETEROSCOPY WITH LITHOTRIPSY HOLMIUM LASER, RETROGRADE PYELOGRAM AND INSERTION STENT URETERAL, STONE BASKET EXTRACTION;  Surgeon: Ed Beck MD;  [facility];  Service: Urology   • NH EXC B9 LESION MRGN XCP SK TG F/E/E/N/L/M > 4.0CM Right 2016    Procedure: EXCISION  LESION UPPER EYELID, COMPLEX CLOSURE;  Surgeon: Gianluca Salazar MD;  [facility];  Service: Plastics   • NH LITHOTRIPSY XTRCORP SHOCK WAVE Right 2019    Procedure: LITHROTRIPSY EXTRACORPORAL SHOCKWAVE (ESWL);  Surgeon: Orlando Crouch MD;  [facility];  Service: Urology      Family History:     Family History   Problem Relation Age of Onset   • Other Father         CABG (coronary artery bypass surgery)   • Atrial fibrillation Sister    • Other Paternal Uncle         Myocardial infarction   • Coronary artery disease Family    • Diabetes Family    • Hypertension Family    • Uterine cancer Family       Social History:     Social History     Socioeconomic History   • Marital status: /Civil Union     Spouse name: None   • Number of children: None   • Years of education: None   • Highest education level: None   Occupational History   • None   Tobacco Use   • Smoking status: Former     Current packs/day: 0.00     Average packs/day: 0.8 packs/day for 18.0 years (13.5 ttl pk-yrs)     Types: Cigarettes     Start date:      Quit date:      Years since quittin.3   • Smokeless tobacco: Never   Vaping Use   • Vaping status: Never Used   Substance and Sexual Activity   • Alcohol use: Yes     Alcohol/week: 3.0 standard drinks of alcohol     Types: 1 Glasses of wine, 2 Cans of beer per week     Comment: weekend   • Drug use: No   • Sexual activity: Yes     Partners: Female   Other Topics Concern   • None   Social History Narrative    Daily caffeine consumption     Social Determinants of Health     Financial Resource Strain: Not on file   Food Insecurity: Not on file   Transportation Needs: Not on  "file   Physical Activity: Not on file   Stress: Not on file   Social Connections: Not on file   Intimate Partner Violence: Not on file   Housing Stability: Not on file      Current Medications:     Current Outpatient Medications   Medication Sig Dispense Refill   • amLODIPine (NORVASC) 5 mg tablet TAKE 1 TABLET DAILY 90 tablet 1   • b complex vitamins capsule Take 1 capsule by mouth daily.     • Cholecalciferol (VITAMIN D3) 1000 units CAPS Take 1 capsule by mouth daily     • coenzyme Q-10 100 MG capsule Take 1 capsule by mouth daily     • cyanocobalamin (VITAMIN B-12) 100 mcg tablet Take by mouth daily.     • fenofibrate (TRICOR) 145 mg tablet TAKE 1 TABLET DAILY 90 tablet 1   • fexofenadine (ALLEGRA) 180 MG tablet Take 180 mg by mouth daily in the early morning      • lisinopril (ZESTRIL) 10 mg tablet Take 1.5 tablets (15 mg total) by mouth daily 135 tablet 1   • LORazepam (ATIVAN) 0.5 mg tablet Take 1 tablet (0.5 mg total) by mouth daily as needed for anxiety 90 tablet 0   • multivitamin (THERAGRAN) TABS Take 1 tablet by mouth daily.     • omeprazole (PriLOSEC) 20 mg delayed release capsule Take 20 mg by mouth daily     • potassium citrate (UROCIT-K) 10 mEq Take 1 tablet (10 mEq total) by mouth 2 (two) times a day 180 tablet 3   • sildenafil (VIAGRA) 50 MG tablet TAKE 1 TABLET DAILY AS     NEEDED FOR ERECTILE        DYSFUNCTION 30 tablet 0   • SYRINGE-NEEDLE, DISP, 3 ML (BD Eclipse Syringe) 25G X 1\" 3 ML MISC Use once a week 50 each 3   • testosterone cypionate (DEPO-TESTOSTERONE) 200 mg/mL SOLN Inject 0.4 mL intramuscularly into the shoulder, thigh, or buttocks once weekly discard the remainder 4 mL 0     No current facility-administered medications for this visit.      Allergies:     Allergies   Allergen Reactions   • Ciprofloxacin Hives   • Macrobid [Nitrofurantoin] Nausea Only and Palpitations      Physical Exam:     /82   Pulse 78   Resp 16   Ht 5' 11\" (1.803 m)   Wt 96.4 kg (212 lb 9.6 oz)   SpO2 " 95%   BMI 29.65 kg/m²     Physical Exam  Vitals and nursing note reviewed.   Constitutional:       General: He is not in acute distress.     Appearance: Normal appearance. He is well-developed. He is not ill-appearing, toxic-appearing or diaphoretic.   HENT:      Head: Normocephalic and atraumatic.      Right Ear: External ear normal.      Left Ear: External ear normal.      Nose: Nose normal.   Eyes:      Pupils: Pupils are equal, round, and reactive to light.   Cardiovascular:      Rate and Rhythm: Normal rate and regular rhythm.      Heart sounds: Normal heart sounds. No murmur heard.  Pulmonary:      Effort: Pulmonary effort is normal.      Breath sounds: Normal breath sounds.   Abdominal:      General: There is no distension.      Palpations: Abdomen is soft.      Tenderness: There is no abdominal tenderness. There is no guarding.   Neurological:      Mental Status: He is alert.          Guillermo Eller DO  [facility]

## 2024-04-12 NOTE — ASSESSMENT & PLAN NOTE
Hyperlipidemia controlled continue with current medical regiment recommend a low-cholesterol diet and recommend routine exercise we will continue to monitor the progress.  Patient would prefer to hold off on a statin he will continue with Tricor 145 mg once daily we did review the laboratories from previous we also reviewed his coronary calcium score and cardiac CTA that were negative.  He will continue to optimize his diet following a low-cholesterol diet

## 2024-04-12 NOTE — ASSESSMENT & PLAN NOTE
Kate is a 80 year old  female who presents for Medicare Limited exam.     Do you have a Health Care Directive?: Yes: Patient states has Advance Directive and will bring in a copy to clinic.    Fall risk:   Fallen 2 or more times in the past year?: No  Any fall with injury in the past year?: No    HPI :  Has T-2.8. Planning back surgery. Saw Endocrine and will start Forteo.  PCP started lipitor.  Seeing Derm for boil on gluteus.Had injection X1      GYNECOLOGIC HISTORY:  No LMP recorded. Patient is postmenopausal..   reports that she has never smoked. She has never used smokeless tobacco.    STD testing offered?  Declined  Last PHQ-9 score on record=   PHQ-9 SCORE 2018   Total Score 0     Last GAD7 score on record=   RENÉE-7 SCORE 2016   Total Score 0 0 0       HEALTH MAINTENANCE:  Cholesterol: done with PCP, started Lipitor  Last Mammo: one year ago, Result: normal, Next Mammo: today   Pap: 2013 WNl HPV-  DEXA:  2018 Severe Osteoporosis  Colonoscopy:  10/2014, Result:  normal, Next Colonoscopy: Next year if under 80    HISTORY:  Obstetric History       T2      L2     SAB0   TAB0   Ectopic0   Multiple0   Live Births0       # Outcome Date GA Lbr Stuart/2nd Weight Sex Delivery Anes PTL Lv   2 Term            1 Term                 Past Medical History:   Diagnosis Date     Arthritis      Atrophic vaginitis      Glaucoma      Hoarseness 2013     Leukocytopenia      Lichen planus      Raynaud's disease      Reflux      Past Surgical History:   Procedure Laterality Date     ORTHOPEDIC SURGERY      carpal tunnel surgery + new thumb joints     right and left thumb joints       Family History   Problem Relation Age of Onset     Hypertension Mother      CANCER Mother      Depression Mother      CANCER Father      Lung Cancer Father      CANCER Brother      Colon Cancer       Lung Cancer Brother      Social History     Social History     Marital status:  I have counselled the pt to follow a healthy and balanced diet ,and recommend routine exercise.   "     Spouse name: N/A     Number of children: 2     Years of education: N/A     Social History Main Topics     Smoking status: Never Smoker     Smokeless tobacco: Never Used     Alcohol use 0.0 oz/week      Comment: 1-2 drinks a month     Drug use: No     Sexual activity: Yes     Partners: Male     Birth control/ protection: Post-menopausal     Other Topics Concern     Not on file     Social History Narrative     Current Outpatient Prescriptions   Medication Sig     acetaminophen (TYLENOL) 500 MG tablet Patient states she takes 6 tabs daily     atorvastatin (LIPITOR) 20 MG tablet Take 20 mg by mouth     Calcium Carbonate-Vitamin D (CALCIUM 600+D PO) Take  by mouth daily.     cholecalciferol (VITAMIN  -D) 1000 UNITS capsule Take 1 capsule by mouth daily.     fiber modular (NUTRISOURCE FIBER) packet 3 times daily (with meals)     fluocinonide (LIDEX) 0.05 % ointment      fluocinonide (LIDEX) 0.05 % solution apply to ears PRN per derm     glucosamine 500 MG TABS Take  by mouth daily.     hydrocortisone (WESTCORT) 0.2 % cream Apply  topically 2 times daily.     MUPIROCIN EX Externally apply  topically.     pimecrolimus (ELIDEL) 1 % cream Apply  topically 2 times daily.     No current facility-administered medications for this visit.      Allergies   Allergen Reactions     Bacitracin        Past medical, surgical, social and family history were reviewed and updated in EPIC.    EXAM:  /58  Pulse 80  Ht 5' 4.5\" (1.638 m)  Wt 136 lb (61.7 kg)  Breastfeeding? No  BMI 22.98 kg/m2   BMI: Body mass index is 22.98 kg/(m^2).    Constitutional: Appearance: Well nourished, well developed alert, in no acute distress  Breasts: Inspection of Breasts:  No lymphadenopathy present    Palpation of Breasts and Axillae:  No masses present on palpation, no  breast tenderness    Axillary Lymph Nodes:  No lymphadenopathy present  Neurologic/Psychiatric:    Mental Status:  Oriented X3     Pelvic Exam:  External Genitalia:  "    Normal appearance for age, no discharge present, no tenderness present, no inflammatory lesions present, color normal  Vagina:     Normal vaginal vault without central or paravaginal defects, ATROPHIC  Bladder:     Nontender to palpation  Urethra:   Urethral Body:  Urethra palpation normal, urethra structural support normal   Urethral Meatus:  No erythema or lesions present  Cervix:     Appearance healthy, no lesions present, nontender to palpation, no bleeding present  Uterus:     Nontender to palpation, no masses present, position anteflexed, mobility: normal  Adnexa:     No adnexal tenderness present, no adnexal masses present  Perineum:     Perineum within normal limits, no evidence of trauma, no rashes or skin lesions present, Folliculitis on left gluteus. No surrounding induration  Inguinal Lymph Nodes:     No lymphadenopathy present      Body mass index is 22.98 kg/(m^2).  Weight management plan noted, stable and monitoring   reports that she has never smoked. She has never used smokeless tobacco.      ASSESSMENT:  80 year old female with satisfactory annual exam.    ICD-10-CM    1. Encounter for gynecological examination without abnormal finding Z01.419 Medicare Limited Visit       COUNSELING:   Reviewed preventive health counseling, as reflected in patient instructions       Regular exercise    PLAN/PATIENT INSTRUCTIONS:  No GYN findings. RTC 1 year.    Jorje Reyes MD

## 2024-04-12 NOTE — ASSESSMENT & PLAN NOTE
Assessment and plan 1.  Health maintenance annual wellness examination overall the patient is clinically stable and doing well, we encouraged the patient to follow a healthy and balanced diet.  We recommend that the patient exercise routinely approximately 30 minutes 5 times per week .  We have reviewed the patient's vaccines and have made recommendations for updates if necessary annual flu shot, I did  patient about the shingles vaccine he would like to hold off at this point but may consider it in the future.  He would like to hold off on COVID-vaccine he is due for his routine colonoscopy he has had a polyp in the past I have given him an order to see GI Dr. Mohr, I have ordered the routine PSA    .  We will be ordering screening laboratories which are age appropriate.  Return to the office in   6 months   call if any problems.

## 2024-04-12 NOTE — ASSESSMENT & PLAN NOTE
Hypertension - controlled, I have counseled patient following healthy balance diet, I would like the patient reduce sodium, exercise routinely, I would like the patient continued the med current medical regiment and we will continue to monitor.  Blood pressure today 122/82

## 2024-04-12 NOTE — ASSESSMENT & PLAN NOTE
Working with urology reviewed KUB showing a 1.5 cm kidney stone he will be undergoing cystoscopy with lithotripsy in the near future drinking adequate amounts of fluid the patient is currently on a low oxalate diet he will consider a possible dietitian consult next visit to work on hyperlipidemia, prediabetes and low oxalate diet he would like to consider it for

## 2024-04-16 ENCOUNTER — TELEPHONE (OUTPATIENT)
Age: 64
End: 2024-04-16

## 2024-04-17 NOTE — TELEPHONE ENCOUNTER
I called pt to discuss scheduling his procedure with Dr. Nicole. There was no answer so I did leave a voicemail asking pt to call our office back to discuss.

## 2024-04-18 ENCOUNTER — PREP FOR PROCEDURE (OUTPATIENT)
Dept: UROLOGY | Facility: AMBULATORY SURGERY CENTER | Age: 64
End: 2024-04-18

## 2024-04-18 DIAGNOSIS — R39.89 SUSPECTED UTI: ICD-10-CM

## 2024-04-18 DIAGNOSIS — Z01.810 PREOP CARDIOVASCULAR EXAM: ICD-10-CM

## 2024-04-18 DIAGNOSIS — N20.0 KIDNEY STONES: Primary | ICD-10-CM

## 2024-04-18 NOTE — TELEPHONE ENCOUNTER
Spoke with patient and confirmed surgery date of: 5/31/24  Type of surgery: Cysto/ L. RGP/ L. URS w/ litho/ L. Stent insertion  Operating physician: Dr. Nicole  Location of surgery: [facility]    Verbally went over prep with patient on:   NPO  Bowel prep? No  Hospital calls afternoon prior with arrival time -Calls Friday afternoon for Monday surgeries  Patient needs ride to and from surgery (outpatient/inpatient)   Pre-op testing to be done 2 weeks prior to surgery  Urine culture and EKG  Blood thinners:   List blood thinner/ no hold needed  Clearances needed: None    Mailed/emailed to patient on:  Copy of packet scanned into Media on: 4/18/24  Labs in packet  Soap     Consent: in Media

## 2024-04-25 ENCOUNTER — AMB VIDEO VISIT (OUTPATIENT)
Dept: OTHER | Facility: HOSPITAL | Age: 64
End: 2024-04-25

## 2024-04-25 VITALS — SYSTOLIC BLOOD PRESSURE: 126 MMHG | DIASTOLIC BLOOD PRESSURE: 78 MMHG | HEART RATE: 78 BPM | OXYGEN SATURATION: 98 %

## 2024-04-25 DIAGNOSIS — E29.1 HYPOGONADISM IN MALE: ICD-10-CM

## 2024-04-25 DIAGNOSIS — J01.90 ACUTE SINUSITIS, RECURRENCE NOT SPECIFIED, UNSPECIFIED LOCATION: Primary | ICD-10-CM

## 2024-04-25 DIAGNOSIS — F41.9 ANXIETY: ICD-10-CM

## 2024-04-25 PROCEDURE — ECARE PR SL URGENT CARE VIRTUAL VISIT: Performed by: NURSE PRACTITIONER

## 2024-04-25 RX ORDER — LORAZEPAM 0.5 MG/1
0.5 TABLET ORAL DAILY PRN
Qty: 90 TABLET | Refills: 0 | Status: SHIPPED | OUTPATIENT
Start: 2024-04-25

## 2024-04-25 RX ORDER — TESTOSTERONE CYPIONATE 200 MG/ML
INJECTION, SOLUTION INTRAMUSCULAR
Qty: 4 ML | Refills: 0 | Status: SHIPPED | OUTPATIENT
Start: 2024-04-25

## 2024-04-25 RX ORDER — AMOXICILLIN 875 MG/1
875 TABLET, COATED ORAL 2 TIMES DAILY
Qty: 20 TABLET | Refills: 0 | Status: SHIPPED | OUTPATIENT
Start: 2024-04-25 | End: 2024-05-05

## 2024-04-25 NOTE — CARE ANYWHERE EVISITS
Visit Summary for DAWN DORSEY - Gender: Male - Date of Birth: 1960  Date: [id number] - Duration: 5 minutes  Patient: DAWN DORSEY  Provider: Ryanne BLOOM    Patient Contact Information  Address  [address]  [place]; [place] [address]      Visit Topics  Cold [Added By: Self - 2024-04-25]    Triage Questions   What is your current physical address in the event of a medical emergency? Answer []  Are you allergic to any medications? Answer []  Are you now or could you be pregnant? Answer []  Do you have any immune system compromise or chronic lung   disease? Answer []  Do you have any vulnerable family members in the home (infant, pregnant, cancer, elderly)? Answer []     Conversation Transcripts  [0A][0A] [Notification] You are connected with Ryanne BLOOM, Urgent Care Specialist.[0A][Notification] DAWN DORSEY is located in [place].[0A][Notification] DAWN DORSEY has shared health history...[0A]    Diagnosis  Acute pansinusitis    Procedures  Value: [id number] Code: CPT-4 UNLISTED E&M SERVICE    Medications Prescribed    No prescriptions ordered    Electronically signed by: Ryanne Lazcano(NPI [id number])

## 2024-04-25 NOTE — PATIENT INSTRUCTIONS
Rest and drink extra fluids.  Start antibiotic.  Take probiotic.  Continue Flonase and allergy medication.  Follow up with PCP if no improvement.  Go to ER with any worsening symptoms.

## 2024-04-25 NOTE — PROGRESS NOTES
Required Documentation:  Encounter provider WOLF Galindo    Provider located remotely    Identify all parties in room with patient during virtual visit:  No one else    The patient was identified by name and date of birth. Jack Dewey was informed that this is a telemedicine visit and that the visit is being conducted through the Gient platform. He agrees to proceed..  My office door was closed. No one else was in the room.  He acknowledged consent and understanding of privacy and security of the video platform. The patient has agreed to participate and understands they can discontinue the visit at any time.    Verification of patient location:    Patient is located at Home in the following state in which I hold an active license [place]    Patient is aware this is a billable service.     Reason for visit is No chief complaint on file.       Subjective  This is a 63 year old male here today for video visit.  He states he 6 days ago he started with sinus congestion and pressure.  He states he started with some mild congestion.  He states sinus pressure is now worse.  He states his nasal discharge was more cloudy but is now greenish brown.  He denies any fevers.  He has some post nasal drip.  He has pressure in his face when he puts glasses on .            Past Medical History:   Diagnosis Date    Anxiety     Cancer (HCC)     skin ca on nose    Chronic kidney disease     kidney stones    GERD (gastroesophageal reflux disease)     Hyperlipidemia     Hypertension     Kidney stone     6/2022       Past Surgical History:   Procedure Laterality Date    COLONOSCOPY      ESOPHAGOGASTRODUODENOSCOPY      EXTRACORPOREAL SHOCK WAVE LITHOTRIPSY Bilateral     Renal Multiple times    FL RETROGRADE PYELOGRAM  3/31/2022    FL RETROGRADE PYELOGRAM  6/22/2022    [place] CYSTO/URETERO W/LITHOTRIPSY &INDWELL STENT INSRT Left 8/6/2020    Procedure: CYSTOSCOPY URETEROSCOPY WITH LITHOTRIPSY HOLMIUM LASER, AND INSERTION  STENT URETERAL;  Surgeon: Otis Rutledge MD;  [facility];  Service: Urology    IN CYSTO/URETERO W/LITHOTRIPSY &INDWELL STENT INSRT Right 3/31/2022    Procedure: CYSTOSCOPY URETEROSCOPY WITH LITHOTRIPSY HOLMIUM LASER,BASKET STONE EXTRACTION, RETROGRADE PYELOGRAM AND INSERTION STENT URETERAL;  Surgeon: Jamal Finley MD;  [facility];  Service: Urology    IN CYSTO/URETERO W/LITHOTRIPSY &INDWELL STENT INSRT Left 6/22/2022    Procedure: CYSTOSCOPY URETEROSCOPY WITH LITHOTRIPSY HOLMIUM LASER, RETROGRADE PYELOGRAM AND INSERTION STENT URETERAL, STONE BASKET EXTRACTION;  Surgeon: Ed Beck MD;  [facility];  Service: Urology    IN EXC B9 LESION MRGN XCP SK TG F/E/E/N/L/M > 4.0CM Right 4/28/2016    Procedure: EXCISION  LESION UPPER EYELID, COMPLEX CLOSURE;  Surgeon: Gianluca Salazar MD;  [facility];  Service: Plastics    IN LITHOTRIPSY XTRCORP SHOCK WAVE Right 4/18/2019    Procedure: LITHROTRIPSY EXTRACORPORAL SHOCKWAVE (ESWL);  Surgeon: Orlando Crouch MD;  [facility];  Service: Urology        Allergies   Allergen Reactions    Ciprofloxacin Hives    Macrobid [Nitrofurantoin] Nausea Only and Palpitations       Review of Systems   Constitutional:  Negative for activity change, chills and fatigue.   HENT:  Positive for congestion, rhinorrhea and sinus pain.    Respiratory:  Negative for cough.    Cardiovascular: Negative.    Gastrointestinal: Negative.    Neurological: Negative.    Psychiatric/Behavioral: Negative.         Video Exam    Vitals:    04/25/24 1010   BP: 126/78   Pulse: 78   SpO2: 98%       Physical Exam  Constitutional:       General: He is not in acute distress.     Appearance: Normal appearance. He is not ill-appearing or toxic-appearing.   HENT:      Head: Normocephalic and atraumatic.      Comments: TTP over the maxillary region.     Nose: Congestion and rhinorrhea present.   Skin:     Comments: No rash on head or neck.    Neurological:      Mental Status:  He is alert and oriented to person, place, and time.   Psychiatric:         Mood and Affect: Mood normal.         Behavior: Behavior normal.         Thought Content: Thought content normal.         Judgment: Judgment normal.         Visit Time  Total Visit Duration: 7 minutes    Assessment/Plan:    Diagnoses and all orders for this visit:    Acute sinusitis, recurrence not specified, unspecified location  -     amoxicillin (AMOXIL) 875 mg tablet; Take 1 tablet (875 mg total) by mouth 2 (two) times a day for 10 days        Patient Instructions   Rest and drink extra fluids.  Start antibiotic.  Take probiotic.  Continue Flonase and allergy medication.  Follow up with PCP if no improvement.  Go to ER with any worsening symptoms.

## 2024-05-17 ENCOUNTER — APPOINTMENT (OUTPATIENT)
Dept: LAB | Age: 64
End: 2024-05-17
Payer: COMMERCIAL

## 2024-05-17 ENCOUNTER — LAB (OUTPATIENT)
Dept: LAB | Age: 64
End: 2024-05-17
Payer: COMMERCIAL

## 2024-05-17 ENCOUNTER — ANESTHESIA EVENT (OUTPATIENT)
Dept: PERIOP | Facility: AMBULARY SURGERY CENTER | Age: 64
End: 2024-05-17
Payer: COMMERCIAL

## 2024-05-17 DIAGNOSIS — E78.5 DYSLIPIDEMIA: ICD-10-CM

## 2024-05-17 DIAGNOSIS — N20.0 KIDNEY STONES: ICD-10-CM

## 2024-05-17 DIAGNOSIS — I10 ESSENTIAL HYPERTENSION: ICD-10-CM

## 2024-05-17 DIAGNOSIS — Z13.1 SCREENING FOR DIABETES MELLITUS: ICD-10-CM

## 2024-05-17 DIAGNOSIS — Z01.810 PREOP CARDIOVASCULAR EXAM: ICD-10-CM

## 2024-05-17 DIAGNOSIS — E83.39 HYPOPHOSPHATEMIA: ICD-10-CM

## 2024-05-17 DIAGNOSIS — R39.89 SUSPECTED UTI: ICD-10-CM

## 2024-05-17 DIAGNOSIS — N20.0 RECURRENT NEPHROLITHIASIS: ICD-10-CM

## 2024-05-17 DIAGNOSIS — R82.991 HYPOCITRATURIA: ICD-10-CM

## 2024-05-17 LAB
25(OH)D3 SERPL-MCNC: 52.9 NG/ML (ref 30–100)
ALBUMIN SERPL BCP-MCNC: 4.3 G/DL (ref 3.5–5)
ALP SERPL-CCNC: 41 U/L (ref 34–104)
ALT SERPL W P-5'-P-CCNC: 26 U/L (ref 7–52)
ANION GAP SERPL CALCULATED.3IONS-SCNC: 8 MMOL/L (ref 4–13)
AST SERPL W P-5'-P-CCNC: 23 U/L (ref 13–39)
ATRIAL RATE: 82 BPM
BILIRUB SERPL-MCNC: 0.54 MG/DL (ref 0.2–1)
BUN SERPL-MCNC: 18 MG/DL (ref 5–25)
CALCIUM SERPL-MCNC: 10.1 MG/DL (ref 8.4–10.2)
CHLORIDE SERPL-SCNC: 103 MMOL/L (ref 96–108)
CHOLEST SERPL-MCNC: 214 MG/DL
CO2 SERPL-SCNC: 27 MMOL/L (ref 21–32)
CREAT SERPL-MCNC: 1 MG/DL (ref 0.6–1.3)
CREAT UR-MCNC: 96.1 MG/DL
EST. AVERAGE GLUCOSE BLD GHB EST-MCNC: 117 MG/DL
GFR SERPL CREATININE-BSD FRML MDRD: 80 ML/MIN/1.73SQ M
GLUCOSE P FAST SERPL-MCNC: 99 MG/DL (ref 65–99)
HBA1C MFR BLD: 5.7 %
HDLC SERPL-MCNC: 35 MG/DL
LDLC SERPL CALC-MCNC: 135 MG/DL (ref 0–100)
MAGNESIUM SERPL-MCNC: 1.7 MG/DL (ref 1.9–2.7)
P AXIS: 32 DEGREES
PHOSPHATE SERPL-MCNC: 2 MG/DL (ref 2.3–4.1)
POTASSIUM SERPL-SCNC: 4.7 MMOL/L (ref 3.5–5.3)
PR INTERVAL: 154 MS
PROT SERPL-MCNC: 7.5 G/DL (ref 6.4–8.4)
PROT UR-MCNC: 9 MG/DL
PROT/CREAT UR: 0.09 MG/G{CREAT} (ref 0–0.1)
QRS AXIS: -1 DEGREES
QRSD INTERVAL: 112 MS
QT INTERVAL: 364 MS
QTC INTERVAL: 425 MS
SODIUM SERPL-SCNC: 138 MMOL/L (ref 135–147)
T WAVE AXIS: 47 DEGREES
TRIGL SERPL-MCNC: 221 MG/DL
VENTRICULAR RATE: 82 BPM

## 2024-05-17 PROCEDURE — 83735 ASSAY OF MAGNESIUM: CPT

## 2024-05-17 PROCEDURE — 82570 ASSAY OF URINE CREATININE: CPT

## 2024-05-17 PROCEDURE — 80061 LIPID PANEL: CPT

## 2024-05-17 PROCEDURE — 87086 URINE CULTURE/COLONY COUNT: CPT

## 2024-05-17 PROCEDURE — 80053 COMPREHEN METABOLIC PANEL: CPT

## 2024-05-17 PROCEDURE — 83036 HEMOGLOBIN GLYCOSYLATED A1C: CPT

## 2024-05-17 PROCEDURE — 93010 ELECTROCARDIOGRAM REPORT: CPT | Performed by: INTERNAL MEDICINE

## 2024-05-17 PROCEDURE — 36415 COLL VENOUS BLD VENIPUNCTURE: CPT

## 2024-05-17 PROCEDURE — 84100 ASSAY OF PHOSPHORUS: CPT

## 2024-05-17 PROCEDURE — 82306 VITAMIN D 25 HYDROXY: CPT

## 2024-05-17 PROCEDURE — 82652 VIT D 1 25-DIHYDROXY: CPT

## 2024-05-17 PROCEDURE — 84156 ASSAY OF PROTEIN URINE: CPT

## 2024-05-18 DIAGNOSIS — E83.42 HYPOMAGNESEMIA: Primary | ICD-10-CM

## 2024-05-18 LAB
1,25(OH)2D3 SERPL-MCNC: 56.8 PG/ML (ref 24.8–81.5)
BACTERIA UR CULT: NORMAL

## 2024-05-18 RX ORDER — MAGNESIUM L-LACTATE 84 MG
84 TABLET, EXTENDED RELEASE ORAL DAILY
Qty: 90 TABLET | Refills: 3 | Status: SHIPPED | OUTPATIENT
Start: 2024-05-18 | End: 2024-05-21 | Stop reason: SDUPTHER

## 2024-05-18 NOTE — RESULT ENCOUNTER NOTE
Please  inform patient that renal function is stable . Magnesium level is slightly low at 1.7, started on mag tab once daily. Please arrange follow up appt.

## 2024-05-20 ENCOUNTER — TELEPHONE (OUTPATIENT)
Dept: NEPHROLOGY | Facility: CLINIC | Age: 64
End: 2024-05-20

## 2024-05-20 NOTE — TELEPHONE ENCOUNTER
----- Message from Nicholas Lunsford MD sent at 5/18/2024  7:36 PM EDT -----  Please  inform patient that renal function is stable . Magnesium level is slightly low at 1.7, started on mag tab once daily. Please arrange follow up appt.

## 2024-05-21 DIAGNOSIS — E83.42 HYPOMAGNESEMIA: ICD-10-CM

## 2024-05-21 RX ORDER — MAGNESIUM L-LACTATE 84 MG
84 TABLET, EXTENDED RELEASE ORAL DAILY
Qty: 90 TABLET | Refills: 3 | Status: SHIPPED | OUTPATIENT
Start: 2024-05-21

## 2024-05-22 NOTE — PRE-PROCEDURE INSTRUCTIONS
Pre-Surgery Instructions:   Medication Instructions    amLODIPine (NORVASC) 5 mg tablet Take night before surgery    b complex vitamins capsule Stop taking 7 days prior to surgery.    Cholecalciferol (VITAMIN D3) 1000 units CAPS Stop taking 7 days prior to surgery.    coenzyme Q-10 100 MG capsule Stop taking 7 days prior to surgery.    cyanocobalamin (VITAMIN B-12) 100 mcg tablet Stop taking 7 days prior to surgery.    fenofibrate (TRICOR) 145 mg tablet Take day of surgery.    fexofenadine (ALLEGRA) 180 MG tablet Take night before surgery    lisinopril (ZESTRIL) 10 mg tablet Take night before surgery    LORazepam (ATIVAN) 0.5 mg tablet Uses PRN- OK to take day of surgery    magnesium (MAGTAB) 84 MG (7MEQ) TBCR Hold day of surgery.    multivitamin (THERAGRAN) TABS Stop taking 7 days prior to surgery.    Omega-3 Fatty Acids (Fish Oil) 1200 MG CPDR Stop taking 7 days prior to surgery.    omeprazole (PriLOSEC) 20 mg delayed release capsule Take night before surgery    potassium citrate (UROCIT-K) 10 mEq Hold day of surgery.    sildenafil (VIAGRA) 50 MG tablet Uses PRN- DO NOT take day of surgery    testosterone cypionate (DEPO-TESTOSTERONE) 200 mg/mL SOLN Weekly - do not take DOS      Spoke with pt via phone.    Medication instructions for day surgery reviewed. Please use only a sip of water to take your instructed medications. Avoid all over the counter vitamins, supplements and NSAIDS for one week prior to surgery per anesthesia guidelines. Tylenol is ok to take as needed.     You will receive a call one business day prior to surgery with an arrival time and hospital directions. If your surgery is scheduled on a Monday, the hospital will be calling you on the Friday prior to your surgery. If you have not heard from anyone by 8pm, please call the hospital supervisor through the hospital  at [phone]. ([place] [phone] or [place] [phone]).    Do not eat or drink anything after midnight the night  before your surgery, including candy, mints, lifesavers, or chewing gum. Do not drink alcohol 24hrs before your surgery. Try not to smoke at least 24hrs before your surgery.       Follow the pre surgery showering instructions as listed in the “My Surgical Experience Booklet” or otherwise provided by your surgeon's office. Do not use a blade to shave the surgical area 1 week before surgery. It is okay to use a clean electric clippers up to 24 hours before surgery. Do not apply any lotions, creams, including makeup, cologne, deodorant, or perfumes after showering on the day of your surgery. Do not use dry shampoo, hair spray, hair gel, or any type of hair products.     No contact lenses, eye make-up, or artificial eyelashes. Remove nail polish, including gel polish, and any artificial, gel, or acrylic nails if possible. Remove all jewelry including rings and body piercing jewelry.     Wear causal clothing that is easy to take on and off. Consider your type of surgery.    Keep any valuables, jewelry, piercings at home. Please bring any specially ordered equipment (sling, braces) if indicated.    Arrange for a responsible person to drive you to and from the hospital on the day of your surgery. Please confirm the visitor policy for the day of your procedure when you receive your phone call with an arrival time.     Call the surgeon's office with any new illnesses, exposures, or additional questions prior to surgery.    Please reference your “My Surgical Experience Booklet” for additional information to prepare for your upcoming surgery.

## 2024-05-31 ENCOUNTER — HOSPITAL ENCOUNTER (OUTPATIENT)
Facility: AMBULARY SURGERY CENTER | Age: 64
Setting detail: OUTPATIENT SURGERY
Discharge: HOME | End: 2024-05-31
Attending: UROLOGY | Admitting: UROLOGY
Payer: COMMERCIAL

## 2024-05-31 ENCOUNTER — APPOINTMENT (OUTPATIENT)
Dept: RADIOLOGY | Facility: AMBULARY SURGERY CENTER | Age: 64
End: 2024-05-31
Payer: COMMERCIAL

## 2024-05-31 ENCOUNTER — ANESTHESIA (OUTPATIENT)
Dept: PERIOP | Facility: AMBULARY SURGERY CENTER | Age: 64
End: 2024-05-31
Payer: COMMERCIAL

## 2024-05-31 VITALS
HEIGHT: 71 IN | HEART RATE: 90 BPM | SYSTOLIC BLOOD PRESSURE: 155 MMHG | TEMPERATURE: 97.9 F | DIASTOLIC BLOOD PRESSURE: 87 MMHG | OXYGEN SATURATION: 97 % | WEIGHT: 214 LBS | RESPIRATION RATE: 19 BRPM | BODY MASS INDEX: 29.96 KG/M2

## 2024-05-31 DIAGNOSIS — N20.0 RECURRENT NEPHROLITHIASIS: Primary | ICD-10-CM

## 2024-05-31 DIAGNOSIS — N20.0 KIDNEY STONES: ICD-10-CM

## 2024-05-31 PROCEDURE — C2617 STENT, NON-COR, TEM W/O DEL: HCPCS | Performed by: UROLOGY

## 2024-05-31 PROCEDURE — C1769 GUIDE WIRE: HCPCS | Performed by: UROLOGY

## 2024-05-31 PROCEDURE — 82360 CALCULUS ASSAY QUANT: CPT | Performed by: UROLOGY

## 2024-05-31 PROCEDURE — 74420 UROGRAPHY RTRGR +-KUB: CPT

## 2024-05-31 PROCEDURE — C1894 INTRO/SHEATH, NON-LASER: HCPCS | Performed by: UROLOGY

## 2024-05-31 PROCEDURE — 52356 CYSTO/URETERO W/LITHOTRIPSY: CPT | Performed by: UROLOGY

## 2024-05-31 PROCEDURE — C1758 CATHETER, URETERAL: HCPCS | Performed by: UROLOGY

## 2024-05-31 PROCEDURE — C1747 URETEROSCOPE DIGITAL FLEX SNGL USE STD DEFLECTION APTRA: HCPCS | Performed by: UROLOGY

## 2024-05-31 PROCEDURE — NC001 PR NO CHARGE: Performed by: UROLOGY

## 2024-05-31 DEVICE — STENT URETERAL 6FR 26CM INLAY OPTIMA: Type: IMPLANTABLE DEVICE | Site: URETER | Status: FUNCTIONAL

## 2024-05-31 RX ORDER — CEPHALEXIN 500 MG/1
500 CAPSULE ORAL EVERY 6 HOURS SCHEDULED
Qty: 3 CAPSULE | Refills: 0 | Status: SHIPPED | OUTPATIENT
Start: 2024-05-31 | End: 2024-06-01

## 2024-05-31 RX ORDER — ONDANSETRON 2 MG/ML
4 INJECTION INTRAMUSCULAR; INTRAVENOUS EVERY 4 HOURS PRN
Status: DISCONTINUED | OUTPATIENT
Start: 2024-05-31 | End: 2024-05-31 | Stop reason: HOSPADM

## 2024-05-31 RX ORDER — LIDOCAINE HCL/PF 100 MG/5ML
SYRINGE (ML) INJECTION AS NEEDED
Status: DISCONTINUED | OUTPATIENT
Start: 2024-05-31 | End: 2024-05-31

## 2024-05-31 RX ORDER — NAPROXEN 500 MG/1
500 TABLET ORAL 2 TIMES DAILY WITH MEALS
Qty: 15 TABLET | Refills: 0 | Status: SHIPPED | OUTPATIENT
Start: 2024-05-31 | End: 2024-06-08

## 2024-05-31 RX ORDER — DOCUSATE SODIUM 100 MG/1
100 CAPSULE, LIQUID FILLED ORAL 2 TIMES DAILY
Qty: 30 CAPSULE | Refills: 0 | Status: SHIPPED | OUTPATIENT
Start: 2024-05-31 | End: 2024-06-15

## 2024-05-31 RX ORDER — OXYCODONE HYDROCHLORIDE 5 MG/1
10 TABLET ORAL EVERY 4 HOURS PRN
Status: DISCONTINUED | OUTPATIENT
Start: 2024-05-31 | End: 2024-05-31 | Stop reason: HOSPADM

## 2024-05-31 RX ORDER — OXYCODONE HYDROCHLORIDE 5 MG/1
5 TABLET ORAL EVERY 4 HOURS PRN
Status: DISCONTINUED | OUTPATIENT
Start: 2024-05-31 | End: 2024-05-31 | Stop reason: HOSPADM

## 2024-05-31 RX ORDER — ONDANSETRON 2 MG/ML
INJECTION INTRAMUSCULAR; INTRAVENOUS AS NEEDED
Status: DISCONTINUED | OUTPATIENT
Start: 2024-05-31 | End: 2024-05-31

## 2024-05-31 RX ORDER — HYDROMORPHONE HCL/PF 1 MG/ML
0.5 SYRINGE (ML) INJECTION EVERY 2 HOUR PRN
Status: DISCONTINUED | OUTPATIENT
Start: 2024-05-31 | End: 2024-05-31 | Stop reason: HOSPADM

## 2024-05-31 RX ORDER — OXYCODONE HYDROCHLORIDE 5 MG/1
5 TABLET ORAL EVERY 4 HOURS PRN
Qty: 5 TABLET | Refills: 0 | Status: SHIPPED | OUTPATIENT
Start: 2024-05-31 | End: 2024-06-05

## 2024-05-31 RX ORDER — DEXAMETHASONE SODIUM PHOSPHATE 10 MG/ML
INJECTION, SOLUTION INTRAMUSCULAR; INTRAVENOUS AS NEEDED
Status: DISCONTINUED | OUTPATIENT
Start: 2024-05-31 | End: 2024-05-31

## 2024-05-31 RX ORDER — ONDANSETRON HYDROCHLORIDE 8 MG/1
8 TABLET, FILM COATED ORAL EVERY 8 HOURS PRN
Qty: 20 TABLET | Refills: 0 | Status: SHIPPED | OUTPATIENT
Start: 2024-05-31

## 2024-05-31 RX ORDER — SODIUM CHLORIDE, SODIUM LACTATE, POTASSIUM CHLORIDE, CALCIUM CHLORIDE 600; 310; 30; 20 MG/100ML; MG/100ML; MG/100ML; MG/100ML
INJECTION, SOLUTION INTRAVENOUS CONTINUOUS PRN
Status: DISCONTINUED | OUTPATIENT
Start: 2024-05-31 | End: 2024-05-31

## 2024-05-31 RX ORDER — FENTANYL CITRATE/PF 50 MCG/ML
25 SYRINGE (ML) INJECTION
Status: DISCONTINUED | OUTPATIENT
Start: 2024-05-31 | End: 2024-05-31 | Stop reason: HOSPADM

## 2024-05-31 RX ORDER — SUCCINYLCHOLINE/SOD CL,ISO/PF 100 MG/5ML
SYRINGE (ML) INTRAVENOUS AS NEEDED
Status: DISCONTINUED | OUTPATIENT
Start: 2024-05-31 | End: 2024-05-31

## 2024-05-31 RX ORDER — FENTANYL CITRATE 50 UG/ML
INJECTION, SOLUTION INTRAMUSCULAR; INTRAVENOUS AS NEEDED
Status: DISCONTINUED | OUTPATIENT
Start: 2024-05-31 | End: 2024-05-31

## 2024-05-31 RX ORDER — ACETAMINOPHEN 325 MG/1
650 TABLET ORAL EVERY 4 HOURS PRN
Start: 2024-05-31

## 2024-05-31 RX ORDER — PHENAZOPYRIDINE HYDROCHLORIDE 200 MG/1
200 TABLET, FILM COATED ORAL 3 TIMES DAILY PRN
Qty: 10 TABLET | Refills: 0 | Status: SHIPPED | OUTPATIENT
Start: 2024-05-31 | End: 2024-06-03

## 2024-05-31 RX ORDER — OXYBUTYNIN CHLORIDE 5 MG/1
5 TABLET ORAL EVERY 6 HOURS PRN
Qty: 30 TABLET | Refills: 0 | Status: SHIPPED | OUTPATIENT
Start: 2024-05-31

## 2024-05-31 RX ORDER — PROPOFOL 10 MG/ML
INJECTION, EMULSION INTRAVENOUS AS NEEDED
Status: DISCONTINUED | OUTPATIENT
Start: 2024-05-31 | End: 2024-05-31

## 2024-05-31 RX ORDER — MIDAZOLAM HYDROCHLORIDE 2 MG/2ML
INJECTION, SOLUTION INTRAMUSCULAR; INTRAVENOUS AS NEEDED
Status: DISCONTINUED | OUTPATIENT
Start: 2024-05-31 | End: 2024-05-31

## 2024-05-31 RX ORDER — CEFAZOLIN SODIUM 2 G/50ML
2000 SOLUTION INTRAVENOUS ONCE
Status: COMPLETED | OUTPATIENT
Start: 2024-05-31 | End: 2024-05-31

## 2024-05-31 RX ADMIN — PROPOFOL 200 MG: 10 INJECTION, EMULSION INTRAVENOUS at 11:00

## 2024-05-31 RX ADMIN — LIDOCAINE HYDROCHLORIDE 50 MG: 20 INJECTION INTRAVENOUS at 11:00

## 2024-05-31 RX ADMIN — ONDANSETRON 4 MG: 2 INJECTION INTRAMUSCULAR; INTRAVENOUS at 11:00

## 2024-05-31 RX ADMIN — SODIUM CHLORIDE, SODIUM LACTATE, POTASSIUM CHLORIDE, AND CALCIUM CHLORIDE: .6; .31; .03; .02 INJECTION, SOLUTION INTRAVENOUS at 10:59

## 2024-05-31 RX ADMIN — DEXAMETHASONE SODIUM PHOSPHATE 10 MG: 10 INJECTION, SOLUTION INTRAMUSCULAR; INTRAVENOUS at 11:00

## 2024-05-31 RX ADMIN — CEFAZOLIN SODIUM 2000 MG: 2 SOLUTION INTRAVENOUS at 10:57

## 2024-05-31 RX ADMIN — MIDAZOLAM 2 MG: 1 INJECTION INTRAMUSCULAR; INTRAVENOUS at 10:56

## 2024-05-31 RX ADMIN — FENTANYL CITRATE 25 MCG: 50 INJECTION INTRAMUSCULAR; INTRAVENOUS at 12:08

## 2024-05-31 RX ADMIN — FENTANYL CITRATE 50 MCG: 50 INJECTION INTRAMUSCULAR; INTRAVENOUS at 11:00

## 2024-05-31 RX ADMIN — FENTANYL CITRATE 25 MCG: 50 INJECTION INTRAMUSCULAR; INTRAVENOUS at 12:03

## 2024-05-31 RX ADMIN — FENTANYL CITRATE 25 MCG: 50 INJECTION INTRAMUSCULAR; INTRAVENOUS at 12:19

## 2024-05-31 RX ADMIN — FENTANYL CITRATE 50 MCG: 50 INJECTION INTRAMUSCULAR; INTRAVENOUS at 10:56

## 2024-05-31 RX ADMIN — Medication 100 MG: at 11:00

## 2024-05-31 RX ADMIN — OXYCODONE HYDROCHLORIDE 5 MG: 5 TABLET ORAL at 13:26

## 2024-05-31 NOTE — ANESTHESIA POSTPROCEDURE EVALUATION
Post-Op Assessment Note    CV Status:  Stable  Pain Score: 8 (burning)    Pain management: adequate       Mental Status:  Alert and awake   Hydration Status:  Euvolemic   PONV Controlled:  Controlled   Airway Patency:  Patent     Post Op Vitals Reviewed: Yes    No anethesia notable event occurred.    Staff: CRNA               BP   155/85   Temp   97.7   Pulse  93   Resp   19   SpO2   96 % RA

## 2024-05-31 NOTE — H&P
UROLOGY HISTORY AND PHYSICAL     Patient Identifiers: Jack Dewey (MRN [id number])      Date of Service: 5/31/2024        ASSESSMENT:     63 y.o. old male with left nephrolithiasis.      PLAN:     Left ureteroscopy      History of Present Illness:     Jack Dewey is a 63 y.o. old with a history of recurrent nephrolithiasis    Past Medical, Past Surgical History:     Past Medical History:   Diagnosis Date    Anxiety     Cancer (HCC)     skin ca on nose    Chronic kidney disease     kidney stones    GERD (gastroesophageal reflux disease)     Hyperlipidemia     Hypertension     Kidney stone     6/2022   :    Past Surgical History:   Procedure Laterality Date    COLONOSCOPY      ESOPHAGOGASTRODUODENOSCOPY      EXTRACORPOREAL SHOCK WAVE LITHOTRIPSY Bilateral     Renal Multiple times    FL RETROGRADE PYELOGRAM  3/31/2022    FL RETROGRADE PYELOGRAM  6/22/2022    [place] CYSTO/URETERO W/LITHOTRIPSY &INDWELL STENT INSRT Left 8/6/2020    Procedure: CYSTOSCOPY URETEROSCOPY WITH LITHOTRIPSY HOLMIUM LASER, AND INSERTION STENT URETERAL;  Surgeon: Otis Rutledge MD;  [facility];  Service: Urology    [place] CYSTO/URETERO W/LITHOTRIPSY &INDWELL STENT INSRT Right 3/31/2022    Procedure: CYSTOSCOPY URETEROSCOPY WITH LITHOTRIPSY HOLMIUM LASER,BASKET STONE EXTRACTION, RETROGRADE PYELOGRAM AND INSERTION STENT URETERAL;  Surgeon: Jamal Finley MD;  [facility];  Service: Urology    [place] CYSTO/URETERO W/LITHOTRIPSY &INDWELL STENT INSRT Left 6/22/2022    Procedure: CYSTOSCOPY URETEROSCOPY WITH LITHOTRIPSY HOLMIUM LASER, RETROGRADE PYELOGRAM AND INSERTION STENT URETERAL, STONE BASKET EXTRACTION;  Surgeon: Ed Beck MD;  [facility];  Service: Urology    N[place] EXC B9 LESION MRGN XCP SK TG F/E/E/N/L/M > 4.0CM Right 4/28/2016    Procedure: EXCISION  LESION UPPER EYELID, COMPLEX CLOSURE;  Surgeon: Gianluca Salazar MD;  [facility];  Service: Plastics    [place] LITHOTRIPSY XTRCORP SHOCK WAVE Right 4/18/2019     Procedure: LITHROTRIPSY EXTRACORPORAL SHOCKWAVE (ESWL);  Surgeon: Orlando Crouch MD;  [facility];  Service: Urology   :    Medications, Allergies:     Current Facility-Administered Medications:     ceFAZolin (ANCEF) IVPB (premix in dextrose) 2,000 mg 50 mL, 2,000 mg, Intravenous, Once, Christopher Nicole MD    Allergies:  Allergies   Allergen Reactions    Ciprofloxacin Hives    Macrobid [Nitrofurantoin] Nausea Only and Palpitations   :    Social and Family History:   Social History:   Social History     Tobacco Use    Smoking status: Former     Current packs/day: 0.00     Average packs/day: 0.8 packs/day for 18.0 years (13.5 ttl pk-yrs)     Types: Cigarettes     Start date:      Quit date:      Years since quittin.4    Smokeless tobacco: Never   Vaping Use    Vaping status: Never Used   Substance Use Topics    Alcohol use: Yes     Alcohol/week: 3.0 standard drinks of alcohol     Types: 1 Glasses of wine, 2 Cans of beer per week     Comment: weekend    Drug use: No   .    Social History     Tobacco Use   Smoking Status Former    Current packs/day: 0.00    Average packs/day: 0.8 packs/day for 18.0 years (13.5 ttl pk-yrs)    Types: Cigarettes    Start date:     Quit date:     Years since quittin.4   Smokeless Tobacco Never       Family History:  Family History   Problem Relation Age of Onset    Other Father         CABG (coronary artery bypass surgery)    Atrial fibrillation Sister     Other Paternal Uncle         Myocardial infarction    Coronary artery disease Family     Diabetes Family     Hypertension Family     Uterine cancer Family    :     Review of Systems:     General: Fever, chills, or night sweats: negative  Cardiac: Negative for chest pain.    Pulmonary: Negative for shortness of breath.  Gastrointestinal: Abdominal pain negative  Nausea, vomiting, or diarrhea negative  Genitourinary: See HPI above.  Patient does nothave hematuria.  All other systems queried were  "negative.    Physical Exam:   General: Patient is pleasant and in NAD. Awake and alert  BP (!) 163/104   Pulse 89   Temp 98.1 °F (36.7 °C) (Temporal)   Resp 18   Ht 5' 11\" (1.803 m)   Wt 97.1 kg (214 lb)   SpO2 98%   BMI 29.85 kg/m²   HEENT:  Normocephalic atraumatic  Cardiac:  Regular rate and rhythm, Peripheral edema: negative  Pulmonary: Non-labored breathing, CTAB  Abdomen: Soft, non-tender, non-distended.  No surgical scars.  No masses, tenderness, hernias noted.    Genitourinary: negative CVA tenderness, neg suprapubic tenderness.  Extremities: normal movement in all 4       Labs:     Lab Results   Component Value Date    HGB 18.4 (H) 10/05/2023    HCT 55.3 (H) 10/05/2023    WBC 7.01 10/05/2023     10/05/2023   ]    Lab Results   Component Value Date     11/16/2017    K 4.7 05/17/2024     05/17/2024    CO2 27 05/17/2024    BUN 18 05/17/2024    CREATININE 1.00 05/17/2024    CALCIUM 10.1 05/17/2024    GLUCOSE 97 01/08/2016   ]    Imaging:   I personally reviewed the images and report of the following studies, and reviewed them with the patient:        Thank you for allowing me to participate in this patients’ care.  Please do not hesitate to call with any additional questions.  Christopher Nicole MD      "

## 2024-05-31 NOTE — DISCHARGE INSTR - AVS FIRST PAGE
Jack Dewey:    Your surgery went very well.  All stones were fragmented to small pieces which were then removed with a basket..    My office will call you to schedule a visit to remove your stent on next week .    We will then see you back in 2 months with an x-ray and ultrasound.    Please take your medications as prescribed with caution for comfort.  Most importantly please drink 6-8 glasses of water per day    Please call with any questions or concerns.    Christopher Nicole MD,PhD  [facility]  [phone]            WHAT IS A STENT?  At the end of the procedure, your doctor may place a stent into your ureter. A stent is a thin, flexible piece of plastic that will hold open your ureter while the remaining small pieces of stone pass. This allows your kidney to drain easily and prevents you from having to “pass” these small stone pieces on your own, which could be painful. The stent is about 12 inches long and looks and feels like a thin piece of spaghetti.    AFTER THE PROCEDURE  After the procedure you may experience the following symptoms. All of these are normal and should resolve within 1 or 2 days after your stent is removed.  Urinary frequency (urinating more often than usual)  Urinary urgency (the sensation that you need to urinate right away)  Painful urination (this can be pain in your bladder or in your back when  you urinate)  Blood in your urine ( a stent can irritate the lining of your bladder causing it to bleed)  Back/Flank pain, especially with urination  You will receive a prescription for narcotic pain medication after the procedure. You will also receive a prescription for tamsulosin which you will take once a day for 2 weeks to help relax your ureter and decrease stent discomfort. You will also need to purchase a stool softener (i.e. Colace) or mild laxative (i.e. Miralax) as the narcotic pain medication can make you constipated. This is important as constipation can  exacerbate stent related symptoms.     STENT REMOVAL  In some cases, your doctor will leave strings attached to your stent. The strings will be taped to your skin after the procedure. The strings will allow you to remove the thin flexible stent while you are at home. Normally, the stent can be removed 3-5 days after your procedure; your physician will tell you the specific date after your procedure.     On the day you are supposed to remove your stent, do the following:  When you wake up in the morning, take 1-2 pain pills with food.  Start your antibiotic pill the morning of schedule stent removal if prescribed  One hour later sit on the toilet or in the bath tub.  Take a deep breath in and while exhaling, pull the string.   Dispose of the stent in the garbage.    Alternatively, you will come back for an office procedure to remove the stent by placing a small camera into your bladder to remove the stent.    During the next 4-8 hours after removing your stent, you may experience additional blood in your urine, pain with urination or back/side pain. You should take the pain medication you were prescribed to help you with the pain, as well as continue the Flomax. If the pain is severe, you are vomiting, and/or have a fever > 101.4 please call the clinic.

## 2024-05-31 NOTE — OP NOTE
Operative Note     PATIENT:  Jack Dewey (MRN [id number])    DATE OF PROCEDURE:   5/31/2024    PRE-OP DIAGNOSIS:   1) Left renal calculus    POST-OP DIAGNOSIS:   1) Left renal calculus    PROCEDURES PERFORMED:  1) Cystoscopy  2) Left retrograde pyelography with fluoroscopic interpretation  3) Left ureteroscopy with laser lithotripsy and basket extraction of stone  4) Left ureteral stent placement    SURGEON:  Christopher Nicole MD    NOTE:  There were no qualified teaching residents to assist with this case    ANESTHESIA: General     COMPLICATIONS:   None    ANTIBIOTICS:  Ancef    INTRAOPERATIVE THROMBOEMBOLISM PROPHYLAXIS:  Pneumatic compression stockings     FINDINGS:  Collection of stones throughout the the upper and interpolar calyces.  All visible stone was completely dusted and all clinically significant fragments were subsequently removed under vision with a basket.  Postoperative stent left place with external string.    INDICATIONS FOR PROCEDURE:  Jack Dewey is an 63 y.o. old male with Left renal nephrolithiasis.  After discussing the options, the patient elected to undergo ureteroscopy and ureteral stent placement.  We discussed the procedure in detail, the alternatives, and the risks, and they signed informed consent to proceed.    PROCEDURE IN DETAIL:   The patient was identified and brought to the OR.  Antibiotic prophylaxis and DVT prophylaxis were administered.  They were placed in the comfortable dorsal lithotomy position with care to pad all pressure points.  They were prepped and draped in the usual sterile fashion using hibiclens.  A surgical time out was performed with all in the room in agreement with the correct patient, procedure, indications, and laterality.  A 21-Egyptian rigid cystoscope was used to enter the bladder.  The bladder was inspected in its entirety and there were no lesions noted.  The ureteral orifices were identified in their normal orthotopic positions.     The Left  ureteral orifice was identified and a 5 Fr open ended catheter was placed into the ureteral orifice  .   A retrograde pyelogram was performed with the findings as described above.  A Sensor wire was advanced up to the kidney under fluoroscopic guidance.  Leaving this safety wire in place, the bladder was drained.   A second working wire was then placed, and over this a 12/14 ureteral access sheath was sequentially passed under fluoroscopic guidance.  A  8.5 [place]ayan digital flexible ureteroscope was advanced up the ureter under vision . The stone was encountered in the  upper and interpolar calyces .  The stone was not noted to be impacted.      a holmium laser fiber was passed through the ureteral scope and laser lithotripsy was commenced utilizing the high-powered laser.  I utilized both fragmentation settings beginning with 0.8 joules and 8 hertz, as well as dusting settings beginning with 0.2 joules and 50 hertz.  High power settings were then titrated and the laser virtual basket was utilized as well.  The stone was completely addressed utilizing the dusting technique      Once the stone had been appropriately fragmented into smaller pieces, a 1.9 [place]ayan zero tip nitinol basket was passed through the ureteroscope.  The residual fragments were basketed out and removed sequentially under vision.  I then reintroduced the endoscope confirmed that the patient was completely stone free and there is no injury to the ureter.      The ureteroscope was backed down the ureter under vision and there were no residual fragments and the ureter was noted to be intact with no injury and no edema where the stone was located.   A JJ stent was then passed up the wire  under fluoroscopic guidance into the kidney with a good curl noted in the kidney and in the bladder.  An externalized tethering string was left in place and secured to the skin. The bladder was drained.      The patient was placed back supine, awakened from general  anesthesia and brought to recovery room in stable condition.      ESTIMATED BLOOD LOSS:  Minimal      SPECIMENS:   Order Name Source Comment Collection Info Order Time   STONE ANALYSIS Kidney, Left  Collected By: Christopher Nicole MD 5/31/2024 11:45 AM        IMPLANTS:   Implant Name Type Inv. Item Serial No.  Lot No. LRB No. Used Action   STENT URETERAL 6 FR 26CM INLAY OPTIMA - [id number]  STENT URETERAL 6 FR 26CM INLAY OPTIMA  [employer] [id number] 1 Implanted        COMPLICATIONS: None    DISPOSITION: PACU    PLAN:  Jack wishes to come into the office for his stent removal.  We will schedule this next week and he will then follow-up in 2 months time with a KUB and renal ultrasound.

## 2024-05-31 NOTE — ANESTHESIA PREPROCEDURE EVALUATION
Procedure:  CYSTOSCOPY URETEROSCOPY WITH LITHOTRIPSY HOLMIUM LASER, RETROGRADE PYELOGRAM AND INSERTION STENT URETERAL (Left: Bladder)    Relevant Problems   CARDIO   (+) Essential hypertension      GI/HEPATIC   (+) Fatty liver   (+) GERD (gastroesophageal reflux disease)      /RENAL   (+) CKD (chronic kidney disease)   (+) Recurrent nephrolithiasis      NPO  (-) URI    Physical Exam    Airway    Mallampati score: III  TM Distance: >3 FB  Neck ROM: full     Dental   No notable dental hx     Cardiovascular      Pulmonary      Other Findings        Anesthesia Plan  ASA Score- 2     Anesthesia Type- general with ASA Monitors.         Additional Monitors:     Airway Plan: LMA.           Plan Factors-Exercise tolerance (METS): >4 METS.    Chart reviewed.    Patient summary reviewed.                  Induction- intravenous.    Postoperative Plan- Plan for postoperative opioid use. Planned trial extubation    Perioperative Resuscitation Plan - Level 1 - Full Code.       Informed Consent- Anesthetic plan and risks discussed with patient.  I personally reviewed this patient with the CRNA. Discussed and agreed on the Anesthesia Plan with the CRNA..

## 2024-06-08 DIAGNOSIS — E29.1 HYPOGONADISM IN MALE: ICD-10-CM

## 2024-06-08 DIAGNOSIS — I10 ESSENTIAL HYPERTENSION: ICD-10-CM

## 2024-06-08 RX ORDER — AMLODIPINE BESYLATE 5 MG/1
5 TABLET ORAL DAILY
Qty: 90 TABLET | Refills: 1 | Status: SHIPPED | OUTPATIENT
Start: 2024-06-08

## 2024-06-10 LAB
CALCIUM OXALATE DIHYDRATE MFR STONE IR: 40 %
COLOR STONE: NORMAL
COM MFR STONE: 50 %
COMMENT-STONE3: NORMAL
COMPOSITION: NORMAL
HYDROXYAPATITE 24H ENGDIFF UR: 10 %
LABORATORY COMMENT REPORT: NORMAL
PHOTO: NORMAL
SIZE STONE: NORMAL MM
SPEC SOURCE SUBJ: NORMAL
STONE ANALYSIS-IMP: NORMAL
WT STONE: 27 MG

## 2024-06-10 RX ORDER — TESTOSTERONE CYPIONATE 200 MG/ML
INJECTION, SOLUTION INTRAMUSCULAR
Qty: 4 ML | Refills: 0 | Status: SHIPPED | OUTPATIENT
Start: 2024-06-10

## 2024-06-20 DIAGNOSIS — E29.1 TESTICULAR HYPOGONADISM: ICD-10-CM

## 2024-06-21 DIAGNOSIS — E29.1 HYPOGONADISM IN MALE: ICD-10-CM

## 2024-06-21 RX ORDER — NEEDLES, SAFETY 18GX1 1/2"
NEEDLE, DISPOSABLE MISCELLANEOUS WEEKLY
Qty: 100 EACH | Refills: 2 | Status: SHIPPED | OUTPATIENT
Start: 2024-06-21

## 2024-06-21 RX ORDER — SILDENAFIL 50 MG/1
50 TABLET, FILM COATED ORAL DAILY PRN
Qty: 30 TABLET | Refills: 0 | Status: SHIPPED | OUTPATIENT
Start: 2024-06-21

## 2024-07-12 ENCOUNTER — APPOINTMENT (OUTPATIENT)
Dept: RADIOLOGY | Age: 64
End: 2024-07-12
Payer: COMMERCIAL

## 2024-07-12 ENCOUNTER — HOSPITAL ENCOUNTER (OUTPATIENT)
Dept: RADIOLOGY | Age: 64
Discharge: HOME | End: 2024-07-12
Payer: COMMERCIAL

## 2024-07-12 DIAGNOSIS — N20.0 KIDNEY STONES: ICD-10-CM

## 2024-07-12 PROCEDURE — 74018 RADEX ABDOMEN 1 VIEW: CPT

## 2024-07-12 PROCEDURE — 76775 US EXAM ABDO BACK WALL LIM: CPT

## 2024-07-18 DIAGNOSIS — F41.9 ANXIETY: ICD-10-CM

## 2024-07-18 RX ORDER — LORAZEPAM 0.5 MG/1
0.5 TABLET ORAL DAILY PRN
Qty: 90 TABLET | Refills: 0 | Status: SHIPPED | OUTPATIENT
Start: 2024-07-18

## 2024-07-25 ENCOUNTER — TELEPHONE (OUTPATIENT)
Dept: ADMINISTRATIVE | Facility: OTHER | Age: 64
End: 2024-07-25

## 2024-07-25 NOTE — TELEPHONE ENCOUNTER
Upon review of the In Basket request we were able to locate, review, and update the patient chart as requested for CRC: Colonoscopy.    Any additional questions or concerns should be emailed to the Practice Liaisons via the appropriate education email address, please do not reply via In Basket.    Thank you  Deangelo Galvan MA   [facility]

## 2024-07-25 NOTE — TELEPHONE ENCOUNTER
----- Message from Charlotte CASTANEDA sent at 7/25/2024  7:57 AM EDT -----  Regarding: Care Gap Request  07/25/24 7:57 AM    Hello, our patient Jack Dewey has had CRC: Colonoscopy completed/performed. Please assist in updating the patient chart by pulling the document from the Media Tab. The date of service is 7/24/2024.     Thank you,  Charlotte Linares  [facility]

## 2024-08-06 ENCOUNTER — OFFICE VISIT (OUTPATIENT)
Dept: UROLOGY | Facility: CLINIC | Age: 64
End: 2024-08-06
Payer: COMMERCIAL

## 2024-08-06 VITALS
HEIGHT: 71 IN | OXYGEN SATURATION: 97 % | BODY MASS INDEX: 30.24 KG/M2 | DIASTOLIC BLOOD PRESSURE: 80 MMHG | WEIGHT: 216 LBS | SYSTOLIC BLOOD PRESSURE: 130 MMHG | HEART RATE: 80 BPM

## 2024-08-06 DIAGNOSIS — R10.9 RIGHT FLANK PAIN: ICD-10-CM

## 2024-08-06 DIAGNOSIS — R10.9 RIGHT FLANK PAIN: Primary | ICD-10-CM

## 2024-08-06 PROCEDURE — 99213 OFFICE O/P EST LOW 20 MIN: CPT

## 2024-08-06 RX ORDER — TAMSULOSIN HYDROCHLORIDE 0.4 MG/1
0.4 CAPSULE ORAL
Qty: 30 CAPSULE | Refills: 0 | Status: SHIPPED | OUTPATIENT
Start: 2024-08-06 | End: 2024-08-06

## 2024-08-06 RX ORDER — KETOROLAC TROMETHAMINE 10 MG/1
10 TABLET, FILM COATED ORAL EVERY 6 HOURS PRN
Qty: 20 TABLET | Refills: 0 | Status: SHIPPED | OUTPATIENT
Start: 2024-08-06 | End: 2024-08-11

## 2024-08-06 RX ORDER — OXYCODONE HYDROCHLORIDE 5 MG/1
5 TABLET ORAL EVERY 6 HOURS PRN
Qty: 8 TABLET | Refills: 0 | Status: CANCELLED | OUTPATIENT
Start: 2024-08-06

## 2024-08-06 RX ORDER — TAMSULOSIN HYDROCHLORIDE 0.4 MG/1
0.4 CAPSULE ORAL
Qty: 90 CAPSULE | Refills: 1 | Status: SHIPPED | OUTPATIENT
Start: 2024-08-06

## 2024-08-06 RX ORDER — OXYCODONE HYDROCHLORIDE AND ACETAMINOPHEN 5; 325 MG/1; MG/1
1 TABLET ORAL EVERY 6 HOURS PRN
Qty: 10 TABLET | Refills: 0 | Status: SHIPPED | OUTPATIENT
Start: 2024-08-06

## 2024-08-06 NOTE — PROGRESS NOTES
8/6/2024      Chief Complaint   Patient presents with    Follow-up         Assessment and Plan    63 y.o. male managed by Dr. Nicole    Nephrolithiasis  -s/p left ureteroscopy 5/31/24 with Dr. Nicole  -Renal US (7/12/24) showing multiple nonobstructing bilateral renal stones. Larger stone burden on left; measuring up to 8 mm. No hydronephrosis. Bilateral ureteral jets detected. Prostatomegaly. Hepatomegatly.   -KUB (7/12/24) showing bilateral stones; heavier stone burden on right  -He is experiencing intermittent right flank pain described as twinges that started this past weekend. He denies urinary symptoms. No fevers or chills.  -Flomax and pain medication sent to patient's pharmacy.  -ER precautions discussed. Patient verbalized understanding.  -He will get a CT renal stone study now and I will be in contact with his results. He is considering getting an elective right ureteroscopy, due to starting of intermittent right flank twinges. He has unfortunately had many stone interventions and would like to prevent severe kidney stone pain.   -Patient would only like to have Dr. Nicole for future stone interventions.      History of Present Illness  Jack Dewey is a 63 y.o. male here for follow-up of nephrolithiasis.  He has had multiple prior endoscopic surgical interventions. He is s/p left ureteroscopy 5/31/24 with Dr. Nicole. He has been having occasional intermittent right flank pain twinges since that patient states started over the weekend. He says it feels like kidney stone pain. He denies urinary symptoms. He denies dysuria, frequency, urgency, and gross hematuria. No nausea or vomiting. No fevers or chills.    He follows with nephrology. His stone analysis revealed calcium oxalate stones. He is on 10 mg of potassium citrate BID and he was started on magnesium by nephrology as well. He states that he drinks well over 64 fl oz of water each day.    He states that he was previously passed a stone  as big as 5 mm but is concerned with the size of his stones in his right kidney.    His PSA was 0.53 (10/5/23). He states that he follows with Dr. Eller, his PCP, for prostate cancer screening.           Review of Systems   Constitutional:  Negative for activity change, chills, fatigue and fever.   HENT:  Negative for congestion, rhinorrhea and sore throat.    Eyes:  Negative for photophobia, redness and visual disturbance.   Respiratory:  Negative for cough, shortness of breath and wheezing.    Cardiovascular:  Negative for chest pain, palpitations and leg swelling.   Gastrointestinal:  Negative for abdominal pain, diarrhea, nausea and vomiting.   Genitourinary:  Positive for flank pain. Negative for difficulty urinating, dysuria, frequency, hematuria and urgency.   Neurological:  Negative for weakness, light-headedness and headaches.           AUA SYMPTOM SCORE      Flowsheet Row Most Recent Value   AUA SYMPTOM SCORE    How often have you had a sensation of not emptying your bladder completely after you finished urinating? 0 (P)     How often have you had to urinate again less than two hours after you finished urinating? 1 (P)     How often have you found you stopped and started again several times when you urinate? 0 (P)     How often have you found it difficult to postpone urination? 1 (P)     How often have you had a weak urinary stream? 2 (P)     How often have you had to push or strain to begin urination? 1 (P)     How many times did you most typically get up to urinate from the time you went to bed at night until the time you got up in the morning? 3 (P)     Quality of Life: If you were to spend the rest of your life with your urinary condition just the way it is now, how would you feel about that? 3 (P)     AUA SYMPTOM SCORE 8 (P)               Vitals  Vitals:    08/06/24 1255   BP: 130/80   BP Location: Left arm   Patient Position: Sitting   Cuff Size: Standard   Pulse: 80   SpO2: 97%   Weight: 98 kg (216  "lb)   Height: 5' 11\" (1.803 m)       Physical Exam  Constitutional:       Appearance: Normal appearance. He is not toxic-appearing.   HENT:      Head: Normocephalic.      Mouth/Throat:      Pharynx: Oropharynx is clear.   Eyes:      Extraocular Movements: Extraocular movements intact.      Pupils: Pupils are equal, round, and reactive to light.   Pulmonary:      Effort: Pulmonary effort is normal. No respiratory distress.   Musculoskeletal:         General: Normal range of motion.      Cervical back: Normal range of motion.   Neurological:      Mental Status: He is alert and oriented to person, place, and time. Mental status is at baseline.      Gait: Gait normal.   Psychiatric:         Mood and Affect: Mood normal.         Behavior: Behavior normal.         Thought Content: Thought content normal.         Judgment: Judgment normal.           Past History  Past Medical History:   Diagnosis Date    Anxiety     Cancer (HCC)     skin ca on nose    Chronic kidney disease     kidney stones    GERD (gastroesophageal reflux disease)     Hyperlipidemia     Hypertension     Kidney stone     2022     Social History     Socioeconomic History    Marital status: /Civil Union     Spouse name: None    Number of children: None    Years of education: None    Highest education level: None   Occupational History    None   Tobacco Use    Smoking status: Former     Current packs/day: 0.00     Average packs/day: 0.8 packs/day for 18.0 years (13.5 ttl pk-yrs)     Types: Cigarettes     Start date:      Quit date:      Years since quittin.6    Smokeless tobacco: Never   Vaping Use    Vaping status: Never Used   Substance and Sexual Activity    Alcohol use: Yes     Alcohol/week: 3.0 standard drinks of alcohol     Types: 1 Glasses of wine, 2 Cans of beer per week     Comment: weekend    Drug use: No    Sexual activity: Yes     Partners: Female   Other Topics Concern    None   Social History Narrative    Daily caffeine " consumption     Social Determinants of Health     Financial Resource Strain: Not on file   Food Insecurity: Not on file   Transportation Needs: Not on file   Physical Activity: Not on file   Stress: Not on file   Social Connections: Not on file   Intimate Partner Violence: Not on file   Housing Stability: Not on file     Social History     Tobacco Use   Smoking Status Former    Current packs/day: 0.00    Average packs/day: 0.8 packs/day for 18.0 years (13.5 ttl pk-yrs)    Types: Cigarettes    Start date:     Quit date:     Years since quittin.6   Smokeless Tobacco Never     Family History   Problem Relation Age of Onset    Other Father         CABG (coronary artery bypass surgery)    Atrial fibrillation Sister     Other Paternal Uncle         Myocardial infarction    Coronary artery disease Family     Diabetes Family     Hypertension Family     Uterine cancer Family        The following portions of the patient's history were reviewed and updated as appropriate: allergies, current medications, past medical history, past social history, past surgical history and problem list.    Results  No results found for this or any previous visit (from the past 1 hour(s)).]  Lab Results   Component Value Date    PSA 0.53 10/05/2023    PSA 0.9 2022    PSA 0.9 2021    PSA 1.0 10/14/2020     Lab Results   Component Value Date    GLUCOSE 97 2016    CALCIUM 10.1 2024     2017    K 4.7 2024    CO2 27 2024     2024    BUN 18 2024    CREATININE 1.00 2024     Lab Results   Component Value Date    WBC 7.01 10/05/2023    HGB 18.4 (H) 10/05/2023    HCT 55.3 (H) 10/05/2023    MCV 89 10/05/2023     10/05/2023       WOLF Franco

## 2024-08-08 DIAGNOSIS — E78.1 HYPERTRIGLYCERIDEMIA: ICD-10-CM

## 2024-08-08 RX ORDER — FENOFIBRATE 145 MG/1
TABLET, COATED ORAL
Qty: 90 TABLET | Refills: 1 | Status: SHIPPED | OUTPATIENT
Start: 2024-08-08

## 2024-08-09 DIAGNOSIS — E29.1 HYPOGONADISM IN MALE: ICD-10-CM

## 2024-08-12 RX ORDER — TESTOSTERONE CYPIONATE 200 MG/ML
INJECTION, SOLUTION INTRAMUSCULAR
Qty: 4 ML | Refills: 0 | Status: SHIPPED | OUTPATIENT
Start: 2024-08-12

## 2024-08-15 ENCOUNTER — HOSPITAL ENCOUNTER (OUTPATIENT)
Dept: RADIOLOGY | Age: 64
Discharge: HOME | End: 2024-08-15
Payer: COMMERCIAL

## 2024-08-15 DIAGNOSIS — R10.9 RIGHT FLANK PAIN: ICD-10-CM

## 2024-08-15 PROCEDURE — 74176 CT ABD & PELVIS W/O CONTRAST: CPT

## 2024-08-19 ENCOUNTER — TELEPHONE (OUTPATIENT)
Dept: UROLOGY | Facility: CLINIC | Age: 64
End: 2024-08-19

## 2024-08-19 NOTE — TELEPHONE ENCOUNTER
I spoke with patient regarding CT results.    His has bilateral kidney stones. They are non obstructing. Larges stone burden is on the left measuring 8 mm and 6 mm on the right.    We did discuss having an elective ESWL.    Patient states he is not currently having the right flank pain twinges and appears to be asymptomatic at this point in time.    He does follow with nephrology and will be seeing them shortly. He is requesting follow-up with Dr. Nicole.

## 2024-08-19 NOTE — TELEPHONE ENCOUNTER
Pt returned call to scheduled.  Next available was 12/17  please advise if this is too far out.    Igf need to reschedule sooner please call pt back at [phone]

## 2024-08-19 NOTE — TELEPHONE ENCOUNTER
Left a voicemail to have patient call back and schedule with [facility] per [facility] recommendations. Office phone number given.

## 2024-08-20 ENCOUNTER — PATIENT MESSAGE (OUTPATIENT)
Dept: UROLOGY | Facility: CLINIC | Age: 64
End: 2024-08-20

## 2024-08-21 ENCOUNTER — TELEPHONE (OUTPATIENT)
Dept: NEPHROLOGY | Facility: CLINIC | Age: 64
End: 2024-08-21

## 2024-08-21 DIAGNOSIS — E83.42 HYPOMAGNESEMIA: ICD-10-CM

## 2024-08-21 DIAGNOSIS — E83.39 HYPOPHOSPHATEMIA: ICD-10-CM

## 2024-08-21 DIAGNOSIS — N20.0 RECURRENT NEPHROLITHIASIS: Primary | ICD-10-CM

## 2024-08-21 DIAGNOSIS — R82.991 HYPOCITRATURIA: ICD-10-CM

## 2024-08-21 NOTE — TELEPHONE ENCOUNTER
----- Message from Nicholas Lunsford MD sent at 8/21/2024  3:17 PM EDT -----  Ordered previsit labs  ----- Message -----  From: Kelly Gaytan MA  Sent: 8/21/2024  10:16 AM EDT  To: Nicholas Lunsford MD    Scheduled 8/30 no active labs in epic.

## 2024-08-22 ENCOUNTER — APPOINTMENT (OUTPATIENT)
Dept: LAB | Age: 64
End: 2024-08-22
Payer: COMMERCIAL

## 2024-08-22 DIAGNOSIS — E83.42 HYPOMAGNESEMIA: ICD-10-CM

## 2024-08-22 DIAGNOSIS — E83.39 HYPOPHOSPHATEMIA: ICD-10-CM

## 2024-08-22 DIAGNOSIS — K76.0 FATTY METAMORPHOSIS OF LIVER: ICD-10-CM

## 2024-08-22 DIAGNOSIS — N20.0 RECURRENT NEPHROLITHIASIS: ICD-10-CM

## 2024-08-22 DIAGNOSIS — R82.991 HYPOCITRATURIA: ICD-10-CM

## 2024-08-22 LAB
ALBUMIN SERPL BCG-MCNC: 4.2 G/DL (ref 3.5–5)
ALP SERPL-CCNC: 43 U/L (ref 34–104)
ALT SERPL W P-5'-P-CCNC: 20 U/L (ref 7–52)
ANION GAP SERPL CALCULATED.3IONS-SCNC: 9 MMOL/L (ref 4–13)
AST SERPL W P-5'-P-CCNC: 22 U/L (ref 13–39)
BASOPHILS # BLD AUTO: 0.09 THOUSANDS/ÂΜL (ref 0–0.1)
BASOPHILS NFR BLD AUTO: 1 % (ref 0–1)
BILIRUB SERPL-MCNC: 0.52 MG/DL (ref 0.2–1)
BUN SERPL-MCNC: 22 MG/DL (ref 5–25)
CALCIUM SERPL-MCNC: 10 MG/DL (ref 8.4–10.2)
CHLORIDE SERPL-SCNC: 101 MMOL/L (ref 96–108)
CO2 SERPL-SCNC: 27 MMOL/L (ref 21–32)
CREAT SERPL-MCNC: 1.25 MG/DL (ref 0.6–1.3)
EOSINOPHIL # BLD AUTO: 0.26 THOUSAND/ÂΜL (ref 0–0.61)
EOSINOPHIL NFR BLD AUTO: 4 % (ref 0–6)
ERYTHROCYTE [DISTWIDTH] IN BLOOD BY AUTOMATED COUNT: 13.2 % (ref 11.6–15.1)
GFR SERPL CREATININE-BSD FRML MDRD: 61 ML/MIN/1.73SQ M
GLUCOSE P FAST SERPL-MCNC: 94 MG/DL (ref 65–99)
HCT VFR BLD AUTO: 55 % (ref 36.5–49.3)
HGB BLD-MCNC: 17.6 G/DL (ref 12–17)
IMM GRANULOCYTES # BLD AUTO: 0.03 THOUSAND/UL (ref 0–0.2)
IMM GRANULOCYTES NFR BLD AUTO: 1 % (ref 0–2)
LYMPHOCYTES # BLD AUTO: 1.95 THOUSANDS/ÂΜL (ref 0.6–4.47)
LYMPHOCYTES NFR BLD AUTO: 31 % (ref 14–44)
MAGNESIUM SERPL-MCNC: 1.9 MG/DL (ref 1.9–2.7)
MCH RBC QN AUTO: 28.8 PG (ref 26.8–34.3)
MCHC RBC AUTO-ENTMCNC: 32 G/DL (ref 31.4–37.4)
MCV RBC AUTO: 90 FL (ref 82–98)
MONOCYTES # BLD AUTO: 0.79 THOUSAND/ÂΜL (ref 0.17–1.22)
MONOCYTES NFR BLD AUTO: 13 % (ref 4–12)
NEUTROPHILS # BLD AUTO: 3.12 THOUSANDS/ÂΜL (ref 1.85–7.62)
NEUTS SEG NFR BLD AUTO: 50 % (ref 43–75)
NRBC BLD AUTO-RTO: 0 /100 WBCS
PHOSPHATE SERPL-MCNC: 2.1 MG/DL (ref 2.3–4.1)
PLATELET # BLD AUTO: 290 THOUSANDS/UL (ref 149–390)
PMV BLD AUTO: 10.9 FL (ref 8.9–12.7)
POTASSIUM SERPL-SCNC: 4.8 MMOL/L (ref 3.5–5.3)
PROT SERPL-MCNC: 7.7 G/DL (ref 6.4–8.4)
PTH-INTACT SERPL-MCNC: 14.7 PG/ML (ref 12–88)
RBC # BLD AUTO: 6.11 MILLION/UL (ref 3.88–5.62)
SODIUM SERPL-SCNC: 137 MMOL/L (ref 135–147)
WBC # BLD AUTO: 6.24 THOUSAND/UL (ref 4.31–10.16)

## 2024-08-22 PROCEDURE — 36415 COLL VENOUS BLD VENIPUNCTURE: CPT

## 2024-08-22 PROCEDURE — 84100 ASSAY OF PHOSPHORUS: CPT

## 2024-08-22 PROCEDURE — 83735 ASSAY OF MAGNESIUM: CPT

## 2024-08-22 PROCEDURE — 83970 ASSAY OF PARATHORMONE: CPT

## 2024-08-22 PROCEDURE — 85025 COMPLETE CBC W/AUTO DIFF WBC: CPT

## 2024-08-22 PROCEDURE — 80053 COMPREHEN METABOLIC PANEL: CPT

## 2024-08-26 DIAGNOSIS — I10 ESSENTIAL HYPERTENSION: ICD-10-CM

## 2024-08-26 DIAGNOSIS — E29.1 TESTICULAR HYPOGONADISM: ICD-10-CM

## 2024-08-27 RX ORDER — LISINOPRIL 10 MG/1
TABLET ORAL
Qty: 135 TABLET | Refills: 1 | Status: SHIPPED | OUTPATIENT
Start: 2024-08-27

## 2024-08-27 RX ORDER — SILDENAFIL 50 MG/1
TABLET, FILM COATED ORAL
Qty: 30 TABLET | Refills: 5 | Status: SHIPPED | OUTPATIENT
Start: 2024-08-27

## 2024-08-28 LAB — MISCELLANEOUS LAB TEST RESULT: NORMAL

## 2024-08-30 ENCOUNTER — OFFICE VISIT (OUTPATIENT)
Dept: NEPHROLOGY | Facility: CLINIC | Age: 64
End: 2024-08-30
Payer: COMMERCIAL

## 2024-08-30 VITALS
DIASTOLIC BLOOD PRESSURE: 88 MMHG | BODY MASS INDEX: 29.82 KG/M2 | SYSTOLIC BLOOD PRESSURE: 122 MMHG | WEIGHT: 213 LBS | HEIGHT: 71 IN

## 2024-08-30 DIAGNOSIS — I10 ESSENTIAL HYPERTENSION: ICD-10-CM

## 2024-08-30 DIAGNOSIS — R79.89 LOW SERUM PARATHYROID HORMONE (PTH): ICD-10-CM

## 2024-08-30 DIAGNOSIS — N20.0 RECURRENT NEPHROLITHIASIS: Primary | ICD-10-CM

## 2024-08-30 DIAGNOSIS — E83.42 HYPOMAGNESEMIA: ICD-10-CM

## 2024-08-30 DIAGNOSIS — E83.39 HYPOPHOSPHATEMIA: ICD-10-CM

## 2024-08-30 DIAGNOSIS — R82.991 HYPOCITRATURIA: ICD-10-CM

## 2024-08-30 PROCEDURE — 99214 OFFICE O/P EST MOD 30 MIN: CPT | Performed by: INTERNAL MEDICINE

## 2024-08-30 NOTE — PATIENT INSTRUCTIONS
Renal function is stable, blood pressure is stable continue same treatment, check stone risk profile within next 2 weeks  Continue oral hydration around 70 ounces per day, continue low oxalate diet and continue potassium citrate tablets  Recommend continuing low-sodium diet as well    Follow-up in 6 months with repeat labs before the office visit

## 2024-08-30 NOTE — PROGRESS NOTES
NEPHROLOGY OUTPATIENT PROGRESS NOTE   Jack Dewey 63 y.o. male MRN: [id number]  DATE: 8/30/2024  Reason for visit:   Chief Complaint   Patient presents with    Follow-up    Nephrolithiasis       ASSESSMENT and PLAN:  Recurrent nephrolithiasis/hypocitraturia  -kidney stone analysis from June 22, 2022 suggestive of 80% calcium oxalate dihydrate and 20% calcium oxalate monohydrate.  -stone risk profile from December 2020: Urine volume 2.5 L, urine citratrate 243 mg which is low  -previous stone analysis from 2021 as well as 2019 suggestive of calcium oxalate stones but around 40% calcium oxalate dihydrate  -24-hour stone risk profile from August 2022 showed- Urine volume was adequate at 2.3 L. 24 hour urine citrate was 249 low.  pH was around 6.9  -4-hour stone risk profile from October 2023, total volume was 2.5 L, his urinary citrate improved to normal range at 338  -status post multiple urological procedures for recurrent nephrolithiasis.  In June 2022, 2022 underwent cystoscopy with lithotripsy and insertion of stent, stone basket extraction on the left side.  -05/31/24- had cystoscopy , lithotripsy and stone basket extraction.   -x-ray KUB from July 5th 2022 still with persistent bilateral renal calculi which are unchanged.  -renal ultrasound from June 2022 with finding of multiple calculi in both kidneys mild left hydronephrosis  -X-ray KUB from December 2022 showed bilateral nephrolithiasis largest on the right measuring 6 mm in largest on the left measuring 11 mm in size  -CT abdomen from August 2024, multiple stones largest measuring 6 mm on right and multiple stones in the left kidney with largest measuring 8 mm on the left  -PTH level has been persistently low  -Was taken off vitamin C after initial office visit  -Plan  No recent stone episodes, Continue  hydration with water 3 to 4 L/day.  Recommend low-sodium diet and low animal protein. Continue Urocit-K 10 mEq twice daily  Using lower dose as urine  pH is around 6.9 previously .  Last 24-hour urine test urine pH was trending down to 6.4, will repeat 24-hour stone risk profile and monitor.  May consider use of HCTZ if 24-hour urine calcium is high but would consider really low dose as serum calcium is around 10 and there is a risk of hypercalcemia    24 hr stone risk profile      Primary Hypertension:   -Current medication:  Lisinopril 15 mg daily, amlodipine 5 mg daily  -Current blood pressure:  stable and at goal   -Plan:     Continue same medications  -Recommend 2 g sodium diet.    -Discussed home monitoring of BP and maintaining a log of blood pressure.  -Recommend goal BP less than 130/80.     Hypomagnesemia: Resolved last magnesium level was 1.9, continue magnesium tablet 84 mg once daily    Hypophosphatemia  -level  2.1  -vitamin D wnl 52.9, vitamin D 125 was 56.8  -Etiology for hypophosphatemia not clear, urinary phosphorus was around 600 in the setting of hypophosphatemia which is slightly high.  Patient is not on any medication which could cause hypophosphatemia.  Not considering Fanconi syndrome.  Denies diarrhea .PTH is actually low so less likely primary or secondary hyperparathyroidism as a cause  -Continue high intake of dairy products, phosphorus level gradually improving to 2.1    Low serum PTH  -PTH level low last PTH was 14.7.    patient denies any radiation or neck surgery calcium level is at normal range     Right renal cyst, renal ultrasound showed simple cyst measuring 1.1 cm in the right kidney, no need for further follow-up.       Patient Instructions   Renal function is stable, blood pressure is stable continue same treatment, check stone risk profile within next 2 weeks  Continue oral hydration around 70 ounces per day, continue low oxalate diet and continue potassium citrate tablets  Recommend continuing low-sodium diet as well    Follow-up in 6 months with repeat labs before the office visit  Diagnoses and all orders for this  visit:    Recurrent nephrolithiasis  -     Stone risk profile; Future  -     Basic metabolic panel; Future  -     Protein / creatinine ratio, urine; Future  -     Urinalysis with microscopic; Future  -     Phosphorus; Future  -     Magnesium; Future    Essential hypertension  -     Basic metabolic panel; Future    Hypomagnesemia  -     Magnesium; Future    Hypocitraturia  -     Basic metabolic panel; Future    Hypophosphatemia  -     Phosphorus; Future    Low serum parathyroid hormone (PTH)  -     PTH, intact; Future              SUBJECTIVE / HPI:  Jack Dewey is a 63 y.o.  male with history of HTN, colon polyp presenting for follow-up of recurrent nephrolithiasis. STARTED  At the age of 32 yrs of age.  Says had lithotripsy 8-9 times and 3 cystoscopies so far.   -  Previously was following with Dr. Combs and now transferring care.  He was found to have low 24 hour urine citrate and was started on potassium citrate 10 mEq t.i.d.    Repeat  24 hour urine stone risk profile.  Urine volume was adequate at 2.3 L. 24 hour urine citrate was 249 low.     Was started on uro crit K 10 mEq twice daily during visit and told to continue same     Continue low oxalate diet.  Cousin on mother side had kidney stones      On June 22, 2022 patient underwent cystoscopy, lithotripsy and extraction of stone  05/31/24- had cystoscopy , lithotripsy and stone basket extraction.      Reviewed blood work from 8/22/2024 creatinine 1.25 mg/dL.  Electrolytes stable, phosphorus is still low at 2.1, magnesium improved to 1.9.  Hemoglobin 17.6, PTH 14.7    No new complaints  No flank pain     REVIEW OF SYSTEMS:    Review of Systems   Constitutional:  Negative for activity change, appetite change, chills, diaphoresis, fatigue and fever.   HENT:  Negative for congestion, ear pain, facial swelling, nosebleeds and sore throat.    Eyes:  Negative for pain and visual disturbance.   Respiratory:  Negative for cough, chest tightness and shortness of  breath.    Cardiovascular:  Negative for chest pain and palpitations.   Gastrointestinal:  Negative for abdominal distention, abdominal pain, diarrhea, nausea and vomiting.   Genitourinary:  Negative for difficulty urinating, dysuria, flank pain, frequency and hematuria.   Musculoskeletal:  Negative for arthralgias, back pain and joint swelling.   Skin:  Negative for color change and rash.   Neurological:  Negative for dizziness, seizures, syncope, weakness and headaches.   Psychiatric/Behavioral:  Negative for agitation and confusion. The patient is not nervous/anxious.    All other systems reviewed and are negative.      More than 10 point review of systems were obtained and discussed in length with the patient. Complete review of systems were negative / unremarkable except mentioned above.       PAST MEDICAL HISTORY:  Past Medical History:   Diagnosis Date    Anxiety     Cancer (HCC)     skin ca on nose    Chronic kidney disease     kidney stones    GERD (gastroesophageal reflux disease)     Hyperlipidemia     Hypertension     Kidney stone     6/2022       PAST SURGICAL HISTORY:  Past Surgical History:   Procedure Laterality Date    COLONOSCOPY      ESOPHAGOGASTRODUODENOSCOPY      EXTRACORPOREAL SHOCK WAVE LITHOTRIPSY Bilateral     Renal Multiple times    FL RETROGRADE PYELOGRAM  3/31/2022    FL RETROGRADE PYELOGRAM  6/22/2022    FL RETROGRADE PYELOGRAM  5/31/2024    NV CYSTO/URETERO W/LITHOTRIPSY &INDWELL STENT INSRT Left 8/6/2020    Procedure: CYSTOSCOPY URETEROSCOPY WITH LITHOTRIPSY HOLMIUM LASER, AND INSERTION STENT URETERAL;  Surgeon: Otis Rutledge MD;  Location: [facility];  Service: Urology    NV CYSTO/URETERO W/LITHOTRIPSY &INDWELL STENT INSRT Right 3/31/2022    Procedure: CYSTOSCOPY URETEROSCOPY WITH LITHOTRIPSY HOLMIUM LASER,BASKET STONE EXTRACTION, RETROGRADE PYELOGRAM AND INSERTION STENT URETERAL;  Surgeon: Jamal Finley MD;  Location: [facility];  Service: Urology    NV CYSTO/URETERO W/LITHOTRIPSY  &INDWELL STENT INSRT Left 2022    Procedure: CYSTOSCOPY URETEROSCOPY WITH LITHOTRIPSY HOLMIUM LASER, RETROGRADE PYELOGRAM AND INSERTION STENT URETERAL, STONE BASKET EXTRACTION;  Surgeon: Ed Beck MD;  Location[facility];  Service: Urology    NH CYSTO/URETERO W/LITHOTRIPSY &INDWELL STENT INSRT Left 2024    Procedure: CYSTOSCOPY URETEROSCOPY WITH LITHOTRIPSY HOLMIUM LASER, STONE BASKET EXTRACTION, RETROGRADE PYELOGRAM AND INSERTION STENT URETERAL;  Surgeon: Christopher Nicole MD;  [facility];  Service: Urology    NH EXC B9 LESION MRGN XCP SK TG F/E/E/N/L/M > 4.0CM Right 2016    Procedure: EXCISION  LESION UPPER EYELID, COMPLEX CLOSURE;  Surgeon: Gianluca Salazar MD;  Location: [facility];  Service: Plastics    NH LITHOTRIPSY XTRCORP SHOCK WAVE Right 2019    Procedure: LITHROTRIPSY EXTRACORPORAL SHOCKWAVE (ESWL);  Surgeon: Orlando Crouch MD;  Location: [facility];  Service: Urology       SOCIAL HISTORY:  Social History     Substance and Sexual Activity   Alcohol Use Yes    Alcohol/week: 3.0 standard drinks of alcohol    Types: 1 Glasses of wine, 2 Cans of beer per week    Comment: weekend     Social History     Substance and Sexual Activity   Drug Use No     Social History     Tobacco Use   Smoking Status Former    Current packs/day: 0.00    Average packs/day: 0.8 packs/day for 18.0 years (13.5 ttl pk-yrs)    Types: Cigarettes    Start date:     Quit date:     Years since quittin.6   Smokeless Tobacco Never       FAMILY HISTORY:  Family History   Problem Relation Age of Onset    Other Father         CABG (coronary artery bypass surgery)    Atrial fibrillation Sister     Other Paternal Uncle         Myocardial infarction    Coronary artery disease Family     Diabetes Family     Hypertension Family     Uterine cancer Family        MEDICATIONS:    Current Outpatient Medications:     amLODIPine (NORVASC) 5 mg tablet, TAKE 1 TABLET DAILY, Disp: 90 tablet, Rfl: 1    b  "complex vitamins capsule, Take 1 capsule by mouth daily., Disp: , Rfl:     Cholecalciferol (VITAMIN D3) 1000 units CAPS, Take 1 capsule by mouth daily, Disp: , Rfl:     coenzyme Q-10 100 MG capsule, Take 1 capsule by mouth daily, Disp: , Rfl:     cyanocobalamin (VITAMIN B-12) 100 mcg tablet, Take by mouth daily., Disp: , Rfl:     fenofibrate (TRICOR) 145 mg tablet, TAKE 1 TABLET DAILY, Disp: 90 tablet, Rfl: 1    fexofenadine (ALLEGRA) 180 MG tablet, Take 180 mg by mouth every evening, Disp: , Rfl:     ketorolac (TORADOL) 10 mg tablet, Take 1 tablet (10 mg total) by mouth every 6 (six) hours as needed for moderate pain for up to 5 days, Disp: 20 tablet, Rfl: 0    lisinopril (ZESTRIL) 10 mg tablet, TAKE 1 AND 1/2 TABLETS(15  MG TOTAL) DAILY, Disp: 135 tablet, Rfl: 1    LORazepam (ATIVAN) 0.5 mg tablet, Take 1 tablet (0.5 mg total) by mouth daily as needed for anxiety, Disp: 90 tablet, Rfl: 0    magnesium (MAGTAB) 84 MG (7MEQ) TBCR, Take 1 tablet (84 mg total) by mouth daily, Disp: 90 tablet, Rfl: 3    multivitamin (THERAGRAN) TABS, Take 1 tablet by mouth daily., Disp: , Rfl:     Omega-3 Fatty Acids (Fish Oil) 1200 MG CPDR, Take by mouth, Disp: , Rfl:     omeprazole (PriLOSEC) 20 mg delayed release capsule, Take 20 mg by mouth daily, Disp: , Rfl:     oxyCODONE-acetaminophen (Percocet) 5-325 mg per tablet, Take 1 tablet by mouth every 6 (six) hours as needed for moderate pain Max Daily Amount: 4 tablets, Disp: 10 tablet, Rfl: 0    potassium citrate (UROCIT-K) 10 mEq, Take 1 tablet (10 mEq total) by mouth 2 (two) times a day, Disp: 180 tablet, Rfl: 3    sildenafil (VIAGRA) 50 MG tablet, TAKE 1 TABLET DAILY AS     NEEDED FOR ERECTILE        DYSFUNCTION, Disp: 30 tablet, Rfl: 5    SYRINGE-NEEDLE, DISP, 3 ML (BD Eclipse Syringe) 25G X 1\" 3 ML MISC, Use once a week, Disp: 100 each, Rfl: 2    tamsulosin (FLOMAX) 0.4 mg, Take 1 capsule (0.4 mg total) by mouth daily with dinner, Disp: 90 capsule, Rfl: 1    testosterone " "cypionate (DEPO-TESTOSTERONE) 200 mg/mL SOLN, Inject 0.4 mL intramuscularly into the shoulder, thigh, or buttocks once weekly discard the remainder, Disp: 4 mL, Rfl: 0    docusate sodium (COLACE) 100 mg capsule, Take 1 capsule (100 mg total) by mouth 2 (two) times a day for 15 days, Disp: 30 capsule, Rfl: 0    ondansetron (ZOFRAN) 8 mg tablet, Take 1 tablet (8 mg total) by mouth every 8 (eight) hours as needed for nausea or vomiting, Disp: 20 tablet, Rfl: 0      PHYSICAL EXAM:  Vitals:    08/30/24 1348   BP: 122/88   BP Location: Left arm   Patient Position: Sitting   Cuff Size: Large   Weight: 96.6 kg (213 lb)   Height: 5' 11\" (1.803 m)     Body mass index is 29.71 kg/m².    Physical Exam  Constitutional:       General: He is not in acute distress.     Appearance: He is well-developed. He is not diaphoretic.   HENT:      Head: Normocephalic and atraumatic.      Mouth/Throat:      Mouth: Mucous membranes are moist.   Eyes:      General: No scleral icterus.     Conjunctiva/sclera: Conjunctivae normal.      Pupils: Pupils are equal, round, and reactive to light.   Neck:      Thyroid: No thyromegaly.   Cardiovascular:      Rate and Rhythm: Normal rate and regular rhythm.      Heart sounds: Normal heart sounds. No murmur heard.     No friction rub.   Pulmonary:      Effort: Pulmonary effort is normal. No respiratory distress.      Breath sounds: Normal breath sounds. No wheezing or rales.   Abdominal:      General: Bowel sounds are normal. There is no distension.      Palpations: Abdomen is soft.      Tenderness: There is no abdominal tenderness.   Musculoskeletal:         General: No deformity.      Cervical back: Neck supple.      Right lower leg: No edema.      Left lower leg: No edema.   Lymphadenopathy:      Cervical: No cervical adenopathy.   Skin:     Coloration: Skin is not pale.      Nails: There is no clubbing.   Neurological:      Mental Status: He is alert and oriented to person, place, and time. He is not " disoriented.   Psychiatric:         Mood and Affect: Mood normal. Mood is not anxious. Affect is not inappropriate.         Behavior: Behavior normal.         Thought Content: Thought content normal.         Lab Results:   Results for orders placed or performed in visit on 08/22/24   PTH, intact   Result Value Ref Range    PTH 14.7 12.0 - 88.0 pg/mL   Phosphorus   Result Value Ref Range    Phosphorus 2.1 (L) 2.3 - 4.1 mg/dL   Magnesium   Result Value Ref Range    Magnesium 1.9 1.9 - 2.7 mg/dL   CBC and differential   Result Value Ref Range    WBC 6.24 4.31 - 10.16 Thousand/uL    RBC 6.11 (H) 3.88 - 5.62 Million/uL    Hemoglobin 17.6 (H) 12.0 - 17.0 g/dL    Hematocrit 55.0 (H) 36.5 - 49.3 %    MCV 90 82 - 98 fL    MCH 28.8 26.8 - 34.3 pg    MCHC 32.0 31.4 - 37.4 g/dL    RDW 13.2 11.6 - 15.1 %    MPV 10.9 8.9 - 12.7 fL    Platelets 290 149 - 390 Thousands/uL    nRBC 0 /100 WBCs    Segmented % 50 43 - 75 %    Immature Grans % 1 0 - 2 %    Lymphocytes % 31 14 - 44 %    Monocytes % 13 (H) 4 - 12 %    Eosinophils Relative 4 0 - 6 %    Basophils Relative 1 0 - 1 %    Absolute Neutrophils 3.12 1.85 - 7.62 Thousands/µL    Absolute Immature Grans 0.03 0.00 - 0.20 Thousand/uL    Absolute Lymphocytes 1.95 0.60 - 4.47 Thousands/µL    Absolute Monocytes 0.79 0.17 - 1.22 Thousand/µL    Eosinophils Absolute 0.26 0.00 - 0.61 Thousand/µL    Basophils Absolute 0.09 0.00 - 0.10 Thousands/µL   Comprehensive metabolic panel   Result Value Ref Range    Sodium 137 135 - 147 mmol/L    Potassium 4.8 3.5 - 5.3 mmol/L    Chloride 101 96 - 108 mmol/L    CO2 27 21 - 32 mmol/L    ANION GAP 9 4 - 13 mmol/L    BUN 22 5 - 25 mg/dL    Creatinine 1.25 0.60 - 1.30 mg/dL    Glucose, Fasting 94 65 - 99 mg/dL    Calcium 10.0 8.4 - 10.2 mg/dL    AST 22 13 - 39 U/L    ALT 20 7 - 52 U/L    Alkaline Phosphatase 43 34 - 104 U/L    Total Protein 7.7 6.4 - 8.4 g/dL    Albumin 4.2 3.5 - 5.0 g/dL    Total Bilirubin 0.52 0.20 - 1.00 mg/dL    eGFR 61 ml/min/1.73sq m  "  MISCELLANEOUS LAB TEST   Result Value Ref Range    Miscellaneous Lab Test Result enhanced liver fibrosis score        Lab Results:         Invalid input(s): \"ALBUMIN\"    Laboratory Results:  Lab Results   Component Value Date    WBC 6.24 08/22/2024    HGB 17.6 (H) 08/22/2024    HCT 55.0 (H) 08/22/2024    MCV 90 08/22/2024     08/22/2024     Lab Results   Component Value Date    SODIUM 137 08/22/2024    K 4.8 08/22/2024     08/22/2024    CO2 27 08/22/2024    BUN 22 08/22/2024    CREATININE 1.25 08/22/2024    GLUC 94 03/31/2022    CALCIUM 10.0 08/22/2024     Lab Results   Component Value Date    CALCIUM 10.0 08/22/2024    PHOS 2.1 (L) 08/22/2024     No results found for: \"LABPROT\"  [unfilled]  Lab Results   Component Value Date    PTH 14.7 08/22/2024    CALCIUM 10.0 08/22/2024    PHOS 2.1 (L) 08/22/2024     [unfilled]  "

## 2024-10-14 DIAGNOSIS — E29.1 HYPOGONADISM IN MALE: ICD-10-CM

## 2024-10-15 RX ORDER — TESTOSTERONE CYPIONATE 200 MG/ML
INJECTION, SOLUTION INTRAMUSCULAR
Qty: 4 ML | Refills: 0 | Status: SHIPPED | OUTPATIENT
Start: 2024-10-15

## 2024-11-24 DIAGNOSIS — I10 ESSENTIAL HYPERTENSION: ICD-10-CM

## 2024-11-26 RX ORDER — AMLODIPINE BESYLATE 5 MG/1
5 TABLET ORAL DAILY
Qty: 90 TABLET | Refills: 1 | Status: SHIPPED | OUTPATIENT
Start: 2024-11-26

## 2024-12-02 DIAGNOSIS — F41.9 ANXIETY: ICD-10-CM

## 2024-12-04 RX ORDER — LORAZEPAM 0.5 MG/1
0.5 TABLET ORAL DAILY PRN
Qty: 90 TABLET | Refills: 0 | Status: SHIPPED | OUTPATIENT
Start: 2024-12-04

## 2024-12-04 NOTE — TELEPHONE ENCOUNTER
Patient is no longer taking medication percocet. He states he was on this for kidney stone removal surgery, but does not take on routine basis.

## 2024-12-09 NOTE — PATIENT COMMUNICATION
Patient called today to reschedule 12/17 appt with Dr Nicole to next available due to work schedule.    Pt is rescheduled to 3/5/25 at 3 PM in [place] with Dr Nicole. Pt states this appt is to ask questions regarding last procedure. Pt stated that it isn't urgent and is okay with appt date.  Pt is on the wait list.    No further action needed at this time.

## 2024-12-19 ENCOUNTER — RESULTS FOLLOW-UP (OUTPATIENT)
Dept: INTERNAL MEDICINE CLINIC | Facility: CLINIC | Age: 64
End: 2024-12-19

## 2024-12-19 ENCOUNTER — APPOINTMENT (OUTPATIENT)
Dept: LAB | Age: 64
End: 2024-12-19
Payer: COMMERCIAL

## 2024-12-19 DIAGNOSIS — Z12.5 SCREENING PSA (PROSTATE SPECIFIC ANTIGEN): ICD-10-CM

## 2024-12-19 LAB — PSA SERPL-MCNC: 1.24 NG/ML (ref 0–4)

## 2024-12-19 PROCEDURE — G0103 PSA SCREENING: HCPCS

## 2024-12-19 PROCEDURE — 36415 COLL VENOUS BLD VENIPUNCTURE: CPT

## 2024-12-23 ENCOUNTER — OFFICE VISIT (OUTPATIENT)
Dept: INTERNAL MEDICINE CLINIC | Facility: CLINIC | Age: 64
End: 2024-12-23
Payer: COMMERCIAL

## 2024-12-23 VITALS
WEIGHT: 217 LBS | HEART RATE: 85 BPM | BODY MASS INDEX: 30.27 KG/M2 | DIASTOLIC BLOOD PRESSURE: 80 MMHG | SYSTOLIC BLOOD PRESSURE: 126 MMHG | OXYGEN SATURATION: 98 %

## 2024-12-23 DIAGNOSIS — K76.0 FATTY LIVER: ICD-10-CM

## 2024-12-23 DIAGNOSIS — E66.811 OBESITY (BMI 30.0-34.9): Primary | Chronic | ICD-10-CM

## 2024-12-23 DIAGNOSIS — N20.0 RECURRENT NEPHROLITHIASIS: ICD-10-CM

## 2024-12-23 DIAGNOSIS — R73.03 PREDIABETES: ICD-10-CM

## 2024-12-23 DIAGNOSIS — I10 ESSENTIAL HYPERTENSION: ICD-10-CM

## 2024-12-23 DIAGNOSIS — E78.5 DYSLIPIDEMIA: ICD-10-CM

## 2024-12-23 DIAGNOSIS — K21.9 GASTROESOPHAGEAL REFLUX DISEASE WITHOUT ESOPHAGITIS: ICD-10-CM

## 2024-12-23 DIAGNOSIS — Z12.11 SCREENING FOR COLON CANCER: ICD-10-CM

## 2024-12-23 DIAGNOSIS — R97.20 INCREASED PROSTATE SPECIFIC ANTIGEN (PSA) VELOCITY: ICD-10-CM

## 2024-12-23 PROCEDURE — 99214 OFFICE O/P EST MOD 30 MIN: CPT | Performed by: INTERNAL MEDICINE

## 2024-12-23 NOTE — ASSESSMENT & PLAN NOTE
I have counselled the pt to follow a healthy and balanced diet ,and recommend routine exercise.  His weight loss  Orders:  •  Ambulatory Referral to Gastroenterology; Future

## 2024-12-23 NOTE — PROGRESS NOTES
Name: Jack Dewey      : 1960      MRN: [id number]  Encounter Provider: Guillermo Eller DO  Encounter Date: 2024   Encounter department: [facility]  :  Assessment & Plan  Obesity (BMI 30.0-34.9)  Obesity -I have counseled patient following healthy and balanced diet, I would like the patient to lose weight, I would like the patient exercise routinely; we will continue monitor the patient's progress.  Orders:  •  Ambulatory Referral to Nutrition Services; Future  We did discuss possible consideration for GLP-1 in the future  Essential hypertension  Hypertension - controlled, I have counseled patient following healthy balance diet, I would like the patient reduce sodium, exercise routinely, I would like the patient continued the med current medical regiment and we will continue to monitor.       Dyslipidemia  Hyperlipidemia controlled continue with current medical regiment recommend a low-cholesterol diet and recommend routine exercise we will continue to monitor the progress.  Continue Tricor/fish oil patient would like to hold off on statin at this point  Orders:  •  Comprehensive metabolic panel; Future  •  Lipid Panel with Direct LDL reflex; Future    Increased prostate specific antigen (PSA) velocity  Will repeat PSA patient has a follow-up with his urologist he can discuss this further at the next routine follow-up will repeat PSA prior to this  Orders:  •  PSA Total, Diagnostic; Future    Prediabetes  Pre diabetes -I have counseled the patient to follow a healthy balanced diet, I have counseled patient reduce carbohydrates and sweets in the diet, I would like the patient exercise routinely.  I will be checking hemoglobin A1c and comprehensive metabolic panel.  Have counseled patient about the prevention of diabetes, and the risk of progression to type 2 diabetes.  Orders:  •  Hemoglobin A1C; Future    Screening for colon cancer    Orders:  •  Ambulatory Referral to  Gastroenterology; Future    Gastroesophageal reflux disease without esophagitis  Clinically stable and doing well continue the current medical regiment will continue monitor.  Continue Prilosec 20 g once daily  Orders:  •  Ambulatory Referral to Gastroenterology; Future    Fatty liver  I have counselled the pt to follow a healthy and balanced diet ,and recommend routine exercise.  His weight loss  Orders:  •  Ambulatory Referral to Gastroenterology; Future    Recurrent nephrolithiasis  Currently stable reviewed urology report will have patient see the dietitian to discuss specific diet regarding kidney stone       RTO in 6 months call with any problems       History of Present Illness     HPI 64-year old male coming in for a follow up visit regarding obesity, essential hypertension, hyperlipidemia, prediabetes, GERD, fatty liver and recurrent kidney stones; the patient reports me compliant taking medications without untoward side effects the.  The patient is here to review his medical condition, update me on the medical condition and the patient reports me no hospitalizations and no ER visits.  No injuries no illnesses, prior to follow-up and balanced diet difficult to balanced diet with the kidney stone diet the prediabetes diet and diet for hyperlipidemia would like to meet with a dietitian.  GERD currently stable no injuries no illnesses prior to follow-up and balanced diet as active as usual would like a dietitian needs low oxylate ,  25-30 min treadmill and ligh weights 3 times per week  Review of Systems   Constitutional:  Negative for activity change, appetite change and unexpected weight change.   HENT:  Negative for congestion and postnasal drip.    Eyes:  Negative for visual disturbance.   Respiratory:  Negative for cough and shortness of breath.    Cardiovascular:  Negative for chest pain.   Gastrointestinal:  Negative for abdominal pain, diarrhea, nausea and vomiting.   Neurological:  Negative for  dizziness, light-headedness and headaches.   Hematological:  Negative for adenopathy.       Objective   /80 (BP Location: Left arm, Patient Position: Sitting, Cuff Size: Standard)   Pulse 85   Wt 98.4 kg (217 lb)   SpO2 98%   BMI 30.27 kg/m²      Physical Exam  Vitals and nursing note reviewed.   Constitutional:       General: He is not in acute distress.     Appearance: Normal appearance. He is well-developed. He is obese. He is not ill-appearing or toxic-appearing.   HENT:      Head: Normocephalic and atraumatic.      Right Ear: External ear normal.      Left Ear: External ear normal.      Nose: Nose normal.   Eyes:      Pupils: Pupils are equal, round, and reactive to light.   Cardiovascular:      Rate and Rhythm: Normal rate and regular rhythm.      Heart sounds: Normal heart sounds. No murmur heard.  Pulmonary:      Effort: Pulmonary effort is normal.      Breath sounds: Normal breath sounds.   Abdominal:      General: There is no distension.      Palpations: Abdomen is soft.      Tenderness: There is no abdominal tenderness. There is no guarding.   Neurological:      Mental Status: He is alert.

## 2024-12-23 NOTE — ASSESSMENT & PLAN NOTE
Clinically stable and doing well continue the current medical regiment will continue monitor.  Continue Prilosec 20 g once daily  Orders:  •  Ambulatory Referral to Gastroenterology; Future

## 2024-12-23 NOTE — ASSESSMENT & PLAN NOTE
Hyperlipidemia controlled continue with current medical regiment recommend a low-cholesterol diet and recommend routine exercise we will continue to monitor the progress.  Continue Tricor/fish oil patient would like to hold off on statin at this point  Orders:  •  Comprehensive metabolic panel; Future  •  Lipid Panel with Direct LDL reflex; Future

## 2024-12-23 NOTE — ASSESSMENT & PLAN NOTE
Obesity -I have counseled patient following healthy and balanced diet, I would like the patient to lose weight, I would like the patient exercise routinely; we will continue monitor the patient's progress.  Orders:  •  Ambulatory Referral to Nutrition Services; Future  We did discuss possible consideration for GLP-1 in the future

## 2024-12-31 NOTE — ASSESSMENT & PLAN NOTE
Currently stable reviewed urology report will have patient see the dietitian to discuss specific diet regarding kidney stone

## 2025-01-17 ENCOUNTER — APPOINTMENT (OUTPATIENT)
Dept: LAB | Facility: AMBULARY SURGERY CENTER | Age: 65
End: 2025-01-17
Payer: COMMERCIAL

## 2025-01-17 ENCOUNTER — RESULTS FOLLOW-UP (OUTPATIENT)
Dept: OTHER | Facility: HOSPITAL | Age: 65
End: 2025-01-17

## 2025-01-17 DIAGNOSIS — E83.42 HYPOMAGNESEMIA: ICD-10-CM

## 2025-01-17 DIAGNOSIS — E83.39 HYPOPHOSPHATEMIA: ICD-10-CM

## 2025-01-17 DIAGNOSIS — N20.0 RECURRENT NEPHROLITHIASIS: ICD-10-CM

## 2025-01-17 DIAGNOSIS — I10 ESSENTIAL HYPERTENSION: ICD-10-CM

## 2025-01-17 DIAGNOSIS — R79.89 LOW SERUM PARATHYROID HORMONE (PTH): ICD-10-CM

## 2025-01-17 DIAGNOSIS — R82.991 HYPOCITRATURIA: ICD-10-CM

## 2025-01-17 LAB
ANION GAP SERPL CALCULATED.3IONS-SCNC: 8 MMOL/L (ref 4–13)
BACTERIA UR QL AUTO: ABNORMAL /HPF
BILIRUB UR QL STRIP: NEGATIVE
BUN SERPL-MCNC: 19 MG/DL (ref 5–25)
CALCIUM SERPL-MCNC: 9.8 MG/DL (ref 8.4–10.2)
CHLORIDE SERPL-SCNC: 102 MMOL/L (ref 96–108)
CLARITY UR: CLEAR
CO2 SERPL-SCNC: 28 MMOL/L (ref 21–32)
COLOR UR: ABNORMAL
CREAT SERPL-MCNC: 1.08 MG/DL (ref 0.6–1.3)
CREAT UR-MCNC: 90.8 MG/DL
GFR SERPL CREATININE-BSD FRML MDRD: 73 ML/MIN/1.73SQ M
GLUCOSE P FAST SERPL-MCNC: 82 MG/DL (ref 65–99)
GLUCOSE UR STRIP-MCNC: NEGATIVE MG/DL
HGB UR QL STRIP.AUTO: NEGATIVE
KETONES UR STRIP-MCNC: NEGATIVE MG/DL
LEUKOCYTE ESTERASE UR QL STRIP: NEGATIVE
MAGNESIUM SERPL-MCNC: 1.8 MG/DL (ref 1.9–2.7)
MUCOUS THREADS UR QL AUTO: ABNORMAL
NITRITE UR QL STRIP: NEGATIVE
NON-SQ EPI CELLS URNS QL MICRO: ABNORMAL /HPF
PH UR STRIP.AUTO: 8 [PH]
PHOSPHATE SERPL-MCNC: 2 MG/DL (ref 2.3–4.1)
POTASSIUM SERPL-SCNC: 4.3 MMOL/L (ref 3.5–5.3)
PROT UR STRIP-MCNC: ABNORMAL MG/DL
PROT UR-MCNC: 6.7 MG/DL
PROT/CREAT UR: 0.1 MG/G{CREAT} (ref 0–0.1)
PTH-INTACT SERPL-MCNC: 14.4 PG/ML (ref 12–88)
RBC #/AREA URNS AUTO: ABNORMAL /HPF
SODIUM SERPL-SCNC: 138 MMOL/L (ref 135–147)
SP GR UR STRIP.AUTO: 1.01 (ref 1–1.03)
UROBILINOGEN UR STRIP-ACNC: <2 MG/DL
WBC #/AREA URNS AUTO: ABNORMAL /HPF

## 2025-01-17 PROCEDURE — 83735 ASSAY OF MAGNESIUM: CPT

## 2025-01-17 PROCEDURE — 83970 ASSAY OF PARATHORMONE: CPT

## 2025-01-17 PROCEDURE — 80048 BASIC METABOLIC PNL TOTAL CA: CPT

## 2025-01-17 PROCEDURE — 81001 URINALYSIS AUTO W/SCOPE: CPT

## 2025-01-17 PROCEDURE — 82570 ASSAY OF URINE CREATININE: CPT

## 2025-01-17 PROCEDURE — 84100 ASSAY OF PHOSPHORUS: CPT

## 2025-01-17 PROCEDURE — 84156 ASSAY OF PROTEIN URINE: CPT

## 2025-01-17 PROCEDURE — 36415 COLL VENOUS BLD VENIPUNCTURE: CPT

## 2025-01-17 NOTE — RESULT ENCOUNTER NOTE
Please inform patient that renal function is stable at creatinine 1.08 mg/dL.  Magnesium level was slightly low at 1.8 and phosphorus level was low, please stressed on compliance with magnesium tablet and recommend intake of dairy product for the low phosphorus.  Thank you will discuss further during office visit in February

## 2025-01-19 DIAGNOSIS — E29.1 HYPOGONADISM IN MALE: ICD-10-CM

## 2025-01-20 RX ORDER — TESTOSTERONE CYPIONATE 200 MG/ML
INJECTION, SOLUTION INTRAMUSCULAR
Qty: 4 ML | Refills: 0 | Status: SHIPPED | OUTPATIENT
Start: 2025-01-20

## 2025-01-20 NOTE — RESULT ENCOUNTER NOTE
I left a detailed message for Jack, advised that his renal function is stable. Magnesium and phos levels are low; make sure to take his magnesium supplement as prescribed and increase dairy products at this time.   - Any questions advised to call the office.

## 2025-01-24 ENCOUNTER — TRANSCRIBE ORDERS (OUTPATIENT)
Dept: LAB | Facility: HOSPITAL | Age: 65
End: 2025-01-24

## 2025-01-24 ENCOUNTER — APPOINTMENT (OUTPATIENT)
Dept: LAB | Facility: AMBULARY SURGERY CENTER | Age: 65
End: 2025-01-24
Payer: COMMERCIAL

## 2025-01-24 DIAGNOSIS — N20.0 RECURRENT NEPHROLITHIASIS: Primary | ICD-10-CM

## 2025-01-24 DIAGNOSIS — R97.20 INCREASED PROSTATE SPECIFIC ANTIGEN (PSA) VELOCITY: ICD-10-CM

## 2025-01-24 DIAGNOSIS — N20.0 RECURRENT NEPHROLITHIASIS: ICD-10-CM

## 2025-01-24 DIAGNOSIS — E78.5 DYSLIPIDEMIA: ICD-10-CM

## 2025-01-24 DIAGNOSIS — R73.03 PREDIABETES: ICD-10-CM

## 2025-01-29 DIAGNOSIS — E78.1 HYPERTRIGLYCERIDEMIA: ICD-10-CM

## 2025-01-29 RX ORDER — FENOFIBRATE 145 MG/1
145 TABLET, COATED ORAL DAILY
Qty: 90 TABLET | Refills: 0 | Status: SHIPPED | OUTPATIENT
Start: 2025-01-29

## 2025-02-13 ENCOUNTER — APPOINTMENT (OUTPATIENT)
Dept: RADIOLOGY | Age: 65
End: 2025-02-13
Payer: COMMERCIAL

## 2025-02-13 ENCOUNTER — OFFICE VISIT (OUTPATIENT)
Dept: INTERNAL MEDICINE CLINIC | Facility: CLINIC | Age: 65
End: 2025-02-13
Payer: COMMERCIAL

## 2025-02-13 VITALS
HEART RATE: 88 BPM | TEMPERATURE: 97.3 F | BODY MASS INDEX: 29.99 KG/M2 | WEIGHT: 215 LBS | OXYGEN SATURATION: 96 % | DIASTOLIC BLOOD PRESSURE: 90 MMHG | SYSTOLIC BLOOD PRESSURE: 138 MMHG

## 2025-02-13 DIAGNOSIS — M79.644 FINGER PAIN, RIGHT: Primary | ICD-10-CM

## 2025-02-13 DIAGNOSIS — M79.644 FINGER PAIN, RIGHT: ICD-10-CM

## 2025-02-13 PROCEDURE — 99213 OFFICE O/P EST LOW 20 MIN: CPT

## 2025-02-13 PROCEDURE — 73130 X-RAY EXAM OF HAND: CPT

## 2025-02-13 NOTE — PROGRESS NOTES
Name: Jack Dewey      : 1960      MRN: [id number]  Encounter Provider: Tadeo Staples MD  Encounter Date: 2025   Encounter department: [facility]    Assessment & Plan  Finger pain, right  Unable to visualize foreign body, will get an xray of hand, complete Keflex course as prophylaxis, send referral to Dr Marcio sainz hand surgeon  Orders:    XR hand 2 vw right; Future    cephalexin (KEFLEX) 250 mg capsule; Take 1 capsule (250 mg total) by mouth every 6 (six) hours for 7 days    Ambulatory Referral to Orthopedic Surgery; Future         History of Present Illness     A 64 year old male presented to clinic with a small painful bump on his Rt index finger. Patient doesn't recall any trauma, working with metal/wood. It started last night and progressing and painful. Erythematous/blanching papule his middle phalanx region of right index finger Denies any fever/chills.       Review of Systems   Constitutional:  Negative for chills and fever.   HENT:  Negative for ear pain and sore throat.    Eyes:  Negative for pain and visual disturbance.   Respiratory:  Negative for cough and shortness of breath.    Cardiovascular:  Negative for chest pain and palpitations.   Gastrointestinal:  Negative for abdominal pain and vomiting.   Genitourinary:  Negative for dysuria and hematuria.   Musculoskeletal:  Negative for arthralgias and back pain.        Rt index finger pain   Skin:  Negative for color change and rash.   Neurological:  Negative for seizures and syncope.   All other systems reviewed and are negative.    Past Medical History:   Diagnosis Date    Anxiety     Cancer (HCC)     skin ca on nose    Chronic kidney disease     kidney stones    GERD (gastroesophageal reflux disease)     Hyperlipidemia     Hypertension     Kidney stone     2022     Past Surgical History:   Procedure Laterality Date    COLONOSCOPY      ESOPHAGOGASTRODUODENOSCOPY      EXTRACORPOREAL SHOCK WAVE LITHOTRIPSY  Bilateral     Renal Multiple times    FL RETROGRADE PYELOGRAM  3/31/2022    FL RETROGRADE PYELOGRAM  6/22/2022    FL RETROGRADE PYELOGRAM  5/31/2024    [place] CYSTO/URETERO W/LITHOTRIPSY &INDWELL STENT INSRT Left 8/6/2020    Procedure: CYSTOSCOPY URETEROSCOPY WITH LITHOTRIPSY HOLMIUM LASER, AND INSERTION STENT URETERAL;  Surgeon: Otis Rutledge MD;  [facility];  Service: Urology    [place] CYSTO/URETERO W/LITHOTRIPSY &INDWELL STENT INSRT Right 3/31/2022    Procedure: CYSTOSCOPY URETEROSCOPY WITH LITHOTRIPSY HOLMIUM LASER,BASKET STONE EXTRACTION, RETROGRADE PYELOGRAM AND INSERTION STENT URETERAL;  Surgeon: Jamal Finley MD;  [facility];  Service: Urology    WV CYSTO/URETERO W/LITHOTRIPSY &INDWELL STENT INSRT Left 6/22/2022    Procedure: CYSTOSCOPY URETEROSCOPY WITH LITHOTRIPSY HOLMIUM LASER, RETROGRADE PYELOGRAM AND INSERTION STENT URETERAL, STONE BASKET EXTRACTION;  Surgeon: Ed Beck MD;  [facility];  Service: Urology    WV CYSTO/URETERO W/LITHOTRIPSY &INDWELL STENT INSRT Left 5/31/2024    Procedure: CYSTOSCOPY URETEROSCOPY WITH LITHOTRIPSY HOLMIUM LASER, STONE BASKET EXTRACTION, RETROGRADE PYELOGRAM AND INSERTION STENT URETERAL;  Surgeon: Christopher Nicole MD;  [facility];  Service: Urology    WV EXC B9 LESION MRGN XCP SK TG F/E/E/N/L/M > 4.0CM Right 4/28/2016    Procedure: EXCISION  LESION UPPER EYELID, COMPLEX CLOSURE;  Surgeon: Gianluca Salazar MD;  [facility];  Service: Plastics    WV LITHOTRIPSY XTRCORP SHOCK WAVE Right 4/18/2019    Procedure: LITHROTRIPSY EXTRACORPORAL SHOCKWAVE (ESWL);  Surgeon: Orlando Crouch MD;  [facility];  Service: Urology     Family History   Problem Relation Age of Onset    Other Father         CABG (coronary artery bypass surgery)    Atrial fibrillation Sister     Other Paternal Uncle         Myocardial infarction    Coronary artery disease Family     Diabetes Family     Hypertension Family     Uterine cancer Family   "    Social History     Tobacco Use    Smoking status: Former     Current packs/day: 0.00     Average packs/day: 0.8 packs/day for 18.0 years (13.5 ttl pk-yrs)     Types: Cigarettes     Start date:      Quit date:      Years since quittin.1    Smokeless tobacco: Never   Vaping Use    Vaping status: Never Used   Substance and Sexual Activity    Alcohol use: Yes     Alcohol/week: 3.0 standard drinks of alcohol     Types: 1 Glasses of wine, 2 Cans of beer per week     Comment: weekend    Drug use: No    Sexual activity: Yes     Partners: Female     Current Outpatient Medications on File Prior to Visit   Medication Sig    amLODIPine (NORVASC) 5 mg tablet TAKE 1 TABLET DAILY    b complex vitamins capsule Take 1 capsule by mouth daily.    Cholecalciferol (VITAMIN D3) 1000 units CAPS Take 1 capsule by mouth daily    coenzyme Q-10 100 MG capsule Take 1 capsule by mouth daily    cyanocobalamin (VITAMIN B-12) 100 mcg tablet Take by mouth daily.    fenofibrate (TRICOR) 145 mg tablet TAKE 1 TABLET DAILY    lisinopril (ZESTRIL) 10 mg tablet TAKE 1 AND 1/2 TABLETS(15  MG TOTAL) DAILY    LORazepam (ATIVAN) 0.5 mg tablet Take 1 tablet (0.5 mg total) by mouth daily as needed for anxiety    magnesium (MAGTAB) 84 MG (7MEQ) TBCR Take 1 tablet (84 mg total) by mouth daily    multivitamin (THERAGRAN) TABS Take 1 tablet by mouth daily.    Omega-3 Fatty Acids (Fish Oil) 1200 MG CPDR Take by mouth    omeprazole (PriLOSEC) 20 mg delayed release capsule Take 20 mg by mouth daily    potassium citrate (UROCIT-K) 10 mEq Take 1 tablet (10 mEq total) by mouth 2 (two) times a day    sildenafil (VIAGRA) 50 MG tablet TAKE 1 TABLET DAILY AS     NEEDED FOR ERECTILE        DYSFUNCTION    SYRINGE-NEEDLE, DISP, 3 ML (BD Eclipse Syringe) 25G X 1\" 3 ML MISC Use once a week    testosterone cypionate (DEPO-TESTOSTERONE) 200 mg/mL SOLN Inject 0.4 mL intramuscularly into the shoulder, thigh, or buttocks once weekly discard the remainder "     Allergies   Allergen Reactions    Ciprofloxacin Hives    Macrobid [Nitrofurantoin] Nausea Only and Palpitations     Immunization History   Administered Date(s) Administered    INFLUENZA 11/09/2024    Influenza Injectable, MDCK, Preservative Free, Quadrivalent, 0.5 mL 10/19/2018, 11/16/2023    Influenza, recombinant, quadrivalent,injectable, preservative free 10/25/2019, 10/29/2020    Influenza, seasonal, injectable 10/09/2012, 10/20/2014, 10/10/2017    Td (adult), adsorbed 1960    Tdap 02/22/2013, 05/03/2023     Objective   /90   Pulse 88   Temp (!) 97.3 °F (36.3 °C)   Wt 97.5 kg (215 lb)   SpO2 96%   BMI 29.99 kg/m²     Physical Exam  Vitals and nursing note reviewed.   Constitutional:       General: He is not in acute distress.     Appearance: He is well-developed.   HENT:      Head: Normocephalic and atraumatic.   Eyes:      Conjunctiva/sclera: Conjunctivae normal.   Cardiovascular:      Rate and Rhythm: Normal rate and regular rhythm.      Heart sounds: No murmur heard.  Pulmonary:      Effort: Pulmonary effort is normal. No respiratory distress.      Breath sounds: Normal breath sounds. No wheezing, rhonchi or rales.   Abdominal:      Palpations: Abdomen is soft.      Tenderness: There is no abdominal tenderness. There is no guarding or rebound.   Musculoskeletal:         General: No swelling.      Cervical back: Neck supple.      Comments: Rt index finger painful papule(?foreign object)   Skin:     General: Skin is warm and dry.      Capillary Refill: Capillary refill takes less than 2 seconds.   Neurological:      Mental Status: He is alert.   Psychiatric:         Mood and Affect: Mood normal.

## 2025-02-14 ENCOUNTER — OFFICE VISIT (OUTPATIENT)
Dept: OBGYN CLINIC | Facility: CLINIC | Age: 65
End: 2025-02-14
Payer: COMMERCIAL

## 2025-02-14 VITALS — BODY MASS INDEX: 30.1 KG/M2 | HEIGHT: 71 IN | WEIGHT: 215 LBS

## 2025-02-14 DIAGNOSIS — R22.31 FINGER MASS, RIGHT: Primary | ICD-10-CM

## 2025-02-14 DIAGNOSIS — M79.644 FINGER PAIN, RIGHT: ICD-10-CM

## 2025-02-14 PROCEDURE — 20612 ASPIRATE/INJ GANGLION CYST: CPT | Performed by: ORTHOPAEDIC SURGERY

## 2025-02-14 PROCEDURE — 99203 OFFICE O/P NEW LOW 30 MIN: CPT | Performed by: ORTHOPAEDIC SURGERY

## 2025-02-14 NOTE — PROGRESS NOTES
The HAND & UPPER EXTREMITY OFFICE VISIT   Referred By:  Tadeo Staples Md  [id number] [id number]      Chief Complaint:     Right index finger acute mass    History of Present Illness:   64 y.o., right hand dominant male presents with a new mass to the volar aspect of his index finger at the level of his middle phalanx. He reports he first noticed it two days ago, and yesterday it become much larger. It is painful and red. He denies trauma or puncture wound to the hand. Denies previous similar events.     ADLs: Community ambulator  Smoke: no   ETOH: no   Drugs:  no  Job: Contract management       Past Medical History:  Past Medical History:   Diagnosis Date    Anxiety     Cancer (HCC)     skin ca on nose    Chronic kidney disease     kidney stones    GERD (gastroesophageal reflux disease)     Hyperlipidemia     Hypertension     Kidney stone     6/2022     Past Surgical History:   Procedure Laterality Date    COLONOSCOPY      ESOPHAGOGASTRODUODENOSCOPY      EXTRACORPOREAL SHOCK WAVE LITHOTRIPSY Bilateral     Renal Multiple times    FL RETROGRADE PYELOGRAM  3/31/2022    FL RETROGRADE PYELOGRAM  6/22/2022    FL RETROGRADE PYELOGRAM  5/31/2024    [place] CYSTO/URETERO W/LITHOTRIPSY &INDWELL STENT INSRT Left 8/6/2020    Procedure: CYSTOSCOPY URETEROSCOPY WITH LITHOTRIPSY HOLMIUM LASER, AND INSERTION STENT URETERAL;  Surgeon: Otis Rutledge MD;  [facility];  Service: Urology    SC CYSTO/URETERO W/LITHOTRIPSY &INDWELL STENT INSRT Right 3/31/2022    Procedure: CYSTOSCOPY URETEROSCOPY WITH LITHOTRIPSY HOLMIUM LASER,BASKET STONE EXTRACTION, RETROGRADE PYELOGRAM AND INSERTION STENT URETERAL;  Surgeon: Jamal Finley MD;  [facility];  Service: Urology    SC CYSTO/URETERO W/LITHOTRIPSY &INDWELL STENT INSRT Left 6/22/2022    Procedure: CYSTOSCOPY URETEROSCOPY WITH LITHOTRIPSY HOLMIUM LASER, RETROGRADE PYELOGRAM AND INSERTION STENT URETERAL, STONE BASKET EXTRACTION;  Surgeon: Ed Beck MD;   [facility];  Service: Urology    RI CYSTO/URETERO W/LITHOTRIPSY &INDWELL STENT INSRT Left 2024    Procedure: CYSTOSCOPY URETEROSCOPY WITH LITHOTRIPSY HOLMIUM LASER, STONE BASKET EXTRACTION, RETROGRADE PYELOGRAM AND INSERTION STENT URETERAL;  Surgeon: Christopher Nicole MD;  [facility];  Service: Urology    RI EXC B9 LESION MRGN XCP SK TG F/E/E/N/L/M > 4.0CM Right 2016    Procedure: EXCISION  LESION UPPER EYELID, COMPLEX CLOSURE;  Surgeon: Gianluca Salazar MD;  [facility];  Service: Plastics    RI LITHOTRIPSY XTRCORP SHOCK WAVE Right 2019    Procedure: LITHROTRIPSY EXTRACORPORAL SHOCKWAVE (ESWL);  Surgeon: Orlando Crouch MD;  [facility];  Service: Urology     Family History   Problem Relation Age of Onset    Other Father         CABG (coronary artery bypass surgery)    Atrial fibrillation Sister     Other Paternal Uncle         Myocardial infarction    Coronary artery disease Family     Diabetes Family     Hypertension Family     Uterine cancer Family      Social History     Socioeconomic History    Marital status: /Civil Union     Spouse name: Not on file    Number of children: Not on file    Years of education: Not on file    Highest education level: Not on file   Occupational History    Not on file   Tobacco Use    Smoking status: Former     Current packs/day: 0.00     Average packs/day: 0.8 packs/day for 18.0 years (13.5 ttl pk-yrs)     Types: Cigarettes     Start date:      Quit date:      Years since quittin.1    Smokeless tobacco: Never   Vaping Use    Vaping status: Never Used   Substance and Sexual Activity    Alcohol use: Yes     Alcohol/week: 3.0 standard drinks of alcohol     Types: 1 Glasses of wine, 2 Cans of beer per week     Comment: weekend    Drug use: No    Sexual activity: Yes     Partners: Female   Other Topics Concern    Not on file   Social History Narrative    Daily caffeine consumption     Social Drivers of  "Health     Financial Resource Strain: Not on file   Food Insecurity: Not on file   Transportation Needs: Not on file   Physical Activity: Not on file   Stress: Not on file   Social Connections: Not on file   Intimate Partner Violence: Not on file   Housing Stability: Not on file     Scheduled Meds:  Continuous Infusions:No current facility-administered medications for this visit.    PRN Meds:.  Allergies   Allergen Reactions    Ciprofloxacin Hives    Macrobid [Nitrofurantoin] Nausea Only and Palpitations           Physical Examination:    Ht 5' 11\" (1.803 m)   Wt 97.5 kg (215 lb)   BMI 29.99 kg/m²     Gen: A&Ox3, NAD  Cardiac: regular rate  Chest: non labored breathing  Abdomen: Non-distended    Right Upper Extremity:  Skin CDI  Circular soft mass, approx 1x1 cm, over volar aspect of index finger on ulnar side  Mass transilliminates  TTP   Overlying mild erythema that does not radiate past the boundaries of the mass  PIP AROM preserved, DIP flexion limited due to pain and swelling  Finger WWP    Studies:  Radiographs: I personally reviewed and independently interpreted the available radiographs.  XR Right Hand: Radiographs of the right hand, multiple views, demonstrate no foreign body, no fracture or dislocation, mild degenerative changes to DIP joints with joint space narrowing and subchondral sclerosis.     Hand/upper extremity injection  Universal Protocol:  Consent: Verbal consent obtained.  Consent given by: patient  Patient identity confirmed: verbally with patient  Supporting Documentation  Indications: diagnostic and pain   Procedure Details  Condition comment: Right index finger volar mass over middle phalanx   Needle size: 22 G  Ultrasound guidance: no  Approach: volar  Aspirate amount: 0 mL            Assessment and Plan:  1. Finger mass, right  US MSK limited    Hand/upper extremity injection      2. Finger pain, right  Ambulatory Referral to Orthopedic Surgery          64 y.o. male presents with signs " and symptoms consistent with the above diagnosis.  We discussed the natural history of this condition and its pathogenesis.  We performed attempted aspiration of the right index finger mass, however were unable to aspirate any fluid. After aspiration, the mass did feel smaller and softer to palpation, suggesting it may have punctured and decompressed. We will plan to obtain an ultrasound of the mass to further characterize it.   We will see him for follow up after completion of the ultrasound to discuss further treatment options. Differential diagnosis includes ganglion cyst, inclusion cyst.     he expressed understanding of the plan and agreed. We encouraged them to contact our office with any questions or concerns.         Ru Billings MD  Hand and Upper Extremity Surgery        *This note was dictated using Dragon voice recognition software. Please excuse any word substitutions or errors.*

## 2025-02-15 DIAGNOSIS — I10 ESSENTIAL HYPERTENSION: ICD-10-CM

## 2025-02-15 RX ORDER — LISINOPRIL 10 MG/1
TABLET ORAL
Qty: 135 TABLET | Refills: 1 | Status: SHIPPED | OUTPATIENT
Start: 2025-02-15

## 2025-02-21 ENCOUNTER — OFFICE VISIT (OUTPATIENT)
Dept: NEPHROLOGY | Facility: CLINIC | Age: 65
End: 2025-02-21
Payer: COMMERCIAL

## 2025-02-21 ENCOUNTER — HOSPITAL ENCOUNTER (OUTPATIENT)
Dept: RADIOLOGY | Age: 65
Discharge: HOME | End: 2025-02-21
Payer: COMMERCIAL

## 2025-02-21 VITALS
DIASTOLIC BLOOD PRESSURE: 78 MMHG | SYSTOLIC BLOOD PRESSURE: 126 MMHG | BODY MASS INDEX: 31.08 KG/M2 | WEIGHT: 222 LBS | HEIGHT: 71 IN

## 2025-02-21 DIAGNOSIS — R22.31 FINGER MASS, RIGHT: ICD-10-CM

## 2025-02-21 DIAGNOSIS — R79.89 LOW SERUM PARATHYROID HORMONE (PTH): ICD-10-CM

## 2025-02-21 DIAGNOSIS — I10 ESSENTIAL HYPERTENSION: ICD-10-CM

## 2025-02-21 DIAGNOSIS — R82.991 HYPOCITRATURIA: ICD-10-CM

## 2025-02-21 DIAGNOSIS — E83.42 HYPOMAGNESEMIA: ICD-10-CM

## 2025-02-21 DIAGNOSIS — N20.0 RECURRENT NEPHROLITHIASIS: Primary | ICD-10-CM

## 2025-02-21 DIAGNOSIS — E83.39 HYPOPHOSPHATEMIA: ICD-10-CM

## 2025-02-21 PROCEDURE — 99214 OFFICE O/P EST MOD 30 MIN: CPT | Performed by: INTERNAL MEDICINE

## 2025-02-21 PROCEDURE — 76882 US LMTD JT/FCL EVL NVASC XTR: CPT

## 2025-02-21 RX ORDER — MAGNESIUM L-LACTATE 84 MG
84 TABLET, EXTENDED RELEASE ORAL 2 TIMES DAILY
Qty: 180 TABLET | Refills: 3 | Status: SHIPPED | OUTPATIENT
Start: 2025-02-21

## 2025-02-21 NOTE — ASSESSMENT & PLAN NOTE
-Current medication: lisinopril 15 mg daily and amlodipine 5 mg daily      -Current blood pressure: stable and at goal   -Plan:     Continue same meds   -Recommend 2 g sodium diet.    -Recommend daily exercise and weight loss  -Discussed home monitoring of BP and maintaining a log of blood pressure.  -Recommend goal BP less than 130/80.    Orders:    Basic metabolic panel; Future

## 2025-02-21 NOTE — ASSESSMENT & PLAN NOTE
PTH level low last PTH was 14.7.    patient denies any radiation or neck surgery calcium level is at normal range   Orders:    PTH, intact; Future

## 2025-02-21 NOTE — PATIENT INSTRUCTIONS
Renal function is stable continue oral hydration low-sodium diet and low oxalate diet  Recommend increasing intake of phosphorus containing food  Follow-up in 6 months with repeat labs.  Will also check 24-hour stone risk profile in 6 months

## 2025-02-21 NOTE — ASSESSMENT & PLAN NOTE
-Continue potassium citrate 10 meq bid, serum potassium at normal range and bicarb at normal range, urine pH trending up and needs to be monitor

## 2025-02-21 NOTE — ASSESSMENT & PLAN NOTE
kidney stone analysis from June 22, 2022 suggestive of 80% calcium oxalate dihydrate and 20% calcium oxalate monohydrate.  -stone risk profile from December 2020: Urine volume 2.5 L, urine citratrate 243 mg which is low  -previous stone analysis from 2021 as well as 2019 suggestive of calcium oxalate stones but around 40% calcium oxalate dihydrate  -24-hour stone risk profile from August 2022 showed- Urine volume was adequate at 2.3 L. 24 hour urine citrate was 249 low.  pH was around 6.9  -24-hour stone risk profile from October 2023, total volume was 2.5 L, his urinary citrate improved to normal range at 338  -status post multiple urological procedures for recurrent nephrolithiasis.  In June 2022, 2022 underwent cystoscopy with lithotripsy and insertion of stent, stone basket extraction on the left side.  -05/31/24- had cystoscopy , lithotripsy and stone basket extraction.   -x-ray [facility] from July 5th 2022 still with persistent bilateral renal calculi which are unchanged.  -renal ultrasound from June 2022 with finding of multiple calculi in both kidneys mild left hydronephrosis  -X-ray [facility] from December 2022 showed bilateral nephrolithiasis largest on the right measuring 6 mm in largest on the left measuring 11 mm in size  -CT abdomen from August 2024, multiple stones largest measuring 6 mm on right and multiple stones in the left kidney with largest measuring 8 mm on the left  -PTH level has been persistently low  -Was taken off vitamin C after initial office visit  -Plan  No recent stone episodes,  Continue  hydration with water 3 to 4 L/day.  Recommend low-sodium diet and low animal protein. Continue Urocit-K 10 mEq twice daily  Using lower dose as urine pH is around 6.9 previously .  Last 24-hour urine test urine pH was trending down to 6.4, will repeat 24-hour stone risk profile and monitor.  May consider use of HCTZ if 24-hour urine calcium is high.  24 hr stone risk profile in 6 months . He did the urine test  but labs could not run the test as they expect sample to be submitted by Thursday.  Okay to wait for 6 months to do repeat 24-hour stone risk profile is currently asymptomatic and urine microscopy is negative for any RBC indicating no stone passage.  Urine pH on dip test was around 8.0 which is slightly on higher side even with lower dose of potassium citrate.  Will follow-up results of repeat 24-hour stone risk profile in 6 months and adjust the dose of potassium citrate if needed     Orders:    Basic metabolic panel; Future    Stone risk profile; Future    Urinalysis with microscopic; Future

## 2025-02-21 NOTE — ASSESSMENT & PLAN NOTE
-Phosphorus level is low at 2.0 - increase intake of phosphorus containing food.   Orders:    Phosphorus; Future

## 2025-02-21 NOTE — PROGRESS NOTES
Name: Jack Dewey      : 1960      MRN: [id number]  Encounter Provider: Nicholas Lunsford MD  Encounter Date: 2025   Encounter department: [facility] [place]  :  Assessment & Plan  Recurrent nephrolithiasis  kidney stone analysis from 2022 suggestive of 80% calcium oxalate dihydrate and 20% calcium oxalate monohydrate.  -stone risk profile from 2020: Urine volume 2.5 L, urine citratrate 243 mg which is low  -previous stone analysis from  as well as  suggestive of calcium oxalate stones but around 40% calcium oxalate dihydrate  -24-hour stone risk profile from 2022 showed- Urine volume was adequate at 2.3 L. 24 hour urine citrate was 249 low.  pH was around 6.9  -24-hour stone risk profile from 2023, total volume was 2.5 L, his urinary citrate improved to normal range at 338  -status post multiple urological procedures for recurrent nephrolithiasis.  In  underwent cystoscopy with lithotripsy and insertion of stent, stone basket extraction on the left side.  -24- had cystoscopy , lithotripsy and stone basket extraction.   -x-ray KUB from 2022 still with persistent bilateral renal calculi which are unchanged.  -renal ultrasound from 2022 with finding of multiple calculi in both kidneys mild left hydronephrosis  -X-ray KUB from 2022 showed bilateral nephrolithiasis largest on the right measuring 6 mm in largest on the left measuring 11 mm in size  -CT abdomen from 2024, multiple stones largest measuring 6 mm on right and multiple stones in the left kidney with largest measuring 8 mm on the left  -PTH level has been persistently low  -Was taken off vitamin C after initial office visit  -Plan  No recent stone episodes,  Continue  hydration with water 3 to 4 L/day.  Recommend low-sodium diet and low animal protein. Continue Urocit-K 10 mEq twice daily  Using lower dose as urine pH is around 6.9  previously .  Last 24-hour urine test urine pH was trending down to 6.4, will repeat 24-hour stone risk profile and monitor.  May consider use of HCTZ if 24-hour urine calcium is high.  24 hr stone risk profile in 6 months . He did the urine test but labs could not run the test as they expect sample to be submitted by Thursday.  Okay to wait for 6 months to do repeat 24-hour stone risk profile is currently asymptomatic and urine microscopy is negative for any RBC indicating no stone passage.  Urine pH on dip test was around 8.0 which is slightly on higher side even with lower dose of potassium citrate.  Will follow-up results of repeat 24-hour stone risk profile in 6 months and adjust the dose of potassium citrate if needed     Orders:    Basic metabolic panel; Future    Stone risk profile; Future    Urinalysis with microscopic; Future    Essential hypertension  -Current medication: lisinopril 15 mg daily and amlodipine 5 mg daily      -Current blood pressure: stable and at goal   -Plan:     Continue same meds   -Recommend 2 g sodium diet.    -Recommend daily exercise and weight loss  -Discussed home monitoring of BP and maintaining a log of blood pressure.  -Recommend goal BP less than 130/80.    Orders:    Basic metabolic panel; Future    Hypomagnesemia  -Magnesium level is low.  Increase magnesium tablet to 84 mg twice daily    Orders:    magnesium (MAGTAB) 84 MG (7MEQ) TBCR; Take 1 tablet (84 mg total) by mouth 2 (two) times a day    Magnesium; Future    Hypocitraturia  -Continue potassium citrate 10 meq bid, serum potassium at normal range and bicarb at normal range, urine pH trending up and needs to be monitor       Hypophosphatemia  -Phosphorus level is low at 2.0 - increase intake of phosphorus containing food.   Orders:    Phosphorus; Future    Low serum parathyroid hormone (PTH)  PTH level low last PTH was 14.7.    patient denies any radiation or neck surgery calcium level is at normal range   Orders:     PTH, intact; Future        History of Present Illness   HPI  Jack Dewey is a 64 y.o. male  with history of HTN, colon polyp presenting for follow-up of recurrent nephrolithiasis. STARTED  At the age of 32 yrs of age.  Says had lithotripsy 8-9 times and 3 cystoscopies so far.     He was found to have low 24 hour urine citrate and was started on potassium citrate 10 mEq t.i.d.   -Cousin on mother side had kidney stones      On June 22, 2022 patient underwent cystoscopy, lithotripsy and extraction of stone  05/31/24- had cystoscopy , lithotripsy and stone basket extraction.      Reviewed labs from 1/17/2025.  Renal function is stable at creatinine 1.08 mg/dL, electrolytes are stable, phosphorus is low at 2.0 magnesium 1.8 last UPC ratio was 0.1  -Currently asymptomatic not passing any stone, no muscle cramps reviewed last PCP note was told to continue same treatment and was referred to nutrition service as patient can get a good idea of what to eat and not eat with multiple different conditions.      Review of Systems   Constitutional:  Negative for activity change, appetite change, chills, diaphoresis, fatigue and fever.   HENT:  Negative for congestion, facial swelling and nosebleeds.    Eyes:  Negative for pain and visual disturbance.   Respiratory:  Negative for cough, chest tightness and shortness of breath.    Cardiovascular:  Negative for chest pain and palpitations.   Gastrointestinal:  Negative for abdominal distention, abdominal pain, diarrhea, nausea and vomiting.   Genitourinary:  Negative for difficulty urinating, dysuria, flank pain, frequency and hematuria.   Musculoskeletal:  Negative for arthralgias, back pain and joint swelling.   Skin:  Negative for rash.   Neurological:  Negative for dizziness, seizures, syncope, weakness and headaches.   Psychiatric/Behavioral:  Negative for agitation and confusion. The patient is not nervous/anxious.      Pertinent Medical History    Right renal cyst, renal  "ultrasound showed simple cyst measuring 1.1 cm in the right kidney, no need for further follow-up.            Medical History Reviewed by provider this encounter:  Tobacco  Allergies  Meds  Problems  Med Hx  Surg Hx  Fam Hx     .  Current Outpatient Medications on File Prior to Visit   Medication Sig Dispense Refill    amLODIPine (NORVASC) 5 mg tablet TAKE 1 TABLET DAILY 90 tablet 1    b complex vitamins capsule Take 1 capsule by mouth daily.      Cholecalciferol (VITAMIN D3) 1000 units CAPS Take 1 capsule by mouth daily      coenzyme Q-10 100 MG capsule Take 1 capsule by mouth daily      cyanocobalamin (VITAMIN B-12) 100 mcg tablet Take by mouth daily.      fenofibrate (TRICOR) 145 mg tablet TAKE 1 TABLET DAILY 90 tablet 0    lisinopril (ZESTRIL) 10 mg tablet TAKE 1 AND 1/2 TABLETS(15  MG TOTAL) DAILY 135 tablet 1    LORazepam (ATIVAN) 0.5 mg tablet Take 1 tablet (0.5 mg total) by mouth daily as needed for anxiety 90 tablet 0    multivitamin (THERAGRAN) TABS Take 1 tablet by mouth daily.      Omega-3 Fatty Acids (Fish Oil) 1200 MG CPDR Take by mouth      omeprazole (PriLOSEC) 20 mg delayed release capsule Take 20 mg by mouth daily      potassium citrate (UROCIT-K) 10 mEq Take 1 tablet (10 mEq total) by mouth 2 (two) times a day 180 tablet 3    sildenafil (VIAGRA) 50 MG tablet TAKE 1 TABLET DAILY AS     NEEDED FOR ERECTILE        DYSFUNCTION 30 tablet 5    SYRINGE-NEEDLE, DISP, 3 ML (BD Eclipse Syringe) 25G X 1\" 3 ML MISC Use once a week 100 each 2    testosterone cypionate (DEPO-TESTOSTERONE) 200 mg/mL SOLN Inject 0.4 mL intramuscularly into the shoulder, thigh, or buttocks once weekly discard the remainder 4 mL 0    [DISCONTINUED] magnesium (MAGTAB) 84 MG (7MEQ) TBCR Take 1 tablet (84 mg total) by mouth daily 90 tablet 3    [] cephalexin (KEFLEX) 250 mg capsule Take 1 capsule (250 mg total) by mouth every 6 (six) hours for 7 days 28 capsule 0     No current facility-administered medications on file " "prior to visit.         Objective   /78 (BP Location: Left arm, Patient Position: Sitting, Cuff Size: Large)   Ht 5' 11\" (1.803 m)   Wt 101 kg (222 lb)   BMI 30.96 kg/m²      Physical Exam  Vitals and nursing note reviewed.   Constitutional:       General: He is not in acute distress.     Appearance: He is well-developed.   HENT:      Head: Normocephalic and atraumatic.      Mouth/Throat:      Mouth: Mucous membranes are moist.   Eyes:      Conjunctiva/sclera: Conjunctivae normal.   Cardiovascular:      Rate and Rhythm: Normal rate and regular rhythm.      Heart sounds: No murmur heard.  Pulmonary:      Effort: Pulmonary effort is normal. No respiratory distress.      Breath sounds: Normal breath sounds.   Abdominal:      General: Abdomen is flat.      Palpations: Abdomen is soft.      Tenderness: There is no abdominal tenderness.   Musculoskeletal:         General: No swelling.      Cervical back: Neck supple.      Right lower leg: No edema.      Left lower leg: No edema.   Skin:     General: Skin is warm and dry.      Capillary Refill: Capillary refill takes less than 2 seconds.   Neurological:      Mental Status: He is alert and oriented to person, place, and time.   Psychiatric:         Mood and Affect: Mood normal.         Behavior: Behavior normal.           "

## 2025-02-23 DIAGNOSIS — K21.9 GASTROESOPHAGEAL REFLUX DISEASE WITHOUT ESOPHAGITIS: Primary | ICD-10-CM

## 2025-02-24 DIAGNOSIS — Z12.11 SCREENING FOR COLON CANCER: Primary | ICD-10-CM

## 2025-02-24 RX ORDER — OMEPRAZOLE 20 MG/1
20 CAPSULE, DELAYED RELEASE ORAL DAILY
Qty: 30 CAPSULE | Refills: 3 | Status: SHIPPED | OUTPATIENT
Start: 2025-02-24

## 2025-02-26 DIAGNOSIS — N20.0 RECURRENT NEPHROLITHIASIS: ICD-10-CM

## 2025-02-26 DIAGNOSIS — R82.991 HYPOCITRATURIA: ICD-10-CM

## 2025-02-26 RX ORDER — POTASSIUM CITRATE 10 MEQ/1
10 TABLET, EXTENDED RELEASE ORAL 2 TIMES DAILY
Qty: 180 TABLET | Refills: 3 | Status: SHIPPED | OUTPATIENT
Start: 2025-02-26

## 2025-02-28 ENCOUNTER — RESULTS FOLLOW-UP (OUTPATIENT)
Dept: INTERNAL MEDICINE CLINIC | Facility: CLINIC | Age: 65
End: 2025-02-28

## 2025-02-28 ENCOUNTER — APPOINTMENT (OUTPATIENT)
Dept: LAB | Age: 65
End: 2025-02-28
Payer: COMMERCIAL

## 2025-02-28 LAB — PSA SERPL-MCNC: 1.23 NG/ML (ref 0–4)

## 2025-03-03 NOTE — TELEPHONE ENCOUNTER
Pt was advised to schedule an apt with urology due to labs.         ----- Message from Guillermo Eller DO sent at 2/28/2025  5:00 PM EST -----  Notify the patient PSA is stable 1.2 but elevated compared to the one 1 year ago next step I would like the patient to follow-up with urology Dr. Nicole please have the pt follow up with me to discuss as scheduled

## 2025-03-05 ENCOUNTER — TELEPHONE (OUTPATIENT)
Dept: UROLOGY | Facility: CLINIC | Age: 65
End: 2025-03-05

## 2025-03-05 ENCOUNTER — HOSPITAL ENCOUNTER (OUTPATIENT)
Dept: RADIOLOGY | Facility: HOSPITAL | Age: 65
Discharge: HOME | End: 2025-03-05
Payer: COMMERCIAL

## 2025-03-05 ENCOUNTER — OFFICE VISIT (OUTPATIENT)
Dept: UROLOGY | Facility: CLINIC | Age: 65
End: 2025-03-05
Payer: COMMERCIAL

## 2025-03-05 VITALS
BODY MASS INDEX: 31.36 KG/M2 | DIASTOLIC BLOOD PRESSURE: 86 MMHG | HEIGHT: 71 IN | HEART RATE: 96 BPM | SYSTOLIC BLOOD PRESSURE: 118 MMHG | OXYGEN SATURATION: 96 % | WEIGHT: 224 LBS

## 2025-03-05 DIAGNOSIS — N20.0 KIDNEY STONES: ICD-10-CM

## 2025-03-05 DIAGNOSIS — N20.0 KIDNEY STONES: Primary | ICD-10-CM

## 2025-03-05 PROCEDURE — 99213 OFFICE O/P EST LOW 20 MIN: CPT | Performed by: UROLOGY

## 2025-03-05 PROCEDURE — 74018 RADEX ABDOMEN 1 VIEW: CPT

## 2025-03-05 NOTE — PROGRESS NOTES
Referring Physician: Guillermo Eller DO  A copy of this note was sent to the referring physician.       Diagnoses and all orders for this visit:    Kidney stones  -     XR abdomen 1 view kub; Future  -     XR abdomen 1 view kub; Future              Assessment and plan:       1. Extensive Nephrolithiasis  -status post multiple endoscopic interventions most recently left ureteroscopy performed by Dr. Beck June 2022  -patient has us not undergone a formal metabolic workup  - prior stone burden:  5 mm right lower pole calculi, left interpolar calculi  - on metabolic therapy with nephrology  -Urine stone burden has worsened, now with enlarging left upper pole stones up to 1.8 cm on personal review of KUB  -Status post left ureteroscopy May 2024  -     Patient is doing well from a stone standpoint, continuing on potassium citrate and magnesium supplement with Dr. Porter from nephrology.  He is asymptomatic, no stone episodes in the past year    I reviewed today's KUB which demonstrates a negligible stone burden in the left with a good result from his prior ureteroscopy, and stable stone burden on the right.  Given the lack of any radiographic progression and his asymptomatic status I recommend continued annual imaging with no new interventions.    Follow-up annually with our advanced practitioner team with a KUB prior.            Christopher Nicole      Chief Complaint     Follow-up nephrolithiasis      History of Present Illness     Jack Dewey is a 64 y.o. male followed for nephrolithiasis.        Detailed Urologic History     - please refer to HPI    Review of Systems     Review of Systems   Constitutional:  Negative for activity change and fatigue.   HENT:  Negative for congestion.    Eyes:  Negative for visual disturbance.   Respiratory:  Negative for shortness of breath and wheezing.    Cardiovascular:  Negative for chest pain and leg swelling.   Gastrointestinal:  Negative for abdominal pain.  "  Endocrine: Positive for polyuria.   Genitourinary:  Positive for dysuria. Negative for flank pain, hematuria and urgency.   Musculoskeletal:  Negative for back pain.   Allergic/Immunologic: Negative for immunocompromised state.   Neurological:  Negative for dizziness and numbness.   Psychiatric/Behavioral:  Negative for dysphoric mood.    All other systems reviewed and are negative.            Allergies     Allergies   Allergen Reactions    Ciprofloxacin Hives    Macrobid [Nitrofurantoin] Nausea Only and Palpitations       Physical Exam     Physical Exam  Constitutional:       General: He is not in acute distress.     Appearance: He is well-developed.   HENT:      Head: Normocephalic and atraumatic.   Cardiovascular:      Comments: The lower extremity edema  Pulmonary:      Effort: Pulmonary effort is normal.      Breath sounds: Normal breath sounds.   Abdominal:      Palpations: Abdomen is soft.   Genitourinary:     Comments: Negative CVA tenderness  Musculoskeletal:         General: Normal range of motion.      Cervical back: Normal range of motion.   Skin:     General: Skin is warm.   Neurological:      Mental Status: He is alert and oriented to person, place, and time.   Psychiatric:         Behavior: Behavior normal.             Vital Signs  Vitals:    03/05/25 1444   BP: 118/86   BP Location: Left arm   Patient Position: Sitting   Cuff Size: Large   Pulse: 96   SpO2: 96%   Weight: 102 kg (224 lb)   Height: 5' 11\" (1.803 m)           Current Medications       Current Outpatient Medications:     amLODIPine (NORVASC) 5 mg tablet, TAKE 1 TABLET DAILY, Disp: 90 tablet, Rfl: 1    b complex vitamins capsule, Take 1 capsule by mouth daily., Disp: , Rfl:     Cholecalciferol (VITAMIN D3) 1000 units CAPS, Take 1 capsule by mouth daily, Disp: , Rfl:     coenzyme Q-10 100 MG capsule, Take 1 capsule by mouth daily, Disp: , Rfl:     cyanocobalamin (VITAMIN B-12) 100 mcg tablet, Take by mouth daily., Disp: , Rfl:     " "fenofibrate (TRICOR) 145 mg tablet, TAKE 1 TABLET DAILY, Disp: 90 tablet, Rfl: 0    lisinopril (ZESTRIL) 10 mg tablet, TAKE 1 AND 1/2 TABLETS(15  MG TOTAL) DAILY, Disp: 135 tablet, Rfl: 1    LORazepam (ATIVAN) 0.5 mg tablet, Take 1 tablet (0.5 mg total) by mouth daily as needed for anxiety, Disp: 90 tablet, Rfl: 0    magnesium (MAGTAB) 84 MG (7MEQ) TBCR, Take 1 tablet (84 mg total) by mouth 2 (two) times a day, Disp: 180 tablet, Rfl: 3    multivitamin (THERAGRAN) TABS, Take 1 tablet by mouth daily., Disp: , Rfl:     Omega-3 Fatty Acids (Fish Oil) 1200 MG CPDR, Take by mouth, Disp: , Rfl:     omeprazole (PriLOSEC) 20 mg delayed release capsule, Take 1 capsule (20 mg total) by mouth daily, Disp: 30 capsule, Rfl: 3    potassium citrate (UROCIT-K) 10 mEq, Take 1 tablet (10 mEq total) by mouth 2 (two) times a day, Disp: 180 tablet, Rfl: 3    sildenafil (VIAGRA) 50 MG tablet, TAKE 1 TABLET DAILY AS     NEEDED FOR ERECTILE        DYSFUNCTION, Disp: 30 tablet, Rfl: 5    SYRINGE-NEEDLE, DISP, 3 ML (BD Eclipse Syringe) 25G X 1\" 3 ML MISC, Use once a week, Disp: 100 each, Rfl: 2    testosterone cypionate (DEPO-TESTOSTERONE) 200 mg/mL SOLN, Inject 0.4 mL intramuscularly into the shoulder, thigh, or buttocks once weekly discard the remainder, Disp: 4 mL, Rfl: 0      Active Problems     Patient Active Problem List   Diagnosis    Essential hypertension    GERD (gastroesophageal reflux disease)    Overweight    Thyroid nodule    Testicular hypogonadism    Elevated hematocrit    Fatty liver    Low serum parathyroid hormone (PTH)    Dyslipidemia    Wellness examination    CKD (chronic kidney disease)    Recurrent nephrolithiasis    Hypocitraturia    Hypophosphatemia         Past Medical History     Past Medical History:   Diagnosis Date    Anxiety     Cancer (HCC)     skin ca on nose    Chronic kidney disease     kidney stones    GERD (gastroesophageal reflux disease)     Hyperlipidemia     Hypertension     Kidney stone     6/2022 "         Surgical History     Past Surgical History:   Procedure Laterality Date    COLONOSCOPY      ESOPHAGOGASTRODUODENOSCOPY      EXTRACORPOREAL SHOCK WAVE LITHOTRIPSY Bilateral     Renal Multiple times    FL RETROGRADE PYELOGRAM  3/31/2022    FL RETROGRADE PYELOGRAM  6/22/2022    FL RETROGRADE PYELOGRAM  5/31/2024    ID CYSTO/URETERO W/LITHOTRIPSY &INDWELL STENT INSRT Left 8/6/2020    Procedure: CYSTOSCOPY URETEROSCOPY WITH LITHOTRIPSY HOLMIUM LASER, AND INSERTION STENT URETERAL;  Surgeon: Otis Rutledge MD;  Location: [facility];  Service: Urology    ID CYSTO/URETERO W/LITHOTRIPSY &INDWELL STENT INSRT Right 3/31/2022    Procedure: CYSTOSCOPY URETEROSCOPY WITH LITHOTRIPSY HOLMIUM LASER,BASKET STONE EXTRACTION, RETROGRADE PYELOGRAM AND INSERTION STENT URETERAL;  Surgeon: Jamal Finley MD;  Location: [facility];  Service: Urology    ID CYSTO/URETERO W/LITHOTRIPSY &INDWELL STENT INSRT Left 6/22/2022    Procedure: CYSTOSCOPY URETEROSCOPY WITH LITHOTRIPSY HOLMIUM LASER, RETROGRADE PYELOGRAM AND INSERTION STENT URETERAL, STONE BASKET EXTRACTION;  Surgeon: Ed Beck MD;  Location: [facility];  Service: Urology    ID CYSTO/URETERO W/LITHOTRIPSY &INDWELL STENT INSRT Left 5/31/2024    Procedure: CYSTOSCOPY URETEROSCOPY WITH LITHOTRIPSY HOLMIUM LASER, STONE BASKET EXTRACTION, RETROGRADE PYELOGRAM AND INSERTION STENT URETERAL;  Surgeon: Christopher Nicole MD;  [facility];  Service: Urology    ID EXC B9 LESION MRGN XCP SK TG F/E/E/N/L/M > 4.0CM Right 4/28/2016    Procedure: EXCISION  LESION UPPER EYELID, COMPLEX CLOSURE;  Surgeon: Gianluca Salazar MD;  Location: [facility];  Service: Plastics    ID LITHOTRIPSY XTRCORP SHOCK WAVE Right 4/18/2019    Procedure: LITHROTRIPSY EXTRACORPORAL SHOCKWAVE (ESWL);  Surgeon: Orlando Crouch MD;  Location: [facility];  Service: Urology         Family History     Family History   Problem Relation Age of Onset    Other Father         CABG (coronary artery bypass  "surgery)    Atrial fibrillation Sister     Other Paternal Uncle         Myocardial infarction    Coronary artery disease Family     Diabetes Family     Hypertension Family     Uterine cancer Family          Social History     Social History     Social History     Tobacco Use   Smoking Status Former    Current packs/day: 0.00    Average packs/day: 0.8 packs/day for 18.0 years (13.5 ttl pk-yrs)    Types: Cigarettes    Start date:     Quit date:     Years since quittin.2   Smokeless Tobacco Never         Pertinent Lab Values     Lab Results   Component Value Date    CREATININE 1.08 2025       Lab Results   Component Value Date    PSA 1.225 2025    PSA 1.242 2024    PSA 0.53 10/05/2023       @RESULTRCNT(1H])@      Pertinent Imaging      - n/a    Portions of the record may have been created with voice recognition software.  Occasional wrong word or \"sound a like\" substitutions may have occurred due to the inherent limitations of voice recognition software.  Read the chart carefully and recognize, using context, where substitutions have occurred.  "

## 2025-03-05 NOTE — TELEPHONE ENCOUNTER
Advised patient that Dr. Nicole would like him to go for xray prior to appt this afternoon. Patient in agreement and will get done prior to 3 pm appt

## 2025-03-20 DIAGNOSIS — E29.1 HYPOGONADISM IN MALE: ICD-10-CM

## 2025-03-20 RX ORDER — TESTOSTERONE CYPIONATE 200 MG/ML
INJECTION, SOLUTION INTRAMUSCULAR
Qty: 4 ML | Refills: 0 | Status: SHIPPED | OUTPATIENT
Start: 2025-03-20

## 2025-03-28 ENCOUNTER — PREP FOR PROCEDURE (OUTPATIENT)
Dept: OBGYN CLINIC | Facility: CLINIC | Age: 65
End: 2025-03-28

## 2025-03-28 ENCOUNTER — OFFICE VISIT (OUTPATIENT)
Dept: OBGYN CLINIC | Facility: CLINIC | Age: 65
End: 2025-03-28
Payer: COMMERCIAL

## 2025-03-28 DIAGNOSIS — R22.31 FINGER MASS, RIGHT: Primary | ICD-10-CM

## 2025-03-28 PROCEDURE — 99214 OFFICE O/P EST MOD 30 MIN: CPT | Performed by: ORTHOPAEDIC SURGERY

## 2025-03-28 RX ORDER — ACETAMINOPHEN 325 MG/1
975 TABLET ORAL ONCE
OUTPATIENT
Start: 2025-03-28 | End: 2025-03-28

## 2025-03-28 NOTE — H&P (VIEW-ONLY)
HAND & UPPER EXTREMITY OFFICE VISIT   Referred By:  No referring provider defined for this encounter.        Chief Complaint:     Right index finger acute mass     Previous History:   Patient was last seen 2/14/2025 where an aspiration as attempted but no fluid was drained. He was to obtain and US of the cyst       Interval History:  Since last visit patient states he obtained the US and the results were discussed with him by Dr Billings over the phone.   He states there are good and bad days but the mass does not appear larger than last visit. He does have pain,like a needle stabbing him, at times if he puts pressure on the mass just the right way. He would like to discuss surgical excision today.    Past Medical History:  Past Medical History:   Diagnosis Date    Anxiety     Cancer (HCC)     skin ca on nose    Chronic kidney disease     kidney stones    GERD (gastroesophageal reflux disease)     Hyperlipidemia     Hypertension     Kidney stone     6/2022     Past Surgical History:   Procedure Laterality Date    COLONOSCOPY      ESOPHAGOGASTRODUODENOSCOPY      EXTRACORPOREAL SHOCK WAVE LITHOTRIPSY Bilateral     Renal Multiple times    FL RETROGRADE PYELOGRAM  3/31/2022    FL RETROGRADE PYELOGRAM  6/22/2022    FL RETROGRADE PYELOGRAM  5/31/2024    [place] CYSTO/URETERO W/LITHOTRIPSY &INDWELL STENT INSRT Left 8/6/2020    Procedure: CYSTOSCOPY URETEROSCOPY WITH LITHOTRIPSY HOLMIUM LASER, AND INSERTION STENT URETERAL;  Surgeon: Otis Rutledge MD;  [facility];  Service: Urology    [place] CYSTO/URETERO W/LITHOTRIPSY &INDWELL STENT INSRT Right 3/31/2022    Procedure: CYSTOSCOPY URETEROSCOPY WITH LITHOTRIPSY HOLMIUM LASER,BASKET STONE EXTRACTION, RETROGRADE PYELOGRAM AND INSERTION STENT URETERAL;  Surgeon: Jamal Finley MD;  [facility];  Service: Urology    [place] CYSTO/URETERO W/LITHOTRIPSY &INDWELL STENT INSRT Left 6/22/2022    Procedure: CYSTOSCOPY URETEROSCOPY WITH LITHOTRIPSY HOLMIUM LASER, RETROGRADE  PYELOGRAM AND INSERTION STENT URETERAL, STONE BASKET EXTRACTION;  Surgeon: Ed Beck MD;  [facility];  Service: Urology    [place] CYSTO/URETERO W/LITHOTRIPSY &INDWELL STENT INSRT Left 2024    Procedure: CYSTOSCOPY URETEROSCOPY WITH LITHOTRIPSY HOLMIUM LASER, STONE BASKET EXTRACTION, RETROGRADE PYELOGRAM AND INSERTION STENT URETERAL;  Surgeon: Christopher Nicole MD;  [facility];  Service: Urology    CT EXC B9 LESION MRGN XCP SK TG F/E/E/N/L/M > 4.0CM Right 2016    Procedure: EXCISION  LESION UPPER EYELID, COMPLEX CLOSURE;  Surgeon: Gianluca Salazar MD;  [facility];  Service: Plastics    CT LITHOTRIPSY XTRCORP SHOCK WAVE Right 2019    Procedure: LITHROTRIPSY EXTRACORPORAL SHOCKWAVE (ESWL);  Surgeon: Orlando Crouch MD;  [facility];  Service: Urology     Family History   Problem Relation Age of Onset    Other Father         CABG (coronary artery bypass surgery)    Atrial fibrillation Sister     Other Paternal Uncle         Myocardial infarction    Coronary artery disease Family     Diabetes Family     Hypertension Family     Uterine cancer Family      Social History     Socioeconomic History    Marital status: /Civil Union     Spouse name: Not on file    Number of children: Not on file    Years of education: Not on file    Highest education level: Not on file   Occupational History    Not on file   Tobacco Use    Smoking status: Former     Current packs/day: 0.00     Average packs/day: 0.8 packs/day for 18.0 years (13.5 ttl pk-yrs)     Types: Cigarettes     Start date:      Quit date:      Years since quittin.2    Smokeless tobacco: Never   Vaping Use    Vaping status: Never Used   Substance and Sexual Activity    Alcohol use: Yes     Alcohol/week: 3.0 standard drinks of alcohol     Types: 1 Glasses of wine, 2 Cans of beer per week     Comment: weekend    Drug use: No    Sexual activity: Yes     Partners: Female   Other Topics Concern     Not on file   Social History Narrative    Daily caffeine consumption     Social Drivers of Health     Financial Resource Strain: Not on file   Food Insecurity: Not on file   Transportation Needs: Not on file   Physical Activity: Not on file   Stress: Not on file   Social Connections: Not on file   Intimate Partner Violence: Not on file   Housing Stability: Not on file     Scheduled Meds:  Continuous Infusions:No current facility-administered medications for this visit.    PRN Meds:.  Allergies   Allergen Reactions    Ciprofloxacin Hives    Macrobid [Nitrofurantoin] Nausea Only and Palpitations       Physical Examination:    There were no vitals taken for this visit.    Gen: A&Ox3, NAD  Cardiac: regular rate  Chest: non labored breathing  Abdomen: Non-distended    Right Upper Extremity:   Skin CDI  Circular soft mass, approx 1x1 cm, over volar aspect of index finger on ulnar side  Mass transilliminates  TTP   Overlying mild erythema that does not radiate past the boundaries of the mass  PIP AROM preserved, DIP flexion limited due to pain and swelling  Finger WWP      Studies:  Ultrasound Right second digit  5 x 2 x 4 mm hypoechoic nodular focus in the superficial soft tissues of the ulnar aspect of the right second digit. No abnormal vascularity. The mass does closely abut the adjacent tendon sheath.     5 mm hypoechoic nodular focus in the superficial soft tissues of the right second digit abutting the tendon sheath. Appearance is nonspecific, possibly a tendon sheath ganglion or fibroma. Recommend clinical surveillance with repeat imaging for increase   in size/symptoms    Assessment and Plan:  Assessment & Plan  Finger mass, right  We discussed repeat attempt at aspiration would not be recommended. If he is having pain I recommend surgical excision of the mass under local anesthetic. He was in agreement with this   We reviewed the risks of surgery including infection, bleeding, injury to nearby structures  including the nerve, poor wound healing, stiffness, CRPS, and incomplete resolution or recurrence of symptoms. We reviewed the details of the procedure and the anticipated recovery period. All questions answered to patient's satisfaction. Written consent obtained in the office today.       Orders:    Case request operating room: EXCISIONAL BIOPSY RIGHT INDEX FINGER MASS; Standing  Local anesthetic      I recommend they follow up Return for Post-Op, sutures removed.  he expressed understanding of the plan and agreed. We encouraged them to contact our office with any questions or concerns.           Ru Billings MD  Hand and Upper Extremity Surgery      *This note was dictated using Dragon voice recognition software. Please excuse any word substitutions or errors.*

## 2025-03-28 NOTE — PROGRESS NOTES
HAND & UPPER EXTREMITY OFFICE VISIT   Referred By:  No referring provider defined for this encounter.        Chief Complaint:     Right index finger acute mass     Previous History:   Patient was last seen 2/14/2025 where an aspiration as attempted but no fluid was drained. He was to obtain and US of the cyst       Interval History:  Since last visit patient states he obtained the US and the results were discussed with him by Dr Billings over the phone.   He states there are good and bad days but the mass does not appear larger than last visit. He does have pain,like a needle stabbing him, at times if he puts pressure on the mass just the right way. He would like to discuss surgical excision today.    Past Medical History:  Past Medical History:   Diagnosis Date    Anxiety     Cancer (HCC)     skin ca on nose    Chronic kidney disease     kidney stones    GERD (gastroesophageal reflux disease)     Hyperlipidemia     Hypertension     Kidney stone     6/2022     Past Surgical History:   Procedure Laterality Date    COLONOSCOPY      ESOPHAGOGASTRODUODENOSCOPY      EXTRACORPOREAL SHOCK WAVE LITHOTRIPSY Bilateral     Renal Multiple times    FL RETROGRADE PYELOGRAM  3/31/2022    FL RETROGRADE PYELOGRAM  6/22/2022    FL RETROGRADE PYELOGRAM  5/31/2024    [place] CYSTO/URETERO W/LITHOTRIPSY &INDWELL STENT INSRT Left 8/6/2020    Procedure: CYSTOSCOPY URETEROSCOPY WITH LITHOTRIPSY HOLMIUM LASER, AND INSERTION STENT URETERAL;  Surgeon: Otis Rutledge MD;  [facility];  Service: Urology    [place] CYSTO/URETERO W/LITHOTRIPSY &INDWELL STENT INSRT Right 3/31/2022    Procedure: CYSTOSCOPY URETEROSCOPY WITH LITHOTRIPSY HOLMIUM LASER,BASKET STONE EXTRACTION, RETROGRADE PYELOGRAM AND INSERTION STENT URETERAL;  Surgeon: Jamal Finley MD;  [facility];  Service: Urology    [place] CYSTO/URETERO W/LITHOTRIPSY &INDWELL STENT INSRT Left 6/22/2022    Procedure: CYSTOSCOPY URETEROSCOPY WITH LITHOTRIPSY HOLMIUM LASER, RETROGRADE  PYELOGRAM AND INSERTION STENT URETERAL, STONE BASKET EXTRACTION;  Surgeon: Ed Beck MD;  [facility];  Service: Urology    [place] CYSTO/URETERO W/LITHOTRIPSY &INDWELL STENT INSRT Left 2024    Procedure: CYSTOSCOPY URETEROSCOPY WITH LITHOTRIPSY HOLMIUM LASER, STONE BASKET EXTRACTION, RETROGRADE PYELOGRAM AND INSERTION STENT URETERAL;  Surgeon: Christopher Nicole MD;  [facility];  Service: Urology    [place] EXC B9 LESION MRGN XCP SK TG F/E/E/N/L/M > 4.0CM Right 2016    Procedure: EXCISION  LESION UPPER EYELID, COMPLEX CLOSURE;  Surgeon: Gianluca Salazar MD;  [facility];  Service: Plastics    [place] LITHOTRIPSY XTRCORP SHOCK WAVE Right 2019    Procedure: LITHROTRIPSY EXTRACORPORAL SHOCKWAVE (ESWL);  Surgeon: Orlando Crouch MD;  [facility];  Service: Urology     Family History   Problem Relation Age of Onset    Other Father         CABG (coronary artery bypass surgery)    Atrial fibrillation Sister     Other Paternal Uncle         Myocardial infarction    Coronary artery disease Family     Diabetes Family     Hypertension Family     Uterine cancer Family      Social History     Socioeconomic History    Marital status: /Civil Union     Spouse name: Not on file    Number of children: Not on file    Years of education: Not on file    Highest education level: Not on file   Occupational History    Not on file   Tobacco Use    Smoking status: Former     Current packs/day: 0.00     Average packs/day: 0.8 packs/day for 18.0 years (13.5 ttl pk-yrs)     Types: Cigarettes     Start date:      Quit date:      Years since quittin.2    Smokeless tobacco: Never   Vaping Use    Vaping status: Never Used   Substance and Sexual Activity    Alcohol use: Yes     Alcohol/week: 3.0 standard drinks of alcohol     Types: 1 Glasses of wine, 2 Cans of beer per week     Comment: weekend    Drug use: No    Sexual activity: Yes     Partners: Female   Other Topics Concern     Not on file   Social History Narrative    Daily caffeine consumption     Social Drivers of Health     Financial Resource Strain: Not on file   Food Insecurity: Not on file   Transportation Needs: Not on file   Physical Activity: Not on file   Stress: Not on file   Social Connections: Not on file   Intimate Partner Violence: Not on file   Housing Stability: Not on file     Scheduled Meds:  Continuous Infusions:No current facility-administered medications for this visit.    PRN Meds:.  Allergies   Allergen Reactions    Ciprofloxacin Hives    Macrobid [Nitrofurantoin] Nausea Only and Palpitations       Physical Examination:    There were no vitals taken for this visit.    Gen: A&Ox3, NAD  Cardiac: regular rate  Chest: non labored breathing  Abdomen: Non-distended    Right Upper Extremity:   Skin CDI  Circular soft mass, approx 1x1 cm, over volar aspect of index finger on ulnar side  Mass transilliminates  TTP   Overlying mild erythema that does not radiate past the boundaries of the mass  PIP AROM preserved, DIP flexion limited due to pain and swelling  Finger WWP      Studies:  Ultrasound Right second digit  5 x 2 x 4 mm hypoechoic nodular focus in the superficial soft tissues of the ulnar aspect of the right second digit. No abnormal vascularity. The mass does closely abut the adjacent tendon sheath.     5 mm hypoechoic nodular focus in the superficial soft tissues of the right second digit abutting the tendon sheath. Appearance is nonspecific, possibly a tendon sheath ganglion or fibroma. Recommend clinical surveillance with repeat imaging for increase   in size/symptoms    Assessment and Plan:  Assessment & Plan  Finger mass, right  We discussed repeat attempt at aspiration would not be recommended. If he is having pain I recommend surgical excision of the mass under local anesthetic. He was in agreement with this   We reviewed the risks of surgery including infection, bleeding, injury to nearby structures  including the nerve, poor wound healing, stiffness, CRPS, and incomplete resolution or recurrence of symptoms. We reviewed the details of the procedure and the anticipated recovery period. All questions answered to patient's satisfaction. Written consent obtained in the office today.       Orders:    Case request operating room: EXCISIONAL BIOPSY RIGHT INDEX FINGER MASS; Standing  Local anesthetic      I recommend they follow up Return for Post-Op, sutures removed.  he expressed understanding of the plan and agreed. We encouraged them to contact our office with any questions or concerns.           Ru Billings MD  Hand and Upper Extremity Surgery      *This note was dictated using Dragon voice recognition software. Please excuse any word substitutions or errors.*

## 2025-03-28 NOTE — H&P
HAND & UPPER EXTREMITY OFFICE VISIT   Referred By:  No referring provider defined for this encounter.        Chief Complaint:     Right index finger acute mass     Previous History:   Patient was last seen 2/14/2025 where an aspiration as attempted but no fluid was drained. He was to obtain and US of the cyst       Interval History:  Since last visit patient states he obtained the US and the results were discussed with him by Dr Billings over the phone.   He states there are good and bad days but the mass does not appear larger than last visit. He does have pain,like a needle stabbing him, at times if he puts pressure on the mass just the right way. He would like to discuss surgical excision today.    Past Medical History:  Past Medical History:   Diagnosis Date    Anxiety     Cancer (HCC)     skin ca on nose    Chronic kidney disease     kidney stones    GERD (gastroesophageal reflux disease)     Hyperlipidemia     Hypertension     Kidney stone     6/2022     Past Surgical History:   Procedure Laterality Date    COLONOSCOPY      ESOPHAGOGASTRODUODENOSCOPY      EXTRACORPOREAL SHOCK WAVE LITHOTRIPSY Bilateral     Renal Multiple times    FL RETROGRADE PYELOGRAM  3/31/2022    FL RETROGRADE PYELOGRAM  6/22/2022    FL RETROGRADE PYELOGRAM  5/31/2024    [place] CYSTO/URETERO W/LITHOTRIPSY &INDWELL STENT INSRT Left 8/6/2020    Procedure: CYSTOSCOPY URETEROSCOPY WITH LITHOTRIPSY HOLMIUM LASER, AND INSERTION STENT URETERAL;  Surgeon: Otis Rutledge MD;  [facility];  Service: Urology    [place] CYSTO/URETERO W/LITHOTRIPSY &INDWELL STENT INSRT Right 3/31/2022    Procedure: CYSTOSCOPY URETEROSCOPY WITH LITHOTRIPSY HOLMIUM LASER,BASKET STONE EXTRACTION, RETROGRADE PYELOGRAM AND INSERTION STENT URETERAL;  Surgeon: Jamal Finley MD;  [facility];  Service: Urology    [place] CYSTO/URETERO W/LITHOTRIPSY &INDWELL STENT INSRT Left 6/22/2022    Procedure: CYSTOSCOPY URETEROSCOPY WITH LITHOTRIPSY HOLMIUM LASER, RETROGRADE  PYELOGRAM AND INSERTION STENT URETERAL, STONE BASKET EXTRACTION;  Surgeon: Ed Beck MD;  [facility];  Service: Urology    [place] CYSTO/URETERO W/LITHOTRIPSY &INDWELL STENT INSRT Left 2024    Procedure: CYSTOSCOPY URETEROSCOPY WITH LITHOTRIPSY HOLMIUM LASER, STONE BASKET EXTRACTION, RETROGRADE PYELOGRAM AND INSERTION STENT URETERAL;  Surgeon: Christopher Nicole MD;  [facility];  Service: Urology    [place] EXC B9 LESION MRGN XCP SK TG F/E/E/N/L/M > 4.0CM Right 2016    Procedure: EXCISION  LESION UPPER EYELID, COMPLEX CLOSURE;  Surgeon: Gianluca Salazar MD;  [facility];  Service: Plastics    [place] LITHOTRIPSY XTRCORP SHOCK WAVE Right 2019    Procedure: LITHROTRIPSY EXTRACORPORAL SHOCKWAVE (ESWL);  Surgeon: Orlando Crouch MD;  [facility];  Service: Urology     Family History   Problem Relation Age of Onset    Other Father         CABG (coronary artery bypass surgery)    Atrial fibrillation Sister     Other Paternal Uncle         Myocardial infarction    Coronary artery disease Family     Diabetes Family     Hypertension Family     Uterine cancer Family      Social History     Socioeconomic History    Marital status: /Civil Union     Spouse name: Not on file    Number of children: Not on file    Years of education: Not on file    Highest education level: Not on file   Occupational History    Not on file   Tobacco Use    Smoking status: Former     Current packs/day: 0.00     Average packs/day: 0.8 packs/day for 18.0 years (13.5 ttl pk-yrs)     Types: Cigarettes     Start date:      Quit date:      Years since quittin.2    Smokeless tobacco: Never   Vaping Use    Vaping status: Never Used   Substance and Sexual Activity    Alcohol use: Yes     Alcohol/week: 3.0 standard drinks of alcohol     Types: 1 Glasses of wine, 2 Cans of beer per week     Comment: weekend    Drug use: No    Sexual activity: Yes     Partners: Female   Other Topics Concern     Not on file   Social History Narrative    Daily caffeine consumption     Social Drivers of Health     Financial Resource Strain: Not on file   Food Insecurity: Not on file   Transportation Needs: Not on file   Physical Activity: Not on file   Stress: Not on file   Social Connections: Not on file   Intimate Partner Violence: Not on file   Housing Stability: Not on file     Scheduled Meds:  Continuous Infusions:No current facility-administered medications for this visit.    PRN Meds:.  Allergies   Allergen Reactions    Ciprofloxacin Hives    Macrobid [Nitrofurantoin] Nausea Only and Palpitations       Physical Examination:    There were no vitals taken for this visit.    Gen: A&Ox3, NAD  Cardiac: regular rate  Chest: non labored breathing  Abdomen: Non-distended    Right Upper Extremity:   Skin CDI  Circular soft mass, approx 1x1 cm, over volar aspect of index finger on ulnar side  Mass transilliminates  TTP   Overlying mild erythema that does not radiate past the boundaries of the mass  PIP AROM preserved, DIP flexion limited due to pain and swelling  Finger WWP      Studies:  Ultrasound Right second digit  5 x 2 x 4 mm hypoechoic nodular focus in the superficial soft tissues of the ulnar aspect of the right second digit. No abnormal vascularity. The mass does closely abut the adjacent tendon sheath.     5 mm hypoechoic nodular focus in the superficial soft tissues of the right second digit abutting the tendon sheath. Appearance is nonspecific, possibly a tendon sheath ganglion or fibroma. Recommend clinical surveillance with repeat imaging for increase   in size/symptoms    Assessment and Plan:  Assessment & Plan  Finger mass, right  We discussed repeat attempt at aspiration would not be recommended. If he is having pain I recommend surgical excision of the mass under local anesthetic. He was in agreement with this   We reviewed the risks of surgery including infection, bleeding, injury to nearby structures  including the nerve, poor wound healing, stiffness, CRPS, and incomplete resolution or recurrence of symptoms. We reviewed the details of the procedure and the anticipated recovery period. All questions answered to patient's satisfaction. Written consent obtained in the office today.       Orders:    Case request operating room: EXCISIONAL BIOPSY RIGHT INDEX FINGER MASS; Standing  Local anesthetic      I recommend they follow up Return for Post-Op, sutures removed.  he expressed understanding of the plan and agreed. We encouraged them to contact our office with any questions or concerns.           Ru Billings MD  Hand and Upper Extremity Surgery      *This note was dictated using Dragon voice recognition software. Please excuse any word substitutions or errors.*

## 2025-04-08 PROBLEM — R22.31 FINGER MASS, RIGHT: Status: ACTIVE | Noted: 2025-04-08

## 2025-04-08 NOTE — DISCHARGE INSTR - AVS FIRST PAGE
POST-OPERATIVE INSTRUCTIONS  FINGER MASS EXCISION    You have just undergone a finger mass excision by Dr. Billings in the operating room.  It is our wish that your postoperative recovery be as quick and comfortable as possible.  Please carefully review the following items that are important in your recovery.    Pain Control:  After any operation, a certain degree of pain is to be expected.  A prescription for acetaminophen and/or ibuprofen has been electronically sent to your pharmacy. Do not exceed 3000 mg of Tylenol per day. This medication will relieve most of your pain but may not relieve all of the pain. Some pain is normal post operatively.     When you go home, please keep your operated arm elevated at all times (above the level of your heart.)  If you do this, your swelling will be diminished and your pain will be diminished as well.      Dressing Care:  After surgery, make sure that your dressing is kept dry.  You can take a shower if you cover your arm with a plastic bag, such as a newspaper bag, and use tape or rubber bands. If your dressing gets wet you may take it off, place Band-Aids on the wound and re-cover it with sterile dressings which you can obtain at your local drug store.    Please remove your dressing down to the incision 3 days after your surgery. For example, if your had surgery on a Monday, do this on Thursday.  If your surgery was on Thursday, do this on Sunday.   If your dressing gets wet prior to removing it, please take if off and place something clean, or bandaids, on the wound. After removal of your surgical dressing, you may leave the incision open to air or cover with a Band-Aid. You may begin to shower regularly and allow the clean, soapy water to run over the incision site. Do not scrub the incision.     Weight Bearing:  You should NOT bear weight >5lbs through your operative extremity. Do not push off using your operative extremity.     It is ok to move your fingers as tolerated  to prevent stiffness. You may also use your operative hand to assist with light activities of daily living such as putting on clothes, brushing your teeth and eating as tolerated    Please work on these finger range of motions exercises between now and you follow up visit:         Follow Up:  If you don't already have a scheduled postoperative appointment, please call our office for a follow-up appointment. It is best to call the day after surgery to make an appointment in 10-14 days.  At your first postoperative visit, you will be seen by either Dr. Billings, his PA; or one of their associates. The sutures will be removed and you may be asked to see a hand therapist to optimize your functional result. Each of the hand therapists that you will be referred to have received special training in the care of the hand and upper extremity.    When to Call:  It is normal to see minor staining on the hand surgery dressing after surgery. If there is significant bleeding, you are advised to call the office during regular office hours to have this checked.     If you feel that you have a surgical emergency postoperatively that requires immediate attention, please call [phone]. In addition, any emergency can also be handled by the emergency room.      If you have questions regarding your surgery postop that you feel requires attention, please call the office.   If this occurs after our regular office hours, there is a message with instructions to talk to the physician on call.

## 2025-04-10 ENCOUNTER — HOSPITAL ENCOUNTER (OUTPATIENT)
Age: 65
Setting detail: OUTPATIENT SURGERY
Discharge: HOME | End: 2025-04-10
Attending: ORTHOPAEDIC SURGERY | Admitting: ORTHOPAEDIC SURGERY
Payer: COMMERCIAL

## 2025-04-10 VITALS
RESPIRATION RATE: 16 BRPM | BODY MASS INDEX: 30.8 KG/M2 | HEIGHT: 71 IN | OXYGEN SATURATION: 98 % | DIASTOLIC BLOOD PRESSURE: 98 MMHG | SYSTOLIC BLOOD PRESSURE: 160 MMHG | WEIGHT: 220 LBS | TEMPERATURE: 98.5 F | HEART RATE: 85 BPM

## 2025-04-10 DIAGNOSIS — R22.31 FINGER MASS, RIGHT: Primary | ICD-10-CM

## 2025-04-10 DIAGNOSIS — Z47.89 AFTERCARE FOLLOWING SURGERY OF THE MUSCULOSKELETAL SYSTEM: ICD-10-CM

## 2025-04-10 PROCEDURE — 64776 REMOVE DIGIT NERVE LESION: CPT | Performed by: ORTHOPAEDIC SURGERY

## 2025-04-10 PROCEDURE — 64776 REMOVE DIGIT NERVE LESION: CPT

## 2025-04-10 PROCEDURE — 88305 TISSUE EXAM BY PATHOLOGIST: CPT | Performed by: PATHOLOGY

## 2025-04-10 RX ORDER — ACETAMINOPHEN 500 MG
1000 TABLET ORAL EVERY 8 HOURS
Qty: 60 TABLET | Refills: 0 | Status: SHIPPED | OUTPATIENT
Start: 2025-04-10

## 2025-04-10 RX ORDER — IBUPROFEN 800 MG/1
800 TABLET, FILM COATED ORAL EVERY 8 HOURS
Qty: 30 TABLET | Refills: 0 | Status: SHIPPED | OUTPATIENT
Start: 2025-04-10

## 2025-04-10 RX ORDER — IBUPROFEN 600 MG/1
600 TABLET, FILM COATED ORAL EVERY 6 HOURS PRN
Status: DISCONTINUED | OUTPATIENT
Start: 2025-04-10 | End: 2025-04-10 | Stop reason: HOSPADM

## 2025-04-10 RX ORDER — ACETAMINOPHEN 325 MG/1
650 TABLET ORAL EVERY 6 HOURS PRN
Status: DISCONTINUED | OUTPATIENT
Start: 2025-04-10 | End: 2025-04-10 | Stop reason: HOSPADM

## 2025-04-10 RX ORDER — ACETAMINOPHEN 325 MG/1
975 TABLET ORAL ONCE
Status: COMPLETED | OUTPATIENT
Start: 2025-04-10 | End: 2025-04-10

## 2025-04-10 RX ADMIN — LIDOCAINE HYDROCHLORIDE,EPINEPHRINE BITARTRATE: 10; .01 INJECTION, SOLUTION INFILTRATION; PERINEURAL at 06:37

## 2025-04-10 RX ADMIN — ACETAMINOPHEN 975 MG: 325 TABLET ORAL at 06:19

## 2025-04-10 NOTE — OP NOTE
OPERATIVE REPORT  PATIENT NAME: Jack Dewey    :  1960  MRN: [id number]  Pt Location: [facility] 06    SURGERY DATE: 4/10/2025    Surgeons and Role:     * Ru Billings MD - Primary    Preop Diagnosis:  Finger mass, right [R22.31]    Post-Op Diagnosis Codes:     * Finger mass, right [R22.31]    Procedure(s):  Right - EXCISIONAL BIOPSY RIGHT INDEX FINGER MASS    Specimen(s):  ID Type Source Tests Collected by Time Destination   1 : Right Index Finger Mass Tissue Finger, Right TISSUE EXAM Ru Billings MD 4/10/2025 0705        Estimated Blood Loss:   Minimal    Drains:  None    Anesthesia Type:   Local    Operative Indications:  Finger mass, right [R22.31]    64-year-old male with a painful right index finger mass.  He has failed appropriate nonoperative management and elected to proceed with surgical excision.    Operative Findings:  Small solid mass on the volar-ulnar aspect of the index finger middle phalanx near the DIP joint.  Clinical appearance suspicious for a neuroma      Complications:   None    Procedure and Technique:  The patient was greeted in the preoperative area. The risks, benefits, and alternatives of the procedure were again discussed in detail with the patient. The patient was amenable to proceed. The operative extremity was marked.  10 cc of a one-to-one mixture of 1% lidocaine with epinephrine and 0.25% Marcaine were administered at the base of the operative index finger and a digital block in preparation for our local only anesthetic case.  Patient was brought to the operating room and positioned on the stretcher with a hand table. The operative extremity was prepped and draped in the usual sterile fashion.  No antibiotic prophylaxis was indicated. A timeout was performed identifying the name and laterality of the procedure.     We checked the patient to ensure proper anesthesia of the finger.  We then applied a finger turnicot to the base of the index finger.  A  mid lateral incision was made on the ulnar aspect of the finger centered at the level of the palpated mass.  This was dissected out subcutaneous tissues and the Iva's ligament was incised and the neurovascular bundle exposed.  The nerve and artery were gently dissected and protected through the remainder of the case.  We dissected volarly and visualized the flexor tendon sheath.  There was no obvious cyst arising from the tendon sheath.  There is a small rent in the sheath between the A4 and A5 pulleys.  This is open slightly further and the edges excised to prevent recurrence if there was a cyst which had popped.  There was then a small mass identified just adjacent to this level with some apparent nerve endings extruding from it with a very small nerve branch extending to this area.  Clinically concerning for possible neuroma as the source of his mass and symptoms.  This was marginally excised en bloc with tenotomy scissors and sent for pathology.  No further masses were identified within the finger.  The neurovascular bundle was intact.  Finger turnicot was released.    The wound was thoroughly irrigated and hemostasis obtained.  The wound was closed in an interrupted fashion.  Sterile dressings were applied.  The patient was awoken and transported to the recovery room in stable condition.     I was present for the entire procedure., A qualified resident physician was not available., and A physician assistant was required during the procedure for retraction, tissue handling, dissection and suturing.    Patient Disposition:  PACU              SIGNATURE: Ru Billings MD  DATE: April 10, 2025  TIME: 7:38 AM

## 2025-04-15 PROCEDURE — 88305 TISSUE EXAM BY PATHOLOGIST: CPT | Performed by: PATHOLOGY

## 2025-04-25 ENCOUNTER — OFFICE VISIT (OUTPATIENT)
Dept: OBGYN CLINIC | Facility: CLINIC | Age: 65
End: 2025-04-25

## 2025-04-25 VITALS — HEIGHT: 71 IN | WEIGHT: 220 LBS | BODY MASS INDEX: 30.8 KG/M2

## 2025-04-25 DIAGNOSIS — Z98.890 STATUS POST MUSCULOSKELETAL SYSTEM SURGERY: Primary | ICD-10-CM

## 2025-04-25 PROCEDURE — 99024 POSTOP FOLLOW-UP VISIT: CPT | Performed by: ORTHOPAEDIC SURGERY

## 2025-04-25 NOTE — PROGRESS NOTES
HAND & UPPER EXTREMITY OFFICE VISIT   Referred By:  No referring provider defined for this encounter.      Chief Complaint:     Post-op visit    Surgery:  Surgery Date: 4/10/2025 - Excisional Biopsy Right Index Finger Mass - Right     History of Present Illness:   Patient presents now 15 days status post the above surgery. Overall, he is doing well and is pain free at today's visit. He has been compliant with at home hand therapy and notes making great progressions with  strength and range of motion. He is unable to make a tight fist at this time due to tightness at the incision from intact stitches. He notes decreased sensation along the incision, but denies finger numbness and tingling.       Past Medical History:  Past Medical History:   Diagnosis Date    Anxiety     Cancer (HCC)     skin ca on nose    Chronic kidney disease     kidney stones    GERD (gastroesophageal reflux disease)     Hyperlipidemia     Hypertension     Kidney stone     6/2022     Past Surgical History:   Procedure Laterality Date    COLONOSCOPY      ESOPHAGOGASTRODUODENOSCOPY      EXTRACORPOREAL SHOCK WAVE LITHOTRIPSY Bilateral     Renal Multiple times    FL RETROGRADE PYELOGRAM  3/31/2022    FL RETROGRADE PYELOGRAM  6/22/2022    FL RETROGRADE PYELOGRAM  5/31/2024    UT CYSTO/URETERO W/LITHOTRIPSY &INDWELL STENT INSRT Left 8/6/2020    Procedure: CYSTOSCOPY URETEROSCOPY WITH LITHOTRIPSY HOLMIUM LASER, AND INSERTION STENT URETERAL;  Surgeon: Otis Rutledge MD;  [facility];  Service: Urology    UT CYSTO/URETERO W/LITHOTRIPSY &INDWELL STENT INSRT Right 3/31/2022    Procedure: CYSTOSCOPY URETEROSCOPY WITH LITHOTRIPSY HOLMIUM LASER,BASKET STONE EXTRACTION, RETROGRADE PYELOGRAM AND INSERTION STENT URETERAL;  Surgeon: Jamal Finley MD;  [facility];  Service: Urology    UT CYSTO/URETERO W/LITHOTRIPSY &INDWELL STENT INSRT Left 6/22/2022    Procedure: CYSTOSCOPY URETEROSCOPY WITH LITHOTRIPSY HOLMIUM LASER, RETROGRADE PYELOGRAM  AND INSERTION STENT URETERAL, STONE BASKET EXTRACTION;  Surgeon: Ed Beck MD;  [facility];  Service: Urology    NC CYSTO/URETERO W/LITHOTRIPSY &INDWELL STENT INSRT Left 2024    Procedure: CYSTOSCOPY URETEROSCOPY WITH LITHOTRIPSY HOLMIUM LASER, STONE BASKET EXTRACTION, RETROGRADE PYELOGRAM AND INSERTION STENT URETERAL;  Surgeon: Christopher Nicole MD;  [facility];  Service: Urology    NC EXC B9 LESION MRGN XCP SK TG F/E/E/N/L/M > 4.0CM Right 2016    Procedure: EXCISION  LESION UPPER EYELID, COMPLEX CLOSURE;  Surgeon: Gianluca Salazar MD;  Location[facility];  Service: Plastics    NC EXC CINTHIA/VASC MAL SFT TISS HAND/FNGR SUBQ <1.5CM Right 4/10/2025    Procedure: EXCISIONAL BIOPSY RIGHT INDEX FINGER MASS;  Surgeon: Ru Billings MD;  [facility];  Service: Orthopedics    NC LITHOTRIPSY XTRCORP SHOCK WAVE Right 2019    Procedure: LITHROTRIPSY EXTRACORPORAL SHOCKWAVE (ESWL);  Surgeon: Orlando Crouch MD;  [facility];  Service: Urology     Family History   Problem Relation Age of Onset    Other Father         CABG (coronary artery bypass surgery)    Atrial fibrillation Sister     Other Paternal Uncle         Myocardial infarction    Coronary artery disease Family     Diabetes Family     Hypertension Family     Uterine cancer Family      Social History     Socioeconomic History    Marital status: /Civil Union     Spouse name: Not on file    Number of children: Not on file    Years of education: Not on file    Highest education level: Not on file   Occupational History    Not on file   Tobacco Use    Smoking status: Former     Current packs/day: 0.00     Average packs/day: 0.8 packs/day for 18.0 years (13.5 ttl pk-yrs)     Types: Cigarettes     Start date:      Quit date:      Years since quittin.3    Smokeless tobacco: Never   Vaping Use    Vaping status: Never Used   Substance and Sexual Activity    Alcohol use: Yes      "Alcohol/week: 3.0 standard drinks of alcohol     Types: 1 Glasses of wine, 2 Cans of beer per week     Comment: weekend    Drug use: No    Sexual activity: Yes     Partners: Female   Other Topics Concern    Not on file   Social History Narrative    Daily caffeine consumption     Social Drivers of Health     Financial Resource Strain: Not on file   Food Insecurity: Not on file   Transportation Needs: Not on file   Physical Activity: Not on file   Stress: Not on file   Social Connections: Not on file   Intimate Partner Violence: Not on file   Housing Stability: Not on file     Scheduled Meds:  Continuous Infusions:No current facility-administered medications for this visit.    PRN Meds:.  Allergies   Allergen Reactions    Ciprofloxacin Hives    Macrobid [Nitrofurantoin] Nausea Only and Palpitations       Physical Examination:    Ht 5' 11\" (1.803 m)   Wt 99.8 kg (220 lb)   BMI 30.68 kg/m²     Gen: A&Ox3, NAD    Right Upper Extremity:  Dressing clean and dry, removed  Incision healing well without signs of infection   Sutures intact, removed  Non-tender on exam  Sensation intact to light touch in the axillary median, ulnar, and radial nerve distributions  Decreased sensation over incisional site  Able to form full composite fist  4+/5 motor  strength   Warm, well-perfused digits  Cap refill <2s        Studies:  OR Pathology:   Final Diagnosis   A. Soft Tissue, Right Index Finger:  CONSISTENT WITH PACINIAN NEUROMA          Labs:  I personally reviewed the following labs,  Lab Results   Component Value Date    HGBA1C 5.7 (H) 05/17/2024    HGBA1C 5.6 10/05/2023         Assessment & Plan  Status post musculoskeletal system surgery     64 y.o. male presents 15 days status post Excisional Biopsy Right Index Finger Mass - Right. Sutures removed in the office today. he may continue regular hygiene and apply lotions/oils and perform scar massage as needed. he may participate in all activities as tolerated without further " restrictions. It is recommended he return to the office in 1 month, or sooner should symptoms worsen      he expressed understanding of the plan and agreed. We encouraged them to contact our office with any questions or concerns.       Ru Billings MD  Hand and Upper Extremity Surgery        *This note was dictated using Dragon voice recognition software. Please excuse any word substitutions or errors.*      Scribe Attestation      I,:  Dennis Dawkins am acting as a scribe while in the presence of the attending physician.:       I,:  Ru Billings MD personally performed the services described in this documentation    as scribed in my presence.:

## 2025-04-28 DIAGNOSIS — E78.1 HYPERTRIGLYCERIDEMIA: ICD-10-CM

## 2025-04-29 RX ORDER — FENOFIBRATE 145 MG/1
145 TABLET, FILM COATED ORAL DAILY
Qty: 90 TABLET | Refills: 0 | Status: SHIPPED | OUTPATIENT
Start: 2025-04-29

## 2025-05-01 DIAGNOSIS — F41.9 ANXIETY: ICD-10-CM

## 2025-05-01 RX ORDER — LORAZEPAM 0.5 MG/1
0.5 TABLET ORAL DAILY PRN
Qty: 90 TABLET | Refills: 0 | Status: SHIPPED | OUTPATIENT
Start: 2025-05-01

## 2025-05-16 ENCOUNTER — OFFICE VISIT (OUTPATIENT)
Dept: OBGYN CLINIC | Facility: CLINIC | Age: 65
End: 2025-05-16

## 2025-05-16 DIAGNOSIS — I10 ESSENTIAL HYPERTENSION: ICD-10-CM

## 2025-05-16 DIAGNOSIS — Z98.890 STATUS POST MUSCULOSKELETAL SYSTEM SURGERY: Primary | ICD-10-CM

## 2025-05-16 DIAGNOSIS — R22.31 FINGER MASS, RIGHT: ICD-10-CM

## 2025-05-16 PROCEDURE — 99024 POSTOP FOLLOW-UP VISIT: CPT | Performed by: ORTHOPAEDIC SURGERY

## 2025-05-16 RX ORDER — AMLODIPINE BESYLATE 5 MG/1
5 TABLET ORAL DAILY
Qty: 90 TABLET | Refills: 1 | Status: SHIPPED | OUTPATIENT
Start: 2025-05-16

## 2025-05-16 NOTE — PROGRESS NOTES
HAND & UPPER EXTREMITY OFFICE VISIT   Referred By:  No referring provider defined for this encounter.      Chief Complaint:     Post-op visit    Surgery:  Surgery Date: 4/10/2025 - Excisional Biopsy Right Index Finger Mass - Right     History of Present Illness:   Patient presents now 5 weeks status post the above surgery. Since the last visit, he reports increased numbness, tingling, and swelling of the finger. He reports numbness along the incision site and tingling distal to the incision, along the ulnar side of the finger. The volar aspect of the finger overlying the middle phalanx is also firm to the touch and limits his ROM. He reports these symptoms became more bothersome shortly after his last visit on 4/25 when his sutures were removed.     Past Medical History:  Past Medical History:   Diagnosis Date    Anxiety     Cancer (HCC)     skin ca on nose    Chronic kidney disease     kidney stones    GERD (gastroesophageal reflux disease)     Hyperlipidemia     Hypertension     Kidney stone     6/2022     Past Surgical History:   Procedure Laterality Date    COLONOSCOPY      ESOPHAGOGASTRODUODENOSCOPY      EXTRACORPOREAL SHOCK WAVE LITHOTRIPSY Bilateral     Renal Multiple times    FL RETROGRADE PYELOGRAM  3/31/2022    FL RETROGRADE PYELOGRAM  6/22/2022    FL RETROGRADE PYELOGRAM  5/31/2024    DE CYSTO/URETERO W/LITHOTRIPSY &INDWELL STENT INSRT Left 8/6/2020    Procedure: CYSTOSCOPY URETEROSCOPY WITH LITHOTRIPSY HOLMIUM LASER, AND INSERTION STENT URETERAL;  Surgeon: Otis Rutledge MD;  [facility];  Service: Urology    DE CYSTO/URETERO W/LITHOTRIPSY &INDWELL STENT INSRT Right 3/31/2022    Procedure: CYSTOSCOPY URETEROSCOPY WITH LITHOTRIPSY HOLMIUM LASER,BASKET STONE EXTRACTION, RETROGRADE PYELOGRAM AND INSERTION STENT URETERAL;  Surgeon: Jamal Finley MD;  [facility];  Service: Urology    DE CYSTO/URETERO W/LITHOTRIPSY &INDWELL STENT INSRT Left 6/22/2022    Procedure: CYSTOSCOPY URETEROSCOPY  WITH LITHOTRIPSY HOLMIUM LASER, RETROGRADE PYELOGRAM AND INSERTION STENT URETERAL, STONE BASKET EXTRACTION;  Surgeon: Ed Beck MD;  [facility];  Service: Urology    WA CYSTO/URETERO W/LITHOTRIPSY &INDWELL STENT INSRT Left 2024    Procedure: CYSTOSCOPY URETEROSCOPY WITH LITHOTRIPSY HOLMIUM LASER, STONE BASKET EXTRACTION, RETROGRADE PYELOGRAM AND INSERTION STENT URETERAL;  Surgeon: Christopher Nicole MD;  [facility];  Service: Urology    WA EXC B9 LESION MRGN XCP SK TG F/E/E/N/L/M > 4.0CM Right 2016    Procedure: EXCISION  LESION UPPER EYELID, COMPLEX CLOSURE;  Surgeon: Gianluca Salazar MD;  [facility];  Service: Plastics    WA EXC CINTHIA/VASC MAL SFT TISS HAND/FNGR SUBQ <1.5CM Right 4/10/2025    Procedure: EXCISIONAL BIOPSY RIGHT INDEX FINGER MASS;  Surgeon: Ru Billings MD;  [facility];  Service: Orthopedics    WA LITHOTRIPSY XTRCORP SHOCK WAVE Right 2019    Procedure: LITHROTRIPSY EXTRACORPORAL SHOCKWAVE (ESWL);  Surgeon: Orlando Crouch MD;  [facility];  Service: Urology     Family History   Problem Relation Age of Onset    Other Father         CABG (coronary artery bypass surgery)    Atrial fibrillation Sister     Other Paternal Uncle         Myocardial infarction    Coronary artery disease Family     Diabetes Family     Hypertension Family     Uterine cancer Family      Social History     Socioeconomic History    Marital status: /Civil Union     Spouse name: Not on file    Number of children: Not on file    Years of education: Not on file    Highest education level: Not on file   Occupational History    Not on file   Tobacco Use    Smoking status: Former     Current packs/day: 0.00     Average packs/day: 0.8 packs/day for 18.0 years (13.5 ttl pk-yrs)     Types: Cigarettes     Start date:      Quit date:      Years since quittin.3    Smokeless tobacco: Never   Vaping Use    Vaping status: Never Used   Substance  and Sexual Activity    Alcohol use: Yes     Alcohol/week: 3.0 standard drinks of alcohol     Types: 1 Glasses of wine, 2 Cans of beer per week     Comment: weekend    Drug use: No    Sexual activity: Yes     Partners: Female   Other Topics Concern    Not on file   Social History Narrative    Daily caffeine consumption     Social Drivers of Health     Financial Resource Strain: Not on file   Food Insecurity: Not on file   Transportation Needs: Not on file   Physical Activity: Not on file   Stress: Not on file   Social Connections: Not on file   Intimate Partner Violence: Not on file   Housing Stability: Not on file     Scheduled Meds:  Continuous Infusions:No current facility-administered medications for this visit.    PRN Meds:.  Allergies   Allergen Reactions    Ciprofloxacin Hives    Macrobid [Nitrofurantoin] Nausea Only and Palpitations       Physical Examination:    There were no vitals taken for this visit.    Gen: A&Ox3, NAD    Right Upper Extremity:  Incision well-healed without signs of infection  Diminished sensation to light touch over incision site along ulnar aspect of index finger. Paresthesias to light touch on ulnar aspect of finger distal to incision site. Positive Tinel's over distal aspect of incision. Normal sensation along radial aspect of finger and proximal to incision site.   Sensation otherwise intact to light touch in the axillary, ulnar, and radial nerve distributions  Index finger middle phalanx firm to the touch, non-tender  Limited index finger flexion due to stiffness of PIP and DIP joints. Able to fully flex the finger passively without pain  4+/5 motor  strength   Warm, well-perfused digits  Cap refill <2s      Studies:  OR Pathology:   Final Diagnosis   A. Soft Tissue, Right Index Finger:  CONSISTENT WITH PACINIAN NEUROMA          Labs:  I personally reviewed the following labs,  Lab Results   Component Value Date    HGBA1C 5.7 (H) 05/17/2024    HGBA1C 5.6 10/05/2023          Assessment & Plan  Status post musculoskeletal system surgery  Finger mass, right  64 y.o. male presents 5 weeks status post Excisional Biopsy Right Index Finger Mass - Right. The firmness over the middle phalanx is likely new scar tissue formation. He has been doing some scar massage over the finger. Encouraged patient to continue aggressive scar massage especially over the middle phalanx. He may also continue working on passive ROM of the finger to help with his stiffness. Referral also placed to formal PT/OT. We will continue to monitor the progression of his sensation around the incision site. It is recommended he return to the office in 4 weeks for repeat evaluation.   Orders:    Ambulatory Referral to PT/OT Hand Therapy; Future        he expressed understanding of the plan and agreed. We encouraged them to contact our office with any questions or concerns.       Ru Billings MD  Hand and Upper Extremity Surgery        *This note was dictated using Dragon voice recognition software. Please excuse any word substitutions or errors.*      Scribe Attestation      I,:  Evelia Moreno PA-C am acting as a scribe while in the presence of the attending physician.:       I,:  Ru Billings MD personally performed the services described in this documentation    as scribed in my presence.:

## 2025-05-16 NOTE — ASSESSMENT & PLAN NOTE
64 y.o. male presents 5 weeks status post Excisional Biopsy Right Index Finger Mass - Right. The firmness over the middle phalanx is likely new scar tissue formation. He has been doing some scar massage over the finger. Encouraged patient to continue aggressive scar massage especially over the middle phalanx. He may also continue working on passive ROM of the finger to help with his stiffness. Referral also placed to formal PT/OT. We will continue to monitor the progression of his sensation around the incision site. It is recommended he return to the office in 4 weeks for repeat evaluation.   Orders:    Ambulatory Referral to PT/OT Hand Therapy; Future

## 2025-06-05 ENCOUNTER — EVALUATION (OUTPATIENT)
Dept: OCCUPATIONAL THERAPY | Age: 65
End: 2025-06-05
Payer: COMMERCIAL

## 2025-06-05 DIAGNOSIS — R22.31 FINGER MASS, RIGHT: Primary | ICD-10-CM

## 2025-06-05 PROCEDURE — 97140 MANUAL THERAPY 1/> REGIONS: CPT

## 2025-06-05 PROCEDURE — 97165 OT EVAL LOW COMPLEX 30 MIN: CPT

## 2025-06-05 NOTE — PROGRESS NOTES
OT Evaluation     Today's date: 2025  Patient name: Jack Dewey  : 1960  MRN: [id number]  Referring provider: Evelia Moreno PA-C  Dx:   Encounter Diagnosis     ICD-10-CM    1. Finger mass, right  R22.31           Start Time: 1120  Stop Time: 1210  Total time in clinic (min): 50 minutes    Assessment  Impairments: abnormal or restricted ROM, activity intolerance, impaired physical strength, lacks appropriate home exercise program, pain with function and activity limitations    Assessment details: Pt presents for OT eval s/p R index finger mass excision 4/10/25. Subjectively, pt reports pain with activities that involve gripping or any pressure. Pt also notes numbness and tingling in the index finger. Pt has difficulty with home maintenance tasks and leisure activities. Objectively, pt has increased edema, decreased AROM, and  decreased strength compared to the uninvolved side. Pt has decreased sensation with paresthesias just distal to incision site on the ulnar aspect of the index finger. OT recommending services 1-2x/week for 6 weeks to progress toward goals. Pt may benefit from a static splint to improve ROM and was provided with a static progressive finger splint.  Pt understands/agrees with current POC.    Understanding of Dx/Px/POC: good     Prognosis: good    Goals  Goals  Short term goals:  To be achieved within 1-2 visits from start of care    Pt will be independent with HEP.    Pt will decrease pain to improve tolerance with IADLs and subjectively report pain at worst 1/10 during IADLs  Pt will be independent with edema management and demonstrate understanding of edema reduction practices.         Long term goal  To be achieved at DC     Pt will increase  strength to 77 lbs to improve tolerance to I/ADL tasks  Pt will improve pinch strength to at least 90 % of the contralateral side to improve tolerance to IADL tasks.  Pt will demonstrate insignificant edema that allows for  participation in IADL tasks.      Plan  Patient would benefit from: skilled occupational therapy  Referral necessary: No  Planned modality interventions: thermotherapy: hydrocollator packs and ultrasound    Planned therapy interventions: IASTM, joint mobilization, manual therapy, massage, nerve gliding, neuromuscular re-education, orthotic fitting/training, orthotic management and training, patient/caregiver education, strengthening, stretching, therapeutic activities, therapeutic exercise, home exercise program, graded exercise, functional ROM exercises, flexibility and fine motor coordination training    Frequency: 1-2x month  Duration in weeks: 12  Plan of Care beginning date: 2025  Plan of Care expiration date: 2025  Treatment plan discussed with: patient        Subjective Evaluation    History of Present Illness  Date of onset: 4/10/2025  Date of surgery: 4/10/2025  Mechanism of injury: surgery  Mechanism of injury: Pt is a 64 year old RHD referred to OT for s/p R index finger mass excision 4/10/25.     Occupational Profile  ADLs: na    IADLs: difficulty with house maintenance tasks    School: na    Driving: na    Sport/Leisure: difficulty gripping golf club    Work: na              Not a recurrent problem   Quality of life: good    Patient Goals  Patient goals for therapy: increased strength, return to sport/leisure activities, decreased pain, decreased edema and increased motion  Patient goal: Decreased pressure and swelling and improved sensation  Pain  Current pain ratin  At best pain ratin  At worst pain rating: 3  Quality: pressure and needle-like (numbness)  Relieving factors: rest  Exacerbated by: gripping.    Treatments  Current treatment: occupational therapy        Objective    Tissue Integrity: closed and healed incision. Scar adherence, decreased skin mobility around scar, D2 firmness, and slightly pink scar tissue. No signs of infection.      Sensation     Diminished sensation  starting just distal to incision site on the ulnar aspect of the index finger     Decreased light touch with [place] 2.83 monofilament compared to uninvolved side.     Special Tests: na    Edema (circumferential) (cm):    Right Left   P2 middle  6.3 6.2       AROM     Right Hand (ext/flex)   D2   MCP 75   PIP 95   DIP 50   Hook (DPC) 2.6     Left Hand (ext/flex)   D2   MCP 75   PIP 95   DIP 60         /Pinch Strength  Dynamometer R UE  L UE comments   Position #2 (lbs) 75 77.1     Pinch Meter         Lateral 20 26      3 JAW DAVION 14 18      2-point 12.5  13              Precautions: R index mass excision 4/10  Manuals  06/05 eval                  Scar mob  3 min                Edema massage  2 min             IASTM  5 min                                                       Ther Ex                       Tendon glides                  DIP/PIP blocking              DIP/PIP stretching              Functional gripping                                                                                                                                                Ther Activity                   fine motor/dexterity                   Stereognosis                                                                                  HEP  issued                 Neuro Re-Ed                                                               Ortho Fit                  Static progressive finger splint Issued and educated pt                                            Modalities                  HP  5 min

## 2025-06-12 ENCOUNTER — OFFICE VISIT (OUTPATIENT)
Dept: OCCUPATIONAL THERAPY | Age: 65
End: 2025-06-12
Payer: COMMERCIAL

## 2025-06-12 DIAGNOSIS — E29.1 HYPOGONADISM IN MALE: ICD-10-CM

## 2025-06-12 DIAGNOSIS — R22.31 FINGER MASS, RIGHT: Primary | ICD-10-CM

## 2025-06-12 PROCEDURE — 97530 THERAPEUTIC ACTIVITIES: CPT

## 2025-06-12 PROCEDURE — 97140 MANUAL THERAPY 1/> REGIONS: CPT

## 2025-06-12 PROCEDURE — 97110 THERAPEUTIC EXERCISES: CPT

## 2025-06-12 RX ORDER — TESTOSTERONE CYPIONATE 200 MG/ML
INJECTION, SOLUTION INTRAMUSCULAR
Qty: 4 ML | Refills: 0 | Status: SHIPPED | OUTPATIENT
Start: 2025-06-12

## 2025-06-12 NOTE — PROGRESS NOTES
Daily Note     Today's date: 2025  Patient name: Jack Dewey  : 1960  MRN: [id number]  Referring provider: Evelia Moreno PA-C  Dx:   Encounter Diagnosis     ICD-10-CM    1. Finger mass, right  R22.31           Start Time: 1045  Stop Time: 1130  Total time in clinic (min): 45 minutes    Subjective: Pt reports that he has noticed a little more motion in his finger following the IE, notes compliance with splint wearing. Pt also noticed improvement in sensation and holding a guitar pick is getting easier.       Objective: See treatment diary below  Updated HEP, issued pink putty    Assessment: Tolerated treatment well. Fatiguing with  and pinch strengthening. No errors during stereognosis. Patient would benefit from continued OT      Plan: Continue per plan of care.      Precautions: R index mass excision 4/10  Manuals   eval                  Scar mob  3 min  3 min              Edema massage  2 min 2 min             IASTM  5 min   5 min                                                     Ther Ex                       Tendon glides    10 ea              DIP/PIP blocking  20 ea             DIP/PIP stretching  20sx3             Functional gripping  BPW  2x20              Putty gripping  HEP red putty                                                                                                                                Ther Activity                   fine motor/dexterity     putty retrieval     Grooved peg board 1 at a time    Pinch clip [place]- 2 laps   3 jaw- green  Pincer- red                  Stereognosis   5 piece identification                                                                                HEP  issued   updated               Neuro Re-Ed                                                               Ortho Fit                  Static progressive finger splint Issued and educated pt                                            Modalities                  HP  5 min  5  blanca MCKNIGHT

## 2025-06-19 ENCOUNTER — OFFICE VISIT (OUTPATIENT)
Dept: OCCUPATIONAL THERAPY | Age: 65
End: 2025-06-19
Payer: COMMERCIAL

## 2025-06-19 DIAGNOSIS — R22.31 FINGER MASS, RIGHT: Primary | ICD-10-CM

## 2025-06-19 PROCEDURE — 97110 THERAPEUTIC EXERCISES: CPT

## 2025-06-19 PROCEDURE — 97140 MANUAL THERAPY 1/> REGIONS: CPT

## 2025-06-19 PROCEDURE — 97530 THERAPEUTIC ACTIVITIES: CPT

## 2025-06-23 ENCOUNTER — OFFICE VISIT (OUTPATIENT)
Dept: OCCUPATIONAL THERAPY | Age: 65
End: 2025-06-23
Payer: COMMERCIAL

## 2025-06-23 DIAGNOSIS — R22.31 FINGER MASS, RIGHT: Primary | ICD-10-CM

## 2025-06-23 PROCEDURE — 97110 THERAPEUTIC EXERCISES: CPT

## 2025-06-23 PROCEDURE — 97140 MANUAL THERAPY 1/> REGIONS: CPT

## 2025-06-23 PROCEDURE — 97530 THERAPEUTIC ACTIVITIES: CPT

## 2025-06-23 NOTE — PROGRESS NOTES
Daily Note     Today's date: 2025  Patient name: Jack Dewey  : 1960  MRN: [id number]  Referring provider: Evelia Moreno PA-C  Dx:   Encounter Diagnosis     ICD-10-CM    1. Finger mass, right  R22.31           Start Time: 1100  Stop Time: 1150  Total time in clinic (min): 50 minutes    Subjective: Pt reports that he continues to notice improvement in sensation and ROM. MD appt on Friday.       Objective: See treatment diary below  Issued Digi-sleeve for edema.     Assessment: Tolerated treatment well. Able to progress again with pinching/gripping. Continues with fatigue during fine motor tasks. Patient would benefit from continued OT      Plan: Continue per plan of care.       Precautions: R index mass excision 4/10  Manuals   eval              Scar mob  3 min  3 min  3 min  3 min          Edema massage  2 min 2 min  2 min 2 min          IASTM  5 min   5 min   5 min  5 min                                                Ther Ex                       Tendon glides    10 ea  10 ea  10 ea          DIP/PIP blocking  20 ea  20 ea 20 ea          DIP/PIP stretching  20sx3  30sx3  30s x3          Functional gripping  BPW  2x20   Black PW 2x20    Gripper with peg board  Black PW 20          Putty gripping  HEP red putty                                                                                                                                Ther Activity                   fine motor/dexterity     putty retrieval     Grooved peg board 1 at a time    Pinch clip [place]- 2 laps   3 jaw- green  Pincer- red      grooved peg with multiple pegs in hand      Pinch clip [place] 2 laps 3 jaw-blue  Pincer-green    Pinch clip [place] 2 laps 3 jaw-black  Pincer-blue     grooved peg with multiple pegs in hand    Tweezer, screws, small object peg board          Stereognosis   5 piece identification   np                                                                              HEP  issued    updated               Neuro Re-Ed                                                               Ortho Fit                  Static progressive finger splint Issued and educated pt                                            Modalities                  HP  5 min  5 min   5 min   5 min                               Camille Farris  OTS

## 2025-06-27 ENCOUNTER — OFFICE VISIT (OUTPATIENT)
Dept: OBGYN CLINIC | Facility: CLINIC | Age: 65
End: 2025-06-27

## 2025-06-27 VITALS — HEIGHT: 71 IN | WEIGHT: 220 LBS | BODY MASS INDEX: 30.8 KG/M2

## 2025-06-27 DIAGNOSIS — Z98.890 STATUS POST MUSCULOSKELETAL SYSTEM SURGERY: Primary | ICD-10-CM

## 2025-06-27 PROCEDURE — 99024 POSTOP FOLLOW-UP VISIT: CPT | Performed by: ORTHOPAEDIC SURGERY

## 2025-06-27 NOTE — PROGRESS NOTES
HAND & UPPER EXTREMITY OFFICE VISIT   Referred By:  Guillermo Eller Do  [address] [place] [address]      Chief Complaint:     Postop visit    Surgery:  Surgery Date: 4/10/2025 - Excisional Biopsy Right Index Finger Mass - Right     History of Present Illness:   Patient presents now 78 days status post the above surgery. he reports continued improvement since previous visit.  He states that his range of motion and strength is improving as he has been compliant with outpatient physical therapy.  He reports that the numbness and tingling is also improving as well.  He also reports softening of his scar tissue which has helping his symptoms.  He denies any new injury or trauma and is pleased with his postoperative outcomes..      Past Medical History:  Past Medical History[1]  Past Surgical History[2]  Family History[3]  Social History     Socioeconomic History    Marital status: /Civil Union     Spouse name: Not on file    Number of children: Not on file    Years of education: Not on file    Highest education level: Not on file   Occupational History    Not on file   Tobacco Use    Smoking status: Former     Current packs/day: 0.00     Average packs/day: 0.8 packs/day for 18.0 years (13.5 ttl pk-yrs)     Types: Cigarettes     Start date:      Quit date:      Years since quittin.5    Smokeless tobacco: Never   Vaping Use    Vaping status: Never Used   Substance and Sexual Activity    Alcohol use: Yes     Alcohol/week: 3.0 standard drinks of alcohol     Types: 1 Glasses of wine, 2 Cans of beer per week     Comment: weekend    Drug use: No    Sexual activity: Yes     Partners: Female   Other Topics Concern    Not on file   Social History Narrative    Daily caffeine consumption     Social Drivers of Health     Financial Resource Strain: Not on file   Food Insecurity: Not on file   Transportation Needs: Not on file   Physical Activity: Not on file   Stress: Not on file   Social  "Connections: Not on file   Intimate Partner Violence: Not on file   Housing Stability: Not on file     Scheduled Meds:  Continuous Infusions:No current facility-administered medications for this visit.    PRN Meds:.  Allergies[4]    Physical Examination:    Ht 5' 11\" (1.803 m)   Wt 99.8 kg (220 lb)   BMI 30.68 kg/m²     Gen: A&Ox3, NAD    Right Upper Extremity:  Incision healing well without signs of infection   Less firm scar tissue palpable  Minimal ttp  Composite fist and near full claw fist.  Range of motion much improved from last visit.  Sensation intact to light touch in the axillary median, ulnar, and radial nerve distributions  5/5 motor   Warm, well-perfused digits  Cap refill <2s      Studies:  OR Pathology:   Final Diagnosis   A. Soft Tissue, Right Index Finger:  CONSISTENT WITH PACINIAN NEUROMA       Labs:  I personally reviewed the following labs,  Lab Results   Component Value Date    HGBA1C 5.7 (H) 05/17/2024    HGBA1C 5.6 10/05/2023         Assessment & Plan  Status post musculoskeletal system surgery             64 y.o. male presents 78 days status post Excisional Biopsy Right Index Finger Mass - Right.  His range of motion is much better than it was last visit.  He can continue to progress as tolerated without restrictions.  This sensation also continues to improve on that aspect of the finger.     It is recommended he return to the office in 2 months, or sooner should symptoms worsen    he expressed understanding of the plan and agreed. We encouraged them to contact our office with any questions or concerns.         Ru Billings MD  Hand and Upper Extremity Surgery          *This note was dictated using Dragon voice recognition software. Please excuse any word substitutions or errors.*      Scribe Attestation      I,:  Hamzah Schofield am acting as a scribe while in the presence of the attending physician.:       I,:  Ru Billings MD personally performed the services described " in this documentation    as scribed in my presence.:                     [1]   Past Medical History:  Diagnosis Date    Anxiety     Cancer (HCC)     skin ca on nose    Chronic kidney disease     kidney stones    GERD (gastroesophageal reflux disease)     Hyperlipidemia     Hypertension     Kidney stone     6/2022   [2]   Past Surgical History:  Procedure Laterality Date    COLONOSCOPY      ESOPHAGOGASTRODUODENOSCOPY      EXTRACORPOREAL SHOCK WAVE LITHOTRIPSY Bilateral     Renal Multiple times    FL RETROGRADE PYELOGRAM  3/31/2022    FL RETROGRADE PYELOGRAM  6/22/2022    FL RETROGRADE PYELOGRAM  5/31/2024    IL CYSTO/URETERO W/LITHOTRIPSY &INDWELL STENT INSRT Left 8/6/2020    Procedure: CYSTOSCOPY URETEROSCOPY WITH LITHOTRIPSY HOLMIUM LASER, AND INSERTION STENT URETERAL;  Surgeon: Otis Rutledge MD;  Location: [facility];  Service: Urology    IL CYSTO/URETERO W/LITHOTRIPSY &INDWELL STENT INSRT Right 3/31/2022    Procedure: CYSTOSCOPY URETEROSCOPY WITH LITHOTRIPSY HOLMIUM LASER,BASKET STONE EXTRACTION, RETROGRADE PYELOGRAM AND INSERTION STENT URETERAL;  Surgeon: Jamal Finley MD;  Location: [facility];  Service: Urology    IL CYSTO/URETERO W/LITHOTRIPSY &INDWELL STENT INSRT Left 6/22/2022    Procedure: CYSTOSCOPY URETEROSCOPY WITH LITHOTRIPSY HOLMIUM LASER, RETROGRADE PYELOGRAM AND INSERTION STENT URETERAL, STONE BASKET EXTRACTION;  Surgeon: Ed Beck MD;  Location: [facility];  Service: Urology    IL CYSTO/URETERO W/LITHOTRIPSY &INDWELL STENT INSRT Left 5/31/2024    Procedure: CYSTOSCOPY URETEROSCOPY WITH LITHOTRIPSY HOLMIUM LASER, STONE BASKET EXTRACTION, RETROGRADE PYELOGRAM AND INSERTION STENT URETERAL;  Surgeon: Christopher Nicole MD;  Location: [facility];  Service: Urology    IL EXC B9 LESION MRGN XCP SK TG F/E/E/N/L/M > 4.0CM Right 4/28/2016    Procedure: EXCISION  LESION UPPER EYELID, COMPLEX CLOSURE;  Surgeon: Gianluca Salazar MD;  Location: [facility];  Service: Plastics    IL EXC CINTHIA/VASC MAL  SFT TISS HAND/FNGR SUBQ <1.5CM Right 4/10/2025    Procedure: EXCISIONAL BIOPSY RIGHT INDEX FINGER MASS;  Surgeon: Ru Billings MD;  Location: [facility];  Service: Orthopedics    AR LITHOTRIPSY XTRCORP SHOCK WAVE Right 4/18/2019    Procedure: LITHROTRIPSY EXTRACORPORAL SHOCKWAVE (ESWL);  Surgeon: Orlando Crouch MD;  Location: [facility];  Service: Urology   [3]   Family History  Problem Relation Name Age of Onset    Other Father          CABG (coronary artery bypass surgery)    Atrial fibrillation Sister      Other Paternal Uncle          Myocardial infarction    Coronary artery disease Family      Diabetes Family      Hypertension Family      Uterine cancer Family     [4]   Allergies  Allergen Reactions    Ciprofloxacin Hives    Macrobid [Nitrofurantoin] Nausea Only and Palpitations

## 2025-06-30 ENCOUNTER — OFFICE VISIT (OUTPATIENT)
Dept: OCCUPATIONAL THERAPY | Age: 65
End: 2025-06-30
Payer: COMMERCIAL

## 2025-06-30 DIAGNOSIS — R22.31 FINGER MASS, RIGHT: Primary | ICD-10-CM

## 2025-06-30 PROCEDURE — 97110 THERAPEUTIC EXERCISES: CPT

## 2025-06-30 PROCEDURE — 97530 THERAPEUTIC ACTIVITIES: CPT

## 2025-06-30 PROCEDURE — 97140 MANUAL THERAPY 1/> REGIONS: CPT

## 2025-06-30 NOTE — PROGRESS NOTES
Daily Note     Today's date: 2025  Patient name: Jack Dewey  : 1960  MRN: [id number]  Referring provider: Evelia Moreno PA-C  Dx:   Encounter Diagnosis     ICD-10-CM    1. Finger mass, right  R22.31             Start Time: 1100  Stop Time: 1145  Total time in clinic (min): 45 minutes    Subjective: Pt reports that his MD appt went well, pleased with progress. Feels like the Digi-sleeve is helpful.       Objective: See treatment diary below    Hook (DPC)  cm (2.6 IE)     1.5      Assessment: Tolerated treatment well. Pt progressing well with strength and ROM. Patient would benefit from continued OT      Plan: Continue per plan of care.       Precautions: R index mass excision 4/10  Manuals   eval            Scar mob  3 min  3 min  3 min  3 min  3 min         Edema massage  2 min 2 min  2 min 2 min 2 min          IASTM  5 min   5 min   5 min  5 min  5 min                                              Ther Ex                       Tendon glides    10 ea  10 ea  10 ea  10 ea        DIP/PIP blocking  20 ea  20 ea 20 ea  20 ea        DIP/PIP stretching  20sx3  30sx3  30s x3  30sx3        Functional gripping  BPW  2x20   Black PW 2x20    Gripper with peg board  Black PW 20  black PW 20        Putty gripping  HEP red putty                                                                                                                                Ther Activity                   fine motor/dexterity     putty retrieval     Grooved peg board 1 at a time    Pinch clip [place]- 2 laps   3 jaw- green  Pincer- red      grooved peg with multiple pegs in hand      Pinch clip [place] 2 laps 3 jaw-blue  Pincer-green    Pinch clip [place] 2 laps 3 jaw-black  Pincer-blue     grooved peg with multiple pegs in hand    Tweezer, screws, small object peg board  Pinch clip [place] 2 laps 3 jaw-black  Pincer-blue       Tweezer, screws, small object peg board        Stereognosis   5 piece  identification   np                                                                              HEP  issued   updated               Neuro Re-Ed                                                               Ortho Fit                  Static progressive finger splint Issued and educated pt                                            Modalities                  HP  5 min  5 min   5 min   5 min  5 min                              Camille MCKNIGHT

## 2025-07-07 ENCOUNTER — APPOINTMENT (OUTPATIENT)
Dept: OCCUPATIONAL THERAPY | Age: 65
End: 2025-07-07
Payer: COMMERCIAL

## 2025-07-10 ENCOUNTER — OFFICE VISIT (OUTPATIENT)
Dept: OCCUPATIONAL THERAPY | Age: 65
End: 2025-07-10
Payer: COMMERCIAL

## 2025-07-10 DIAGNOSIS — R22.31 FINGER MASS, RIGHT: Primary | ICD-10-CM

## 2025-07-10 PROCEDURE — 97168 OT RE-EVAL EST PLAN CARE: CPT

## 2025-07-10 PROCEDURE — 97110 THERAPEUTIC EXERCISES: CPT

## 2025-07-10 NOTE — PROGRESS NOTES
"OT Discharge     Today's date: 7/10/2025  Patient name: Jack Dewey  : 1960  MRN: [id number]  Referring provider: Evelia Moreno PA-C  Dx:   Encounter Diagnosis     ICD-10-CM    1. Finger mass, right  R22.31           Start Time: 740  Stop Time: 08  Total time in clinic (min): 25 minutes    Subjective: \"I feel pretty good. I think I can do everything at home.\"       Objective: See treatment diary below      Edema (circumferential) (cm):     Right Left   P2 index  6.5 (was 6.3) 6.2         AROM      Right Hand (ext/flex)    D2   MCP 85 (was 75    (was 95)   DIP 60 (was 50)   Hook (DPC) 0.5 (was 2.6)           /Pinch Strength  Dynamometer R UE  L UE comments   Position #2 (lbs) 80.6 (was 75) 75.4 (was 77.1)     Pinch Meter          Lateral 26 (was 20) 25 (was 26)      3 JAW DAVION 19 (was 14) 17 (was 18)      2-point 12 (was 12.5)  12 (was 13)         Assessment: Tolerated treatment well. Pt doing very well overall. He can make a full fist at this point and feels his numbness, strength and ROM are much improved. Objectively, AROM and strength are much improved compared to evaluation. He is confident continuing his HEPs at home. Will discharge from OT.       Plan: Discharge from OT.      Precautions: R index mass excision 4/10  Manuals   eval   06/12  06/19  06/23  06/30 7/10        Scar mob  3 min  3 min  3 min  3 min  3 min         Edema massage  2 min 2 min  2 min 2 min 2 min          IASTM  5 min   5 min   5 min  5 min  5 min                                              Ther Ex                       Tendon glides    10 ea  10 ea  10 ea  10 ea 10 each       DIP/PIP blocking  20 ea  20 ea 20 ea  20 ea X20 each       DIP/PIP stretching  20sx3  30sx3  30s x3  30sx3 30s x3       Functional gripping  BPW  2x20   Black PW 2x20    Gripper with peg board  Black PW 20  black PW 20        Putty gripping  HEP red putty                                                                               "                                                  Ther Activity                   fine motor/dexterity     putty retrieval     Grooved peg board 1 at a time    Pinch clip [place]- 2 laps   3 jaw- green  Pincer- red      grooved peg with multiple pegs in hand      Pinch clip [place] 2 laps 3 jaw-blue  Pincer-green    Pinch clip [place] 2 laps 3 jaw-black  Pincer-blue     grooved peg with multiple pegs in hand    Tweezer, screws, small object peg board  Pinch clip [place] 2 laps 3 jaw-black  Pincer-blue       Tweezer, screws, small object peg board        Stereognosis   5 piece identification   np                                                                              HEP  issued   updated               Neuro Re-Ed                                                               Ortho Fit                  Static progressive finger splint Issued and educated pt                                            Modalities                  HP  5 min  5 min   5 min   5 min  5 min  5 min in flexion stretch                            Camille Farris  OTS

## 2025-07-14 ENCOUNTER — APPOINTMENT (OUTPATIENT)
Dept: OCCUPATIONAL THERAPY | Age: 65
End: 2025-07-14
Payer: COMMERCIAL

## 2025-07-21 ENCOUNTER — APPOINTMENT (OUTPATIENT)
Dept: OCCUPATIONAL THERAPY | Age: 65
End: 2025-07-21
Payer: COMMERCIAL

## 2025-07-27 DIAGNOSIS — E78.1 HYPERTRIGLYCERIDEMIA: ICD-10-CM

## 2025-07-28 ENCOUNTER — APPOINTMENT (OUTPATIENT)
Dept: OCCUPATIONAL THERAPY | Age: 65
End: 2025-07-28
Payer: COMMERCIAL

## 2025-07-28 DIAGNOSIS — F41.9 ANXIETY: ICD-10-CM

## 2025-07-28 RX ORDER — LORAZEPAM 0.5 MG/1
0.5 TABLET ORAL DAILY PRN
Qty: 90 TABLET | Refills: 0 | Status: SHIPPED | OUTPATIENT
Start: 2025-07-28

## 2025-07-29 RX ORDER — FENOFIBRATE 145 MG/1
145 TABLET, FILM COATED ORAL DAILY
Qty: 90 TABLET | Refills: 0 | Status: SHIPPED | OUTPATIENT
Start: 2025-07-29

## 2025-08-18 DIAGNOSIS — K21.9 GASTROESOPHAGEAL REFLUX DISEASE WITHOUT ESOPHAGITIS: ICD-10-CM

## 2025-08-20 RX ORDER — OMEPRAZOLE 20 MG/1
20 CAPSULE, DELAYED RELEASE ORAL DAILY
Qty: 90 CAPSULE | Refills: 0 | Status: SHIPPED | OUTPATIENT
Start: 2025-08-20

## (undated) DEVICE — PACK TUR

## (undated) DEVICE — CAST PADDING 4 IN SYNTHETIC NON-STRL

## (undated) DEVICE — GUIDEWIRE STRGHT TIP 0.035 IN  SOLO PLUS

## (undated) DEVICE — BASKET SPECIMEN RETRIVAL 1.9FR 120CM

## (undated) DEVICE — GUIDEWIRE ANGLE TIP 0.038 IN SOLO PLUS

## (undated) DEVICE — GLOVE SRG BIOGEL 7

## (undated) DEVICE — ENDOSCOPIC VALVE WITH ADAPTER.: Brand: SURSEAL® II

## (undated) DEVICE — SPECIMEN CONTAINER STERILE PEEL PACK

## (undated) DEVICE — EXIDINE 4 PCT

## (undated) DEVICE — CATH URETERAL 5FR X 70 CM FLEX TIP POLYUR BARD

## (undated) DEVICE — CATH URET .038 10FR 50CM DUAL LUMEN

## (undated) DEVICE — SCD SEQUENTIAL COMPRESSION COMFORT SLEEVE MEDIUM KNEE LENGTH: Brand: KENDALL SCD

## (undated) DEVICE — NEEDLE 25G X 1 1/2

## (undated) DEVICE — INVIEW CLEAR LEGGINGS: Brand: CONVERTORS

## (undated) DEVICE — SINGLE PORT MANIFOLD: Brand: NEPTUNE 2

## (undated) DEVICE — PREMIUM DRY TRAY LF: Brand: MEDLINE INDUSTRIES, INC.

## (undated) DEVICE — Device

## (undated) DEVICE — CHLORHEXIDINE 4PCT 4 OZ

## (undated) DEVICE — SINGLE-USE DIGITAL FLEXIBLE URETEROSCOPE: Brand: APTRA

## (undated) DEVICE — GLOVE PI ULTRA TOUCH SZ.8.0

## (undated) DEVICE — INTENDED FOR TISSUE SEPARATION, AND OTHER PROCEDURES THAT REQUIRE A SHARP SURGICAL BLADE TO PUNCTURE OR CUT.: Brand: BARD-PARKER ® CARBON RIB-BACK BLADES

## (undated) DEVICE — STERILE LUBRICATING JELLY, TUBE: Brand: HR LUBRICATING JELLY

## (undated) DEVICE — 3M™ STERI-STRIP™ COMPOUND BENZOIN TINCTURE 40 BAGS/CARTON 4 CARTONS/CASE C1544: Brand: 3M™ STERI-STRIP™

## (undated) DEVICE — FIBER STD QUANTA 272 MICRON

## (undated) DEVICE — STERILE SURGICAL LUBRICANT,  TUBE: Brand: SURGILUBE

## (undated) DEVICE — GLOVE INDICATOR PI UNDERGLOVE SZ 8 BLUE

## (undated) DEVICE — TUBING SUCTION 5MM X 12 FT

## (undated) DEVICE — SHEATH URETERAL ACCESS 12/14FR 45CM PROXIS

## (undated) DEVICE — SYRINGE 10ML LL

## (undated) DEVICE — STERILE BETHLEHEM PLASTIC HAND: Brand: CARDINAL HEALTH

## (undated) DEVICE — GLOVE SRG BIOGEL 7.5

## (undated) DEVICE — UROLOGIC DRAIN BAG: Brand: UNBRANDED

## (undated) DEVICE — LUBRICANT SURGILUBE TUBE 4 OZ  FLIP TOP

## (undated) DEVICE — GLOVE INDICATOR PI UNDERGLOVE SZ 6.5 BLUE

## (undated) DEVICE — LASER FIBER HOLMIUM 272MICRON

## (undated) DEVICE — PREP PAD BNS: Brand: CONVERTORS

## (undated) DEVICE — SUT ETHILON 4-0 PS-2 18 IN 1667H

## (undated) DEVICE — UROCATCH BAG

## (undated) DEVICE — GLOVE SRG BIOGEL 6.5